# Patient Record
Sex: FEMALE | Race: WHITE | NOT HISPANIC OR LATINO | Employment: FULL TIME | URBAN - METROPOLITAN AREA
[De-identification: names, ages, dates, MRNs, and addresses within clinical notes are randomized per-mention and may not be internally consistent; named-entity substitution may affect disease eponyms.]

---

## 2018-01-03 ENCOUNTER — OFFICE VISIT - HEALTHEAST (OUTPATIENT)
Dept: FAMILY MEDICINE | Facility: CLINIC | Age: 28
End: 2018-01-03

## 2018-01-03 DIAGNOSIS — L08.9 LOCAL SKIN INFECTION: ICD-10-CM

## 2018-01-05 ENCOUNTER — AMBULATORY - HEALTHEAST (OUTPATIENT)
Dept: MULTI SPECIALTY CLINIC | Facility: CLINIC | Age: 28
End: 2018-01-05

## 2018-01-05 ENCOUNTER — OFFICE VISIT (OUTPATIENT)
Dept: FAMILY MEDICINE | Facility: CLINIC | Age: 28
End: 2018-01-05
Payer: COMMERCIAL

## 2018-01-05 VITALS
BODY MASS INDEX: 22.44 KG/M2 | OXYGEN SATURATION: 100 % | HEART RATE: 96 BPM | TEMPERATURE: 98.1 F | RESPIRATION RATE: 14 BRPM | DIASTOLIC BLOOD PRESSURE: 68 MMHG | SYSTOLIC BLOOD PRESSURE: 116 MMHG | HEIGHT: 67 IN | WEIGHT: 143 LBS

## 2018-01-05 DIAGNOSIS — Z12.4 PAP SMEAR FOR CERVICAL CANCER SCREENING: ICD-10-CM

## 2018-01-05 DIAGNOSIS — G47.00 PERSISTENT INSOMNIA: ICD-10-CM

## 2018-01-05 DIAGNOSIS — Z00.00 ROUTINE GENERAL MEDICAL EXAMINATION AT A HEALTH CARE FACILITY: Primary | ICD-10-CM

## 2018-01-05 LAB
CHOLEST SERPL-MCNC: 163 MG/DL
GLUCOSE SERPL-MCNC: 78 MG/DL (ref 70–99)
HDLC SERPL-MCNC: 69 MG/DL
LDLC SERPL CALC-MCNC: 83 MG/DL
NONHDLC SERPL-MCNC: 94 MG/DL
PAP SMEAR - HIM PATIENT REPORTED: NORMAL
TRIGL SERPL-MCNC: 57 MG/DL

## 2018-01-05 PROCEDURE — G0145 SCR C/V CYTO,THINLAYER,RESCR: HCPCS | Performed by: PHYSICIAN ASSISTANT

## 2018-01-05 PROCEDURE — 99385 PREV VISIT NEW AGE 18-39: CPT | Performed by: PHYSICIAN ASSISTANT

## 2018-01-05 PROCEDURE — 80061 LIPID PANEL: CPT | Performed by: PHYSICIAN ASSISTANT

## 2018-01-05 PROCEDURE — 36415 COLL VENOUS BLD VENIPUNCTURE: CPT | Performed by: PHYSICIAN ASSISTANT

## 2018-01-05 PROCEDURE — 82947 ASSAY GLUCOSE BLOOD QUANT: CPT | Performed by: PHYSICIAN ASSISTANT

## 2018-01-05 RX ORDER — TRAZODONE HYDROCHLORIDE 50 MG/1
50 TABLET, FILM COATED ORAL
Qty: 30 TABLET | Refills: 5 | Status: SHIPPED | OUTPATIENT
Start: 2018-01-05 | End: 2021-08-30

## 2018-01-05 NOTE — LETTER
January 6, 2018      Georgie BAKARI Sonia  [address] [address] [place] [address]        Dear ,    We are writing to inform you of your test results.    - Your Cholesterol is excellent.  - Your glucose (screening for diabetes) was normal. We do not need to recheck these for another three years.  - You will receive your Pap Smear results in a separate letter.      Resulted Orders   Glucose   Result Value Ref Range    Glucose 78 70 - 99 mg/dL      Comment:      Fasting specimen   Lipid panel reflex to direct LDL Fasting   Result Value Ref Range    Cholesterol 163 <200 mg/dL    Triglycerides 57 <150 mg/dL      Fasting specimen    HDL Cholesterol 69 >49 mg/dL    LDL Cholesterol Calculated 83 <100 mg/dL      Desirable:       <100 mg/dl    Non HDL Cholesterol 94 <130 mg/dL     If you have any questions or concerns, please call the clinic at the number listed above.       Sincerely,      Mayuri Mcdaniel PA-C

## 2018-01-05 NOTE — MR AVS SNAPSHOT
After Visit Summary   1/5/2018    Georgie Scott    MRN: [id number]           Patient Information     Date Of Birth          1990        Visit Information        Provider Department      1/5/2018 7:30 AM Mayuri Mcdaniel PA-C [facility]        Today's Diagnoses     Routine general medical examination at a health care facility    -  1    Pap smear for cervical cancer screening        Need for prophylactic vaccination and inoculation against influenza        Persistent insomnia          Care Instructions      Preventive Health Recommendations  Female Ages 26 - 39  Yearly exam:   See your health care provider every year in order to    Review health changes.     Discuss preventive care.      Review your medicines if you your doctor has prescribed any.    Until age 30: Get a Pap test every three years (more often if you have had an abnormal result).    After age 30: Talk to your doctor about whether you should have a Pap test every 3 years or have a Pap test with HPV screening every 5 years.   You do not need a Pap test if your uterus was removed (hysterectomy) and you have not had cancer.  You should be tested each year for STDs (sexually transmitted diseases), if you're at risk.   Talk to your provider about how often to have your cholesterol checked.  If you are at risk for diabetes, you should have a diabetes test (fasting glucose).  Shots: Get a flu shot each year. Get a tetanus shot every 10 years.   Nutrition:     Eat at least 5 servings of fruits and vegetables each day.    Eat whole-grain bread, whole-wheat pasta and brown rice instead of white grains and rice.    Talk to your provider about Calcium and Vitamin D.     Lifestyle    Exercise at least 150 minutes a week (30 minutes a day, 5 days of the week). This will help you control your weight and prevent disease.    Limit alcohol to one drink per day.    No smoking.     Wear sunscreen to prevent  "skin cancer.    See your dentist every six months for an exam and cleaning.            Follow-ups after your visit        Who to contact     If you have questions or need follow up information about today's clinic visit or your schedule please contact [facility] directly at [phone].  Normal or non-critical lab and imaging results will be communicated to you by MyChart, letter or phone within 4 business days after the clinic has received the results. If you do not hear from us within 7 days, please contact the clinic through MyChart or phone. If you have a critical or abnormal lab result, we will notify you by phone as soon as possible.  Submit refill requests through "[email or web address] or call your pharmacy and they will forward the refill request to us. Please allow 3 business days for your refill to be completed.          Additional Information About Your Visit        MyChart Information     "[email or web address] lets you send messages to your doctor, view your test results, renew your prescriptions, schedule appointments and more. To sign up, go to [email or web address] . Click on \"Log in\" on the left side of the screen, which will take you to the Welcome page. Then click on \"Sign up Now\" on the right side of the page.     You will be asked to enter the access code listed below, as well as some personal information. Please follow the directions to create your username and password.     Your access code is: [email or web address]  Expires: 2018  8:02 AM     Your access code will  in 90 days. If you need help or a new code, please call your [facility] or [phone].        Care EveryWhere ID     This is your Care EveryWhere ID. This could be used by other organizations to access your [place] medical records  [id number]        Your Vitals Were     Pulse Temperature Respirations Height Last Period Pulse Oximetry    96 98.1  F (36.7  C) (Tympanic) 14 5' 7.25\" (1.708 m) 2017 (Exact Date) " 100%    Breastfeeding? BMI (Body Mass Index)                No 22.23 kg/m2           Blood Pressure from Last 3 Encounters:   01/05/18 116/68    Weight from Last 3 Encounters:   01/05/18 143 lb (64.9 kg)              We Performed the Following     Glucose     Lipid panel reflex to direct LDL Fasting     Pap imaged thin layer screen reflex to HPV if ASCUS - recommend age 25 - 29        Primary Care Provider    None Specified       No primary provider on file.        Equal Access to Services     FAVIO MACEDO : Hadii aad ku hadsherryo Soromeoali, waaxda luqadaha, qaybta kaalmada adeegyada, waxay cadence hyltoneliapetey flowers . So [facility] [phone].    ATENCIÓN: Si habla español, tiene a mcgee disposición servicios gratuitos de asistencia lingüística. Llame al [phone].    We comply with applicable federal civil rights laws and [place] laws. We do not discriminate on the basis of race, color, national origin, age, disability, sex, sexual orientation, or gender identity.            Thank you!     Thank you for choosing [facility]  for your care. Our goal is always to provide you with excellent care. Hearing back from our patients is one way we can continue to improve our services. Please take a few minutes to complete the written survey that you may receive in the mail after your visit with us. Thank you!             Your Updated Medication List - Protect others around you: Learn how to safely use, store and throw away your medicines at www.disposemymeds.org.          This list is accurate as of: 1/5/18  8:02 AM.  Always use your most recent med list.                   Brand Name Dispense Instructions for use Diagnosis    KEFLEX PO

## 2018-01-05 NOTE — PROGRESS NOTES
SUBJECTIVE:   CC: Georgie Scott is an 27 year old woman who presents for preventive health visit.     Physical   Annual:     Getting at least 3 servings of Calcium per day::  Yes    Bi-annual eye exam::  Yes    Dental care twice a year::  NO    Sleep apnea or symptoms of sleep apnea::  None    Diet::  Regular (no restrictions)    Taking medications regularly::  Not Applicable    Medication side effects::  Not applicable    Additional concerns today::  YES          -possible cellulitis, left elbow, small bump, painful, taking abx for it currently  - Abnormal pap a long time ago but also had yeast infection, has had normal since then.    Today's PHQ-2 Score:   PHQ-2 ( 1999 Pf[employer]) 1/5/2018   Q1: Little interest or pleasure in doing things 0   Q2: Feeling down, depressed or hopeless 0   PHQ-2 Score 0   Q1: Little interest or pleasure in doing things Not at all   Q2: Feeling down, depressed or hopeless Not at all   PHQ-2 Score 0       Abuse: Current or Past(Physical, Sexual or Emotional)- No  Do you feel safe in your environment - Yes    Social History   Substance Use Topics     Smoking status: Never Smoker     Smokeless tobacco: Never Used     Alcohol use Yes      Comment: moderate     Alcohol Use 1/5/2018   If you drink alcohol, do you typically have greater than 3 drinks per day OR greater than 7 drinks per week?   Yes   AUDIT SCORE  6     AUDIT - Alcohol Use Disorders Identification Test - Reproduced from the World Health Organization Audit 2001 (Second Edition) 1/5/2018   1.  How often do you have a drink containing alcohol? 2 to 3 times a week   2.  How many drinks containing alcohol do you have on a typical day when you are drinking? 1 or 2   3.  How often do you have five or more drinks on one occasion? Monthly   4.  How often during the last year have you found that you were not able to stop drinking once you had started? Never   5.  How often during the last year have you failed to do what was normally  expected of you because of drinking? Never   6.  How often during the last year have you needed a first drink in the morning to get yourself going after a heavy drinking session? Never   7.  How often during the last year have you had a feeling of guilt or remorse after drinking? Never   8.  How often during the last year have you been unable to remember what happened the night before because of your drinking? Less than monthly   9.  Have you or someone else been injured because of your drinking? No   10. Has a relative, friend, doctor or other health care worker been concerned about your drinking or suggested you cut down? No   TOTAL SCORE 6     Reviewed orders with patient.  Reviewed health maintenance and updated orders accordingly - Yes  BP Readings from Last 3 Encounters:   01/05/18 116/68    Wt Readings from Last 3 Encounters:   01/05/18 143 lb (64.9 kg)               Allergies   Allergen Reactions     Bactrim [Sulfamethoxazole W/Trimethoprim] Hives     Amoxicillin Rash     Morphine Rash     No lab results found.         Pertinent mammograms are reviewed under the imaging tab.  History of abnormal Pap smear: YES - other categories - see link Cervical Cytology Screening Guidelines normal since then, remote abnormal.      Reviewed and updated as needed this visit by clinical staffTobacco  Allergies  Meds  Problems  Med Hx  Surg Hx  Fam Hx  Soc Hx          Reviewed and updated as needed this visit by Provider  Tobacco  Allergies  Meds  Problems  Med Hx  Surg Hx  Fam Hx  Soc Hx         Review of Systems  C: NEGATIVE for fever, chills, change in weight  I: NEGATIVE for worrisome rashes, moles or lesions. improving cellulitis on left elbow about 4 mm in diameter  E: NEGATIVE for vision changes or irritation  ENT: NEGATIVE for ear, mouth and throat problems  R: NEGATIVE for significant cough or SOB  B: NEGATIVE for masses, tenderness or discharge  CV: NEGATIVE for chest pain, palpitations or peripheral  "edema  GI: NEGATIVE for nausea, abdominal pain, heartburn, or change in bowel habits  : NEGATIVE for unusual urinary or vaginal symptoms. Periods are regular.  M: NEGATIVE for significant arthralgias or myalgia  N: NEGATIVE for weakness, dizziness or paresthesias  PSYCHIATRIC: POSITIVE forfatigue and insomnia      OBJECTIVE:   /68  Pulse 96  Temp 98.1  F (36.7  C) (Tympanic)  Resp 14  Ht 5' 7.25\" (1.708 m)  Wt 143 lb (64.9 kg)  LMP 12/14/2017 (Exact Date)  SpO2 100%  Breastfeeding? No  BMI 22.23 kg/m2  Physical Exam  GENERAL: healthy, alert and no distress  EYES: Eyes grossly normal to inspection, PERRL and conjunctivae and sclerae normal  HENT: ear canals and TM's normal, nose and mouth without ulcers or lesions  NECK: no adenopathy, no asymmetry, masses, or scars and thyroid normal to palpation  RESP: lungs clear to auscultation - no rales, rhonchi or wheezes  BREAST: normal without masses, tenderness or nipple discharge and no palpable axillary masses or adenopathy  CV: regular rate and rhythm, normal S1 S2, no S3 or S4, no murmur, click or rub, no peripheral edema and peripheral pulses strong  ABDOMEN: soft, nontender, no hepatosplenomegaly, no masses and bowel sounds normal   (female): normal female external genitalia, normal urethral meatus, vaginal mucosa pink, moist, well rugated, and normal cervix/adnexa/uterus without masses or discharge  MS: no gross musculoskeletal defects noted, no edema  SKIN: 4 mm warm and erythematous macule on left elbow  NEURO: Normal strength and tone, mentation intact and speech normal  PSYCH: mentation appears normal, affect normal/bright    ASSESSMENT/PLAN:       ICD-10-CM    1. Routine general medical examination at a health care facility Z00.00 Lipid panel reflex to direct LDL Fasting   2. Pap smear for cervical cancer screening Z12.4 Pap imaged thin layer screen reflex to HPV if ASCUS - recommend age 25 - 29     Glucose   3. Persistent insomnia G47.00 " "traZODone (DESYREL) 50 MG tablet       Discussed cellulitis improving, do not need to add clinda unless getting worse in the next two days, can call and request this.  Continue keflex as prescribed.    Will trial trazodone for sleep, use prn.    Labs today, recheck not need for 3 years if normal.    COUNSELING:  Reviewed preventive health counseling, as reflected in patient instructions       reports that she has never smoked. She has never used smokeless tobacco.    Estimated body mass index is 22.23 kg/(m^2) as calculated from the following:    Height as of this encounter: 5' 7.25\" (1.708 m).    Weight as of this encounter: 143 lb (64.9 kg).         Counseling Resources:  ATP IV Guidelines  Pooled Cohorts Equation Calculator  Breast Cancer Risk Calculator  FRAX Risk Assessment  ICSI Preventive Guidelines  Dietary Guidelines for Americans, 2010  USDA's MyPlate  ASA Prophylaxis  Lung CA Screening    Mayuri Mcdaniel PA-C  [facility] XERXESAnswers for HPI/ROS submitted by the patient on 1/5/2018   PHQ-2 Score: 0  "

## 2018-01-05 NOTE — NURSING NOTE
"Chief Complaint   Patient presents with     Physical       Initial /68  Pulse 96  Temp 98.1  F (36.7  C) (Tympanic)  Resp 14  Ht 5' 7.25\" (1.708 m)  Wt 143 lb (64.9 kg)  LMP 12/14/2017 (Exact Date)  SpO2 100%  Breastfeeding? No  BMI 22.23 kg/m2 Estimated body mass index is 22.23 kg/(m^2) as calculated from the following:    Height as of this encounter: 5' 7.25\" (1.708 m).    Weight as of this encounter: 143 lb (64.9 kg).  Medication Reconciliation: complete    "

## 2018-01-05 NOTE — LETTER
January 11, 2018      Georgie Scott  [address] [address] [place] [address]    Dear ,      I am happy to inform you that your recent cervical cancer screening test (PAP smear) was normal.      Preventative screenings such as this help to ensure your health for years to come. You should repeat a pap smear in 3 years, unless otherwise directed.      You will still need to return to the clinic every year for your annual exam and other preventive tests.     Please contact the clinic at [phone] if you have further questions.       Sincerely,      Mayuri Mcdaniel PA-C/tru

## 2018-01-06 NOTE — PROGRESS NOTES
Lab letter printed and signed.  Message comments below:    - Your Cholesterol is excellent.  - Your glucose (screening for diabetes) was normal. We do not need to recheck these for another three years.  - You will receive your Pap Smear results in a separate letter.

## 2018-01-09 LAB
COPATH REPORT: NORMAL
PAP: NORMAL

## 2018-04-03 ENCOUNTER — HOSPITAL ENCOUNTER (EMERGENCY)
Facility: CLINIC | Age: 28
Discharge: HOME | End: 2018-04-03
Attending: PHYSICIAN ASSISTANT | Admitting: PHYSICIAN ASSISTANT
Payer: COMMERCIAL

## 2018-04-03 VITALS
HEART RATE: 77 BPM | TEMPERATURE: 97.9 F | DIASTOLIC BLOOD PRESSURE: 80 MMHG | SYSTOLIC BLOOD PRESSURE: 130 MMHG | OXYGEN SATURATION: 98 %

## 2018-04-03 DIAGNOSIS — R30.0 DYSURIA: ICD-10-CM

## 2018-04-03 LAB
ALBUMIN UR-MCNC: NEGATIVE MG/DL
APPEARANCE UR: CLEAR
BACTERIA #/AREA URNS HPF: ABNORMAL /HPF
BILIRUB UR QL STRIP: NEGATIVE
COLOR UR AUTO: ABNORMAL
GLUCOSE UR STRIP-MCNC: NEGATIVE MG/DL
HCG UR QL: NEGATIVE
HGB UR QL STRIP: ABNORMAL
KETONES UR STRIP-MCNC: NEGATIVE MG/DL
LEUKOCYTE ESTERASE UR QL STRIP: ABNORMAL
MUCOUS THREADS #/AREA URNS LPF: PRESENT /LPF
NITRATE UR QL: NEGATIVE
PH UR STRIP: 7 PH (ref 5–7)
RBC #/AREA URNS AUTO: 1 /HPF (ref 0–2)
SOURCE: ABNORMAL
SP GR UR STRIP: 1 (ref 1–1.03)
SQUAMOUS #/AREA URNS AUTO: 3 /HPF (ref 0–1)
UROBILINOGEN UR STRIP-MCNC: 0 MG/DL (ref 0–2)
WBC #/AREA URNS AUTO: 1 /HPF (ref 0–5)

## 2018-04-03 PROCEDURE — 81025 URINE PREGNANCY TEST: CPT | Performed by: PHYSICIAN ASSISTANT

## 2018-04-03 PROCEDURE — 81001 URINALYSIS AUTO W/SCOPE: CPT | Performed by: PHYSICIAN ASSISTANT

## 2018-04-03 PROCEDURE — 87086 URINE CULTURE/COLONY COUNT: CPT | Performed by: PHYSICIAN ASSISTANT

## 2018-04-03 PROCEDURE — G0463 HOSPITAL OUTPT CLINIC VISIT: HCPCS | Performed by: PHYSICIAN ASSISTANT

## 2018-04-03 PROCEDURE — 99214 OFFICE O/P EST MOD 30 MIN: CPT | Mod: Z6 | Performed by: PHYSICIAN ASSISTANT

## 2018-04-03 RX ORDER — CIPROFLOXACIN 500 MG/1
500 TABLET, FILM COATED ORAL 2 TIMES DAILY
Qty: 14 TABLET | Refills: 0 | Status: SHIPPED | OUTPATIENT
Start: 2018-04-03 | End: 2018-04-10

## 2018-04-03 NOTE — ED PROVIDER NOTES
History     Chief Complaint   Patient presents with     Rule out Urinary Tract Infection     HPI  Georgie Scott is a 27 year old female who is in urgent care with concern for possible urinary tract infection.  Patient states she initially developed symptoms approximately 2 weeks ago with some dysuria, increased frequency urgency, nausea.  She did an online virtual visit and was scrubbed and antibiotic thought to be Macrobid daily for 5 days.  She states that while on medication her symptoms improved dramatically however she did continue to have some mild dysuria at the beginning of urination on her last dose.  She finished medications 2 days ago.  Beginning yesterday she states that symptoms have gradually worsened again with increasing dysuria, frequency, urgency.  She has not had any hematuria.  She denies any fever chills body aches, nausea, vomiting, abdominal, flank or back pain.  She has not had any vaginal itching burning or discharge.  She has had infrequent urinary tract infections previously.    Problem List:    Patient Active Problem List    Diagnosis Date Noted     Persistent insomnia 01/05/2018     Priority: Medium        Past Medical History:    History reviewed. No pertinent past medical history.    Past Surgical History:    History reviewed. No pertinent surgical history.    Family History:    Family History   Problem Relation Age of Onset     Hypertension Father      Does not go to the doctor regularly     Hypertension Paternal Grandmother      Colon Cancer Paternal Grandfather 45     No other family history of colon cancer     DIABETES Paternal Uncle      DIABETES Paternal Aunt        Social History:  Marital Status:  Single [1]  Social History   Substance Use Topics     Smoking status: Never Smoker     Smokeless tobacco: Never Used     Alcohol use Yes      Comment: moderate      Medications:      traZODone (DESYREL) 50 MG tablet     Review of Systems  CONSTITUTIONAL:NEGATIVE for fever,  chills, change in weight  INTEGUMENTARY/SKIN: NEGATIVE for worrisome rashes, moles or lesions  RESP:NEGATIVE for significant cough or SOB  GI NEGATIVE For nausea, vomiting, diarrhea, abdominal pain   : POSITIVE for dysuria, increased frequency, urgency, burning NEGATIVE for hematuria or vaginal complaints.    Physical Exam   BP: 130/80  Pulse: 77  Temp: 97.9  F (36.6  C)  SpO2: 98 %  Physical Exam  GENERAL APPEARANCE: healthy, alert and no distress  RESP: lungs clear to auscultation - no rales, rhonchi or wheezes  CV: regular rates and rhythm, normal S1 S2, no murmur noted  ABDOMEN:  soft, nontender, no HSM or masses and bowel sounds normal  BACK: No CVA tenderness  SKIN: no suspicious lesions or rashes  ED Course     ED Course     Procedures        Critical Care time:  none            Results for orders placed or performed during the hospital encounter of 04/03/18 (from the past 24 hour(s))   UA with Microscopic   Result Value Ref Range    Color Urine Straw     Appearance Urine Clear     Glucose Urine Negative NEG^Negative mg/dL    Bilirubin Urine Negative NEG^Negative    Ketones Urine Negative NEG^Negative mg/dL    Specific Gravity Urine 1.002 (L) 1.003 - 1.035    Blood Urine Moderate (A) NEG^Negative    pH Urine 7.0 5.0 - 7.0 pH    Protein Albumin Urine Negative NEG^Negative mg/dL    Urobilinogen mg/dL 0.0 0.0 - 2.0 mg/dL    Nitrite Urine Negative NEG^Negative    Leukocyte Esterase Urine Trace (A) NEG^Negative    Source Midstream Urine     WBC Urine 1 0 - 5 /HPF    RBC Urine 1 0 - 2 /HPF    Bacteria Urine Few (A) NEG^Negative /HPF    Squamous Epithelial /HPF Urine 3 (H) 0 - 1 /HPF    Mucous Urine Present (A) NEG^Negative /LPF   HCG qualitative urine   Result Value Ref Range    HCG Qual Urine Negative NEG^Negative     Medications - No data to display    Assessments & Plan (with Medical Decision Making)     I have reviewed the nursing notes.    I have reviewed the findings, diagnosis, plan and need for follow up  with the patient.       Discharge Medication List as of 4/3/2018  4:36 PM      START taking these medications    Details   ciprofloxacin (CIPRO) 500 MG tablet Take 1 tablet (500 mg) by mouth 2 times daily for 7 days, Disp-14 tablet, R-0, E-Prescribe           Final diagnoses:   Dysuria     19-year-old female presents to urgent care with concern for possible urinary tract infection after developing dysuria, increased frequency, urgency 2 weeks ago and having some initial improvement with oral antibiotics however no complete resolution of pain.  She had stable vital signs upon arrival.  Physical exam findings as described above were benign.  As part of evaluation she did have urinalysis which did show moderate blood, few bacteria and trace leukocyte esterase,  However was dilute.  although this was not definitive for infection.  Given patient's classic history and initial response to antibiotics I did discuss her/benefits of initiating antibiotics while urine culture from today's visit was pending and patient elected to be placed on antibiotics.  If no improvement patient will require further evaluation including testing for sexually transmitted infections, wet prep which she declined today.  She was discharged home stable with instructions to follow up with PCP if no improvement in 48-72 hours.  Worrisome reasons to return to ER/UC sooner discussed.     Disclaimer: This note consists of symbols derived from keyboarding, dictation, and/or voice recognition software. As a result, there may be errors in the script that have gone undetected.  Please consider this when interpreting information found in the chart.    4/3/2018   [facility] EMERGENCY DEPARTMENT     Judi Estes PA-C  04/03/18 [id number]

## 2018-04-03 NOTE — ED AVS SNAPSHOT
[facility] Emergency Department    [address] [address]    Phone:  [phone]    Fax:  [phone]                                       Georgie Scott   MRN: [id number]    Department:  [facility] Emergency Department   Date of Visit:  4/3/2018           After Visit Summary Signature Page     I have received my discharge instructions, and my questions have been answered. I have discussed any challenges I see with this plan with the nurse or doctor.    ..........................................................................................................................................  Patient/Patient Representative Signature      ..........................................................................................................................................  Patient Representative Print Name and Relationship to Patient    ..................................................               ................................................  Date                                            Time    ..........................................................................................................................................  Reviewed by Signature/Title    ...................................................              ..............................................  Date                                                            Time

## 2018-04-03 NOTE — ED AVS SNAPSHOT
[facility] Emergency Department    [address]    [facility] [place] [address]    Phone:  [phone]    Fax:  [phone]                                       Georgie Scott   MRN: [id number]    Department:  [facility] Emergency Department   Date of Visit:  4/3/2018           Patient Information     Date Of Birth          1990        Your diagnoses for this visit were:     Dysuria        You were seen by Judi Estes PA-C.      Follow-up Information     Follow up with No Ref-Primary, Physician In 3 days.    Why:  As needed, If symptoms worsen      24 Hour Appointment Hotline       To make an appointment at any [facility] clinic, call [phone] ([phone]). If you don't have a family doctor or clinic, we will help you find one. [facility] clinics are conveniently located to serve the needs of you and your family.             Review of your medicines      START taking        Dose / Directions Last dose taken    ciprofloxacin 500 MG tablet   Commonly known as:  CIPRO   Dose:  500 mg   Quantity:  14 tablet        Take 1 tablet (500 mg) by mouth 2 times daily for 7 days   Refills:  0          Our records show that you are taking the medicines listed below. If these are incorrect, please call your family doctor or clinic.        Dose / Directions Last dose taken    traZODone 50 MG tablet   Commonly known as:  DESYREL   Dose:  50 mg   Quantity:  30 tablet        Take 1 tablet (50 mg) by mouth nightly as needed   Refills:  5                Prescriptions were sent or printed at these locations (1 Prescription)                   [facility] [address] [place] [address] [address] [facility] [address]    Telephone:  [phone]   Fax:  [phone]   Hours:                  E-Prescribed (1 of 1)         ciprofloxacin (CIPRO) 500 MG tablet                Procedures and tests performed during your visit     HCG qualitative urine    UA with  Microscopic    Urine Culture      Orders Needing Specimen Collection     None      Pending Results     Date and Time Order Name Status Description    4/3/2018 1553 Urine Culture In process             Pending Culture Results     Date and Time Order Name Status Description    4/3/2018 1553 Urine Culture In process             Pending Results Instructions     If you had any lab results that were not finalized at the time of your Discharge, you can call the ED Lab Result RN at [phone]. You will be contacted by this team for any positive Lab results or changes in treatment. The nurses are available 7 days a week from 10A to 6:30P.  You can leave a message 24 hours per day and they will return your call.        Test Results From Your Hospital Stay        4/3/2018  3:58 PM         4/3/2018  4:19 PM      Component Results     Component Value Ref Range & Units Status    Color Urine Straw  Final    Appearance Urine Clear  Final    Glucose Urine Negative NEG^Negative mg/dL Final    Bilirubin Urine Negative NEG^Negative Final    Ketones Urine Negative NEG^Negative mg/dL Final    Specific Gravity Urine 1.002 (L) 1.003 - 1.035 Final    Blood Urine Moderate (A) NEG^Negative Final    pH Urine 7.0 5.0 - 7.0 pH Final    Protein Albumin Urine Negative NEG^Negative mg/dL Final    Urobilinogen mg/dL 0.0 0.0 - 2.0 mg/dL Final    Nitrite Urine Negative NEG^Negative Final    Leukocyte Esterase Urine Trace (A) NEG^Negative Final    Source Midstream Urine  Final    WBC Urine 1 0 - 5 /HPF Final    RBC Urine 1 0 - 2 /HPF Final    Bacteria Urine Few (A) NEG^Negative /HPF Final    Squamous Epithelial /HPF Urine 3 (H) 0 - 1 /HPF Final    Mucous Urine Present (A) NEG^Negative /LPF Final         4/3/2018  4:05 PM      Component Results     Component Value Ref Range & Units Status    HCG Qual Urine Negative NEG^Negative Final    This test is for screening purposes.  Results should be interpreted along with   the clinical picture.  Confirmation  testing is available if warranted by   ordering BAB701, HCG Quantitative Pregnancy.                  Thank you for choosing [facility]       Thank you for choosing [facility] for your care. Our goal is always to provide you with excellent care. Hearing back from our patients is one way we can continue to improve our services. Please take a few minutes to complete the written survey that you may receive in the mail after you visit with us. Thank you!        [employer] Information     [employer] gives you secure access to your electronic health record. If you see a primary care provider, you can also send messages to your care team and make appointments. If you have questions, please call your primary care clinic.  If you do not have a primary care provider, please call [phone] and they will assist you.        Care EveryWhere ID     This is your Care EveryWhere ID. This could be used by other organizations to access your [facility] medical records  [id number]        Equal Access to Services     FAVIO MACEDO : Harry Lilly, suzanne tan, leatha siegel. So [facility] [phone].    ATENCIÓN: Si habla español, tiene a mcgee disposición servicios gratuitos de asistencia lingüística. Llame al [phone].    We comply with applicable federal civil rights laws and [place] laws. We do not discriminate on the basis of race, color, national origin, age, disability, sex, sexual orientation, or gender identity.            After Visit Summary       This is your record. Keep this with you and show to your community pharmacist(s) and doctor(s) at your next visit.

## 2018-04-04 LAB
BACTERIA SPEC CULT: NO GROWTH
Lab: NORMAL
SPECIMEN SOURCE: NORMAL

## 2018-04-05 ENCOUNTER — TELEPHONE (OUTPATIENT)
Dept: EMERGENCY MEDICINE | Facility: CLINIC | Age: 28
End: 2018-04-05

## 2018-04-05 NOTE — TELEPHONE ENCOUNTER
"[facility] Emergency Department Lab result notification:    [facility] ED lab result protocol used  Urine culture    Reason for call  Notify of lab results, assess symptoms,  review ED providers recommendations/discharge instructions (if necessary) and advise per ED lab result f/u protocol    Lab Result  Final urine culture report shows \"NO GROWTH\" and is NEGATIVE.  [facility] ED discharge antibiotic: Ciprofloxacin (Cipro) 500 mg tablet, 1 tablet (500 mg) by mouth 2 times daily for 7 days.  Is ED discharge Rx antibiotic for UTI only (Yes/No): Yes.  (Patient had completed another antibiotic 2 days prior to ED visit).  Recommendations per [facility] ED Lab result protocol - Urine culture protocol.  Information table from ED Provider visit on 4/3/18  Symptoms reported at ED visit (Chief complaint, HPI) Chief Complaint   Patient presents with     Rule out Urinary Tract Infection      HPI  Georgie Scott is a 27 year old female who is in urgent care with concern for possible urinary tract infection.  Patient states she initially developed symptoms approximately 2 weeks ago with some dysuria, increased frequency urgency, nausea.  She did an online virtual visit and was scrubbed and antibiotic thought to be Macrobid daily for 5 days.  She states that while on medication her symptoms improved dramatically however she did continue to have some mild dysuria at the beginning of urination on her last dose.  She finished medications 2 days ago.  Beginning yesterday she states that symptoms have gradually worsened again with increasing dysuria, frequency, urgency.  She has not had any hematuria.  She denies any fever chills body aches, nausea, vomiting, abdominal, flank or back pain.  She has not had any vaginal itching burning or discharge.  She has had infrequent urinary tract infections previously.     ED providers Impression and Plan (applicable information) Final diagnoses:   Dysuria      19-year-old female presents to " urgent care with concern for possible urinary tract infection after developing dysuria, increased frequency, urgency 2 weeks ago and having some initial improvement with oral antibiotics however no complete resolution of pain.  She had stable vital signs upon arrival.  Physical exam findings as described above were benign.  As part of evaluation she did have urinalysis which did show moderate blood, few bacteria and trace leukocyte esterase,  However was dilute.  although this was not definitive for infection.  Given patient's classic history and initial response to antibiotics I did discuss her/benefits of initiating antibiotics while urine culture from today's visit was pending and patient elected to be placed on antibiotics.  If no improvement patient will require further evaluation including testing for sexually transmitted infections, wet prep which she declined today.  She was discharged home stable with instructions to follow up with PCP if no improvement in 48-72 hours.  Worrisome reasons to return to ER/UC sooner discussed.      Miscellaneous information Judi Estes PA-C, RN Assessment (Patient s current Symptoms), include time called.  [Insert Left message here if message left]  12:35 pm She is feeling better and does have PCP. Will continue antibiotic as prescribed.   Please Contact your PCP clinic or return to the Emergency department if your:    Symptoms return.    Symptoms do not improve after 3 days on antibiotic.    Symptoms do not resolve after completing antibiotic.    Symptoms worsen or other concerning symptom's.    PCP follow-up Questions asked: YES       Jennifer Puente RN  [facility] RN  Lung Nodule and ED Lab Result F/u RN  Epic pool (ED late result f/u RN): [phone] [id number]  # [phone]

## 2018-07-24 ENCOUNTER — RECORDS - HEALTHEAST (OUTPATIENT)
Dept: LAB | Facility: CLINIC | Age: 28
End: 2018-07-24

## 2018-07-25 LAB
GAMMA INTERFERON BACKGROUND BLD IA-ACNC: 0.28 IU/ML
M TB IFN-G BLD-IMP: NEGATIVE
MITOGEN IGNF BCKGRD COR BLD-ACNC: -0.1 IU/ML
MITOGEN IGNF BCKGRD COR BLD-ACNC: -0.11 IU/ML
QTF INTERPRETATION: NORMAL
QTF MITOGEN - NIL: >10 IU/ML

## 2018-08-01 PROBLEM — N94.6 DYSMENORRHEA: Status: ACTIVE | Noted: 2018-08-01

## 2018-08-03 ENCOUNTER — OFFICE VISIT (OUTPATIENT)
Dept: FAMILY MEDICINE | Facility: CLINIC | Age: 28
End: 2018-08-03
Payer: COMMERCIAL

## 2018-08-03 VITALS
BODY MASS INDEX: 21.92 KG/M2 | DIASTOLIC BLOOD PRESSURE: 78 MMHG | TEMPERATURE: 97 F | RESPIRATION RATE: 14 BRPM | HEART RATE: 71 BPM | OXYGEN SATURATION: 100 % | SYSTOLIC BLOOD PRESSURE: 120 MMHG | WEIGHT: 141 LBS

## 2018-08-03 DIAGNOSIS — Z12.83 SKIN EXAM, SCREENING FOR CANCER: ICD-10-CM

## 2018-08-03 DIAGNOSIS — N94.6 DYSMENORRHEA: Primary | ICD-10-CM

## 2018-08-03 PROCEDURE — 99213 OFFICE O/P EST LOW 20 MIN: CPT | Performed by: PHYSICIAN ASSISTANT

## 2018-08-03 NOTE — PROGRESS NOTES
SUBJECTIVE:   Georgie Scott is a 28 year old female who presents to clinic today for the following health issues:    Vaginal Bleeding (Dysmenorrhea)  Onset: ongoing    Description:   Duration of bleeding episodes: 10-15 days  Frequency between periods:  28-30 days  Describe bleeding/flow:   Clots: YES  Number of pads/hour: mostly just spotting at times, she usually will change it every 3 hours  Cramping: moderate, mild, before and during    Accompanying Signs & Symptoms:  Weakness: no   Lightheadedness: no   Hot flashes: YES  Nosebleeds/Easy bruising: no   Vaginal Discharge: no     History:  Patient's last menstrual period was 07/23/2018 (exact date).  Previous normal periods: YES- always has had longer periods but never into 15 days  Contraceptive use: NO  Possibility of Pregnancy: no   Any bleeding after intercourse: no   Age of first period (menarche): 11  Abnormal PAP Smears: no    Precipitating factors:   n/a    Alleviating factors:  n/a      Has had ultrasounds in the past,3 years ago was the last one, last US showed cysts   Therapies Tried and outcome: ibuprofen      -patient is also wanting a mole check or referral to derm for mole check  Reviewed and updated as needed this visit by clinical staff  Tobacco  Allergies  Meds  Problems  Med Hx  Surg Hx  Fam Hx  Soc Hx        Reviewed and updated as needed this visit by Provider  Tobacco  Allergies  Meds  Problems  Med Hx  Surg Hx  Fam Hx  Soc Hx        Additional complaints: None    HPI additional notes: Georgie presents today with   Chief Complaint   Patient presents with     Vaginal Bleeding     Mole     mole check or referral to derm for mole check   Life long history of periods lasting 10-15 days.  She has been on OCP and the ring before which shortened her periods to about 1 week but she did not like the way she felt on hormones.  Family history of fibroids.       ROS:  C: Negative for fever, chills, recent change in weight  Skin:  Negative for worrisome rashes or lesions  ENT: Negative for ear, mouth and throat problems  Resp: Negative for significant cough or SOB  CV: Negative for chest pain or peripheral edema  GI: Negative for nausea, abdominal pain, heartburn, or change in bowel habits  : POSITIVE for vaginal bleeding. Negative for dysuria  MS: Negative for significant arthralgias or myalgias  P: Negative for changes in mood or affect  ROS all other systems negative.    Chart Review:  History   Smoking Status     Never Smoker   Smokeless Tobacco     Never Used     Patient Active Problem List   Diagnosis     Persistent insomnia     Dysmenorrhea     History reviewed. No pertinent surgical history.  Problem list, Medication list, Allergies, Medical/Social/Surg hx reviewed in [employer], updated as appropriate.   OBJECTIVE:                                                    /78  Pulse 71  Temp 97  F (36.1  C) (Tympanic)  Resp 14  Wt 141 lb (64 kg)  LMP 07/23/2018 (Exact Date)  SpO2 100%  BMI 21.92 kg/m2  Body mass index is 21.92 kg/(m^2).  GENERAL: healthy, alert, in no acute distress  SKIN: no suspicious lesions, no rashes  PSYCH: Alert and oriented times 3;  Able to articulate logical thoughts. Affect is normal.    Diagnostic test results: none      ASSESSMENT/PLAN:                                                          ICD-10-CM    1. Dysmenorrhea N94.6 RADIOLOGY REFERRAL   2. Skin exam, screening for cancer Z12.83 DERMATOLOGY REFERRAL     Will follow up with dermatology for full skin exam.    Discussed different options for controlling her period.  Pt does not want to be on the pill or ring.  She has had friends who had bad experiences with the mirena and nexplanon.  She knows depo can be damaging to your bones.  Discussed mirena at length as feel that this would be the best option for her but she does not want to try it and sees it as only a temporary solution.    Will get pelvic US to see if there is any abnormality causing  her heavy periods.  If normal, will trial course of provera to see if it resets her cycle.    Please see patient instructions for treatment details.    Follow up as needed.    Mayuri Mcdaniel PA-C  [facility]

## 2018-08-03 NOTE — MR AVS SNAPSHOT
After Visit Summary   8/3/2018    Georgie Scott    MRN: [id number]           Patient Information     Date Of Birth          1990        Visit Information        Provider Department      8/3/2018 7:50 AM Mayuri Mcdaniel PA-C [facility]        Today's Diagnoses     Dysmenorrhea    -  1    Skin exam, screening for cancer           Follow-ups after your visit        Additional Services     DERMATOLOGY REFERRAL       Your provider has referred you to:   [facility]: [facility] [phone]   [email or web address]  [facility]: [facility] [phone]    Please be aware that coverage of these services is subject to the terms and limitations of your health insurance plan.  Call member services at your health plan with any benefit or coverage questions.      Please bring the following with you to your appointment:    (1) Any X-Rays, CTs or MRIs which have been performed.  Contact the facility where they were done to arrange for  prior to your scheduled appointment.    (2) List of current medications  (3) This referral request   (4) Any documents/labs given to you for this referral            RADIOLOGY REFERRAL       [facility]: [facility], [address]    [address] [place] [address]  Scheduling Phone: [phone]      Scheduling Fax: [phone]    Test Requested: US Pelvic complete with trans-vaginal, non OB  Timeline Request:  2 weeks  Signs/Sx: heavy menstrual periods  Known Diagnosis: dysmenorrhea  Previous Testing:    Please be aware that coverage of these services is subject to the terms and limitations of your health insurance plan.  Call member services at your health plan with any benefit or coverage questions.      Please bring the following to your appointment:  >>   Any x-rays, CTs or MRIs which have been performed.    >>   List of  current medications   >>   This referral request   >>   Any documents/labs given to you for this referral                  Who to contact     If you have questions or need follow up information about today's clinic visit or your schedule please contact [facility] directly at [phone].  Normal or non-critical lab and imaging results will be communicated to you by MyChart, letter or phone within 4 business days after the clinic has received the results. If you do not hear from us within 7 days, please contact the clinic through [employer] or phone. If you have a critical or abnormal lab result, we will notify you by phone as soon as possible.  Submit refill requests through [employer] or call your pharmacy and they will forward the refill request to us. Please allow 3 business days for your refill to be completed.          Additional Information About Your Visit        MyChart Information     [employer] gives you secure access to your electronic health record. If you see a primary care provider, you can also send messages to your care team and make appointments. If you have questions, please call your primary care clinic.  If you do not have a primary care provider, please call [phone] and they will assist you.        Care EveryWhere ID     This is your Care EveryWhere ID. This could be used by other organizations to access your [facility] medical records  [id number]        Your Vitals Were     Pulse Temperature Respirations Last Period Pulse Oximetry BMI (Body Mass Index)    71 97  F (36.1  C) (Tympanic) 14 07/23/2018 (Exact Date) 100% 21.92 kg/m2       Blood Pressure from Last 3 Encounters:   08/03/18 120/78   04/03/18 130/80   01/05/18 116/68    Weight from Last 3 Encounters:   08/03/18 141 lb (64 kg)   01/05/18 143 lb (64.9 kg)              We Performed the Following     DERMATOLOGY REFERRAL     RADIOLOGY REFERRAL        Primary Care Provider Fax #    Physician No Ref-Primary  [phone]       No address on file        Equal Access to Services     FAVIO REMINGTON : Hadii aad ku hadsherryusman Nicoleali, wadianeprakash danieleliseha, leno dannyeugeniaprakash arreguinjose rprakash, leatha hyltoneliapetey hinson. So [facility] [phone].    ATENCIÓN: Si habla español, tiene a mcgee disposición servicios gratuitos de asistencia lingüística. Llame al [phone].    We comply with applicable federal civil rights laws and [place] laws. We do not discriminate on the basis of race, color, national origin, age, disability, sex, sexual orientation, or gender identity.            Thank you!     Thank you for choosing [facility]  for your care. Our goal is always to provide you with excellent care. Hearing back from our patients is one way we can continue to improve our services. Please take a few minutes to complete the written survey that you may receive in the mail after your visit with us. Thank you!             Your Updated Medication List - Protect others around you: Learn how to safely use, store and throw away your medicines at www.disposemymeds.org.          This list is accurate as of 8/3/18  9:05 AM.  Always use your most recent med list.                   Brand Name Dispense Instructions for use Diagnosis    traZODone 50 MG tablet    DESYREL    30 tablet    Take 1 tablet (50 mg) by mouth nightly as needed    Persistent insomnia

## 2018-08-06 ENCOUNTER — RECORDS - HEALTHEAST (OUTPATIENT)
Dept: ADMINISTRATIVE | Facility: OTHER | Age: 28
End: 2018-08-06

## 2018-08-06 ENCOUNTER — TELEPHONE (OUTPATIENT)
Dept: FAMILY MEDICINE | Facility: CLINIC | Age: 28
End: 2018-08-06

## 2018-08-06 NOTE — TELEPHONE ENCOUNTER
Reason for Call:  Other call back    Detailed comments: Georgie requested her ultrasound order and information be faxed to [facility] in [place] to [facility].  Fax number: [phone]. Call Georgie for any clarification.     Phone Number Patient can be reached at: Cell number on file:    Telephone Information:   Mobile [phone]       Best Time: any    Can we leave a detailed message on this number? YES    Call taken on 8/6/2018 at 8:09 AM by Vandana Carr

## 2018-08-06 NOTE — TELEPHONE ENCOUNTER
Faxed ultrasound order to [facility] at [phone]. Left a voice message informing pt this was done. Faxed number listed in message is incomplete. Asked pt to call triage back if order needs to be faxed to a different fax.

## 2018-08-13 ENCOUNTER — RECORDS - HEALTHEAST (OUTPATIENT)
Dept: ADMINISTRATIVE | Facility: OTHER | Age: 28
End: 2018-08-13

## 2018-08-20 ENCOUNTER — MYC MEDICAL ADVICE (OUTPATIENT)
Dept: FAMILY MEDICINE | Facility: CLINIC | Age: 28
End: 2018-08-20

## 2018-08-20 DIAGNOSIS — N92.0 MENORRHAGIA WITH REGULAR CYCLE: Primary | ICD-10-CM

## 2018-08-21 ENCOUNTER — RECORDS - HEALTHEAST (OUTPATIENT)
Dept: ADMINISTRATIVE | Facility: OTHER | Age: 28
End: 2018-08-21

## 2018-08-21 NOTE — TELEPHONE ENCOUNTER
Ultrasound done at [facility]. Medical records dept does not open until 8. Will try again then.

## 2018-08-21 NOTE — TELEPHONE ENCOUNTER
"I discussed the ultrasound results with the patient.  She is on no birth control at present.  Said that \"did not work for her\".  When it actually did work in regulating her menstrual cycles she got very gaspar.  She currently has a menstrual cycle regularly but lasts for 2 weeks and then she is off for 2 weeks and then she gets it again for 2 weeks etc.  I recommended OB/GYN referral.  She just recently moved to [place] so I referred her to the [facility] clinic.  "

## 2018-09-26 ENCOUNTER — OFFICE VISIT - HEALTHEAST (OUTPATIENT)
Dept: FAMILY MEDICINE | Facility: CLINIC | Age: 28
End: 2018-09-26

## 2018-09-26 DIAGNOSIS — N92.0 MENORRHAGIA WITH REGULAR CYCLE: ICD-10-CM

## 2018-09-26 ASSESSMENT — MIFFLIN-ST. JEOR: SCORE: 1399.21

## 2018-11-20 ENCOUNTER — OFFICE VISIT - HEALTHEAST (OUTPATIENT)
Dept: FAMILY MEDICINE | Facility: CLINIC | Age: 28
End: 2018-11-20

## 2018-11-20 DIAGNOSIS — Z11.3 ROUTINE SCREENING FOR STI (SEXUALLY TRANSMITTED INFECTION): ICD-10-CM

## 2018-11-20 DIAGNOSIS — N92.0 MENORRHAGIA WITH REGULAR CYCLE: ICD-10-CM

## 2018-11-20 LAB
FSH SERPL-ACNC: 4.4 MIU/ML
HCG UR QL: NEGATIVE
HGB BLD-MCNC: 14 G/DL (ref 12–16)
PROLACTIN SERPL-MCNC: 17.8 NG/ML (ref 0–20)
SP GR UR STRIP: 1.01 (ref 1–1.03)
TSH SERPL DL<=0.005 MIU/L-ACNC: 1.53 UIU/ML (ref 0.3–5)

## 2018-11-20 ASSESSMENT — MIFFLIN-ST. JEOR: SCORE: 1406.01

## 2018-11-21 LAB
C TRACH DNA SPEC QL PROBE+SIG AMP: NEGATIVE
N GONORRHOEA DNA SPEC QL NAA+PROBE: NEGATIVE

## 2018-11-28 ENCOUNTER — COMMUNICATION - HEALTHEAST (OUTPATIENT)
Dept: FAMILY MEDICINE | Facility: CLINIC | Age: 28
End: 2018-11-28

## 2018-11-28 DIAGNOSIS — N92.0 MENORRHAGIA WITH REGULAR CYCLE: ICD-10-CM

## 2018-12-11 ENCOUNTER — OFFICE VISIT (OUTPATIENT)
Dept: DERMATOLOGY | Facility: CLINIC | Age: 28
End: 2018-12-11
Payer: COMMERCIAL

## 2018-12-11 DIAGNOSIS — D22.9 MULTIPLE NEVI: ICD-10-CM

## 2018-12-11 DIAGNOSIS — H01.9 DERMATITIS OF EYELIDS OF BOTH EYES: Primary | ICD-10-CM

## 2018-12-11 DIAGNOSIS — D18.01 CHERRY ANGIOMA: ICD-10-CM

## 2018-12-11 PROCEDURE — 99203 OFFICE O/P NEW LOW 30 MIN: CPT | Performed by: DERMATOLOGY

## 2018-12-11 RX ORDER — TACROLIMUS 1 MG/G
OINTMENT TOPICAL
Qty: 30 G | Refills: 11 | Status: SHIPPED | OUTPATIENT
Start: 2018-12-11 | End: 2021-08-30

## 2018-12-11 RX ORDER — HYDROCORTISONE 25 MG/G
OINTMENT TOPICAL
Qty: 20 G | Refills: 2 | Status: SHIPPED | OUTPATIENT
Start: 2018-12-11 | End: 2021-08-30

## 2018-12-11 NOTE — LETTER
12/11/2018      RE: Georgie Scott  [address]  [place] [address]       Dermatology Clinic Visit Note    DPL:  1. Eyelid dermatitis  2. Benign pigmented nevi   3. Cherry angiomas      Service Date: 12/11/2018      CHIEF COMPLAINT:  Skin check.      HISTORY OF PRESENT ILLNESS:  Ms. Scott is a 28-year-old female presenting to Dermatology Clinic for a skin check.  She is seen at the request of Mayuri Mcdaniel PA-C.  She has no past history of skin cancer or dysplastic nevi.  She notes that she does have chronic irritation and redness around her eyes.  Ms. Scott has seasonal allergies and her eyes water very easily.  She does not use eye drops or any other topical medications in this area.  When she had previously tried mometasone, which had been prescribed by an outside provider, this helped to clear the eruption but then it again recurred.      Patient Active Problem List   Diagnosis     Persistent insomnia     Dysmenorrhea       Allergies   Allergen Reactions     Bactrim [Sulfamethoxazole W/Trimethoprim] Hives     Amoxicillin Rash     Morphine Rash         Current Outpatient Medications   Medication     hydrocortisone 2.5 % ointment     tacrolimus (PROTOPIC) 0.1 % external ointment     traZODone (DESYREL) 50 MG tablet     No current facility-administered medications for this visit.           SOCIAL HISTORY:  The patient is single.  She lives in [place].  She is employed by [employer].      REVIEW OF SYSTEMS:  Feels well without other skin concerns.      FAMILY HISTORY:  No family members with skin cancer.  Sister-in-law with melanoma.      PHYSICAL EXAMINATION:   GENERAL:  Georgie is a healthy-appearing 28-year-old female in no distress.   HEENT:  Conjunctiva clear.   PULMONARY:  Breathing comfortably on room air.   SKIN:  Exam today included the scalp, face, neck, chest, abdomen, back, arms, legs, hands, feet, buttocks.  Skin exam was normal except for as follows:    -Examination of the back,  chest and arms with scattered, oval, slightly raised, 2-3 mm pink-brown papules.   -Examination of the right plantar foot with approximately 5 mm medium-brown macule with a furrow pigment network.   -Left dorsal foot with scattered 2-3 mm medium-brown macules.   -Left upper medial thigh with an approximately 4 mm medium-brown papule with vessels within.   -Scattered 1 mm cherry red papules on the thighs.   -Examination of the bilateral lower eyelids and the bilateral lateral canthi with mild erythema and scale.      ASSESSMENT AND PLAN:   1.  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  Discussed that mometasone would be too strong of a medication to safely use near the eyes.  I suggested use of Vaseline around the eyelids at bedtime to help protect the skin.  Suggested avoidance of makeup remover wipes.  Recommended hydrocortisone 2.5% ointment nightly for 1 week and then transition to Protopic 0.1% nightly as needed.  Noted that initial use of Protopic may cause mild stinging and burning.      Discussed that patch testing for allergic contact dermatitis could be considered but would try a barrier protection first.     2.  Benign pigmented nevi:  No lesions of concern today.  Discussed the ABCDEs of malignant melanoma.     3.  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  No treatment advised.      Ms. Scott to return to Dermatology Clinic every 2 years for a skin check or sooner if additional concerns.  Thank you for this consultation.     Bailey Miller MD  Dermatology Staff       cc:   Mayuri Martinez PA-C   [facility]   [address]   [place] [address]              D: 2018   T: 2018   MT: al      Name:     KAYLA SCOTT   MRN:      [id number]        Account:      [id number]   :      1990           Service Date: 2018      Document: [id number]

## 2018-12-11 NOTE — PATIENT INSTRUCTIONS
-for now, use Vaseline around the eyes at bedtime  -use the hydrocortisone 2.5% ointment nightly for 1 week, then transition to protopic as needed.   -Consider not using makeup remover wipes

## 2018-12-12 NOTE — PROGRESS NOTES
Dermatology Clinic Visit Note    DPL:  1. Eyelid dermatitis  2. Benign pigmented nevi   3. Cherry angiomas      Service Date: 12/11/2018      CHIEF COMPLAINT:  Skin check.      HISTORY OF PRESENT ILLNESS:  Ms. Scott is a 28-year-old female presenting to Dermatology Clinic for a skin check.  She is seen at the request of Mayuri Mcdaniel PA-C.  She has no past history of skin cancer or dysplastic nevi.  She notes that she does have chronic irritation and redness around her eyes.  Ms. Scott has seasonal allergies and her eyes water very easily.  She does not use eye drops or any other topical medications in this area.  When she had previously tried mometasone, which had been prescribed by an outside provider, this helped to clear the eruption but then it again recurred.      Patient Active Problem List   Diagnosis     Persistent insomnia     Dysmenorrhea       Allergies   Allergen Reactions     Bactrim [Sulfamethoxazole W/Trimethoprim] Hives     Amoxicillin Rash     Morphine Rash         Current Outpatient Medications   Medication     hydrocortisone 2.5 % ointment     tacrolimus (PROTOPIC) 0.1 % external ointment     traZODone (DESYREL) 50 MG tablet     No current facility-administered medications for this visit.           SOCIAL HISTORY:  The patient is single.  She lives in [place].  She is employed by [employer].      REVIEW OF SYSTEMS:  Feels well without other skin concerns.      FAMILY HISTORY:  No family members with skin cancer.  Sister-in-law with melanoma.      PHYSICAL EXAMINATION:   GENERAL:  Georgie is a healthy-appearing 28-year-old female in no distress.   HEENT:  Conjunctiva clear.   PULMONARY:  Breathing comfortably on room air.   SKIN:  Exam today included the scalp, face, neck, chest, abdomen, back, arms, legs, hands, feet, buttocks.  Skin exam was normal except for as follows:    -Examination of the back, chest and arms with scattered, oval, slightly raised, 2-3 mm pink-brown papules.    -Examination of the right plantar foot with approximately 5 mm medium-brown macule with a furrow pigment network.   -Left dorsal foot with scattered 2-3 mm medium-brown macules.   -Left upper medial thigh with an approximately 4 mm medium-brown papule with vessels within.   -Scattered 1 mm cherry red papules on the thighs.   -Examination of the bilateral lower eyelids and the bilateral lateral canthi with mild erythema and scale.      ASSESSMENT AND PLAN:   1.  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  Discussed that mometasone would be too strong of a medication to safely use near the eyes.  I suggested use of Vaseline around the eyelids at bedtime to help protect the skin.  Suggested avoidance of makeup remover wipes.  Recommended hydrocortisone 2.5% ointment nightly for 1 week and then transition to Protopic 0.1% nightly as needed.  Noted that initial use of Protopic may cause mild stinging and burning.      Discussed that patch testing for allergic contact dermatitis could be considered but would try a barrier protection first.     2.  Benign pigmented nevi:  No lesions of concern today.  Discussed the ABCDEs of malignant melanoma.     3.  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  No treatment advised.      Ms. Frausto to return to Dermatology Clinic every 2 years for a skin check or sooner if additional concerns.  Thank you for this consultation.     Bailey Miller MD  Dermatology Staff       cc:   Mayuri Martinez PA-C   [facility]   [address]   [place] [address]         BAILEY MILLER MD             D: 2018   T: 2018   MT: al      Name:     KAYLA FRAUSTO   MRN:      [id number]        Account:      [id number]   :      1990           Service Date: 2018      Document: [id number]

## 2019-01-22 ENCOUNTER — OFFICE VISIT - HEALTHEAST (OUTPATIENT)
Dept: OBGYN | Facility: CLINIC | Age: 29
End: 2019-01-22

## 2019-01-22 DIAGNOSIS — N92.0 MENORRHAGIA WITH REGULAR CYCLE: ICD-10-CM

## 2019-01-22 ASSESSMENT — MIFFLIN-ST. JEOR: SCORE: 1424.16

## 2019-01-29 ENCOUNTER — OFFICE VISIT - HEALTHEAST (OUTPATIENT)
Dept: FAMILY MEDICINE | Facility: CLINIC | Age: 29
End: 2019-01-29

## 2019-01-29 DIAGNOSIS — Z00.00 ROUTINE GENERAL MEDICAL EXAMINATION AT A HEALTH CARE FACILITY: ICD-10-CM

## 2019-01-29 DIAGNOSIS — G47.00 INSOMNIA, UNSPECIFIED TYPE: ICD-10-CM

## 2019-01-29 DIAGNOSIS — R41.840 POOR CONCENTRATION: ICD-10-CM

## 2019-01-29 DIAGNOSIS — S46.911A STRAIN OF RIGHT SHOULDER, INITIAL ENCOUNTER: ICD-10-CM

## 2019-01-29 ASSESSMENT — MIFFLIN-ST. JEOR: SCORE: 1406.58

## 2019-01-31 ENCOUNTER — COMMUNICATION - HEALTHEAST (OUTPATIENT)
Dept: ALLERGY | Facility: CLINIC | Age: 29
End: 2019-01-31

## 2019-02-11 ENCOUNTER — OFFICE VISIT - HEALTHEAST (OUTPATIENT)
Dept: PHYSICAL THERAPY | Facility: REHABILITATION | Age: 29
End: 2019-02-11

## 2019-02-11 DIAGNOSIS — M54.2 CHRONIC NECK PAIN: ICD-10-CM

## 2019-02-11 DIAGNOSIS — G89.29 CHRONIC NECK PAIN: ICD-10-CM

## 2019-02-11 DIAGNOSIS — M62.81 GENERALIZED MUSCLE WEAKNESS: ICD-10-CM

## 2019-02-18 ENCOUNTER — COMMUNICATION - HEALTHEAST (OUTPATIENT)
Dept: OBGYN | Facility: CLINIC | Age: 29
End: 2019-02-18

## 2019-02-18 ENCOUNTER — OFFICE VISIT - HEALTHEAST (OUTPATIENT)
Dept: PHYSICAL THERAPY | Facility: REHABILITATION | Age: 29
End: 2019-02-18

## 2019-02-18 DIAGNOSIS — G89.29 CHRONIC NECK PAIN: ICD-10-CM

## 2019-02-18 DIAGNOSIS — M54.2 CHRONIC NECK PAIN: ICD-10-CM

## 2019-02-18 DIAGNOSIS — M62.81 GENERALIZED MUSCLE WEAKNESS: ICD-10-CM

## 2019-02-25 ENCOUNTER — OFFICE VISIT - HEALTHEAST (OUTPATIENT)
Dept: PHYSICAL THERAPY | Facility: REHABILITATION | Age: 29
End: 2019-02-25

## 2019-02-25 DIAGNOSIS — M62.81 GENERALIZED MUSCLE WEAKNESS: ICD-10-CM

## 2019-02-25 DIAGNOSIS — M54.2 CHRONIC NECK PAIN: ICD-10-CM

## 2019-02-25 DIAGNOSIS — G89.29 CHRONIC NECK PAIN: ICD-10-CM

## 2019-03-02 ENCOUNTER — RECORDS - HEALTHEAST (OUTPATIENT)
Dept: ADMINISTRATIVE | Facility: OTHER | Age: 29
End: 2019-03-02

## 2019-03-04 ENCOUNTER — OFFICE VISIT - HEALTHEAST (OUTPATIENT)
Dept: PHYSICAL THERAPY | Facility: REHABILITATION | Age: 29
End: 2019-03-04

## 2019-03-04 DIAGNOSIS — M62.81 GENERALIZED MUSCLE WEAKNESS: ICD-10-CM

## 2019-03-04 DIAGNOSIS — M54.2 CHRONIC NECK PAIN: ICD-10-CM

## 2019-03-04 DIAGNOSIS — G89.29 CHRONIC NECK PAIN: ICD-10-CM

## 2019-03-06 ENCOUNTER — COMMUNICATION - HEALTHEAST (OUTPATIENT)
Dept: FAMILY MEDICINE | Facility: CLINIC | Age: 29
End: 2019-03-06

## 2019-03-08 ENCOUNTER — COMMUNICATION - HEALTHEAST (OUTPATIENT)
Dept: FAMILY MEDICINE | Facility: CLINIC | Age: 29
End: 2019-03-08

## 2019-03-08 ENCOUNTER — OFFICE VISIT - HEALTHEAST (OUTPATIENT)
Dept: FAMILY MEDICINE | Facility: CLINIC | Age: 29
End: 2019-03-08

## 2019-03-08 DIAGNOSIS — M54.2 NECK PAIN: ICD-10-CM

## 2019-03-08 DIAGNOSIS — F90.0 ATTENTION DEFICIT HYPERACTIVITY DISORDER (ADHD), PREDOMINANTLY INATTENTIVE TYPE: ICD-10-CM

## 2019-03-08 ASSESSMENT — MIFFLIN-ST. JEOR: SCORE: 1426.08

## 2019-03-11 ENCOUNTER — OFFICE VISIT - HEALTHEAST (OUTPATIENT)
Dept: PHYSICAL THERAPY | Facility: REHABILITATION | Age: 29
End: 2019-03-11

## 2019-03-11 ENCOUNTER — COMMUNICATION - HEALTHEAST (OUTPATIENT)
Dept: FAMILY MEDICINE | Facility: CLINIC | Age: 29
End: 2019-03-11

## 2019-03-11 DIAGNOSIS — G89.29 CHRONIC NECK PAIN: ICD-10-CM

## 2019-03-11 DIAGNOSIS — M54.2 CHRONIC NECK PAIN: ICD-10-CM

## 2019-03-11 DIAGNOSIS — M62.81 GENERALIZED MUSCLE WEAKNESS: ICD-10-CM

## 2019-03-13 ENCOUNTER — OFFICE VISIT - HEALTHEAST (OUTPATIENT)
Dept: FAMILY MEDICINE | Facility: CLINIC | Age: 29
End: 2019-03-13

## 2019-03-13 DIAGNOSIS — F90.0 ADHD (ATTENTION DEFICIT HYPERACTIVITY DISORDER), INATTENTIVE TYPE: ICD-10-CM

## 2019-03-13 ASSESSMENT — MIFFLIN-ST. JEOR: SCORE: 1426.08

## 2019-03-14 ENCOUNTER — COMMUNICATION - HEALTHEAST (OUTPATIENT)
Dept: FAMILY MEDICINE | Facility: CLINIC | Age: 29
End: 2019-03-14

## 2019-03-21 ENCOUNTER — OFFICE VISIT - HEALTHEAST (OUTPATIENT)
Dept: PHYSICAL THERAPY | Facility: REHABILITATION | Age: 29
End: 2019-03-21

## 2019-03-21 DIAGNOSIS — G89.29 CHRONIC NECK PAIN: ICD-10-CM

## 2019-03-21 DIAGNOSIS — M62.81 GENERALIZED MUSCLE WEAKNESS: ICD-10-CM

## 2019-03-21 DIAGNOSIS — M54.2 CHRONIC NECK PAIN: ICD-10-CM

## 2019-03-22 ENCOUNTER — COMMUNICATION - HEALTHEAST (OUTPATIENT)
Dept: OBGYN | Facility: CLINIC | Age: 29
End: 2019-03-22

## 2019-03-23 ENCOUNTER — AMBULATORY - HEALTHEAST (OUTPATIENT)
Dept: OBGYN | Facility: CLINIC | Age: 29
End: 2019-03-23

## 2019-03-23 DIAGNOSIS — N92.1 MENORRHAGIA WITH IRREGULAR CYCLE: ICD-10-CM

## 2019-03-25 ENCOUNTER — OFFICE VISIT - HEALTHEAST (OUTPATIENT)
Dept: PHYSICAL THERAPY | Facility: REHABILITATION | Age: 29
End: 2019-03-25

## 2019-03-25 DIAGNOSIS — M62.81 GENERALIZED MUSCLE WEAKNESS: ICD-10-CM

## 2019-03-25 DIAGNOSIS — M54.2 CHRONIC NECK PAIN: ICD-10-CM

## 2019-03-25 DIAGNOSIS — G89.29 CHRONIC NECK PAIN: ICD-10-CM

## 2019-03-27 ENCOUNTER — HOSPITAL ENCOUNTER (OUTPATIENT)
Dept: PHYSICAL MEDICINE AND REHAB | Facility: CLINIC | Age: 29
Discharge: HOME | End: 2019-03-27
Attending: FAMILY MEDICINE

## 2019-03-27 DIAGNOSIS — M54.2 CERVICALGIA: ICD-10-CM

## 2019-03-27 DIAGNOSIS — Z87.39 HISTORY OF HERNIATED INTERVERTEBRAL DISC: ICD-10-CM

## 2019-03-27 DIAGNOSIS — M79.18 MYOFASCIAL PAIN: ICD-10-CM

## 2019-04-02 ENCOUNTER — COMMUNICATION - HEALTHEAST (OUTPATIENT)
Dept: PHYSICAL MEDICINE AND REHAB | Facility: CLINIC | Age: 29
End: 2019-04-02

## 2019-04-03 ENCOUNTER — COMMUNICATION - HEALTHEAST (OUTPATIENT)
Dept: PHYSICAL MEDICINE AND REHAB | Facility: CLINIC | Age: 29
End: 2019-04-03

## 2019-04-03 DIAGNOSIS — M54.12 CERVICAL RADICULOPATHY: ICD-10-CM

## 2019-04-04 ENCOUNTER — COMMUNICATION - HEALTHEAST (OUTPATIENT)
Dept: PHYSICAL MEDICINE AND REHAB | Facility: CLINIC | Age: 29
End: 2019-04-04

## 2019-04-04 DIAGNOSIS — F41.9 ANXIETY: ICD-10-CM

## 2019-04-11 ENCOUNTER — RECORDS - HEALTHEAST (OUTPATIENT)
Dept: RADIOLOGY | Facility: CLINIC | Age: 29
End: 2019-04-11

## 2019-04-19 ENCOUNTER — HOSPITAL ENCOUNTER (OUTPATIENT)
Dept: PHYSICAL MEDICINE AND REHAB | Facility: CLINIC | Age: 29
Discharge: HOME | End: 2019-04-19
Attending: PHYSICIAN ASSISTANT

## 2019-04-19 ENCOUNTER — OFFICE VISIT - HEALTHEAST (OUTPATIENT)
Dept: FAMILY MEDICINE | Facility: CLINIC | Age: 29
End: 2019-04-19

## 2019-04-19 DIAGNOSIS — M54.12 CERVICAL RADICULOPATHY: ICD-10-CM

## 2019-04-19 DIAGNOSIS — F90.0 ATTENTION DEFICIT HYPERACTIVITY DISORDER (ADHD), PREDOMINANTLY INATTENTIVE TYPE: ICD-10-CM

## 2019-04-19 ASSESSMENT — MIFFLIN-ST. JEOR: SCORE: 1424.72

## 2019-04-22 ENCOUNTER — COMMUNICATION - HEALTHEAST (OUTPATIENT)
Dept: FAMILY MEDICINE | Facility: CLINIC | Age: 29
End: 2019-04-22

## 2019-04-22 ENCOUNTER — COMMUNICATION - HEALTHEAST (OUTPATIENT)
Dept: PHYSICAL MEDICINE AND REHAB | Facility: CLINIC | Age: 29
End: 2019-04-22

## 2019-04-22 DIAGNOSIS — F41.9 ANXIETY: ICD-10-CM

## 2019-04-23 ENCOUNTER — COMMUNICATION - HEALTHEAST (OUTPATIENT)
Dept: OBGYN | Facility: CLINIC | Age: 29
End: 2019-04-23

## 2019-04-25 ENCOUNTER — AMBULATORY - HEALTHEAST (OUTPATIENT)
Dept: OBGYN | Facility: CLINIC | Age: 29
End: 2019-04-25

## 2019-04-25 DIAGNOSIS — N92.0 MENORRHAGIA WITH REGULAR CYCLE: ICD-10-CM

## 2019-05-03 ENCOUNTER — HOSPITAL ENCOUNTER (OUTPATIENT)
Dept: PHYSICAL MEDICINE AND REHAB | Facility: CLINIC | Age: 29
Discharge: HOME | End: 2019-05-03
Attending: PHYSICIAN ASSISTANT

## 2019-05-03 DIAGNOSIS — M54.12 CERVICAL RADICULOPATHY: ICD-10-CM

## 2019-05-14 ENCOUNTER — OFFICE VISIT - HEALTHEAST (OUTPATIENT)
Dept: FAMILY MEDICINE | Facility: CLINIC | Age: 29
End: 2019-05-14

## 2019-05-14 DIAGNOSIS — F90.0 ATTENTION DEFICIT HYPERACTIVITY DISORDER (ADHD), PREDOMINANTLY INATTENTIVE TYPE: ICD-10-CM

## 2019-05-14 ASSESSMENT — MIFFLIN-ST. JEOR: SCORE: 1396.94

## 2019-05-16 ENCOUNTER — HOSPITAL ENCOUNTER (OUTPATIENT)
Dept: PHYSICAL MEDICINE AND REHAB | Facility: CLINIC | Age: 29
Discharge: HOME | End: 2019-05-16
Attending: PHYSICIAN ASSISTANT

## 2019-05-16 DIAGNOSIS — M79.18 MYOFASCIAL PAIN: ICD-10-CM

## 2019-05-16 DIAGNOSIS — M54.12 CERVICAL RADICULITIS: ICD-10-CM

## 2019-05-29 ENCOUNTER — COMMUNICATION - HEALTHEAST (OUTPATIENT)
Dept: FAMILY MEDICINE | Facility: CLINIC | Age: 29
End: 2019-05-29

## 2019-05-29 DIAGNOSIS — F90.0 ATTENTION DEFICIT HYPERACTIVITY DISORDER (ADHD), PREDOMINANTLY INATTENTIVE TYPE: ICD-10-CM

## 2019-06-03 ENCOUNTER — COMMUNICATION - HEALTHEAST (OUTPATIENT)
Dept: FAMILY MEDICINE | Facility: CLINIC | Age: 29
End: 2019-06-03

## 2019-07-02 ENCOUNTER — COMMUNICATION - HEALTHEAST (OUTPATIENT)
Dept: FAMILY MEDICINE | Facility: CLINIC | Age: 29
End: 2019-07-02

## 2019-07-02 DIAGNOSIS — F90.0 ATTENTION DEFICIT HYPERACTIVITY DISORDER (ADHD), PREDOMINANTLY INATTENTIVE TYPE: ICD-10-CM

## 2019-07-17 ENCOUNTER — COMMUNICATION - HEALTHEAST (OUTPATIENT)
Dept: PHYSICAL MEDICINE AND REHAB | Facility: CLINIC | Age: 29
End: 2019-07-17

## 2019-07-17 DIAGNOSIS — M54.2 NECK PAIN: ICD-10-CM

## 2019-07-17 DIAGNOSIS — M54.12 CERVICAL RADICULITIS: ICD-10-CM

## 2019-08-12 ENCOUNTER — RECORDS - HEALTHEAST (OUTPATIENT)
Dept: RADIOLOGY | Facility: CLINIC | Age: 29
End: 2019-08-12

## 2019-08-13 ENCOUNTER — AMBULATORY - HEALTHEAST (OUTPATIENT)
Dept: LAB | Facility: CLINIC | Age: 29
End: 2019-08-13

## 2019-08-13 ENCOUNTER — COMMUNICATION - HEALTHEAST (OUTPATIENT)
Dept: INTERNAL MEDICINE | Facility: CLINIC | Age: 29
End: 2019-08-13

## 2019-08-13 DIAGNOSIS — R30.0 BURNING WITH URINATION: ICD-10-CM

## 2019-08-13 LAB
ALBUMIN UR-MCNC: ABNORMAL MG/DL
APPEARANCE UR: ABNORMAL
BACTERIA #/AREA URNS HPF: ABNORMAL HPF
BILIRUB UR QL STRIP: NEGATIVE
COLOR UR AUTO: ABNORMAL
GLUCOSE UR STRIP-MCNC: NEGATIVE MG/DL
HGB UR QL STRIP: ABNORMAL
KETONES UR STRIP-MCNC: NEGATIVE MG/DL
LEUKOCYTE ESTERASE UR QL STRIP: ABNORMAL
NITRATE UR QL: POSITIVE
PH UR STRIP: 6.5 [PH] (ref 5–8)
RBC #/AREA URNS AUTO: >100 HPF
SP GR UR STRIP: 1.02 (ref 1–1.03)
SQUAMOUS #/AREA URNS AUTO: ABNORMAL LPF
UROBILINOGEN UR STRIP-ACNC: ABNORMAL
WBC #/AREA URNS AUTO: >100 HPF

## 2019-08-15 LAB — BACTERIA SPEC CULT: ABNORMAL

## 2019-08-20 ENCOUNTER — AMBULATORY - HEALTHEAST (OUTPATIENT)
Dept: INTERNAL MEDICINE | Facility: CLINIC | Age: 29
End: 2019-08-20

## 2019-08-20 ENCOUNTER — AMBULATORY - HEALTHEAST (OUTPATIENT)
Dept: LAB | Facility: CLINIC | Age: 29
End: 2019-08-20

## 2019-08-20 DIAGNOSIS — Z09 FOLLOW UP: ICD-10-CM

## 2019-08-20 LAB
ALBUMIN UR-MCNC: NEGATIVE MG/DL
APPEARANCE UR: CLEAR
BILIRUB UR QL STRIP: NEGATIVE
COLOR UR AUTO: YELLOW
GLUCOSE UR STRIP-MCNC: NEGATIVE MG/DL
HGB UR QL STRIP: NEGATIVE
KETONES UR STRIP-MCNC: NEGATIVE MG/DL
LEUKOCYTE ESTERASE UR QL STRIP: NEGATIVE
NITRATE UR QL: NEGATIVE
PH UR STRIP: 7 [PH] (ref 5–8)
SP GR UR STRIP: 1.01 (ref 1–1.03)
UROBILINOGEN UR STRIP-ACNC: NORMAL

## 2019-08-27 ENCOUNTER — RECORDS - HEALTHEAST (OUTPATIENT)
Dept: ADMINISTRATIVE | Facility: OTHER | Age: 29
End: 2019-08-27

## 2019-11-15 ENCOUNTER — HOSPITAL ENCOUNTER (OUTPATIENT)
Dept: PHYSICAL MEDICINE AND REHAB | Facility: CLINIC | Age: 29
Discharge: HOME | End: 2019-11-15
Attending: PHYSICIAN ASSISTANT

## 2019-11-15 DIAGNOSIS — M47.812 ARTHROPATHY OF CERVICAL FACET JOINT: ICD-10-CM

## 2019-11-15 DIAGNOSIS — M54.12 CERVICAL RADICULITIS: ICD-10-CM

## 2019-11-15 DIAGNOSIS — S13.4XXA WHIPLASH INJURY TO NECK, INITIAL ENCOUNTER: ICD-10-CM

## 2019-11-15 DIAGNOSIS — M79.18 MYOFASCIAL PAIN: ICD-10-CM
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
## 2019-11-15 ASSESSMENT — MIFFLIN-ST. JEOR: SCORE: 1396.94

## 2019-11-22 ENCOUNTER — OFFICE VISIT - HEALTHEAST (OUTPATIENT)
Dept: FAMILY MEDICINE | Facility: CLINIC | Age: 29
End: 2019-11-22

## 2019-11-22 DIAGNOSIS — F90.0 ATTENTION DEFICIT HYPERACTIVITY DISORDER (ADHD), PREDOMINANTLY INATTENTIVE TYPE: ICD-10-CM

## 2019-11-22 ASSESSMENT — MIFFLIN-ST. JEOR: SCORE: 1385.6

## 2019-11-27 ENCOUNTER — HOSPITAL ENCOUNTER (OUTPATIENT)
Dept: PHYSICAL MEDICINE AND REHAB | Facility: CLINIC | Age: 29
Discharge: HOME | End: 2019-11-27
Attending: PHYSICIAN ASSISTANT

## 2019-11-27 DIAGNOSIS — M47.812 ARTHROPATHY OF CERVICAL FACET JOINT: ICD-10-CM

## 2019-12-03 ENCOUNTER — COMMUNICATION - HEALTHEAST (OUTPATIENT)
Dept: PHYSICAL MEDICINE AND REHAB | Facility: CLINIC | Age: 29
End: 2019-12-03

## 2019-12-03 DIAGNOSIS — M47.812 CERVICAL SPONDYLOSIS WITHOUT MYELOPATHY: ICD-10-CM

## 2019-12-04 ENCOUNTER — AMBULATORY - HEALTHEAST (OUTPATIENT)
Dept: PHYSICAL MEDICINE AND REHAB | Facility: CLINIC | Age: 29
End: 2019-12-04

## 2019-12-04 ENCOUNTER — COMMUNICATION - HEALTHEAST (OUTPATIENT)
Dept: PHYSICAL MEDICINE AND REHAB | Facility: CLINIC | Age: 29
End: 2019-12-04

## 2019-12-04 DIAGNOSIS — F41.9 ANXIETY: ICD-10-CM

## 2019-12-11 ENCOUNTER — COMMUNICATION - HEALTHEAST (OUTPATIENT)
Dept: PHYSICAL MEDICINE AND REHAB | Facility: CLINIC | Age: 29
End: 2019-12-11

## 2019-12-12 ENCOUNTER — OFFICE VISIT - HEALTHEAST (OUTPATIENT)
Dept: OBGYN | Facility: CLINIC | Age: 29
End: 2019-12-12

## 2019-12-12 DIAGNOSIS — N92.0 MENORRHAGIA WITH REGULAR CYCLE: ICD-10-CM

## 2019-12-12 DIAGNOSIS — D25.0 SUBMUCOUS UTERINE FIBROID: ICD-10-CM

## 2019-12-12 ASSESSMENT — MIFFLIN-ST. JEOR: SCORE: 1387.87

## 2019-12-13 ENCOUNTER — HOSPITAL ENCOUNTER (OUTPATIENT)
Dept: ULTRASOUND IMAGING | Facility: CLINIC | Age: 29
Discharge: HOME | End: 2019-12-13
Attending: OBSTETRICS & GYNECOLOGY

## 2019-12-13 DIAGNOSIS — N92.0 MENORRHAGIA WITH REGULAR CYCLE: ICD-10-CM

## 2019-12-13 DIAGNOSIS — D25.0 SUBMUCOUS UTERINE FIBROID: ICD-10-CM

## 2019-12-14 ENCOUNTER — COMMUNICATION - HEALTHEAST (OUTPATIENT)
Dept: OBGYN | Facility: CLINIC | Age: 29
End: 2019-12-14

## 2019-12-19 ENCOUNTER — HOSPITAL ENCOUNTER (OUTPATIENT)
Dept: PHYSICAL MEDICINE AND REHAB | Facility: CLINIC | Age: 29
Discharge: HOME | End: 2019-12-19
Attending: PHYSICIAN ASSISTANT

## 2019-12-19 DIAGNOSIS — M47.812 CERVICAL SPONDYLOSIS WITHOUT MYELOPATHY: ICD-10-CM

## 2020-01-03 ENCOUNTER — HOSPITAL ENCOUNTER (OUTPATIENT)
Dept: PHYSICAL MEDICINE AND REHAB | Facility: CLINIC | Age: 30
Discharge: HOME | End: 2020-01-03
Attending: PHYSICIAN ASSISTANT

## 2020-01-03 DIAGNOSIS — M47.812 CERVICAL FACET SYNDROME: ICD-10-CM
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
## 2020-01-03 DIAGNOSIS — S13.4XXA WHIPLASH INJURY TO NECK, INITIAL ENCOUNTER: ICD-10-CM

## 2020-01-03 DIAGNOSIS — M79.18 MYOFASCIAL PAIN: ICD-10-CM

## 2020-02-03 ENCOUNTER — COMMUNICATION - HEALTHEAST (OUTPATIENT)
Dept: PHYSICAL MEDICINE AND REHAB | Facility: CLINIC | Age: 30
End: 2020-02-03

## 2020-02-03 DIAGNOSIS — M79.18 MYOFASCIAL PAIN: ICD-10-CM

## 2020-03-02 ENCOUNTER — COMMUNICATION - HEALTHEAST (OUTPATIENT)
Dept: FAMILY MEDICINE | Facility: CLINIC | Age: 30
End: 2020-03-02

## 2020-03-02 DIAGNOSIS — F90.0 ATTENTION DEFICIT HYPERACTIVITY DISORDER (ADHD), PREDOMINANTLY INATTENTIVE TYPE: ICD-10-CM

## 2020-03-11 ENCOUNTER — HEALTH MAINTENANCE LETTER (OUTPATIENT)
Age: 30
End: 2020-03-11

## 2020-03-16 ENCOUNTER — COMMUNICATION - HEALTHEAST (OUTPATIENT)
Dept: FAMILY MEDICINE | Facility: CLINIC | Age: 30
End: 2020-03-16

## 2020-03-16 ENCOUNTER — AMBULATORY - HEALTHEAST (OUTPATIENT)
Dept: NURSING | Facility: CLINIC | Age: 30
End: 2020-03-16

## 2020-03-16 DIAGNOSIS — Z23 NEED FOR PROPHYLACTIC VACCINATION WITH TETANUS-DIPHTHERIA (TD): ICD-10-CM

## 2020-04-08 ENCOUNTER — COMMUNICATION - HEALTHEAST (OUTPATIENT)
Dept: FAMILY MEDICINE | Facility: CLINIC | Age: 30
End: 2020-04-08

## 2020-05-05 ENCOUNTER — OFFICE VISIT - HEALTHEAST (OUTPATIENT)
Dept: FAMILY MEDICINE | Facility: CLINIC | Age: 30
End: 2020-05-05

## 2020-05-05 DIAGNOSIS — F90.0 ATTENTION DEFICIT HYPERACTIVITY DISORDER (ADHD), PREDOMINANTLY INATTENTIVE TYPE: ICD-10-CM

## 2020-08-20 ENCOUNTER — HOSPITAL ENCOUNTER (OUTPATIENT)
Dept: PHYSICAL MEDICINE AND REHAB | Facility: CLINIC | Age: 30
Discharge: HOME | End: 2020-08-20
Attending: PHYSICIAN ASSISTANT

## 2020-08-20 DIAGNOSIS — M50.20 CERVICAL DISC HERNIATION: ICD-10-CM

## 2020-08-20 DIAGNOSIS — M47.812 CERVICAL FACET SYNDROME: ICD-10-CM

## 2020-08-20 DIAGNOSIS — R20.2 PARESTHESIA: ICD-10-CM

## 2020-08-20 DIAGNOSIS — M79.18 MYOFASCIAL PAIN: ICD-10-CM

## 2020-08-20 ASSESSMENT — MIFFLIN-ST. JEOR: SCORE: 1392.41

## 2020-09-02 ENCOUNTER — COMMUNICATION - HEALTHEAST (OUTPATIENT)
Dept: FAMILY MEDICINE | Facility: CLINIC | Age: 30
End: 2020-09-02

## 2020-09-02 DIAGNOSIS — F90.0 ATTENTION DEFICIT HYPERACTIVITY DISORDER (ADHD), PREDOMINANTLY INATTENTIVE TYPE: ICD-10-CM

## 2020-09-04 ENCOUNTER — COMMUNICATION - HEALTHEAST (OUTPATIENT)
Dept: PHYSICAL MEDICINE AND REHAB | Facility: CLINIC | Age: 30
End: 2020-09-04

## 2020-09-04 ENCOUNTER — HOSPITAL ENCOUNTER (OUTPATIENT)
Dept: PHYSICAL MEDICINE AND REHAB | Facility: CLINIC | Age: 30
Discharge: HOME | End: 2020-09-04
Attending: PHYSICIAN ASSISTANT

## 2020-09-04 ENCOUNTER — OFFICE VISIT - HEALTHEAST (OUTPATIENT)
Dept: PHYSICAL THERAPY | Facility: CLINIC | Age: 30
End: 2020-09-04

## 2020-09-04 DIAGNOSIS — M47.812 CERVICAL FACET SYNDROME: ICD-10-CM

## 2020-09-04 DIAGNOSIS — R20.2 PARESTHESIA: ICD-10-CM

## 2020-09-14 ENCOUNTER — OFFICE VISIT - HEALTHEAST (OUTPATIENT)
Dept: PHYSICAL THERAPY | Facility: CLINIC | Age: 30
End: 2020-09-14

## 2020-09-14 ENCOUNTER — COMMUNICATION - HEALTHEAST (OUTPATIENT)
Dept: PHYSICAL THERAPY | Facility: CLINIC | Age: 30
End: 2020-09-14

## 2020-09-14 DIAGNOSIS — M47.812 CERVICAL FACET SYNDROME: ICD-10-CM

## 2020-09-14 DIAGNOSIS — M62.81 GENERALIZED MUSCLE WEAKNESS: ICD-10-CM

## 2020-09-14 DIAGNOSIS — M54.2 CHRONIC NECK PAIN: ICD-10-CM

## 2020-09-14 DIAGNOSIS — G89.29 CHRONIC NECK PAIN: ICD-10-CM

## 2020-09-18 ENCOUNTER — OFFICE VISIT - HEALTHEAST (OUTPATIENT)
Dept: PHYSICAL THERAPY | Facility: CLINIC | Age: 30
End: 2020-09-18

## 2020-09-18 DIAGNOSIS — M54.2 CHRONIC NECK PAIN: ICD-10-CM

## 2020-09-18 DIAGNOSIS — G89.29 CHRONIC NECK PAIN: ICD-10-CM

## 2020-09-18 DIAGNOSIS — M47.812 CERVICAL FACET SYNDROME: ICD-10-CM

## 2020-09-18 DIAGNOSIS — M62.81 GENERALIZED MUSCLE WEAKNESS: ICD-10-CM

## 2020-09-21 ENCOUNTER — OFFICE VISIT - HEALTHEAST (OUTPATIENT)
Dept: PHYSICAL THERAPY | Facility: CLINIC | Age: 30
End: 2020-09-21

## 2020-09-21 DIAGNOSIS — G89.29 CHRONIC NECK PAIN: ICD-10-CM

## 2020-09-21 DIAGNOSIS — M47.812 CERVICAL FACET SYNDROME: ICD-10-CM

## 2020-09-21 DIAGNOSIS — M62.81 GENERALIZED MUSCLE WEAKNESS: ICD-10-CM

## 2020-09-21 DIAGNOSIS — M54.2 CHRONIC NECK PAIN: ICD-10-CM

## 2020-09-23 ENCOUNTER — OFFICE VISIT - HEALTHEAST (OUTPATIENT)
Dept: PHYSICAL THERAPY | Facility: CLINIC | Age: 30
End: 2020-09-23

## 2020-09-23 DIAGNOSIS — M47.812 CERVICAL FACET SYNDROME: ICD-10-CM

## 2020-09-23 DIAGNOSIS — M62.81 GENERALIZED MUSCLE WEAKNESS: ICD-10-CM

## 2020-09-23 DIAGNOSIS — M54.2 CHRONIC NECK PAIN: ICD-10-CM

## 2020-09-23 DIAGNOSIS — G89.29 CHRONIC NECK PAIN: ICD-10-CM

## 2020-09-28 ENCOUNTER — OFFICE VISIT - HEALTHEAST (OUTPATIENT)
Dept: PHYSICAL THERAPY | Facility: CLINIC | Age: 30
End: 2020-09-28

## 2020-09-28 DIAGNOSIS — G89.29 CHRONIC NECK PAIN: ICD-10-CM

## 2020-09-28 DIAGNOSIS — M62.81 GENERALIZED MUSCLE WEAKNESS: ICD-10-CM

## 2020-09-28 DIAGNOSIS — M47.812 CERVICAL FACET SYNDROME: ICD-10-CM

## 2020-09-28 DIAGNOSIS — M54.2 CHRONIC NECK PAIN: ICD-10-CM

## 2020-10-21 ENCOUNTER — OFFICE VISIT - HEALTHEAST (OUTPATIENT)
Dept: PHYSICAL THERAPY | Facility: CLINIC | Age: 30
End: 2020-10-21

## 2020-10-21 DIAGNOSIS — M62.81 GENERALIZED MUSCLE WEAKNESS: ICD-10-CM

## 2020-10-21 DIAGNOSIS — M47.812 CERVICAL FACET SYNDROME: ICD-10-CM

## 2020-10-21 DIAGNOSIS — G89.29 CHRONIC NECK PAIN: ICD-10-CM

## 2020-10-21 DIAGNOSIS — M54.2 CHRONIC NECK PAIN: ICD-10-CM

## 2020-10-23 ENCOUNTER — OFFICE VISIT - HEALTHEAST (OUTPATIENT)
Dept: PHYSICAL THERAPY | Facility: CLINIC | Age: 30
End: 2020-10-23

## 2020-10-23 DIAGNOSIS — G89.29 CHRONIC NECK PAIN: ICD-10-CM

## 2020-10-23 DIAGNOSIS — M54.2 CHRONIC NECK PAIN: ICD-10-CM

## 2020-10-23 DIAGNOSIS — M62.81 GENERALIZED MUSCLE WEAKNESS: ICD-10-CM

## 2020-10-23 DIAGNOSIS — M47.812 CERVICAL FACET SYNDROME: ICD-10-CM

## 2020-10-26 ENCOUNTER — COMMUNICATION - HEALTHEAST (OUTPATIENT)
Dept: FAMILY MEDICINE | Facility: CLINIC | Age: 30
End: 2020-10-26

## 2020-10-26 DIAGNOSIS — F90.0 ATTENTION DEFICIT HYPERACTIVITY DISORDER (ADHD), PREDOMINANTLY INATTENTIVE TYPE: ICD-10-CM

## 2020-10-28 ENCOUNTER — HOSPITAL ENCOUNTER (OUTPATIENT)
Dept: PHYSICAL MEDICINE AND REHAB | Facility: CLINIC | Age: 30
Discharge: HOME | End: 2020-10-28
Attending: PHYSICIAN ASSISTANT

## 2020-10-28 DIAGNOSIS — R20.2 PARESTHESIA: ICD-10-CM

## 2020-10-28 DIAGNOSIS — M54.12 CERVICAL RADICULAR PAIN: ICD-10-CM

## 2020-10-28 DIAGNOSIS — F41.9 ANXIETY: ICD-10-CM

## 2020-10-28 ASSESSMENT — MIFFLIN-ST. JEOR: SCORE: 1392.41

## 2020-10-30 ENCOUNTER — OFFICE VISIT - HEALTHEAST (OUTPATIENT)
Dept: FAMILY MEDICINE | Facility: CLINIC | Age: 30
End: 2020-10-30

## 2020-10-30 DIAGNOSIS — F90.0 ATTENTION DEFICIT HYPERACTIVITY DISORDER (ADHD), PREDOMINANTLY INATTENTIVE TYPE: ICD-10-CM

## 2020-11-03 ENCOUNTER — COMMUNICATION - HEALTHEAST (OUTPATIENT)
Dept: FAMILY MEDICINE | Facility: CLINIC | Age: 30
End: 2020-11-03

## 2020-11-03 ENCOUNTER — COMMUNICATION - HEALTHEAST (OUTPATIENT)
Dept: PHYSICAL MEDICINE AND REHAB | Facility: CLINIC | Age: 30
End: 2020-11-03

## 2020-11-04 ENCOUNTER — OFFICE VISIT - HEALTHEAST (OUTPATIENT)
Dept: PHYSICAL THERAPY | Facility: CLINIC | Age: 30
End: 2020-11-04

## 2020-11-04 DIAGNOSIS — M47.812 CERVICAL FACET SYNDROME: ICD-10-CM

## 2020-11-04 DIAGNOSIS — M62.81 GENERALIZED MUSCLE WEAKNESS: ICD-10-CM

## 2020-11-04 DIAGNOSIS — G89.29 CHRONIC NECK PAIN: ICD-10-CM

## 2020-11-04 DIAGNOSIS — M54.2 CHRONIC NECK PAIN: ICD-10-CM

## 2020-11-06 ENCOUNTER — OFFICE VISIT - HEALTHEAST (OUTPATIENT)
Dept: FAMILY MEDICINE | Facility: CLINIC | Age: 30
End: 2020-11-06

## 2020-11-06 DIAGNOSIS — J02.9 SORE THROAT: ICD-10-CM

## 2020-11-06 DIAGNOSIS — Z20.822 SUSPECTED COVID-19 VIRUS INFECTION: ICD-10-CM

## 2020-11-07 DIAGNOSIS — Z20.822 SUSPECTED COVID-19 VIRUS INFECTION: Primary | ICD-10-CM

## 2020-11-07 DIAGNOSIS — J02.0 STREPTOCOCCAL SORE THROAT: ICD-10-CM

## 2020-11-07 DIAGNOSIS — J02.9 SORE THROAT: ICD-10-CM

## 2020-11-08 ENCOUNTER — RESULTS ONLY (OUTPATIENT)
Dept: LAB | Age: 30
End: 2020-11-08

## 2020-11-08 ENCOUNTER — OFFICE VISIT (OUTPATIENT)
Dept: URGENT CARE | Facility: URGENT CARE | Age: 30
End: 2020-11-08
Payer: COMMERCIAL

## 2020-11-08 DIAGNOSIS — Z20.822 SUSPECTED COVID-19 VIRUS INFECTION: ICD-10-CM

## 2020-11-08 DIAGNOSIS — J02.9 SORE THROAT: ICD-10-CM

## 2020-11-08 DIAGNOSIS — Z53.9 ERRONEOUS ENCOUNTER--DISREGARD: Primary | ICD-10-CM

## 2020-11-08 LAB
SARS-COV-2 RNA SPEC QL NAA+PROBE: NORMAL
SPECIMEN SOURCE: NORMAL

## 2020-11-09 LAB
LABORATORY COMMENT REPORT: ABNORMAL
SARS-COV-2 RNA SPEC QL NAA+PROBE: POSITIVE
SPECIMEN SOURCE: ABNORMAL

## 2020-11-11 ENCOUNTER — COMMUNICATION - HEALTHEAST (OUTPATIENT)
Dept: PHYSICAL MEDICINE AND REHAB | Facility: CLINIC | Age: 30
End: 2020-11-11

## 2020-11-20 ENCOUNTER — HOSPITAL ENCOUNTER (OUTPATIENT)
Dept: PHYSICAL MEDICINE AND REHAB | Facility: CLINIC | Age: 30
Discharge: HOME | End: 2020-11-20
Attending: PHYSICIAN ASSISTANT

## 2020-11-20 DIAGNOSIS — M54.12 CERVICAL RADICULAR PAIN: ICD-10-CM

## 2020-11-25 ENCOUNTER — OFFICE VISIT - HEALTHEAST (OUTPATIENT)
Dept: FAMILY MEDICINE | Facility: CLINIC | Age: 30
End: 2020-11-25

## 2020-11-25 DIAGNOSIS — N39.0 URINARY TRACT INFECTION WITHOUT HEMATURIA, SITE UNSPECIFIED: ICD-10-CM

## 2020-12-01 ENCOUNTER — COMMUNICATION - HEALTHEAST (OUTPATIENT)
Dept: FAMILY MEDICINE | Facility: CLINIC | Age: 30
End: 2020-12-01

## 2020-12-02 ENCOUNTER — OFFICE VISIT - HEALTHEAST (OUTPATIENT)
Dept: FAMILY MEDICINE | Facility: CLINIC | Age: 30
End: 2020-12-02

## 2020-12-02 DIAGNOSIS — R39.9 UTI SYMPTOMS: ICD-10-CM

## 2020-12-08 ENCOUNTER — COMMUNICATION - HEALTHEAST (OUTPATIENT)
Dept: FAMILY MEDICINE | Facility: CLINIC | Age: 30
End: 2020-12-08

## 2020-12-08 DIAGNOSIS — F90.0 ATTENTION DEFICIT HYPERACTIVITY DISORDER (ADHD), PREDOMINANTLY INATTENTIVE TYPE: ICD-10-CM

## 2021-01-13 ENCOUNTER — COMMUNICATION - HEALTHEAST (OUTPATIENT)
Dept: PHYSICAL MEDICINE AND REHAB | Facility: CLINIC | Age: 31
End: 2021-01-13

## 2021-01-13 DIAGNOSIS — M79.18 MYOFASCIAL PAIN: ICD-10-CM

## 2021-03-31 ENCOUNTER — OFFICE VISIT - HEALTHEAST (OUTPATIENT)
Dept: FAMILY MEDICINE | Facility: CLINIC | Age: 31
End: 2021-03-31

## 2021-03-31 DIAGNOSIS — Z13.220 SCREENING FOR LIPID DISORDERS: ICD-10-CM

## 2021-03-31 DIAGNOSIS — K21.00 GASTROESOPHAGEAL REFLUX DISEASE WITH ESOPHAGITIS, UNSPECIFIED WHETHER HEMORRHAGE: ICD-10-CM

## 2021-03-31 DIAGNOSIS — F90.0 ATTENTION DEFICIT HYPERACTIVITY DISORDER (ADHD), PREDOMINANTLY INATTENTIVE TYPE: ICD-10-CM

## 2021-03-31 DIAGNOSIS — R59.9 ENLARGED LYMPH NODES: ICD-10-CM

## 2021-03-31 DIAGNOSIS — Z12.4 SCREENING FOR CERVICAL CANCER: ICD-10-CM

## 2021-03-31 DIAGNOSIS — Z00.01 ENCOUNTER FOR GENERAL ADULT MEDICAL EXAMINATION WITH ABNORMAL FINDINGS: ICD-10-CM

## 2021-03-31 DIAGNOSIS — M25.531 PAIN IN BOTH WRISTS: ICD-10-CM

## 2021-03-31 DIAGNOSIS — M25.532 PAIN IN BOTH WRISTS: ICD-10-CM

## 2021-03-31 DIAGNOSIS — L30.9 DERMATITIS: ICD-10-CM

## 2021-03-31 DIAGNOSIS — Z13.1 SCREENING FOR DIABETES MELLITUS (DM): ICD-10-CM

## 2021-03-31 LAB
CHOLEST SERPL-MCNC: 161 MG/DL
ERYTHROCYTE [DISTWIDTH] IN BLOOD BY AUTOMATED COUNT: 11.6 % (ref 11–14.5)
FASTING STATUS PATIENT QL REPORTED: YES
FASTING STATUS PATIENT QL REPORTED: YES
GLUCOSE BLD-MCNC: 81 MG/DL (ref 70–99)
HCT VFR BLD AUTO: 40 % (ref 35–47)
HDLC SERPL-MCNC: 69 MG/DL
HGB BLD-MCNC: 13.6 G/DL (ref 12–16)
HIV 1+2 AB+HIV1 P24 AG SERPL QL IA: NEGATIVE
LDLC SERPL CALC-MCNC: 78 MG/DL
MCH RBC QN AUTO: 31.1 PG (ref 27–34)
MCHC RBC AUTO-ENTMCNC: 34 G/DL (ref 32–36)
MCV RBC AUTO: 91 FL (ref 80–100)
PLATELET # BLD AUTO: 286 THOU/UL (ref 140–440)
PMV BLD AUTO: 9.4 FL (ref 7–10)
RBC # BLD AUTO: 4.38 MILL/UL (ref 3.8–5.4)
TRIGL SERPL-MCNC: 70 MG/DL
WBC: 5.4 THOU/UL (ref 4–11)

## 2021-03-31 ASSESSMENT — MIFFLIN-ST. JEOR: SCORE: 1447.85

## 2021-04-01 LAB
HCV AB SERPL QL IA: NEGATIVE
HPV SOURCE: ABNORMAL
HUMAN PAPILLOMA VIRUS 16 DNA: NEGATIVE
HUMAN PAPILLOMA VIRUS 18 DNA: NEGATIVE
HUMAN PAPILLOMA VIRUS FINAL DIAGNOSIS: ABNORMAL
HUMAN PAPILLOMA VIRUS OTHER HR: POSITIVE
SPECIMEN DESCRIPTION: ABNORMAL

## 2021-04-08 ENCOUNTER — COMMUNICATION - HEALTHEAST (OUTPATIENT)
Dept: OBGYN | Facility: CLINIC | Age: 31
End: 2021-04-08

## 2021-04-08 LAB
BKR LAB AP ABNORMAL BLEEDING: NO
BKR LAB AP BIRTH CONTROL/HORMONES: NORMAL
BKR LAB AP CERVICAL APPEARANCE: NORMAL
BKR LAB AP GYN ADEQUACY: NORMAL
BKR LAB AP GYN INTERPRETATION: NORMAL
BKR LAB AP HPV REFLEX: NORMAL
BKR LAB AP LMP: NORMAL
BKR LAB AP PATIENT STATUS: NORMAL
BKR LAB AP PREVIOUS ABNORMAL: NORMAL
BKR LAB AP PREVIOUS NORMAL: 2018
HIGH RISK?: NO
PATH REPORT.COMMENTS IMP SPEC: NORMAL
RESULT FLAG (HE HISTORICAL CONVERSION): NORMAL

## 2021-04-19 ENCOUNTER — COMMUNICATION - HEALTHEAST (OUTPATIENT)
Dept: FAMILY MEDICINE | Facility: CLINIC | Age: 31
End: 2021-04-19

## 2021-04-19 DIAGNOSIS — R59.9 ENLARGED LYMPH NODES: ICD-10-CM

## 2021-04-19 DIAGNOSIS — L30.9 DERMATITIS: ICD-10-CM

## 2021-04-19 DIAGNOSIS — M25.532 PAIN IN BOTH WRISTS: ICD-10-CM

## 2021-04-19 DIAGNOSIS — M25.531 PAIN IN BOTH WRISTS: ICD-10-CM

## 2021-04-26 ENCOUNTER — HOSPITAL ENCOUNTER (OUTPATIENT)
Dept: ULTRASOUND IMAGING | Facility: CLINIC | Age: 31
Discharge: HOME | End: 2021-04-26
Attending: FAMILY MEDICINE
Payer: COMMERCIAL

## 2021-04-26 DIAGNOSIS — R59.9 ENLARGED LYMPH NODES: ICD-10-CM

## 2021-05-04 ENCOUNTER — OFFICE VISIT - HEALTHEAST (OUTPATIENT)
Dept: OCCUPATIONAL THERAPY | Facility: REHABILITATION | Age: 31
End: 2021-05-04

## 2021-05-04 DIAGNOSIS — R20.2 PARESTHESIA OF BOTH HANDS: ICD-10-CM

## 2021-05-04 DIAGNOSIS — M25.532 PAIN IN BOTH WRISTS: ICD-10-CM

## 2021-05-04 DIAGNOSIS — Z78.9 DECREASED ACTIVITIES OF DAILY LIVING (ADL): ICD-10-CM

## 2021-05-04 DIAGNOSIS — M25.531 PAIN IN BOTH WRISTS: ICD-10-CM

## 2021-05-11 ENCOUNTER — OFFICE VISIT - HEALTHEAST (OUTPATIENT)
Dept: OCCUPATIONAL THERAPY | Facility: REHABILITATION | Age: 31
End: 2021-05-11

## 2021-05-11 DIAGNOSIS — M25.532 PAIN IN BOTH WRISTS: ICD-10-CM

## 2021-05-11 DIAGNOSIS — Z78.9 DECREASED ACTIVITIES OF DAILY LIVING (ADL): ICD-10-CM

## 2021-05-11 DIAGNOSIS — M25.531 PAIN IN BOTH WRISTS: ICD-10-CM

## 2021-05-11 DIAGNOSIS — R20.2 PARESTHESIA OF BOTH HANDS: ICD-10-CM

## 2021-05-25 ENCOUNTER — RECORDS - HEALTHEAST (OUTPATIENT)
Dept: ADMINISTRATIVE | Facility: OTHER | Age: 31
End: 2021-05-25

## 2021-05-25 ENCOUNTER — OFFICE VISIT - HEALTHEAST (OUTPATIENT)
Dept: OCCUPATIONAL THERAPY | Facility: REHABILITATION | Age: 31
End: 2021-05-25

## 2021-05-25 DIAGNOSIS — M25.532 PAIN IN BOTH WRISTS: ICD-10-CM

## 2021-05-25 DIAGNOSIS — M25.531 PAIN IN BOTH WRISTS: ICD-10-CM

## 2021-05-25 DIAGNOSIS — R20.2 PARESTHESIA OF BOTH HANDS: ICD-10-CM

## 2021-05-25 DIAGNOSIS — Z78.9 DECREASED ACTIVITIES OF DAILY LIVING (ADL): ICD-10-CM

## 2021-05-27 NOTE — TELEPHONE ENCOUNTER
The patient requires oral sedation for her injection, necessitating a phone consent prior to the day of his injection.  The patient was contacted on 4/4/19 at 16:00.  The patient has had other conservative treatment but wishes to pursue spine injections.   The procedure of a  right C5/6 transforaminal epidural corticosteroid injection was discussed in detail.  The patient had the opportunity to directly ask the physician assistant questions regarding the procedure and the questions were answered prior to the consent form being presented.  The risks of the procedure, including but not limited to:  increased soreness, infection, bleeding, damage to surrounding structures, permanent weakness, paralysis,  allergic reaction,  worsening of her pain or no change in pain. The patient elected to proceed and gave verbal informed consent.  Lynda Fernandez CMA was present for the entire phone consent as a witness and signed the consent form with the provider.       The patient denies any symptoms of an active infection and denies taking antibiotics. The patient denies taking any prescription blood thinning medications. The patient denies any allergies to iodine or iodine contrast.

## 2021-05-27 NOTE — PROGRESS NOTES
ASSESSMENT: Georgie Scott is a 28 y.o. female with past medical history significant for ADHD who presents today for new patient evaluation of chronic record left neck pain with radiation toward the right shoulder with associated headaches.  Patient had an MRI cervical spine in 2015 which showed a small right C5-6 disc herniation.  At that time she had pain radiating further down the arm.  Patient's current pain may represent a residual proximal radiculitis with secondary myofascial pain.  She is neurologically intact.  Pain has been refractory to conservative treatment including chiropractic treatment, acupuncture, physical therapy, and NSAIDs.    NDI: 20  Who 5: 19    PLAN:  A shared decision making model was used.  The patient's values and choices were respected.  The following represents what was discussed and decided upon by the physician assistant and the patient.      1.  DIAGNOSTIC TESTS: I reviewed the report of the MRI cervical spine from 2015.  I ordered an MRI cervical spine for further evaluation.  Pain is been refractory to conservative treatment including physical therapy, chiropractic treatment, acupuncture, and NSAIDs.    2.  PHYSICAL THERAPY:  Patient is currently in PT at [facility] rehab.  She has had 6 sessions.  She should continue physical therapy and the home exercises.  -We could potentially have her try the [facility] physical therapy program next.    3.  MEDICATIONS:    -Cyclobenzaprine 5 mg 3 times daily as needed was prescribed.  -Patient can continue using ibuprofen as needed, but she should use this cautiously.  She reports that she had a suspected stomach ulcer years ago from excessive ibuprofen use.    4.  INTERVENTIONS: No interventions were ordered.  Patient may benefit from interventional pain management since her pain has not improved with conservative treatment.  We will await the results of the MRI.  If there is a disc bulge, would likely recommend an epidural steroid injection.  If  "not, I would recommend a trigger point injection.    5.  PATIENT EDUCATION: Patient is in agreement the above plan.  All questions were answered.    6.  FOLLOW-UP:   A nurse will call the patient with the results of her MRI.  At that time I may order an epidural steroid injection versus a trigger point injection.  If she has any other questions or concerns, she should not hesitate to call.        SUBJECTIVE:  Georgie Scott  Is a 28 y.o. female who presents today in consultation at the request of Dr. Marrero for new patient evaluation of chronic right greater than left neck pain with radiation to the right shoulder with associated headaches.  Patient states that she began to have neck pain at about age 15.  She denies any specific injury or event to cause the pain.  It has been persistent since then.  She gets flareups of pain.  She feels that the flareups are becoming more frequent.  Most severe episode of neck pain was in 2015.  At that time she was found to have a disc herniation on the right at C5-6 which was treated with a long course of physical therapy.  Patient reports that she has had multiple rounds of physical therapy, including her current round of physical therapy and she feels \"stuck.\"  She would like to see if anything else could be done to help with her pain.    Patient complains of right neck pain.  Patient points to the right of midline at the C5-6 level when she points to her pain.  The pain radiates out to the shoulder.  She denies any pain radiating further down the right arm.  She also feels pain radiate up to the head causing headaches.  She occasionally feels pain in the clavicular region on the right as well.  When pain is most severe she states that the left side of her neck feels \"sympathy pain.\"  She states that this pain is located in the same area as her right-sided pain, but not nearly as severe.  She states that her pain is 90% right-sided and 10% left-sided.  Pain is aggravated with " turning her neck.  Flareups can occur after walking her dog if he pulls on the leash, carrying items, sleeping wrong, and working out.  Most recent flareup occurred last week after carrying a grocery basket.  Pain is alleviated with applying a TENS unit and applying heat.  Patient has difficulty sleeping because of the pain.  The pain wakes her up at night.  She sometimes has to use her hands to to stabilize her head when she rolls over in bed due to the pain.  She denies any numbness, tingling, or weakness down the arms.  She denies that with fine motor activities.  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    As mentioned above, patient has had multiple rounds of physical therapy.  She is currently in physical therapy at [facility].  She has had 6 sessions.  She does home exercises.  She gets chiropractic manipulation regularly which provides temporary relief of her pain.  She gets acupuncture which provides temporary relief of her pain.  She has not had any spine injections or spine surgery.  She uses ibuprofen as needed for pain.      Current Outpatient Medications on File Prior to Encounter   Medication Sig Dispense Refill     dextroamphetamine-amphetamine (ADDERALL) 10 mg Tab tablet Take 1 tab daily as needed, then 2 tabs daily. 60 tablet 0     norethindrone (ORTHO MICRONOR) 0.35 mg tablet Take 1 tablet (0.35 mg total) by mouth daily. 84 tablet 4     omega-3/dha/epa/fish oil (FISH OIL-OMEGA-3 FATTY ACIDS) 300-1,000 mg capsule Take 2 g by mouth daily.       progesterone (PROMETRIUM) 100 MG capsule Take 1 capsule (100 mg total) by mouth daily. Take for 10 days a month 30 capsule 5       Allergies   Allergen Reactions     Amoxicillin      Bactrim [Sulfamethoxazole-Trimethoprim]      Morphine        Past Medical History:   Diagnosis Date     Medical history non-contributory 01/22/2019        Patient Active Problem List   Diagnosis     ADHD (attention deficit hyperactivity  disorder), inattentive type       Past Surgical History:   Procedure Laterality Date     No surgery history  01/22/2019       Family History   Problem Relation Age of Onset     Hypertension Father      Stroke Maternal Grandmother      Dementia Maternal Grandmother      Brain cancer Maternal Grandfather      Hypertension Paternal Grandmother      Colon cancer Paternal Grandfather      Diabetes type II Paternal Uncle      Diabetes type I Paternal Uncle      Diabetes Paternal Aunt      Diabetes Paternal Aunt      Social history: Patient is single.  She does not have children.  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  She denies tobacco use.  She drinks alcohol socially.  She denies illicit drug use.    ROS: Positive for headache, ringing in ears, nausea, joint pain, muscle pain, dizziness, excessive tiredness.  Specifically negative for dysphagia, imbalance, fine motor skill difficulties, bowel/bladder dysfunction, fevers,chills, appetite changes, unexplained weight loss.   Otherwise 13 systems reviewed are negative.  Please see the patient's intake questionnaire from today for details.      OBJECTIVE:  PHYSICAL EXAMINATION:  CONSTITUTIONAL:  Vital signs as above.  No acute distress.  The patient is well nourished and well groomed.  PSYCHIATRIC:  The patient is awake, alert, oriented to person, place, time and answering questions appropriately with clear speech.    HEENT:  Normocephalic, atraumatic.  Sclera clear.    SKIN:  Skin over the face, bilateral upper extremities, and neck is clean, dry, intact without rashes.  LYMPH NODES:  No palpable or tender anterior/posterior cervical, submandibular, or supraclavicular lymph nodes.    MUSCLE STRENGTH:  5/5 strength for the bilateral shoulder abductors, elbow flexors/extensors, wrist extensors, finger flexors/abductors.  NEURO:  CN III-XII are grossly intact.  1-2+ symmetric biceps, brachioradialis, triceps reflexes bilaterally.  Sensation to light  touch is intact over bilateral upper extremities throughout.  Negative Johnson's bilaterally.    VASCULAR:  2/4 radial pulses bilaterally.  Warm upper limbs bilaterally.  Capillary refill in the upper extremities is less than 1 second.  MUSCULOSKELETAL: Mild tenderness palpation right cervical paraspinous muscles at C5-6.  Mild tenderness palpation right greater than left occipital nerve.  Hypertonicity right greater than left upper trapezius muscle.  Cervical range of motion is mildly restricted with flexion and lateral rotation to the left.    RESULTS: I reviewed the report of an MRI cervical spine from the [facility] dated August 25, 2015.  This shows a small right herniated nucleus pulposus at C5-6.  This compresses the right side of the thecal sac and exiting nerve root.  Remainder of exam is unremarkable.

## 2021-05-27 NOTE — PROGRESS NOTES
[facility] Daily Progress     Patient Name: Georgie Scott  Date: 3/25/2019  Visit #:   PTA visit #:  na  Visit Max (if applicable): na  Referral Diagnosis: Strain of right shoulder, initial encounter  Referring provider: Nora Marrero MD  Visit Diagnosis:     ICD-10-CM    1. Chronic neck pain M54.2     G89.29    2. Generalized muscle weakness M62.81        Past Medical History:   Diagnosis Date     Medical history non-contributory 2019         Assessment:     HEP/POC compliance is  fair .  Patient demonstrates understanding/independence with home program.  Response to Intervention Limited tolerance for HEP progression with cervical strengthening. Pt continues to report pain/pressure on her R cervical spine and does not tolerate higher level cervical strengthening. She has been inconsistent with the strengthening she does tolerate (cervical isometrics) due to several different flare ups in her pain in the past few weeks. Overall, she does report mild relief with MT but no significant changes overall in her pain since initial evaluation.  Patient is appropriate to continue with skilled physical therapy intervention, as indicated by initial plan of care.    Goal Status: on-going  Pt. will demonstrate/verbalize independence in self-management of condition in : 4 weeks  Pt. will be independent with home exercise program in : 4 weeks  Pt. will have improved quality of sleep: waking less times/night;in other weeks;Comment  In Other Weeks: 8 weeks  Comment:: be able to lay on side without increased pain    No Data Recorded    Plan / Patient Education:     Continue with initial plan of care.  Progress with home program as tolerated. pt [facility] see [facility] this [facility] and is going on vacation next week. Follow up once she returns to progress cervical strengthening if tolerated, assess 1st rib  Subjective:     Pain Ratin-3/10  Pt fell down the stairs over the weekend and that  increased her pain. The pressure and sharp pain has decreased a little bit. She had a massage last week and that was helpful.       Objective:     Cervical AROM:  Flexion: 45 deg with pain/pull- slight decrease after pain after MT  Ext: 40 deg with pinching/pain  Rotation: R 73 deg with end range pain, L 73 deg with ERP  Lateral flexion: 30 deg B    Pt does not tolerate quadriped cervical chin tuck and retraction    Exercises:  Exercise #1: supine chin tuck 10x 5-10 sec hold  Comment #1: seated cervical retraction isometric 5 x 5 sec hold  Exercise #2: pec stretch in doorway 30 sec hold  Comment #2: UBE 5 min WL4.5 F/B  Exercise #3: cervical isometric lateral flexion, rotation, flexion and ext 5 x 3-5 sec hold, work up to 10 reps and 10 sec hold at home  Comment #3: Cervical rotation to R with towel 5-10 reps with 10 sec hold  Exercise #4: SCM stretch 2 x 20 sec hold B  Comment #4: median nerve slider 10x B, gentle ROM neck        Treatment Today     TREATMENT MINUTES COMMENTS   Evaluation     Self-care/ Home management     Manual therapy 12 -supine STM to chandana cervical paspinals, scalenes and SCM   -prone central PA to C2 3 x 30 sec oscillations, grade II  -prone unilateral PAs to cervical spine, grade II-III 10 sec oscillations   Neuromuscular Re-education     Therapeutic Activity     Therapeutic Exercises 15 -see exercise flow sheet for date completed  -verbal review of HEP   Gait training     Modality__________________                Total 27    Blank areas are intentional and mean the treatment did not include these items.       Hien Medellin, PT, DPT  3/25/2019     [facility] Discharge Summary    Patient was seen for 6 visits from 2/11/19 to 3/25/19 with 0 missed appointments.  The patient attended therapy initially, but did not finish the therapy sessions prescribed.  Goals were not fully achieved. Explanation for goals not achieved: Unable to fully assess progress towards goals as pt did not  return to PT for recommended POC. Pt continues to [facility] with the [facility].  Patient received a home program .  The patient discontinued therapy, did not return.      Therapy will be discontinued at this time.  The patient will need a new referral to resume.    Thank you for your referral.  Hien Medellin, PT, DPT  5/31/2019  9:23 AM

## 2021-05-27 NOTE — TELEPHONE ENCOUNTER
Patient calling for her Cervical MRI results. Results given and explained. Patient would like to have the recommended injection. Order placed in Epic. Injection requirements reviewed with patient. Stated understanding.     She is asking if she could get something to help her calm/relax prior to the injection. Explained provider would be notified of her request. They would be in contact with her as a telephone consent would need to be obtained. Stated understanding. Pharmacy verified.

## 2021-05-27 NOTE — TELEPHONE ENCOUNTER
----- Message from Kay Mcrae PA-C sent at 4/2/2019 12:28 PM CDT -----  Please call patient let her know I reviewed her MRI cervical spine.  This does show the disc bulge at C5-6 that was previously seen.  It is causing narrowing around the nerve on the right side, which is more pronounced than it was on her previous scan.  There is also mild narrowing around the nerve as it exits out of the spine higher up in the neck, at C3-4, but it does not appear as severe as the C5-6 level.  I would recommend a right C5-6 transforaminal epidural steroid injection as the next step.

## 2021-05-28 NOTE — TELEPHONE ENCOUNTER
Call placed to pt on behalf of provider to check on status after right C5-6 TFESI was aborted on Friday 4/19/2019, and to assist pt in rescheduling. Pt has the option to reschedule the injection, or she can be scheduled for a C7-T1 ILESI. The patient wanted to think about it over the weekend. Left message for pt to return call.

## 2021-05-28 NOTE — PATIENT INSTRUCTIONS - HE
A Nurse in Care Navigation will call you on Monday and assist with rescheduling. You have the option to repeat a Right C5-6 transforaminal epidural steroid injection or can schedule for a C7-T1 intralaminar epidural steroid injection.    DISCHARGE INSTRUCTIONS    During office hours (8:00 a.m.- 4:30 p.m.) questions or concerns may be answered  by calling  [phone] or spine navigation [phone]. If you experience any problems after hours  please call [phone] and you will be connected to [facility] Connection     All Patients:  ? You may experience an increase in your symptoms for the first 2 days.    ? You may resume your regular medication    ? You may shower. No swimming, tub bath or hot tub for 24 hours; remove bandage after 4 hours    ? Continue your activities that can cause you pain to test the blocks.                         ? You should not drive for the next 3 to 5 hours (have someone drive you)           POSSIBLE PROCEDURE SIDE EFFECTS  -Call Spine Center if concerned-    Increased Pain  Increased numbness/tingling     Nausea/Vomiting  Bruising/bleeding at site (hematoma)             Swelling at site (edema) Headache  Difficulty walking  Infection        Fever greater than 100.5

## 2021-05-28 NOTE — TELEPHONE ENCOUNTER
Pt returned call. Pt would like to proceed with a repeat injection, the right C5-6 TFESI. Order already in place. Injection requirements reviewed. Pt transferred to scheduling to make appt.

## 2021-05-28 NOTE — PROGRESS NOTES
Assessment:   Georgie Scott is a 29 y.o. y.o. female with past medical history significant for ADHD who presents today for follow-up regarding chronic right greater than left neck pain with radiation toward the right shoulder with associated headaches.  MRI cervical spine showed mild right foraminal stenosis at C3-4 and C5-6.  Patient status post a C7-T1 interlaminar epidural steroid injection on May 3, 2019 which provided 98% relief of her pain.  (Right C5-6 transforaminal epidural steroid injection was not able to be completed due to vascular uptake, despite multiple attempts.)  Patient has only mild residual right upper cervical spine pain which radiates up to the head causing headache.    NDI: 4       Plan:     A shared decision making plan was used.  The patient's values and choices were respected.  The following represents what was discussed and decided upon by the physician assistant and the patient.      1.  DIAGNOSTIC TESTS: I reviewed the MRI cervical spine.  No further diagnostic tests were ordered.    2.  PHYSICAL THERAPY: Patient attended 6 sessions of physical therapy.  No further physical therapy was ordered.  We discussed if she ever did want to trial physical therapy again, we could try the [facility] physical therapy program.    3.  MEDICATIONS: I refilled the patient Flexeril 5 mg 3 times daily as needed.  - Patient should avoid NSAIDs.  She suspects she had a stomach ulcer years ago.     4.  INTERVENTIONS: No further interventions were ordered.  If pain were to return, we could repeat the C7-T1 interlaminar epidural steroid injection    5.  PATIENT EDUCATION: Patient is in agreement the above plan.  All questions were answered.  -I discussed with the patient that it is important to avoid repetitive overhead reaching and work and prolonged cervical flexion extension in order to prevent flareups of pain.    6.  FOLLOW-UP: Patient to follow-up with me as needed.  If she has any questions or concerns,  she should not hesitate to call.    Subjective:     Georgie Scott is a 29 y.o. female who presents today for follow-up regarding chronic right greater than left neck pain with radiation to the right shoulder with associated headaches.  Patient status post a C7-T1 interlaminar epidural steroid injection on May 3, 2019.  Patient reports the injection has provided 98% relief of her pain.    Patient complains of only mild, residual, intermittent neck pain which is to the right of midline in the upper cervical region.  She feels the pain radiate up to the right occipital region the right side of the head causing headaches.  She denies any pain radiating into the shoulder or down the arm.  She rates her pain today as a 0-10.  At its worst it is a 2 out of 10.  At its best it is a 0-10.  Pain is aggravated with working too hard and overdoing it.  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  The physical work tends to aggravate the pain, although she notes that she would never been able to do that type of work before her injection due to the severity of the pain that she had done.  Her pain is alleviated with rest and sleep.  She denies any new symptoms and she was last seen.  She denies any numbness, tingling, or weakness.    Patient attended 6 sessions of physical therapy.  She does her home exercises.  She ran out of cyclobenzaprine.  That was helpful.  She requests a refill.  She is not taking any other pain relief medications.    Past medical history is reviewed and is unchanged.    Review of Systems:  Positive for headache.  Negative for numbness/tingling, weakness, loss of bowel/bladder control, inability to urinate, night pain, trips/stumble/falls, difficulty swallowing, difficulty with hand skills, fevers, unintentional weight loss.     Objective:   CONSTITUTIONAL:  Vital signs as above.  No acute distress.  The patient is well nourished and well groomed.    PSYCHIATRIC:  The patient is  awake, alert, oriented to person, place and time.  The patient is answering questions appropriately with clear speech.  Normal affect.  HEENT: Normocephalic, atraumatic.  Sclera clear.  Neck is supple.  SKIN:  Skin over the face, neck bilateral upper extremities is clean, dry, intact without rashes.  MUSCULOSKELETAL: The patient has 5/5 strength for the bilateral shoulder abductors, elbow flexors/extensors, wrist extensors, finger flexors/abductors.  No significant tenderness palpation cervical paraspinous muscles or upper trapezius muscles.  No tenderness palpation over the right occipital nerve.  NEUROLOGICAL:   Negative Johnson's bilaterally.  Sensation to light touch is intact over bilateral upper extremities throughout.    RESULTS: I reviewed the MRI cervical spine from [facility] dated March 29, 2019.  At C3-4 there is mild facet arthropathy on the right which causes mild right foraminal stenosis.  At C5-6 there is a slight posterior annular bulge and mild facet arthropathy which results in mild right foraminal stenosis.  No additional neural impingement.  Please see report for further details.

## 2021-05-28 NOTE — TELEPHONE ENCOUNTER
Fax received from [facility] requesting Prior Authorization    Medication Name: Adderall XR 10mg capsule    Insurance Plan: Preferred one   PBM:    Patient ID Number: [id number]    Please start PA process

## 2021-05-28 NOTE — PATIENT INSTRUCTIONS - HE
Please follow up two weeks post procedure with Kay to evaluate plan of care.       DISCHARGE INSTRUCTIONS    During office hours (8:00 a.m.- 4:30 p.m.) questions or concerns may be answered  by calling Spine Navigation Nurses at  [phone].     If you experience any problems after hours  please call [phone] and you will be connected to [facility] Connection.     All Patients:    ? You may experience an increase in your symptoms for the first 2 days (It may take anywhere between 2 days- 2 weeks for the steroid to have maximum effect).    ? You may use ice on the injection site, as frequently as 20 minutes each hour if needed.    ? You may take your pain medicine.    ? You may continue taking your regular medication after your injection. If you have had a Medial Branch Block you may resume pain medication once your pain diary is completed.    ? You may shower. No swimming, tub bath or hot tub for 48 hours.  You may remove your bandaid/bandage as soon as you are home.    ? You may resume light activities, as tolerated.    ? Resume your usual diet as tolerated.    ? It is strongly advised that you do not drive for 1-3 hours post injection.    ? If you have had oral sedation:  Do not drive for 8 hours post injection.      ? If you have had IV sedation:  Do not drive for 24 hours post injection.  Do not operate hazardous machinery or make important personal/business decisions for 24 hours.      POSSIBLE STEROID SIDE EFFECTS (If steroid/cortisone was used for your procedure)    -If you experience these symptoms, it should only last for a short period      Swelling of the legs                Skin redness (flushing)       Mouth (oral) irritation     Blood sugar (glucose) levels              Sweats                      Mood changes    Headache    Sleeplessness         POSSIBLE PROCEDURE SIDE EFFECTS  -Call the Spine Center if you are concerned    Increased Pain             Increased numbness/tingling         Nausea/Vomiting            Bruising/bleeding at site        Redness or swelling                                                Difficulty walking        Weakness             Fever greater than 100.5    *In the event of a severe headache after an epidural steroid injection that is relieved by lying down, please call the [facility] to speak with a clinical staff member*

## 2021-05-28 NOTE — TELEPHONE ENCOUNTER
The patient requires oral sedation for her injection, necessitating a phone consent prior to the day of his injection.  The patient was contacted on 4/22/19 at 13:15.  The patient has had other conservative treatment but wishes to pursue spine injections.   The procedure of a  right C5/6 transforaminal epidural corticosteroid injection was discussed in detail.  The patient had the opportunity to directly ask the physician assistant questions regarding the procedure and the questions were answered prior to the consent form being presented.  The risks of the procedure, including but not limited to:  increased soreness, infection, bleeding, damage to surrounding structures, permanent weakness, paralysis,  allergic reaction,  worsening of her pain or no change in pain. The patient elected to proceed and gave verbal informed consent.  Lynda Fernandez CMA was present for the entire phone consent as a witness and signed the consent form with the provider.       The patient denies any symptoms of an active infection and denies taking antibiotics. The patient denies taking any prescription blood thinning medications. The patient denies any allergies to iodine or iodine contrast.

## 2021-05-28 NOTE — PROGRESS NOTES
Assessment / Impression     1. Attention deficit hyperactivity disorder (ADHD), predominantly inattentive type  dextroamphetamine-amphetamine (ADDERALL XR) 15 MG 24 hr capsule    dextroamphetamine-amphetamine (ADDERALL) 10 mg Tab tablet       Plan:     Given she has had some improvement, we will increase Adderall X.R.to 50 mg daily #30 tabs with no refills, and she was also given Adderall immediate release 10 mg #30 tabs with no refills.  If she is doing well, she may send me a message through my chart, and can fill up to 6 months worth of prescriptions, though she should follow-up at least once every 6 months.  If further adjustments are needed, would recommend she come in for office visit.  Patient understands, agrees with plan.    Subjective:      HPI: Georgie Scott is a 29 y.o. female who presents for ADHD follow-up.  At her last visit with me, we had put her on Adderall extended release 10 mg in the morning, and she would use Adderall immediate release in the afternoon as needed.  She did note improvement, mostly when she was taking her afternoon immediate release Adderall dose it felt more effective, though she feels that if she could have slightly more improvement with her extended release, she may not even need her immediate release dose in the afternoon.  She denies any adverse effect such as palpitations or appetite suppression.      Medical History:     Patient Active Problem List   Diagnosis     ADHD (attention deficit hyperactivity disorder), inattentive type       Past Medical History:   Diagnosis Date     Medical history non-contributory 01/22/2019       Past Surgical History:   Procedure Laterality Date     No surgery history  01/22/2019       Current Medications:     Current Outpatient Medications   Medication Sig     dextroamphetamine-amphetamine (ADDERALL) 10 mg Tab tablet Take 1 tab daily in PM as needed.     omega-3/dha/epa/fish oil (FISH OIL-OMEGA-3 FATTY ACIDS) 300-1,000 mg capsule Take 2 g  "by mouth daily.     tranexamic acid (LYSTEDA) 650 mg Tab tablet Take 2 tablets (1,300 mg total) by mouth 3 (three) times a day.     dextroamphetamine-amphetamine (ADDERALL XR) 15 MG 24 hr capsule Take 1 capsule (15 mg total) by mouth daily.       Family History:     Family History   Problem Relation Age of Onset     Hypertension Father      Stroke Maternal Grandmother      Dementia Maternal Grandmother      Brain cancer Maternal Grandfather      Hypertension Paternal Grandmother      Colon cancer Paternal Grandfather      Diabetes type II Paternal Uncle      Diabetes type I Paternal Uncle      Diabetes Paternal Aunt      Diabetes Paternal Aunt        Review of Systems  All other systems reviewed and are negative.         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    Psychiatric: Normal affect. Does not appear anxious or depressed.    No results found for this or any previous visit (from the past 168 hour(s)).      Nora Marrero MD  5/14/2019  12:20 PM        "

## 2021-05-28 NOTE — PROGRESS NOTES
Assessment / Impression     1. Attention deficit hyperactivity disorder (ADHD), predominantly inattentive type  dextroamphetamine-amphetamine (ADDERALL XR) 10 MG 24 hr capsule    dextroamphetamine-amphetamine (ADDERALL) 10 mg Tab tablet           Plan:     We will switch her initial daily Adderall immediate release dose to Adderall extended release 10 mg daily, I did also give her an Adderall immediate release 10 mg prescription with #30 tabs.  She may take that in the afternoon as needed.  There is a possibility that she may need to increase her extended release dose to 20 mg.  If so, she will need follow-up appointment in 1 month, otherwise if doses are effective, she may message me via my chart.  She will need to follow-up at least once every 6 months.  Patient understands, agrees with plan.    Subjective:      HPI: Georgie Scott is a 29 y.o. female who presents for ADHD medication follow-up.  Please make my previous note for more details.  At the last visit, the patient was started on Adderall 10 mg daily, and she did occasionally start taking 10 mg twice daily.  She feels that on some days medication seems more helpful than others.  If she was tired she did not feel the effects as much.  She has noted that she tends to get more patient complements at work when she does feel the medication is effective.  She denies any adverse effects of medication such as appetite suppression or palpitations.  She was on vacation for 1 week and only use medication a few times then.  She is interested in trying to take the extended release, she feels medication does not last very long.      Medical History:     Patient Active Problem List   Diagnosis     ADHD (attention deficit hyperactivity disorder), inattentive type       Past Medical History:   Diagnosis Date     Medical history non-contributory 01/22/2019       Past Surgical History:   Procedure Laterality Date     No surgery history  01/22/2019       Current  "Medications:     Current Outpatient Medications   Medication Sig Note     dextroamphetamine-amphetamine (ADDERALL) 10 mg Tab tablet Take 1 tab daily in PM as needed.      omega-3/dha/epa/fish oil (FISH OIL-OMEGA-3 FATTY ACIDS) 300-1,000 mg capsule Take 2 g by mouth daily.      dextroamphetamine-amphetamine (ADDERALL XR) 10 MG 24 hr capsule Take 1 capsule (10 mg total) by mouth every morning.      diazePAM (VALIUM) 5 MG tablet Take one tab by mouth one - 1.5 hours prior to appt.  Bring second tab to MRI appointment, may take again 15 minutes before procedure.      norethindrone (ORTHO MICRONOR) 0.35 mg tablet Take 1 tablet (0.35 mg total) by mouth daily.      progesterone (PROMETRIUM) 100 MG capsule Take 1 capsule (100 mg total) by mouth daily. Take for 10 days a month 3/27/2019: DC 3/27/19       Family History:     Family History   Problem Relation Age of Onset     Hypertension Father      Stroke Maternal Grandmother      Dementia Maternal Grandmother      Brain cancer Maternal Grandfather      Hypertension Paternal Grandmother      Colon cancer Paternal Grandfather      Diabetes type II Paternal Uncle      Diabetes type I Paternal Uncle      Diabetes Paternal Aunt      Diabetes Paternal Aunt        Review of Systems  All other systems reviewed and are negative.         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    Extremities: No edema, no clubbing or cyanosis  Psychiatric: Normal affect. Does not appear anxious or depressed.    No results found for this or any previous visit (from the past 168 hour(s)).      Nora Marrero MD  4/19/2019  10:23 AM

## 2021-05-28 NOTE — TELEPHONE ENCOUNTER
Pt called back, inquiring about getting rx for valium before procedure as she did for the last one on Friday 4/19/2019. Will send message to provider to have order placed. Pharmacy verified.

## 2021-05-29 NOTE — TELEPHONE ENCOUNTER
Fax received from [facility] pharmacy requesting Prior Authorization    Medication Name:   dextroamphetamine-amphetamine (ADDERALL XR) 15 MG 24 hr capsule 30 capsule 0 5/14/2019     Sig - Route: Take 1 capsule (15 mg total) by mouth daily. - Oral    Sent to pharmacy as: dextroamphetamine-amphetamine (ADDERALL XR) 15 MG 24 hr capsule    Earliest Fill Date: 5/14/2019    E-Prescribing Status: Receipt confirmed by pharmacy (5/14/2019 12:18 PM CDT)          Insurance Plan: Preferred One   PBM:    Patient ID Number: [id number]    Please start PA process

## 2021-05-29 NOTE — TELEPHONE ENCOUNTER
Central PA team  [phone]  Pool: HE PA MED ([id number])          PA has been initiated.       PA form completed and faxed insurance via Cover My Meds     Key:  [id number]     Medication:  Adderall xr 15    Insurance:  Preferred One        Response will be received via fax and may take up to 5-10 business days depending on plan

## 2021-05-29 NOTE — TELEPHONE ENCOUNTER
"Request for medication refill:    Providers if patient needs an appointment and you are willing to give a one month supply please refill for one month and  send a letter/MyChart using \".SMILLIMITEDREFILL\" .smillimited and route chart to \"P SMI \" (Giving one month refill in non controlled medications is strongly recommended before denial)    If refill has been denied, meaning absolutely no refills without visit, please complete the smart phrase \".smirxrefuse\" and route it to the \"P SMI MED REFILLS\"  pool to inform the patient and the pharmacy.    ANGELIQUE MELENDEZ MA        " Controlled Substance Refill Request  Medication:   Requested Prescriptions     Pending Prescriptions Disp Refills     dextroamphetamine-amphetamine (ADDERALL XR) 15 MG 24 hr capsule 30 capsule 0     Sig: Take 1 capsule (15 mg total) by mouth daily.     Date Last Fill: 5/14/19  Pharmacy: [facility]   Submit electronically to pharmacy  Controlled Substance Agreement on File:   Encounter-Level CSA Scan Date:    There are no encounter-level csa scan date.       Last office visit: Last office visit pertaining to requested medication was 5/14/19.

## 2021-05-30 NOTE — TELEPHONE ENCOUNTER
"Patient calling to ask to have another injection for her neck pain. \"Kay said I could call if my symptoms returned and weren't different in any way.\" Reports her pain started to return a couple of weeks ago. \"It is just worsening with each day. It is not as bad as before the first injection, but I don't want it to get to that level either. I did have to pick my head up today like in the past.\" Rates her worst pain at 4/10 presently. Chart reviewed. Order placed in Epic. Injection requirements reviewed with patient. Stated understanding. Did discuss the number of injections that one can safely get in a 12 month time frame and usually would like to spread them out to 3 months apart if able. Transferred to scheduling to make appointment.      Did also discuss possibly doing [facility] PT in the future. States she would be open to that in the fall as she is busy with work and vacations during the summer.   "

## 2021-05-31 VITALS — WEIGHT: 146 LBS

## 2021-05-31 NOTE — TELEPHONE ENCOUNTER
Pt states that Cipro has helped her in the passed   [facility] [place] [facility] pharmacy please send there   Pt is very uncomfortably

## 2021-06-02 VITALS — BODY MASS INDEX: 23.23 KG/M2 | WEIGHT: 148 LBS | HEIGHT: 67 IN

## 2021-06-02 VITALS — HEIGHT: 66 IN | WEIGHT: 149 LBS | BODY MASS INDEX: 23.95 KG/M2

## 2021-06-02 VITALS — WEIGHT: 153.3 LBS | BODY MASS INDEX: 24.64 KG/M2 | HEIGHT: 66 IN

## 2021-06-02 VITALS — WEIGHT: 152 LBS | BODY MASS INDEX: 23.86 KG/M2 | HEIGHT: 67 IN

## 2021-06-02 VITALS — WEIGHT: 150.4 LBS | BODY MASS INDEX: 24.09 KG/M2

## 2021-06-02 VITALS — WEIGHT: 153.3 LBS | HEIGHT: 66 IN | BODY MASS INDEX: 24.64 KG/M2

## 2021-06-02 VITALS — HEIGHT: 67 IN | WEIGHT: 146.5 LBS | BODY MASS INDEX: 22.99 KG/M2

## 2021-06-03 ENCOUNTER — OFFICE VISIT - HEALTHEAST (OUTPATIENT)
Dept: OCCUPATIONAL THERAPY | Facility: REHABILITATION | Age: 31
End: 2021-06-03

## 2021-06-03 VITALS — HEIGHT: 66 IN | BODY MASS INDEX: 24.59 KG/M2 | WEIGHT: 153 LBS

## 2021-06-03 VITALS — WEIGHT: 146 LBS | BODY MASS INDEX: 23.21 KG/M2

## 2021-06-03 VITALS — HEIGHT: 67 IN | WEIGHT: 146 LBS | BODY MASS INDEX: 22.91 KG/M2

## 2021-06-03 DIAGNOSIS — M25.531 PAIN IN BOTH WRISTS: ICD-10-CM

## 2021-06-03 DIAGNOSIS — M25.532 PAIN IN BOTH WRISTS: ICD-10-CM

## 2021-06-03 DIAGNOSIS — Z78.9 DECREASED ACTIVITIES OF DAILY LIVING (ADL): ICD-10-CM

## 2021-06-03 DIAGNOSIS — R20.2 PARESTHESIA OF BOTH HANDS: ICD-10-CM

## 2021-06-03 NOTE — TELEPHONE ENCOUNTER
Patient calling to request a prescription of Valium to take prior to the cervical facet injections she is having on 12/20. She has had in the past for other injections. Pharmacy verified. Prescription prepped for provider review and authorization.

## 2021-06-03 NOTE — PROGRESS NOTES
Assessment / Impression     1. Attention deficit hyperactivity disorder (ADHD), predominantly inattentive type  dextroamphetamine-amphetamine (ADDERALL XR) 15 MG 24 hr capsule    dextroamphetamine-amphetamine (ADDERALL XR) 15 MG 24 hr capsule    dextroamphetamine-amphetamine (ADDERALL XR) 15 MG 24 hr capsule    dextroamphetamine-amphetamine (ADDERALL) 10 mg Tab tablet    dextroamphetamine-amphetamine (ADDERALL) 10 mg Tab tablet    dextroamphetamine-amphetamine (ADDERALL) 10 mg Tab tablet         Plan:     Doing well with current medications.  I did discuss trying to increase her extended release dose to higher dose that she would only need a one-time dose of her 10 mg tablets during the day, though patient feels that this regimen is working well for her, so we will keep her at this current dose.  I did give her dextroamphetamine 10 mg #40 tabs per month given she is taking usually 2 doses in the afternoon.  Plan to follow-up in 6 months, sooner if she would like medication adjustment.    Return in about 6 months (around 5/22/2020) for Annual physical, Med Check.    Subjective:      HPI: Georgie Scott is a 29 y.o. female who presents for ADHD medication follow-up.  She has been taking Adderall XR 15mg daily around 6:30 AM when she is at work, and often will take Adderall immediate release around 10 AM, and another dose around 1-2pm.  If she is on vacation, for example when she was in [place] for 2 weeks, she does not take any of her medication.  Denies any adverse effects of medication such as palpitations.      Medical History:     Patient Active Problem List   Diagnosis     ADHD (attention deficit hyperactivity disorder), inattentive type       Past Medical History:   Diagnosis Date     Medical history non-contributory 01/22/2019       Past Surgical History:   Procedure Laterality Date     No surgery history  01/22/2019       Current Medications:     Current Outpatient Medications   Medication Sig     [START ON  "1/21/2020] dextroamphetamine-amphetamine (ADDERALL XR) 15 MG 24 hr capsule Take 1 capsule (15 mg total) by mouth daily.     [START ON 12/22/2019] dextroamphetamine-amphetamine (ADDERALL XR) 15 MG 24 hr capsule Take 1 capsule (15 mg total) by mouth daily.     dextroamphetamine-amphetamine (ADDERALL XR) 15 MG 24 hr capsule Take 1 capsule (15 mg total) by mouth daily.     [START ON 1/21/2020] dextroamphetamine-amphetamine (ADDERALL) 10 mg Tab tablet Take 1 tab daily in PM as needed.     [START ON 12/22/2019] dextroamphetamine-amphetamine (ADDERALL) 10 mg Tab tablet Take 1 tablet by mouth daily.     dextroamphetamine-amphetamine (ADDERALL) 10 mg Tab tablet Take 1 tablet by mouth daily.     omega-3/dha/epa/fish oil (FISH OIL-OMEGA-3 FATTY ACIDS) 300-1,000 mg capsule Take 2 g by mouth daily.     tranexamic acid (LYSTEDA) 650 mg Tab tablet Take 2 tablets (1,300 mg total) by mouth 3 (three) times a day.     cyclobenzaprine (FLEXERIL) 5 MG tablet        Family History:     Family History   Problem Relation Age of Onset     Hypertension Father      Stroke Maternal Grandmother      Dementia Maternal Grandmother      Brain cancer Maternal Grandfather      Hypertension Paternal Grandmother      Colon cancer Paternal Grandfather      Diabetes type II Paternal Uncle      Diabetes type I Paternal Uncle      Diabetes Paternal Aunt      Diabetes Paternal Aunt        Review of Systems  All other systems reviewed and are negative.         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    Extremities: No edema, no clubbing or cyanosis  Psychiatric: Normal affect. Does not appear anxious or depressed.    No results found for this or any previous visit (from the past 168 hour(s)).      Nora Marrero MD  11/22/2019  9:04 AM
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
## 2021-06-03 NOTE — PATIENT INSTRUCTIONS - HE
Please complete your pain diary and return the diary to the Spine Center at your earliest convenience.  The Spine Center will contact you to schedule your next appointment after your pain diary is reviewed by your doctor.  Thank you.    DISCHARGE INSTRUCTIONS    During office hours (8:00 a.m.- 4:30 p.m.) questions or concerns may be answered  by calling  [phone] or spine navigation [phone]. If you experience any problems after hours  please call [phone] and you will be connected to [facility]     All Patients:  ? You may experience an increase in your symptoms for the first 2 days, once the numbing medication wears off.    ? You may resume your regular medication, no pain medication until you have completed your diary    ? You may shower. No swimming, tub bath or hot tub for 24 hours; remove bandage after 4 hours    ? Continue your activities that can cause you pain to test the blocks.                         ? You should not drive for the next 3 to 5 hours (have someone drive you)           POSSIBLE PROCEDURE SIDE EFFECTS  -Call Spine Center if concerned-    Increased Pain  Increased numbness/tingling     Nausea/Vomiting  Bruising/bleeding at site (hematoma)             Swelling at site (edema) Headache  Difficulty walking  Infection        Fever greater than 100.5

## 2021-06-03 NOTE — TELEPHONE ENCOUNTER
Attempted to contact patient to discuss pain diary that was received for a left C5,C6,C7,C8 MBB with Dr Ramos. Detailed message left. Patient with positive diagnostic test and recommended to proceed with a Left C5-6, C6-7, C7-T1 FJI. Injection requirements left and patient encouraged to contact Care Navigation with any questions or concerns or to schedule at her convenience.

## 2021-06-04 VITALS — HEIGHT: 67 IN | WEIGHT: 146 LBS | BODY MASS INDEX: 22.91 KG/M2

## 2021-06-04 VITALS
BODY MASS INDEX: 22.6 KG/M2 | WEIGHT: 144 LBS | OXYGEN SATURATION: 94 % | SYSTOLIC BLOOD PRESSURE: 121 MMHG | HEIGHT: 67 IN | DIASTOLIC BLOOD PRESSURE: 64 MMHG | HEART RATE: 112 BPM

## 2021-06-04 VITALS
RESPIRATION RATE: 16 BRPM | BODY MASS INDEX: 22.52 KG/M2 | SYSTOLIC BLOOD PRESSURE: 108 MMHG | DIASTOLIC BLOOD PRESSURE: 60 MMHG | HEART RATE: 82 BPM | HEIGHT: 67 IN | WEIGHT: 143.5 LBS

## 2021-06-04 VITALS — BODY MASS INDEX: 22.76 KG/M2 | HEIGHT: 67 IN | WEIGHT: 145 LBS

## 2021-06-04 VITALS — BODY MASS INDEX: 23.05 KG/M2 | WEIGHT: 145 LBS

## 2021-06-04 NOTE — PATIENT INSTRUCTIONS - HE
Follow-up visit with Kay Mcrae in 2 weeks to discuss injection outcome and determine care plan going forward.       DISCHARGE INSTRUCTIONS    During office hours (8:00 a.m.- 4:00 p.m.) questions or concerns may be answered  by calling Spine Center Navigation Nurses at  [phone].  Messages received after hours will be returned the following business day.      In the case of an emergency, please dial 911 or seek assistance at the nearest Emergency Room/Urgent Care facility.     All Patients:    ? You may experience an increase in your symptoms for the first 2 days (It may take anywhere between 2 days- 2 weeks for the steroid to have maximum effect).    ? You may use ice on the injection site, as frequently as 20 minutes each hour if needed.    ? You may take your pain medicine.    ? You may continue taking your regular medication after your injection. If you have had a Medial Branch Block you may resume pain medication once your pain diary is completed.    ? You may shower. No swimming, tub bath or hot tub for 48 hours.  You may remove your bandaid/bandage as soon as you are home.    ? You may resume light activities, as tolerated.    ? Resume your usual diet as tolerated.    ? It is strongly advised that you do not drive for 1-3 hours post injection.    ? If you have had oral sedation:  Do not drive for 8 hours post injection.      ? If you have had IV sedation:  Do not drive for 24 hours post injection.  Do not operate hazardous machinery or make important personal/business decisions for 24 hours.      POSSIBLE STEROID SIDE EFFECTS (If steroid/cortisone was used for your procedure)    -If you experience these symptoms, it should only last for a short period      Swelling of the legs                Skin redness (flushing)       Mouth (oral) irritation     Blood sugar (glucose) levels              Sweats                      Mood changes    Headache    Sleeplessness         POSSIBLE PROCEDURE SIDE EFFECTS  -Call  the Spine Center if you are concerned    Increased Pain             Increased numbness/tingling        Nausea/Vomiting            Bruising/bleeding at site        Redness or swelling                                                Difficulty walking        Weakness             Fever greater than 100.5    *In the event of a severe headache after an epidural steroid injection that is relieved by lying down, please call the [facility] to speak with a clinical staff member*
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
## 2021-06-04 NOTE — TELEPHONE ENCOUNTER
The patient wished to take a pre-procedure anxiolytic necessitating a phone consent.  The patient has been offered other conservative treatment (physical therapy, medications, etc.) but has opted to proceed with an injection.  The risks and benefits of the injection (Left C5-6, C6-7 and C7-T1) were discussed with the patient over the phone on 12-11-19 at 11:58 am.  The risks of the injection include infection, bleeding, damage to surrounding structures,  worsened pain, soreness, headache, allergic reaction, no change in pain.      The patient understands that they cannot have symptoms of an infection or be on antibiotics at the time of the injection as this would increase their risk of infection. If they start antibiotics prior to the procedure, they should call the clinic to see if the procedure will need to be rescheduled.  The patient understands that if they start any blood thinners prior to the injection, the provider must be notified immediately.  The patient denies any allergies to iodine contrast dye or iodine products.  The patient denies any symptoms of an active infection and denies taking antibiotics. The patient denies taking any prescription blood thinning medications. The patient denies any allergies to iodine or iodine contrast.   She denies any possibility of pregnancy.      The patient verbalized understanding of the information given. The patient was given the opportunity to ask questions of the physician.  The patient elected to proceed and gave consent over the phone with Luzmaria Reis LPN as a witness.  Informed consent form was signed by the physician and the nurse.

## 2021-06-04 NOTE — PROGRESS NOTES
S: The patient is here in follow up of her luteal phase bleeding.  See note from 1/2/19.  The Prometrium we tried then caused mood issues, in particular increased anxiety.  She then tried Lysteda which helped with her heavy periods but didn't stop the luteal bleeding.  She is trying acupuncture now.  It made her bleed more days, but she didn't have clots, and she's sleeping better.  She had an ultrasound 8/6/18 that showed a 0.7 x 0.6 x 0.5 cm fibroid with some submucosal impact.  She is starting to feel an occasional pain in her pelvis, and she is wondering if her fibroid may be growing.    Outpatient Medications Prior to Visit   Medication Sig Dispense Refill     cyclobenzaprine (FLEXERIL) 5 MG tablet        [START ON 1/21/2020] dextroamphetamine-amphetamine (ADDERALL XR) 15 MG 24 hr capsule Take 1 capsule (15 mg total) by mouth daily. 30 capsule 0     [START ON 12/22/2019] dextroamphetamine-amphetamine (ADDERALL XR) 15 MG 24 hr capsule Take 1 capsule (15 mg total) by mouth daily. 30 capsule 0     dextroamphetamine-amphetamine (ADDERALL XR) 15 MG 24 hr capsule Take 1 capsule (15 mg total) by mouth daily. 30 capsule 0     [START ON 1/21/2020] dextroamphetamine-amphetamine (ADDERALL) 10 mg Tab tablet Take 1 tab daily in PM as needed. 40 tablet 0     [START ON 12/22/2019] dextroamphetamine-amphetamine (ADDERALL) 10 mg Tab tablet Take 1 tablet by mouth daily. 40 tablet 0     dextroamphetamine-amphetamine (ADDERALL) 10 mg Tab tablet Take 1 tablet by mouth daily. 40 tablet 0     diazePAM (VALIUM) 5 MG tablet Take one tab by mouth one - 1.5 hours prior to appt.  Bring second tab to appointment, may take again 15 minutes before procedure. 2 tablet 0     omega-3/dha/epa/fish oil (FISH OIL-OMEGA-3 FATTY ACIDS) 300-1,000 mg capsule Take 2 g by mouth daily.       tranexamic acid (LYSTEDA) 650 mg Tab tablet Take 2 tablets (1,300 mg total) by mouth 3 (three) times a day. 30 tablet 12     No facility-administered medications  "prior to visit.        Patient is allergic to amoxicillin; bactrim [sulfamethoxazole-trimethoprim]; and morphine.    O:  /64 (Patient Site: Right Arm, Patient Position: Sitting, Cuff Size: Adult Regular)   Pulse (!) 112   Ht 5' 6.5\" (1.689 m)   Wt 144 lb (65.3 kg)   LMP 12/03/2019           Body mass index is 22.89 kg/m .    General: WN/WD WF, NAD    Assessment: 29 y.o. SWF  with abnormal bleeding.    Plan: We discussed that the fibroid could be growing and causing the pain she notes, but it likely isn't related to the luteal bleeding.  We discussed that the fibroid could be related to the heavy periods themselves.  We discussed that if the fibroid is larger she could meet with Interventional Radiology to see if she's a candidate for uterine artery embolization.  We discussed that if the ultrasound doesn't show much change, there may not be a lot of non-hormonal options to help with the luteal bleeding, which is what bothers her the most.  One option could be a Lia as the hormones are very low level and local rather than systemic.  Questions were answered.  Approximately 15 minutes were spent with the patient with the majority in counseling.  "

## 2021-06-05 VITALS
WEIGHT: 158 LBS | DIASTOLIC BLOOD PRESSURE: 88 MMHG | HEART RATE: 104 BPM | SYSTOLIC BLOOD PRESSURE: 129 MMHG | OXYGEN SATURATION: 95 % | TEMPERATURE: 99.1 F | BODY MASS INDEX: 25.12 KG/M2 | RESPIRATION RATE: 16 BRPM

## 2021-06-05 VITALS
HEART RATE: 91 BPM | HEIGHT: 67 IN | SYSTOLIC BLOOD PRESSURE: 140 MMHG | RESPIRATION RATE: 16 BRPM | WEIGHT: 156 LBS | DIASTOLIC BLOOD PRESSURE: 86 MMHG | BODY MASS INDEX: 24.48 KG/M2

## 2021-06-05 VITALS — HEIGHT: 67 IN | WEIGHT: 145 LBS | BODY MASS INDEX: 22.76 KG/M2

## 2021-06-06 NOTE — TELEPHONE ENCOUNTER
Controlled Substance Refill Request  Medication Name:   Requested Prescriptions     Pending Prescriptions Disp Refills     dextroamphetamine-amphetamine (ADDERALL XR) 15 MG 24 hr capsule [Pharmacy Med Name: AMPHETAMINE SALTS *ER* 15MG CAP] 30 capsule 0     Sig: TAKE ONE CAPSULE BY MOUTH ONCE DAILY     dextroamphetamine-amphetamine (ADDERALL) 10 mg Tab tablet [Pharmacy Med Name: AMPHETAMINE-DEXTROAMPHETAMI 10 TABS] 40 tablet 0     Sig: TAKE ONE TABLET BY MOUTH EVERY EVENING AS NEEDED     Date Last Fill: 1/21/20  [facility]ed [facility]acy: [facility]  Submit electronically to pharmacy  Controlled Substance Agreement on file:   Encounter-Level CSA Scan Date:    There are no encounter-level csa scan date.        Last office visit:  11/22/19

## 2021-06-06 NOTE — TELEPHONE ENCOUNTER
Patient Returning Call  Reason for call:  lmtcb   Information relayed to patient: Relayed msg and urbano agrees. No further questions a this time Patient will schedule follow up asap .   Patient has additional questions:  No  If YES, what are your questions/concerns:    Na  Okay to leave a detailed message?: No call back needed

## 2021-06-06 NOTE — TELEPHONE ENCOUNTER
1st attempt to contact patient    Left message to call back for: Georgie      Information to relay to patient:  LMTCB    Please advise patient as below and assist in scheduling     3 one month rx for both adderall XR and adderall signed. Needs appointment before additional refills in 3 months (end of May, beginning of June), please remind patient    Bonnie Blanton, CMA

## 2021-06-06 NOTE — TELEPHONE ENCOUNTER
3 one month rx for both adderall XR and adderall signed. Needs appointment before additional refills in 3 months (end of May, beginning of June), please remind patient.

## 2021-06-07 ENCOUNTER — COMMUNICATION - HEALTHEAST (OUTPATIENT)
Dept: FAMILY MEDICINE | Facility: CLINIC | Age: 31
End: 2021-06-07

## 2021-06-07 DIAGNOSIS — K21.00 GASTROESOPHAGEAL REFLUX DISEASE WITH ESOPHAGITIS, UNSPECIFIED WHETHER HEMORRHAGE: ICD-10-CM

## 2021-06-07 NOTE — PROGRESS NOTES
"Georgie Scott is a 30 y.o. female who is being evaluated via a billable video visit.      The patient has been notified of following:     \"This video visit will be conducted via a call between you and your physician/provider. We have found that certain health care needs can be provided without the need for an in-person physical exam.  This service lets us provide the care you need with a video conversation.  If a prescription is necessary we can send it directly to your pharmacy.  If lab work is needed we can place an order for that and you can then stop by our lab to have the test done at a later time.    Video visits are billed at different rates depending on your insurance coverage. Please reach out to your insurance provider with any questions.    If during the course of the call the physician/provider feels a video visit is not appropriate, you will not be charged for this service.\"    Patient has given verbal consent to a Video visit? Yes    Patient would like to receive their AVS by AVS Preference: Thalia.    Patient would like the video invitation sent by: Text to cell phone: [phone]    Will anyone else be joining your video visit? No      Video Start Time: 11:07am    Additional provider notes:   Assessment / Impression     1. Attention deficit hyperactivity disorder (ADHD), predominantly inattentive type  dextroamphetamine-amphetamine (ADDERALL XR) 15 MG 24 hr capsule    dextroamphetamine-amphetamine (ADDERALL XR) 15 MG 24 hr capsule    dextroamphetamine-amphetamine (ADDERALL XR) 15 MG 24 hr capsule    dextroamphetamine-amphetamine (ADDERALL) 10 mg Tab tablet    dextroamphetamine-amphetamine (ADDERALL) 10 mg Tab tablet    dextroamphetamine-amphetamine (ADDERALL) 10 mg Tab tablet         Plan:     Doing well with current medications, she was given 3 one-month prescriptions of both Adderall X.R.15 mg #30 tabs as well as Adderall immediate release 10 mg #40 tabs, with instructions to take 1 to 2 " tablets daily.  Hopefully with instruction changes she will be allowed to  the immediate release prescription monthly.  If she does have issues, she may call and we can adjust the quantity as needed.  Discussed with patient that we do need to follow-up at least once every 6 months, sooner if she has any questions or concerns.  Patient understands, agrees to plan.    Return in about 6 months (around 11/5/2020) for Med Check.    Subjective:      HPI: Georgie Scott is a 30 y.o. female, here today for video visit, who presents for med check for ADHD.  I last saw patient in November, 2019.  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  For this reason, she has not taken her Adderall medication as consistently since she has not been at work.  However, she will be returning to work this coming Monday, and also being deployed to [facility] to work as a radiology tech.  She does feel that she will need her Adderall medication consistently.  Prior to COVID-19, she had a taking Adderall extended release in the morning around 6:30 AM, and then using an additional 1 to 2 tablets of Adderall immediate release 10 mg in the afternoons.  She denies any adverse effects of medication such as appetite suppression or palpitations.  This regimen has been working well for patient.  The only thing that has been difficult is that she sometimes is unable to fill her Adderall immediate release monthly because of quantity #40 tabs.      Medical History:     Patient Active Problem List   Diagnosis     ADHD (attention deficit hyperactivity disorder), inattentive type       Past Medical History:   Diagnosis Date     ADHD      Medical history non-contributory 01/22/2019       Past Surgical History:   Procedure Laterality Date     No surgery history  01/22/2019       Current Medications:     Current Outpatient Medications   Medication Sig     cyclobenzaprine (FLEXERIL) 5  MG tablet TAKE ONE TABLET BY MOUTH THREE TIMES A DAY AS NEEDED FOR MUSCLE SPASMS     [START ON 7/4/2020] dextroamphetamine-amphetamine (ADDERALL XR) 15 MG 24 hr capsule Take 1 capsule (15 mg total) by mouth daily.     [START ON 6/4/2020] dextroamphetamine-amphetamine (ADDERALL XR) 15 MG 24 hr capsule Take 1 capsule (15 mg total) by mouth daily.     dextroamphetamine-amphetamine (ADDERALL XR) 15 MG 24 hr capsule Take 1 capsule (15 mg total) by mouth daily.     [START ON 7/4/2020] dextroamphetamine-amphetamine (ADDERALL) 10 mg Tab tablet Take 1-2 tablets by mouth daily.     [START ON 6/4/2020] dextroamphetamine-amphetamine (ADDERALL) 10 mg Tab tablet Take 1-2 tablets by mouth daily.     dextroamphetamine-amphetamine (ADDERALL) 10 mg Tab tablet Take 1-2 tablets by mouth daily.     ibuprofen 200 mg cap Take 600 mg by mouth every 6 (six) hours as needed.     omega-3/dha/epa/fish oil (FISH OIL-OMEGA-3 FATTY ACIDS) 300-1,000 mg capsule Take 2 g by mouth daily.       Family History:     Family History   Problem Relation Age of Onset     Hypertension Father      Stroke Maternal Grandmother      Dementia Maternal Grandmother      Brain cancer Maternal Grandfather      Hypertension Paternal Grandmother      Colon cancer Paternal Grandfather      Diabetes type II Paternal Uncle      Diabetes type I Paternal Uncle      Diabetes Paternal Aunt      Diabetes Paternal Aunt        Review of Systems  All other systems reviewed and are negative.         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  No visible retractions or increased work of breathing.  Able to speak fully in  complete sentences.  NEURO: Cranial nerves grossly intact, mentation intact and speech normal  PSYCH: mentation appears normal, affect normal/bright, judgement and insight intact, normal speech and appearance well-groomed                Video-Visit Details    Type of service:  Video Visit    Video End Time (time video stopped): 11:18am  Originating Location (pt. Location): Home    Distant Location (provider location):  [facility] offsite  Platform used for Video Visit: [facility]      Nora Marrero MD

## 2021-06-07 NOTE — TELEPHONE ENCOUNTER
1st attempt to contact patient    Left message to call back for: Georgie     Information to relay to patient:  PATT     Patient has an appointment on  with Dr. Marrero Friday May 1st.    Our providers have reviewed their schedules of upcoming patient visit to prioritize care needs related to COVID-19 as well as to ensure the health of other patients.    We are rescheduling/postpoing all physicals to July. Please assist patient in rescheduling into July or later.    If the patient has any other needs during this time, please advise that alternatives to many types of in-person visits are being implemented in order to remove any risk of COVID-19 transmission. (med refills, illness, anxiety, depression etc) and assist patient in scheduling a phone/video visit to address any issues she may have at this time.

## 2021-06-08 ENCOUNTER — OFFICE VISIT - HEALTHEAST (OUTPATIENT)
Dept: OCCUPATIONAL THERAPY | Facility: REHABILITATION | Age: 31
End: 2021-06-08

## 2021-06-08 DIAGNOSIS — M25.532 PAIN IN BOTH WRISTS: ICD-10-CM

## 2021-06-08 DIAGNOSIS — M25.531 PAIN IN BOTH WRISTS: ICD-10-CM

## 2021-06-08 DIAGNOSIS — R20.2 PARESTHESIA OF BOTH HANDS: ICD-10-CM

## 2021-06-08 DIAGNOSIS — Z78.9 DECREASED ACTIVITIES OF DAILY LIVING (ADL): ICD-10-CM
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
## 2021-06-11 NOTE — PROGRESS NOTES
"[facility]  Cervical Thoracic Initial Evaluation    Patient Name: Georgie Scott  Date of evaluation: 9/4/2020  Referral Diagnosis: Cervical facet syndrome  Referring provider: Kay Mcrae PA-C  Visit Diagnosis:     ICD-10-CM    1. Cervical facet syndrome  M47.812        Assessment:       Patient presents with chronic cervical pain which she has had since she was a teenager.  Over the past couple years she has had injections, physical therapy over a year ago and chiropractic adjustments, which are getting more painful to adjust and don't last. She did have an EMG today to rule out neck involvement with her UE symptoms.  She demonstrates decreased cervical strength and motion, tightness, decreased flexibility and pain with palpation.  Functional limitations are described as occurring with: turning head, sleep on side, reading, headaches.  Pt. is a good candidate for skilled PT services to improve pain levels and function and does not have any red flags to start MedX.    She was able to perform cervical extension MedX testing today which did show a strength and motion deficit.    Goals:  Pt. will demonstrate/verbalize independence in self-management of condition in : 12 weeks  Pt. will be independent with home exercise program in : 12 weeks    Pt will: Able to lay on either side to sleep within 12-16 weeks  Pt will: Able to turn head with less \"shifting\" or symptoms as stability and strength improve within 12-20 visits    Patient's expectations/goals are realistic.    Barriers to Learning or Achieving Goals:  Patient Active Problem List   Diagnosis     ADHD (attention deficit hyperactivity disorder), inattentive type     POC, goals, and pathology of condition were reviewed with patient.  Patient is in agreement with the POC.     Plan / Patient Instructions:      Plan of Care:   Authorization / Certification Start Date: 09/04/20  Authorization / Certification Number of Visits: " 16-20  Communication with: Referral Source  Patient Related Instruction: Nature of Condition;Treatment plan and rationale;Self Care instruction;Basis of treatment  Times per Week: 1-2  Number of Visits: 16-20  Therapeutic Exercise: ROM;Stretching;Strengthening  Neuromuscular Reeducation: kinesio tape;posture  Manual Therapy: soft tissue mobilization;myofascial release;joint mobilization  Modalities: traction;TENS      Plan for next visit:   medx exercise, add rotary torso  Add midback strengthening       Subjective:         History of Present Illness:    Georgie is a 30 y.o. female who presents to therapy today with complaints of chronic neck pain which she has had since she was a teenager.  Has been to physical therapy multiple times as well a chiropractic care and injections.  More recently she is having a harder time having successful adjustments and the pain is getting more intense.  She had an injection on the left side in 1/2020 and isn't sure it was as helpful as when she had it on the right prior to that injection.  She had an EMG today and it was negative for neck involvement. Overall, pain is on both sides and each day one side could be worse than the other.  Symptoms are intermittent and not improving.      Pain Rating:3  Pain rating at best: 0  Pain rating at worst: 9  Pain description: aching, dull, sharp and shooting    Functional limitations are described as occurring with:   turning head, sleep on side, reading, headaches    Patient reports benefit from:  laying supine       Objective:      Note: Items left blank indicates the item was not performed or not indicated at the time of the evaluation.    Patient Outcome Measures :    Neck Disability Score in %: 16     Scores range from 0-100%, where a score of 0% represents minimal pain and maximal function. The minmal clinically important difference is a score reduction of 10%.    Cervical Thoracic Examination  1. Cervical facet syndrome       Involved  side: Bilateral  Posture Observation:      General standing posture is fair.    Cervical ROM:    Within Normal Limits Unless Noted  Date: 09/04/20     *Indicate scale AROM AROM AROM   Cervical Flexion WNL     Cervical Extension WNL, pinch on right      Right Left Right Left Right Left   Cervical Sidebending WNL WNL       Cervical Rotation 58, P 72       Cervical Protraction      Cervical Retraction      Thoracic Flexion      Thoracic Extension      Thoracic Sidebending         Thoracic Rotation           Strength     Date: 09/04/20     Cervical Myotomes/5 Right Left Right Left Right Left   Cervical Flexion (C1-2) 5 5       Cervical Sidebending (C3) 5 5       Shoulder Elevation (C4) 5 5       Shoulder Abduction (C5) 5 5       Elbow Flexion (C6) 5 5       Elbow Extension (C7) 5 5       Wrist Flexion (C7)         Wrist Extension (C6)         Thumb abduction (C8) 5 5       Finger Abduction (T1)           Sensation         Reflex Testing  Cervical Dermatomes Right Left UE Reflexes Right Left   Back of the Head (C2)   Biceps (C5-6)     Supraclavicular Fossa (C3)   Brachioradialis (C5-6)     AC Joint (C4)   Triceps (C7-8)     Lateral Biceps (C5)   Marbin s test     Palmar Thumb (C6)   LE Reflexes     Palmar 3rd Finger (C7)   Patellar (L3-4)     Palmar 5th Finger (C8)   Achilles (S1-2)     Ulnar Forearm (T1)   Babinski Response         Flexibility: good    Palpation: tightness left UT, LS, pain over L>R SO region    Passive Mobility-Joint Integrity: some levels hyper and some hypo    Cervical Special Tests     Cervical Special Tests Right Left UE Nerve Mobility Right Left   Cervical compression - + pain Median nerve     Cervical distraction   Ulnar nerve     Spurling s test - - Radial nerve     Shoulder abduction sign   Thoracic outlet     Deep neck flexor endurance test   Jessica     Upper cervical rotation   Adson s     Sharper-Cameron   Cervical rotation lateral flexion     Alar ligament test   Other:     Other:   Other:        UE Screen: bilateral UE AROM WNL    Treatment Today       Enter Week / Visit #: w1v1  Weight (lbs): 141#  Reps (#): 30  Time: 204s  ROM (degrees): 0-102  Flex:Ext ratio: 1.24:1  .optmedx    Cervical MedX Initial testing (9/4/20) 4-week Re-test   AROM (full= 0-120  cervical) 0-102    Max Extension Torque  285    Flex: ext ratio (ideal 1.4:1) 1.24:1      Current HEP:  Cervical AROM with overpressure, UT stretch, doorway pec stretch    performs daily    Exercises:  Exercise #1: seated chin tuck and scapular retraction   hold 10 seconds X 10 reps  Comment #1: scalene stretch   hold 30 seconds X 1-3 reps  Exercise #2: nu-step   5' warm up      TREATMENT MINUTES COMMENTS   Evaluation 25 Cervical/thoracic   Self-care/ Home management     Manual therapy     Neuromuscular Re-education     Therapeutic Activity     Therapeutic Exercises 30 See above or flow sheet.   Review of current stretches  Added scalene   Declined pictures for HEP today  [id number]   Gait training     Modality__________________                Total 55    Blank areas are intentional and mean the treatment did not include these items.     PT Evaluation Code: (Please list factors)  Patient History/Comorbidities:   Patient Active Problem List   Diagnosis     ADHD (attention deficit hyperactivity disorder), inattentive type     Examination: pain to palpation, decrease strength  Clinical Presentation: stable  Clinical Decision Making: low    Patient History/  Comorbidities Examination  (body structures and functions, activity limitations, and/or participation restrictions) Clinical Presentation Clinical Decision Making (Complexity)   No documented Comorbidities or personal factors 1-2 Elements Stable and/or uncomplicated Low   1-2 documented comorbidities or personal factor 3 Elements Evolving clinical presentation with changing characteristics Moderate   3-4 documented comorbidities or personal factors 4 or more Unstable and unpredictable High               Sera Patel, PT  9/4/2020  10:38 AM

## 2021-06-11 NOTE — TELEPHONE ENCOUNTER
Call to pt with provider's results and recommendations. Pt stated understanding. Pt is scheduled to begin PT today 9/4.

## 2021-06-11 NOTE — PROGRESS NOTES
Patient presents at the request of Kay Mcrae for a bilateral upper extremity EMG.  She has bilateral hand numbness and tingling all fingers but mostly the thumbs right is worse than the left.  She is right-handed.  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    EMG/NCS  results: Please see scanned full report    Comment NCS: Normal study  1.  Normal nerve conduction studies bilateral upper extremities.    Comment EMG: Normal study  1.  Normal needle EMG bilateral upper extremities.    Interpretation: Normal study    1. There is no electrodiagnostic evidence of cervical radiculopathy, brachial plexopathy, or focal neuropathy in the bilateral upper extremities.  Specifically, there is no electrodiagnostic evidence of median neuropathy at the wrist in the bilateral upper extremities.    Note: Patient will continue to wear carpal tunnel splints at night as they are helpful for her and will await further recommendations from Kay Mcrae.    The testing was completed in its entirety by the physician.       It was our pleasure caring for your patient today, if there any questions or concerns please do not hesitate to contact us.

## 2021-06-11 NOTE — PATIENT INSTRUCTIONS - [facility]
Thank you for choosing the [facility] for your EMG testing.    The ordering provider will receive your final EMG results within the next few days.  Please follow up with your provider for the results and further treatment recommendations.

## 2021-06-11 NOTE — PROGRESS NOTES
[facility] Rehabilitation Daily Progress     Patient Name: Georgie Scott  Date: 9/28/2020  Date of Initial Evaluation: 9/4/2020  Visit #: 6/20  PTA visit #:  -  Referral Diagnosis: Cervical facet syndrome  Referring provider: Kay Mcrae PA-C  Visit Diagnosis:     ICD-10-CM    1. Cervical facet syndrome  M47.812    2. Chronic neck pain  M54.2     G89.29    3. Generalized muscle weakness  M62.81      From Initial Evaluation:  Patient presents with chronic cervical pain which she has had since she was a teenager.  Over the past couple years she has had injections, physical therapy over a year ago and chiropractic adjustments, which are getting more painful to adjust and don't last. She did have an EMG today to rule out neck involvement with her UE symptoms.  She demonstrates decreased cervical strength and motion, tightness, decreased flexibility and pain with palpation.  Functional limitations are described as occurring with: turning head, sleep on side, reading, headaches.  Pt. is a good candidate for skilled PT services to improve pain levels and function and does not have any red flags to start MedX.      Assessment:     HEP/POC compliance is  good .     Patient has completed the 3rd week of the Medx program and tolerating appropriately. The patient does have muscular restriction, joint mobility seems WNL if not slightly hypermobile. With testing of cervical proprioception (joint error position testing) the patient shows normal testing to the right, but abnormal to the left. The patient may benefit from training of cervical proprioception. She is showing overall slow improvements in headache complaints, though did have severe headache last night which has since improved. No progression of HEP today.    Goal Status:  Pt. will demonstrate/verbalize independence in self-management of condition in : 12 weeks  Pt. will be independent with home exercise program in : 12 weeks    Pt will: Able to lay on  "either side to sleep within 12-16 weeks  Pt will: Able to turn head with less \"shifting\" or symptoms as stability and strength improve within 12-20 visits      Plan / Patient Education:     Continue with initial plan of care.     Plan for next visit:   medx exercise, add rotary torso  Add midback strengthening  Cervical proprioception banded cervical rotation/antirotation.     Subjective:     Pain Rating: Not Rated     Last night was up all night from a migraine. Slept a lot the day before. Unsure of what would have caused it otherwise.    Objective:     Cervical Proprioception Joint Error Position Testing  Right 4.8 cm (normal <6.5 cm)  Left 9.3 cm (normal <6.5 cm)     Normal Cervical ROM  TTP Bilat suboccipital SCM, L cervical paraspinals, B UT.    Cervical Medx:  Enter Week / Visit #: w3 v2  Weight (lbs): 165#  Reps (#): 20  Time: 148  ROM (degrees): 0-102  Flex:Ext ratio: 1.24:1    Exercises:  Exercise #1: seated chin tuck and scapular retraction   hold 10 seconds X 10 reps  Comment #1: scalene stretch   hold 30 seconds X 1-3 reps  Exercise #2: nu-step   5' warm up  Comment #2: UBE X 4 min  Exercise #3: Prone I's X 10  Comment #3: DNF endurance x 15-20 seconds X 5  Exercise #4: C1-C2 self mobilization PRN  Comment #4: Rotary Torso 90 seconds 24#'s started to the L  Exercise #5: prone tracing of pattern with laser X 1.5 min      Treatment Today     TREATMENT MINUTES COMMENTS   Evaluation     Self-care/ Home management     Manual therapy 10 TPR to R suboccipital, SCM, cervical paraspinals B, and UT   Neuromuscular Re-education Not done today Joint Error position testing and cervical proprioception training in sitting.   Therapeutic Activity     Therapeutic Exercises 15 See flowsheets   Gait training     Modality__________________                Total 25    Blank areas are intentional and mean the treatment did not include these items.       Tye RAMOS, PT  9/28/2020  "

## 2021-06-11 NOTE — TELEPHONE ENCOUNTER
Controlled Substance Refill Request  Medication Name:   Requested Prescriptions     Pending Prescriptions Disp Refills     dextroamphetamine-amphetamine (ADDERALL) 10 mg Tab tablet [Pharmacy Med Name: AMPHETAMINE-DEXTROAMPHETAMI 10 TABS] 40 tablet 0     Sig: TAKE ONE TO TWO TABLETS BY MOUTH ONCE DAILY     Date Last Fill: 7/4/20  Requested Pharmacy: [facility]  Submit electronically to pharmacy  Controlled Substance Agreement on file:   Encounter-Level CSA Scan Date:    There are no encounter-level csa scan date.        Last office visit:  5/5/20

## 2021-06-11 NOTE — PROGRESS NOTES
"[facility] Rehabilitation Daily Progress     Patient Name: Georgie Scott  Date: 9/18/2020  Date of Initial Evaluation: 9/4/2020  Visit #: 2/20  PTA visit #:  -  Referral Diagnosis: Cervical facet syndrome  Referring provider: Kay Mcrae PA-C  Visit Diagnosis:     ICD-10-CM    1. Cervical facet syndrome  M47.812    2. Chronic neck pain  M54.2     G89.29    3. Generalized muscle weakness  M62.81      From Initial Evaluation:  Patient presents with chronic cervical pain which she has had since she was a teenager.  Over the past couple years she has had injections, physical therapy over a year ago and chiropractic adjustments, which are getting more painful to adjust and don't last. She did have an EMG today to rule out neck involvement with her UE symptoms.  She demonstrates decreased cervical strength and motion, tightness, decreased flexibility and pain with palpation.  Functional limitations are described as occurring with: turning head, sleep on side, reading, headaches.  Pt. is a good candidate for skilled PT services to improve pain levels and function and does not have any red flags to start MedX.      Assessment:     HEP/POC compliance is  good .     Patient is in the 2nd week of the Medx program and tolerating appropriately. The patient does have muscular restriction, joint mobility seems WNL if not slightly hypermobile.No progression of HEP today. May benefit from check of cervical proprioception.    Goal Status:  Pt. will demonstrate/verbalize independence in self-management of condition in : 12 weeks  Pt. will be independent with home exercise program in : 12 weeks    Pt will: Able to lay on either side to sleep within 12-16 weeks  Pt will: Able to turn head with less \"shifting\" or symptoms as stability and strength improve within 12-20 visits      Plan / Patient Education:     Continue with initial plan of care.     Plan for next visit:   medx exercise, add rotary torso  Add midback " strengthening  Cervical proprioception banded cervical rotation/antirotation.     Subjective:     Pain Rating: Not Rated     Has had some mild headaches after the last session. Expected she would get some of this.     Objective:     Normal Cervical ROM  TTP R suboccipital SCM, L cervical paraspinals, B UT.    Cervical Medx:  Enter Week / Visit #: w2 v1  Weight (lbs): 153#  Reps (#): 25  Time: 181  ROM (degrees): 0-102  Flex:Ext ratio: 1.24:1        Exercises:  Exercise #1: seated chin tuck and scapular retraction   hold 10 seconds X 10 reps  Comment #1: scalene stretch   hold 30 seconds X 1-3 reps  Exercise #2: nu-step   5' warm up  Comment #2: UBE X 4 min  Exercise #3: Prone I's X 10  Comment #3: DNF endurance x 15-20 seconds X 5  Exercise #4: C1-C2 self mobilization PRN  Comment #4: Rotary Torso 90 seconds 20#'s started to the L      Treatment Today     TREATMENT MINUTES COMMENTS   Evaluation     Self-care/ Home management     Manual therapy 10 TPR to R suboccipital, SCM, cervical paraspinals B, and UT   Neuromuscular Re-education     Therapeutic Activity     Therapeutic Exercises 17 See flowsheets   Gait training     Modality__________________                Total 27    Blank areas are intentional and mean the treatment did not include these items.       Tye RAMOS, PT  9/18/2020

## 2021-06-11 NOTE — PROGRESS NOTES
"[facility] Rehabilitation Daily Progress     Patient Name: Georgie Scott  Date: 9/14/2020  Date of Initial Evaluation: 9/4/2020  Visit #: 2/20  PTA visit #:  -  Referral Diagnosis: Cervical facet syndrome  Referring provider: Kay Mcrae PA-C  Visit Diagnosis:     ICD-10-CM    1. Cervical facet syndrome  M47.812    2. Chronic neck pain  M54.2     G89.29    3. Generalized muscle weakness  M62.81      From Initial Evaluation:  Patient presents with chronic cervical pain which she has had since she was a teenager.  Over the past couple years she has had injections, physical therapy over a year ago and chiropractic adjustments, which are getting more painful to adjust and don't last. She did have an EMG today to rule out neck involvement with her UE symptoms.  She demonstrates decreased cervical strength and motion, tightness, decreased flexibility and pain with palpation.  Functional limitations are described as occurring with: turning head, sleep on side, reading, headaches.  Pt. is a good candidate for skilled PT services to improve pain levels and function and does not have any red flags to start MedX.      Assessment:     HEP/POC compliance is  good .     Patient is in the 1st week of the Medx program and tolerating appropriately. The patient does have muscular restriction, but also weakness noted with DNF today. Initiated scapular strengthening and DNF endurance training for HEP. May benefit from check of cervical proprioception.    Goal Status:  Pt. will demonstrate/verbalize independence in self-management of condition in : 12 weeks  Pt. will be independent with home exercise program in : 12 weeks    Pt will: Able to lay on either side to sleep within 12-16 weeks  Pt will: Able to turn head with less \"shifting\" or symptoms as stability and strength improve within 12-20 visits      Plan / Patient Education:     Continue with initial plan of care.     Plan for next visit:   medx exercise, add " rotary torso  Add midback strengthening  Cervical proprioception banded cervical rotation/antirotation.     Subjective:     Pain Rating: Not Rated    Felt okay after the test. Suprisingly not as sore as expected.      Objective:     Normal Cervical ROM  TTP R suboccipital SCM, L cervical paraspinals, B UT.    DNF endurance 20 seconds good form, able to hold to 30 seconds.    Cervical Medx:  Enter Week / Visit #: w1v2  Weight (lbs): 150#  Reps (#): 20  Time: 140  ROM (degrees): 0-102  Flex:Ext ratio: 1.24:1        Exercises:  Exercise #1: seated chin tuck and scapular retraction   hold 10 seconds X 10 reps  Comment #1: scalene stretch   hold 30 seconds X 1-3 reps  Exercise #2: nu-step   5' warm up  Comment #2: UBE X 4 min  Exercise #3: Prone I's X 10  Comment #3: DNF endurance x 15-20 seconds X 5  Exercise #4: C1-C2 self mobilization PRN      Treatment Today     TREATMENT MINUTES COMMENTS   Evaluation     Self-care/ Home management     Manual therapy 10 TPR to R suboccipital, SCM, cervical paraspinals B, and UT   Neuromuscular Re-education     Therapeutic Activity     Therapeutic Exercises 17 See flowsheets   Gait training     Modality__________________                Total 27    Blank areas are intentional and mean the treatment did not include these items.       Tye RAMOS, PT  9/14/2020

## 2021-06-11 NOTE — TELEPHONE ENCOUNTER
----- Message from Kay Mcrae PA-C sent at 9/4/2020 10:32 AM CDT -----  Regarding: EMG results  Please call patient and let her know that her EMG did not show any carpal tunnel syndrome.  No nerve injury from the neck.  If wrist splints are helpful, she can continue wearing these at night.  Otherwise, I recommended the patient begin medics physical therapy and follow-up with me in 6 weeks.

## 2021-06-11 NOTE — PROGRESS NOTES
"[facility] Daily Progress     Patient Name: Georgie Scott  Date: 9/21/2020  Date of Initial Evaluation: 9/4/2020  Visit #: 3/20  PTA visit #:  -  Referral Diagnosis: Cervical facet syndrome  Referring provider: Kay Mcrae PA-C  Visit Diagnosis:     ICD-10-CM    1. Cervical facet syndrome  M47.812    2. Chronic neck pain  M54.2     G89.29    3. Generalized muscle weakness  M62.81      From Initial Evaluation:  Patient presents with chronic cervical pain which she has had since she was a teenager.  Over the past couple years she has had injections, physical therapy over a year ago and chiropractic adjustments, which are getting more painful to adjust and don't last. She did have an EMG today to rule out neck involvement with her UE symptoms.  She demonstrates decreased cervical strength and motion, tightness, decreased flexibility and pain with palpation.  Functional limitations are described as occurring with: turning head, sleep on side, reading, headaches.  Pt. is a good candidate for skilled PT services to improve pain levels and function and does not have any red flags to start MedX.      Assessment:     HEP/POC compliance is  good .     Patient has completed the 2nd week of the Medx program and tolerating appropriately. The patient does have muscular restriction, joint mobility seems WNL if not slightly hypermobile. She is showing overall slow improvements in headache complaints. No progression of HEP today. May benefit from check of cervical proprioception.    Goal Status:  Pt. will demonstrate/verbalize independence in self-management of condition in : 12 weeks  Pt. will be independent with home exercise program in : 12 weeks    Pt will: Able to lay on either side to sleep within 12-16 weeks  Pt will: Able to turn head with less \"shifting\" or symptoms as stability and strength improve within 12-20 visits      Plan / Patient Education:     Continue with initial plan of care. "     Plan for next visit:   medx exercise, add rotary torso  Add midback strengthening  Cervical proprioception banded cervical rotation/antirotation.     Subjective:     Pain Rating: Not Rated     Had a slight  Headache after last session, but not as bad. Saturday no headache slight nerve irritation over the last few days, but getting better .    Objective:     Normal Cervical ROM  TTP Bilat suboccipital SCM, L cervical paraspinals, B UT.    Cervical Medx:  Enter Week / Visit #: w2 v2  Weight (lbs): 159#  Reps (#): 20  Time: 148  ROM (degrees): 0-102  Flex:Ext ratio: 1.24:1    Exercises:  Exercise #1: seated chin tuck and scapular retraction   hold 10 seconds X 10 reps  Comment #1: scalene stretch   hold 30 seconds X 1-3 reps  Exercise #2: nu-step   5' warm up  Comment #2: UBE X 4 min  Exercise #3: Prone I's X 10  Comment #3: DNF endurance x 15-20 seconds X 5  Exercise #4: C1-C2 self mobilization PRN  Comment #4: Rotary Torso 90 seconds 22#'s started to the R      Treatment Today     TREATMENT MINUTES COMMENTS   Evaluation     Self-care/ Home management     Manual therapy 10 TPR to R suboccipital, SCM, cervical paraspinals B, and UT   Neuromuscular Re-education     Therapeutic Activity     Therapeutic Exercises 15 See flowsheets   Gait training     Modality__________________                Total 25    Blank areas are intentional and mean the treatment did not include these items.       Tye RAMOS, PT  9/21/2020

## 2021-06-11 NOTE — PROGRESS NOTES
[facility] Daily Progress     Patient Name: Georgie Scott  Date: 9/23/2020  Date of Initial Evaluation: 9/4/2020  Visit #: 5/20  PTA visit #:  -  Referral Diagnosis: Cervical facet syndrome  Referring provider: Kay Mcrae PA-C  Visit Diagnosis:     ICD-10-CM    1. Cervical facet syndrome  M47.812    2. Chronic neck pain  M54.2     G89.29    3. Generalized muscle weakness  M62.81      From Initial Evaluation:  Patient presents with chronic cervical pain which she has had since she was a teenager.  Over the past couple years she has had injections, physical therapy over a year ago and chiropractic adjustments, which are getting more painful to adjust and don't last. She did have an EMG today to rule out neck involvement with her UE symptoms.  She demonstrates decreased cervical strength and motion, tightness, decreased flexibility and pain with palpation.  Functional limitations are described as occurring with: turning head, sleep on side, reading, headaches.  Pt. is a good candidate for skilled PT services to improve pain levels and function and does not have any red flags to start MedX.      Assessment:     HEP/POC compliance is  good .     Patient is in the 3rd week of the Medx program and tolerating appropriately. The patient does have muscular restriction, joint mobility seems WNL if not slightly hypermobile. With testing of cervical proprioception (joint error position testing) the patient shows normal testing to the right, but abnormal to the left. The patient may benefit from training of cervical proprioception. She is showing overall slow improvements in headache complaints. No progression of HEP today.    Goal Status:  Pt. will demonstrate/verbalize independence in self-management of condition in : 12 weeks  Pt. will be independent with home exercise program in : 12 weeks    Pt will: Able to lay on either side to sleep within 12-16 weeks  Pt will: Able to turn head with less  "\"shifting\" or symptoms as stability and strength improve within 12-20 visits      Plan / Patient Education:     Continue with initial plan of care.     Plan for next visit:   medx exercise, add rotary torso  Add midback strengthening  Cervical proprioception banded cervical rotation/antirotation.     Subjective:     Pain Rating: Not Rated     After last session was fine, yesterday and today has had a little pain in the right shoulder. She did get adjusted yesterday. Had trouble yesterday sleeping on her side.    Objective:     Cervical Proprioception Joint Error Position Testing  Right 4.8 cm (normal <6.5 cm)  Left 9.3 cm (normal <6.5 cm)    Normal Cervical ROM  TTP Bilat suboccipital SCM, L cervical paraspinals, B UT.    Cervical Medx:  Enter Week / Visit #: w3 v1  Weight (lbs): 165#  Reps (#): 20  Time: 148  ROM (degrees): 0-102  Flex:Ext ratio: 1.24:1    Exercises:  Exercise #1: seated chin tuck and scapular retraction   hold 10 seconds X 10 reps  Comment #1: scalene stretch   hold 30 seconds X 1-3 reps  Exercise #2: nu-step   5' warm up  Comment #2: UBE X 4 min  Exercise #3: Prone I's X 10  Comment #3: DNF endurance x 15-20 seconds X 5  Exercise #4: C1-C2 self mobilization PRN  Comment #4: Rotary Torso 90 seconds 24#'s started to the L      Treatment Today     TREATMENT MINUTES COMMENTS   Evaluation     Self-care/ Home management     Manual therapy 10 TPR to R suboccipital, SCM, cervical paraspinals B, and UT   Neuromuscular Re-education 10 Joint Error position testing and cervical proprioception training in sitting.   Therapeutic Activity     Therapeutic Exercises 10 See flowsheets   Gait training     Modality__________________                Total 30    Blank areas are intentional and mean the treatment did not include these items.       Tye RAMOS, PT  9/23/2020  "

## 2021-06-12 NOTE — PROGRESS NOTES
Assessment:   Georgie Scott is a 30 y.o. y.o. female with past medical history significant for ADHD who presents today for follow-up regarding 3 concerns with her neck.  Patient has had chronic neck pain.  MRI cervical spine shows mild cervical spondylosis with mild right foraminal stenosis C3-4 and C5-6 related to disc bulges.    1.  Bilateral neck pain at the C1-C2 level.  This area of pain is improving.  Adjustments from her chiropractor are going better.  2.  A flareup of right neck pain with radiation toward the right shoulder/parascapular region.  I believe this represents proximal right C6 radiculitis.  She reports pain feels similar, although less severe, than the pain she had before her C7-T1 interlaminar epidural steroid injection in May 2019 which provided 98% relief of her pain.  3.  Right greater than left upper extremity paresthesias.  EMG bilateral upper extremities normal.  She may have subclinical carpal tunnel syndrome.  Wrist splints at night have provided significant relief of her symptoms.  -Patient was neurologically intact on exam.         Plan:     A shared decision making plan was used.  The patient's values and choices were respected.  The following represents what was discussed and decided upon by the physician assistant and the patient.      1.  DIAGNOSTIC TESTS: I reviewed the MRI cervical spine and EMG bilateral upper extremities.  No further diagnostic tests were ordered.    2.  PHYSICAL THERAPY: Patient is in [facility] physical therapy for her neck.  She has had 8 sessions so far.  Unfortunately home exercise program has been making pain worse as has some of the exercises on the MedX machines.  Hopefully once  pain is under better control she will be able to tolerate the treatment better.    3.  MEDICATIONS:  -Patient requests oral sedation for her procedure.  Valium 5 mg, #2 with no refills was prescribed.  -No other changes are made to the patient's medications.  -Patient can  continue cyclobenzaprine 5 mg at bedtime as needed.  -Patient can continues ibuprofen as needed.    4.  INTERVENTIONS:   -I offered the patient a repeat C7-T1 interlaminar epidural steroid injection.  This injection provided 98% relief of similar pain when it was performed May 3, 2019. The injection will use cortisone.  Cortisone weakens/suppresses the immune system so it does not function as well as it normally does.  We do not know if this could make it more likely that you could get the COVID-19 virus or if you do have the virus, if you are going to be sicker than you would have been if you hadn't had the cortisone injection.  Patient was willing to accept this risk and the order for the injection was placed.  -If left-sided neck pain were to return, recommend repeating the left C5-6, C6-7, C7-T1 facet joint injection.    5.  PATIENT EDUCATION: Patient is in agreement the above plan.  All questions were answered.    6.  FOLLOW-UP: As long as patient has expected relief after her C7-T1 interlaminar epidural steroid injection she can follow-up with me as needed.  If she fails to have significant relief she should follow up with me for a 2-week post procedure follow-up visit.  If she has questions or concerns in the meantime, she should not hesitate to call.    Subjective:     Georgie Scott is a 30 y.o. female who presents today for follow-up regarding bilateral neck pain with radiation toward the right parascapular region and bilateral upper extremity paresthesias.  I last saw the patient August 20, 2020.  Since I saw the patient she had an EMG bilateral upper extremities which was normal.  She has also had 8 sessions of medics physical therapy.  She denies any significant change in her symptoms since she was last seen.    Patient complains of right neck pain.  Pain is located just to the right of midline in the lower cervical region.  Pain extends through the right upper trapezius muscle.  She denies any pain  further down the arm.  Patient reports that this pain feels similar, but less severe, than the pain she had before her C7-T1 interlaminar epidural steroid injection in May 2019.  She states the pain feels like nerve pain.  She states that some of her home exercises seem to be aggravating this pain.  She also had a flareup of pain when she was doing a cervical extension exercises at [facility] physical therapy last week.    Patient also the pain in the upper cervical region has been improving.  She states that her chiropractic manipulations at C1-C2 are going better.    Patient states that her bilateral upper extremity paresthesias have improved significantly since I last saw her.  She started wearing wrist splints and these have made significant difference.  She states that she still has some days when she has more numbness and tingling in the hands, but is much better than it was.  She has noted that if she forgets to wear the wrist once at night she wakes up with her wrists flexed.    Patient is currently in [facility] physical therapy.  She has had 8 sessions so far.  She is doing her home exercises.  Unfortunately home exercises seem to be aggravating her pain.  She is using cyclobenzaprine 5 mg at bedtime as needed.  She uses ibuprofen as needed.  Medications are somewhat helpful.    Past medical history is reviewed and is unchanged.    Review of Systems:  Positive for headache.  Negative for numbness/tingling, weakness, loss of bowel/bladder control, inability to urinate, pain much worse at night, trip/stumble/falls, difficulty swallowing, difficulty with hand skills, fevers, unintentional weight loss.     Objective:   CONSTITUTIONAL:  Vital signs as above.  No acute distress.  The patient is well nourished and well groomed.    PSYCHIATRIC:  The patient is awake, alert, oriented to person, place and time.  The patient is answering questions appropriately with clear speech.  Normal affect.  HEENT: Normocephalic,  atraumatic.  Sclera clear.  Neck is supple.  SKIN:  Skin over the face, neck bilateral upper extremities is clean, dry, intact without rashes.  MUSCULOSKELETAL: The patient has 5/5 strength for the bilateral shoulder abductors, elbow flexors/extensors, wrist extensors, finger flexors/abductors.  Tender to palpation right lower cervical paraspinous muscles.  Mild tenderness palpation with hypertonicity right upper trapezius muscle.  Cervical range of motion is full although she has increased pain at end cervical extension.  NEUROLOGICAL:    Negative Johnson's bilaterally.  Sensation to light touch is intact over bilateral upper extremities throughout.     RESULTS: I reviewed the MRI cervical spine from [facility] dated August 27, 2019.  There is no evidence of cervical cord signal abnormality or high-grade spinal canal stenosis.  There is mild spondylosis with mild right C3-4 and C5-6 foraminal stenosis, similar to MRI cervical spine from March 2019.  Please see report for further details.    I also reviewed flexion-extension cervical spine x-rays from outside facility dated August 8, 2019.  I was not able to find the radiology report.  I do not appreciate any gross dynamic instability.    EMG bilateral upper extremity September 4, 2020:  EMG/NCS  results: Please see scanned full report     Comment NCS: Normal study  1.  Normal nerve conduction studies bilateral upper extremities.     Comment EMG: Normal study  1.  Normal needle EMG bilateral upper extremities.     Interpretation: Normal study     1. There is no electrodiagnostic evidence of cervical radiculopathy, brachial plexopathy, or focal neuropathy in the bilateral upper extremities.  Specifically, there is no electrodiagnostic evidence of median neuropathy at the wrist in the bilateral upper extremities.

## 2021-06-12 NOTE — PATIENT INSTRUCTIONS - HE
Dear Georgie Scott,    Your symptoms show that you may have coronavirus (COVID-19). This illness can cause fever, cough and trouble breathing. Many people get a mild case and get better on their own. Some people can get very sick.    Because you also reported sore throat I would like to also test you for Strep Throat to determine if we need to treat you for that as well.    What should I do?  We would like to test you for Covid-19 virus and Strep Throat. I have placed orders for these tests and please call [phone] to schedule testing. [employer] employees please call [phone]. [employer] employees call [phone].     When it's time for your COVID and Strep test:  Stay at least 6 feet away from others. (If someone will drive you to your test, stay in the backseat, as far away from the  as you can.)  Cover your mouth and nose with a mask, tissue or washcloth.  Go straight to the testing site. Don't make any stops on the way there or back.    Starting now:     Do not go to work. Follow your usual processes for taking time away from work.  o If you receive a negative COVID-19 test result and were NOT exposed to someone with a known positive COVID-19 test, you can return to work once you're free of fever for 24 hours without fever-reducing medication and your symptoms are improving or resolved.  o If you receive a positive COVID-19 test result, you must be cleared by Employee Occupational Health and Safety to return to work.   o If you were exposed to someone who has tested positive for COVID-19, you can return to work 14 days after your last contact with the positive individual, provided you do not have symptoms at all during that time. In some cases, your manager may ask you to come back sooner than 14 days.      During this time, don't leave the house except for testing or medical care.  o Stay in your own room, even for meals. Use your own bathroom if you can.  o Stay away from others in  "your home. No hugging, kissing or shaking hands. No visitors.  o Don't go to work, school or anywhere else.    Clean \"high touch\" surfaces often (doorknobs, counters, handles, etc.). Use a household cleaning spray or wipes. You'll find a full list of  on the EPA website: www.epa.gov/pesticide-registration/list-n-disinfectants-use-against-sars-cov-2.    Cover your mouth and nose with a mask, tissue or washcloth to avoid spreading germs.    Wash your hands and face often. Use soap and water.    People in these groups are at risk for severe illness due to COVID-19:  o People 65 years and older  o People who live in a nursing home or long-term care facility  o People with chronic disease (lung, heart, cancer, diabetes, kidney, liver, immunologic)  o People who have a weakened immune system, including those who:  - Are in cancer treatment  - Take medicine that weakens the immune system, such as corticosteroids  - Had a bone marrow or organ transplant  - Have an immune deficiency  - Have poorly controlled HIV or AIDS  - Are obese (body mass index of 40 or higher)  - Smoke regularly      Caregivers should wear gloves while washing dishes, handling laundry and cleaning bedrooms and bathrooms.    Use caution when washing and drying laundry: Don't shake dirty laundry, and use the warmest water setting that you can.    For more tips, go to www.cdc.gov/coronavirus/2019-ncov/downloads/10Things.pdf.    Sign up for [employer]. We know it's scary to hear that you might have COVID-19. We want to track your symptoms to make sure you're okay over the next 2 weeks. Please look for an email from [employer]--this is a free, online program that we'll use to keep in touch. To sign up, follow the link in the email you will receive. Learn more at [email or web address]    How can I take care of myself?    Get lots of rest. Drink extra fluids (unless a doctor has told you not to)    Take Tylenol (acetaminophen) for " fever or pain. If you have liver or kidney problems, ask your family doctor if it's okay to take Tylenol.  Adults can take either:    650 mg (two 325 mg pills) every 4 to 6 hours, or     1,000 mg (two 500 mg pills) every 8 hours as needed.    Note: Don't take more than 3,000 mg in one day. Acetaminophen is found in many medicines (both prescribed and over-the-counter medicines). Read all labels to be sure you don't take too much.  For children, check the Tylenol bottle for the right dose. The dose is based on the child's age or weight.    If you have other health problems (like cancer, heart failure, an organ transplant or severe kidney disease): Call your specialty clinic if you don't feel better in the next 2 days.    Know when to call 911. Emergency warning signs include:  Trouble breathing or shortness of breath  Pain or pressure in the chest that doesn't go away  Feeling confused like you haven't felt before, or not being able to wake up  Bluish-colored lips or face  Where can I get more information?    [facility] - About COVID-19: [email or web address]  CDC - What to Do If You're Sick: www.cdc.gov/coronavirus/2019-ncov/about/steps-when-sick.html

## 2021-06-12 NOTE — PROGRESS NOTES
[facility] Daily Progress     Patient Name: Georgie Scott  Date: 10/21/2020  Date of Initial Evaluation: 9/4/2020  Visit #: 7/20  PTA visit #:  -  Referral Diagnosis: Cervical facet syndrome  Referring provider: Kay Mcrae PA-C  Visit Diagnosis:     ICD-10-CM    1. Cervical facet syndrome  M47.812    2. Chronic neck pain  M54.2     G89.29    3. Generalized muscle weakness  M62.81      From Initial Evaluation:  Patient presents with chronic cervical pain which she has had since she was a teenager.  Over the past couple years she has had injections, physical therapy over a year ago and chiropractic adjustments, which are getting more painful to adjust and don't last. She did have an EMG today to rule out neck involvement with her UE symptoms.  She demonstrates decreased cervical strength and motion, tightness, decreased flexibility and pain with palpation.  Functional limitations are described as occurring with: turning head, sleep on side, reading, headaches.  Pt. is a good candidate for skilled PT services to improve pain levels and function and does not have any red flags to start MedX.      Assessment:     HEP/POC compliance is  good .     Patient returns to PT after being gone for 3 weeks on vacation. She did have pretty good maintenance of symptoms throughout her time away, but is reporting some increase in nerve/shoulder pain.    Patient is in the 4th week of the Medx program and tolerating appropriately. Sje is due to re-test on cervical Medx next session.    The patient does have muscular restriction, joint mobility seems WNL if not slightly hypermobile. With testing of cervical proprioception (joint error position testing) the patient shows normal testing to the right, but abnormal to the left. The patient may benefit from training of cervical proprioception.     She is showing overall slow improvements in headache complaints.    Goal Status:  Pt. will demonstrate/verbalize  "independence in self-management of condition in : 12 weeks  Pt. will be independent with home exercise program in : 12 weeks    Pt will: Able to lay on either side to sleep within 12-16 weeks  Pt will: Able to turn head with less \"shifting\" or symptoms as stability and strength improve within 12-20 visits      Plan / Patient Education:     Continue with initial plan of care.     Plan for next visit:   medx exercise, add rotary torso  Add midback strengthening  Cervical proprioception banded cervical rotation/antirotation.  4 way neck.     Subjective:     Pain Rating: Not Rated     Towards the end of vacation wasn't as good with exercise.s Since being back has been a little worse. Has been sleeping on stomach more often and this makes it worse.    Nerve was bothered a lot of vacation and had discomfort in the shoulder.     Objective:     Cervical Proprioception Joint Error Position Testing  Right 4.8 cm (normal <6.5 cm)  Left 9.3 cm (normal <6.5 cm)     Normal Cervical ROM  TTP Bilat suboccipital SCM, L cervical paraspinals, B UT.    Cervical Medx:  Enter Week / Visit #: w4 v1  Weight (lbs): 165#  Reps (#): 21  Time: 148  ROM (degrees): 0-102  Flex:Ext ratio: 1.24:1    Exercises:  Exercise #1: seated chin tuck and scapular retraction   hold 10 seconds X 10 reps  Comment #1: scalene stretch   hold 30 seconds X 1-3 reps  Exercise #2: nu-step   5' warm up  Comment #2: UBE X 4 min  Exercise #3: Prone I's X 10  Comment #3: DNF endurance x 15-20 seconds X 5  Exercise #4: C1-C2 self mobilization PRN  Comment #4: Rotary Torso 90 seconds 26#'s started to the R  Exercise #5: prone tracing of pattern with laser X 1.5 min      Treatment Today     TREATMENT MINUTES COMMENTS   Evaluation     Self-care/ Home management     Manual therapy 13 TPR to R suboccipital, SCM, cervical paraspinals B, and UT   Neuromuscular Re-education Not done today Joint Error position testing and cervical proprioception training in sitting.   Therapeutic " Activity     Therapeutic Exercises 15 See flowsheets   Gait training     Modality__________________                Total 28    Blank areas are intentional and mean the treatment did not include these items.       Tye RAMOS, PT  10/21/2020

## 2021-06-12 NOTE — TELEPHONE ENCOUNTER
PHONE CONSENT:    The patient wished to take pre-procedure oral sedation so a phone consent was obtained prior to their injection.  The patient has been offered other conservative treatment (physical therapy, medications, etc.) but has opted to proceed with an injection.  The risks and benefits of the injection (C7-T1 interlaminar epidural steroid injection) were discussed with the patient over the phone on 11-3-20 at 11:45 am.  The patient was told that the corticosteroid injection will somewhat suppress the immune system.  This could potentially increase the chance of catching the virus if exposed.  If the patient already has the virus but is asymptomatic, having a corticosteroid injection could potentially worsen the course of the virus.  These are theoretical risks.  The patient opted to proceed with the injection at this time.   Other risks of the injection include infection, bleeding, damage to surrounding structures, nerve injury, permanent weakness, permanent paralysis, worsened pain, soreness, headache, allergic reaction, no change in pain.      The patient understands that they cannot have symptoms of an infection or be on antibiotics at the time of the injection as this would increase their risk of infection. If they start antibiotics prior to the procedure, they should call the clinic to see if the procedure will need to be rescheduled.  The patient understands that if they start any blood thinners prior to the injection, the provider must be notified immediately.  The patient denies any allergies to iodine contrast dye or iodine products.  The patient denies any symptoms of an active infection (cough, fevers, myalgias) and denies taking antibiotics.  The patient knows not to come in to clinic if they have any symptoms of an active infection, particularly cough, fevers, myalgias.   The patient denies taking any prescription blood thinning medications. The patient denies any allergies to iodine or iodine  contrast.   She denies any possibility of being pregnant.     The patient verbalized understanding of the information given. The patient was given the opportunity to ask questions of the physician.  The patient elected to proceed and gave consent over the phone with Antonette Marrufo RN as a witness.  Informed consent form was signed by the physician and the witness.

## 2021-06-12 NOTE — TELEPHONE ENCOUNTER
Controlled Substance Refill Request  Medication Name:   Requested Prescriptions     Pending Prescriptions Disp Refills     dextroamphetamine-amphetamine (ADDERALL) 10 mg Tab tablet [Pharmacy Med Name: AMPHETAMINE-DEXTROAMPHETAMI 10 TABS] 40 tablet 0     Sig: TAKE ONE TO TWO TABLETS BY MOUTH ONCE DAILY     Date Last Fill: 11/7/20  Requested Pharmacy: [facility]  Submit electronically to pharmacy  Controlled Substance Agreement on file:   Encounter-Level CSA Scan Date:    There are no encounter-level csa scan date.        Last office visit:  5/5/20

## 2021-06-12 NOTE — PROGRESS NOTES
Time taken for assessment: 5 minutes    Assessment:   Diagnoses of Suspected COVID-19 virus infection and Sore throat were pertinent to this visit.     Plan:   No medications were ordered this encounter    Patient Instructions     Dear Georgie Scott,    Your symptoms show that you may have coronavirus (COVID-19). This illness can cause fever, cough and trouble breathing. Many people get a mild case and get better on their own. Some people can get very sick.    Because you also reported sore throat I would like to also test you for Strep Throat to determine if we need to treat you for that as well.    What should I do?  We would like to test you for Covid-19 virus and Strep Throat. I have placed orders for these tests and please call [phone] to schedule testing. [employer] employees please call [phone]. [employer] employees call [phone].     When it's time for your COVID and Strep test:  Stay at least 6 feet away from others. (If someone will drive you to your test, stay in the backseat, as far away from the  as you can.)  Cover your mouth and nose with a mask, tissue or washcloth.  Go straight to the testing site. Don't make any stops on the way there or back.    Starting now:     Do not go to work. Follow your usual processes for taking time away from work.  o If you receive a negative COVID-19 test result and were NOT exposed to someone with a known positive COVID-19 test, you can return to work once you're free of fever for 24 hours without fever-reducing medication and your symptoms are improving or resolved.  o If you receive a positive COVID-19 test result, you must be cleared by Employee Occupational Health and Safety to return to work.   o If you were exposed to someone who has tested positive for COVID-19, you can return to work 14 days after your last contact with the positive individual, provided you do not have symptoms at all during that time. In some cases, your manager  "may ask you to come back sooner than 14 days.      During this time, don't leave the house except for testing or medical care.  o Stay in your own room, even for meals. Use your own bathroom if you can.  o Stay away from others in your home. No hugging, kissing or shaking hands. No visitors.  o Don't go to work, school or anywhere else.    Clean \"high touch\" surfaces often (doorknobs, counters, handles, etc.). Use a household cleaning spray or wipes. You'll find a full list of  on the EPA website: www.epa.gov/pesticide-registration/list-n-disinfectants-use-against-sars-cov-2.    Cover your mouth and nose with a mask, tissue or washcloth to avoid spreading germs.    Wash your hands and face often. Use soap and water.    People in these groups are at risk for severe illness due to COVID-19:  o People 65 years and older  o People who live in a nursing home or long-term care facility  o People with chronic disease (lung, heart, cancer, diabetes, kidney, liver, immunologic)  o People who have a weakened immune system, including those who:  - Are in cancer treatment  - Take medicine that weakens the immune system, such as corticosteroids  - Had a bone marrow or organ transplant  - Have an immune deficiency  - Have poorly controlled HIV or AIDS  - Are obese (body mass index of 40 or higher)  - Smoke regularly      Caregivers should wear gloves while washing dishes, handling laundry and cleaning bedrooms and bathrooms.    Use caution when washing and drying laundry: Don't shake dirty laundry, and use the warmest water setting that you can.    For more tips, go to www.cdc.gov/coronavirus/2019-ncov/downloads/10Things.pdf.    Sign up for [employer]. We know it's scary to hear that you might have COVID-19. We want to track your symptoms to make sure you're okay over the next 2 weeks. Please look for an email from [employer]--this is a free, online program that we'll use to keep in touch. To sign up, follow the link " in the email you will receive. Learn more at [email or web address]    How can I take care of myself?    Get lots of rest. Drink extra fluids (unless a doctor has told you not to)    Take Tylenol (acetaminophen) for fever or pain. If you have liver or kidney problems, ask your family doctor if it's okay to take Tylenol.  Adults can take either:    650 mg (two 325 mg pills) every 4 to 6 hours, or     1,000 mg (two 500 mg pills) every 8 hours as needed.    Note: Don't take more than 3,000 mg in one day. Acetaminophen is found in many medicines (both prescribed and over-the-counter medicines). Read all labels to be sure you don't take too much.  For children, check the Tylenol bottle for the right dose. The dose is based on the child's age or weight.    If you have other health problems (like cancer, heart failure, an organ transplant or severe kidney disease): Call your specialty clinic if you don't feel better in the next 2 days.    Know when to call 911. Emergency warning signs include:  Trouble breathing or shortness of breath  Pain or pressure in the chest that doesn't go away  Feeling confused like you haven't felt before, or not being able to wake up  Bluish-colored lips or face  Where can I get more information?    [facility] - About COVID-19: [email or web address]  CDC - What to Do If You're Sick: www.cdc.gov/coronavirus/2019-ncov/about/steps-when-sick.html      No follow-ups on file.    Subjective:   Georgie Scott is a 30 y.o. female who submitted an eVisit request for evaluation of her Covid Concern.  See the questionnaire and message section of encounter report for information related to history of present illness and review of systems.    The following portions of the patient's history were reviewed and updated as appropriate:  She does not have any pertinent problems on file.  She is allergic to amoxicillin; bactrim [sulfamethoxazole-trimethoprim]; and morphine..      Objective:   No exam performed today, patient submitted as eVisit.

## 2021-06-12 NOTE — PROGRESS NOTES
"[facility] Discharge Summary  Patient Name: Georgie Scott  Date: 1/18/2021  Referral Diagnosis: Cervical facet syndrome  Referring provider: Kay Mcrae PA-C  Visit Diagnosis:   1. Cervical facet syndrome     2. Chronic neck pain     3. Generalized muscle weakness         Goals:  Pt. will demonstrate/verbalize independence in self-management of condition in : 12 weeks Not Met  Pt. will be independent with home exercise program in : 12 weeks Met  Pt will: Able to lay on either side to sleep within 12-16 weeks Not Met  Pt will: Able to turn head with less \"shifting\" or symptoms as stability and strength improve within 12-20 visits Some improvement    Patient was seen for 9 visits from 9/4/2020 to 11/4/2020 with - missed appointments.    The patient attended therapy initially, but did not finish the therapy sessions prescribed.  Goals were not fully achieved. Patient discontinued therapy after COVID exposure and did not return.    Therapy will be discontinued at this time.  The patient will need a new referral to resume.    Thank you for your referral.  Tye RAMOS, PT  1/18/2021  10:36 AM    [facility] Daily Progress     Patient Name: Georgie Scott  Date: 11/4/2020  Date of Initial Evaluation: 9/4/2020  Visit #: 9/20  PTA visit #:  -  Referral Diagnosis: Cervical facet syndrome  Referring provider: Kay Mcrae PA-C  Visit Diagnosis:     ICD-10-CM    1. Cervical facet syndrome  M47.812    2. Chronic neck pain  M54.2     G89.29    3. Generalized muscle weakness  M62.81      From Initial Evaluation:  Patient presents with chronic cervical pain which she has had since she was a teenager.  Over the past couple years she has had injections, physical therapy over a year ago and chiropractic adjustments, which are getting more painful to adjust and don't last. She did have an EMG today to rule out neck involvement with her UE symptoms.  She demonstrates " "decreased cervical strength and motion, tightness, decreased flexibility and pain with palpation.  Functional limitations are described as occurring with: turning head, sleep on side, reading, headaches.  Pt. is a good candidate for skilled PT services to improve pain levels and function and does not have any red flags to start MedX.      Assessment:     HEP/POC compliance is  good .     Patient is in the 5th week of the Medx program and tolerating appropriately. 4 week re-test on Medx showed good improvements in her strength throughout her ROM. The patient did have increased pain after pushing for test. Likely a muscle strain, but may have also irritated her nerve. She is planning to get another injection soon.    The patient does have muscular restriction, joint mobility seems WNL if not slightly hypermobile. With testing of cervical proprioception (joint error position testing) the patient shows normal testing to the right, but abnormal to the left. The patient may benefit from further training of cervical proprioception.     She is showing overall slow/minimal improvements in headache complaints, does continue to have intermittent shoulder pain she attributes to her nerve that has not improved with PT thus far.     Patient appropriate to continue with skilled PT per POC.     Goal Status:  Pt. will demonstrate/verbalize independence in self-management of condition in : 12 weeks  Pt. will be independent with home exercise program in : 12 weeks    Pt will: Able to lay on either side to sleep within 12-16 weeks  Pt will: Able to turn head with less \"shifting\" or symptoms as stability and strength improve within 12-20 visits      Plan / Patient Education:     Continue with initial plan of care.     Plan for next visit:   medx exercise, add rotary torso  Add midback strengthening  Cervical proprioception banded cervical rotation/antirotation.  4 way neck.     Subjective:     Pain Rating: Not Rated     Felt good after " last time. No increased soreness after last session. Wondering if she should have another injection to help the discomfort in the shoulder.    Objective:     Cervical Proprioception Joint Error Position Testing (previous)  Right 4.8 cm (normal <6.5 cm)  Left 9.3 cm (normal <6.5 cm)     Normal Cervical ROM  TTP Bilat suboccipital SCM, L cervical paraspinals, B UT.    Cervical Medx:  Enter Week / Visit #: w5 v1  Weight (lbs): 168#  Reps (#): na  Time: na  ROM (degrees): 0-102  Flex:Ext ratio: 1.24:1    Exercises:  Exercise #1: seated chin tuck and scapular retraction   hold 10 seconds X 10 reps  Comment #1: scalene stretch   hold 30 seconds X 1-3 reps  Exercise #2: nu-step   5' warm up  Comment #2: UBE X 4 min  Exercise #3: Prone I's X 10  Comment #3: DNF endurance x 15-20 seconds X 5  Exercise #4: C1-C2 self mobilization PRN  Comment #4: Rotary Torso 90 seconds 28#'s started to the L  Exercise #5: prone tracing of pattern with laser X 1.5 min      Treatment Today     TREATMENT MINUTES COMMENTS   Evaluation     Self-care/ Home management     Manual therapy 13 TPR to R suboccipital, SCM, cervical paraspinals B, and UT   Neuromuscular Re-education Not done today Joint Error position testing and cervical proprioception training in sitting.   Therapeutic Activity     Therapeutic Exercises 15 See flowsheets   Gait training     Modality__________________                Total 28    Blank areas are intentional and mean the treatment did not include these items.       Tye RAMOS, PT  11/4/2020

## 2021-06-12 NOTE — PROGRESS NOTES
[facility] Daily Progress     Patient Name: Georgie Scott  Date: 10/23/2020  Date of Initial Evaluation: 9/4/2020  Visit #: 8/20  PTA visit #:  -  Referral Diagnosis: Cervical facet syndrome  Referring provider: Kay Mcrae PA-C  Visit Diagnosis:     ICD-10-CM    1. Cervical facet syndrome  M47.812    2. Chronic neck pain  M54.2     G89.29    3. Generalized muscle weakness  M62.81      From Initial Evaluation:  Patient presents with chronic cervical pain which she has had since she was a teenager.  Over the past couple years she has had injections, physical therapy over a year ago and chiropractic adjustments, which are getting more painful to adjust and don't last. She did have an EMG today to rule out neck involvement with her UE symptoms.  She demonstrates decreased cervical strength and motion, tightness, decreased flexibility and pain with palpation.  Functional limitations are described as occurring with: turning head, sleep on side, reading, headaches.  Pt. is a good candidate for skilled PT services to improve pain levels and function and does not have any red flags to start MedX.      Assessment:     HEP/POC compliance is  good .     Patient is in the 4th week of the Medx program and tolerating appropriately. She was tested on Medx today and shows good improvements in her strength throughout her ROM. The patient did have increased pain after pushing for test. Likely a muscle strain, this did improve following manual therapy today.    The patient does have muscular restriction, joint mobility seems WNL if not slightly hypermobile. With testing of cervical proprioception (joint error position testing) the patient shows normal testing to the right, but abnormal to the left. The patient may benefit from training of cervical proprioception.     She is showing overall slow improvements in headache complaints, does continue to have intermittent shoulder pain she attributes to her  "nerve, but not further into the arm at all.     Patient appropriate to continue with skilled PT per POC. Does have a follow up with referring provider next week.     Goal Status:  Pt. will demonstrate/verbalize independence in self-management of condition in : 12 weeks  Pt. will be independent with home exercise program in : 12 weeks    Pt will: Able to lay on either side to sleep within 12-16 weeks  Pt will: Able to turn head with less \"shifting\" or symptoms as stability and strength improve within 12-20 visits      Plan / Patient Education:     Continue with initial plan of care.     Plan for next visit:   medx exercise, add rotary torso  Add midback strengthening  Cervical proprioception banded cervical rotation/antirotation.  4 way neck.     Subjective:     Pain Rating: Not Rated     Felt good after last time. No increased soreness after last session. Wondering if she should have another injection to help the discomfort in the shoulder.    Objective:     Cervical Proprioception Joint Error Position Testing  Right 4.8 cm (normal <6.5 cm)  Left 9.3 cm (normal <6.5 cm)     Normal Cervical ROM  TTP Bilat suboccipital SCM, L cervical paraspinals, B UT.    Cervical Medx:  Enter Week / Visit #: w4 v2 TEST  Weight (lbs): 335# Max  Reps (#): na  Time: na  ROM (degrees): 0-102  Flex:Ext ratio: 1.24:1    Exercises:  Exercise #1: seated chin tuck and scapular retraction   hold 10 seconds X 10 reps  Comment #1: scalene stretch   hold 30 seconds X 1-3 reps  Exercise #2: nu-step   5' warm up  Comment #2: UBE X 4 min  Exercise #3: Prone I's X 10  Comment #3: DNF endurance x 15-20 seconds X 5  Exercise #4: C1-C2 self mobilization PRN  Comment #4: Rotary Torso 90 seconds 28#'s started to the L  Exercise #5: prone tracing of pattern with laser X 1.5 min      Treatment Today     TREATMENT MINUTES COMMENTS   Evaluation     Self-care/ Home management     Manual therapy 13 TPR to R suboccipital, SCM, cervical paraspinals B, and UT "   Neuromuscular Re-education Not done today Joint Error position testing and cervical proprioception training in sitting.   Therapeutic Activity     Therapeutic Exercises 15 See flowsheets   Gait training     Modality__________________                Total 28    Blank areas are intentional and mean the treatment did not include these items.       Tye RAMOS, PT  10/23/2020

## 2021-06-12 NOTE — TELEPHONE ENCOUNTER
Declined, she was given 3 one month rx dated Sept, Oct, Nov of both Adderall XR and Adderall.  Please have pt contact pharmacy for refills.

## 2021-06-12 NOTE — PROGRESS NOTES
"Georgie Scott is a 30 y.o. female who is being evaluated via a billable video visit.      The patient has been notified of following:     \"This video visit will be conducted via a call between you and your physician/provider. We have found that certain health care needs can be provided without the need for an in-person physical exam.  This service lets us provide the care you need with a video conversation.  If a prescription is necessary we can send it directly to your pharmacy.  If lab work is needed we can place an order for that and you can then stop by our lab to have the test done at a later time.    Video visits are billed at different rates depending on your insurance coverage. Please reach out to your insurance provider with any questions.    If during the course of the call the physician/provider feels a video visit is not appropriate, you will not be charged for this service.\"    Patient has given verbal consent to a Video visit? Yes  How would you like to obtain your AVS? AVS Preference: [employer].  If dropped by the video visit, the video invitation should be sent to: Send to e-mail at: [email or web address]  Will anyone else be joining your video visit? No      Video Start Time: 9:05am    Additional provider notes:   Assessment / Impression     1. Attention deficit hyperactivity disorder (ADHD), predominantly inattentive type           Plan:     Doing well on current medications.  She is due to sign controlled substance agreement, so I did send updated controlled substance agreement to patient via [employer].  She will sign and send back to me as a PDF via my chart so that we can scan into patient's chart.  She is doing well on current medications, though given she just picked up medication, will not refill now.  Advised patient to send me my chart message a week prior to her medication running out so that we can refill medications at that time.  Patient understands, agrees with plan.    Return in " about 10 weeks (around 1/8/2021) for Annual physical.    Subjective:      HPI: Georgie Scott is a 30 y.o. female, here today for video visit, who presents for ADHD medication check.  Briefly, patient is currently on Adderall X.R.15 mg daily, and she will take Adderall immediate release 10-20 mg in the afternoon if needed.  She really only takes medication on days that she works, often will not take medication on weekend.  She is also on vacation for 2 weeks and did not need medication at that time.  She does note occasional dry mouth event medication but no heart palpitations or appetite suppression.  She just picked up a refill of her medications earlier this week.      Medical History:     Patient Active Problem List   Diagnosis     ADHD (attention deficit hyperactivity disorder), inattentive type       Past Medical History:   Diagnosis Date     ADHD      Medical history non-contributory 01/22/2019       Past Surgical History:   Procedure Laterality Date     No surgery history  01/22/2019       Current Medications:     Current Outpatient Medications   Medication Sig     cyclobenzaprine (FLEXERIL) 5 MG tablet TAKE ONE TABLET BY MOUTH THREE TIMES A DAY AS NEEDED FOR MUSCLE SPASMS     [START ON 11/7/2020] dextroamphetamine-amphetamine (ADDERALL XR) 15 MG 24 hr capsule Take 1 capsule (15 mg total) by mouth daily.     dextroamphetamine-amphetamine (ADDERALL XR) 15 MG 24 hr capsule Take 1 capsule (15 mg total) by mouth daily.     dextroamphetamine-amphetamine (ADDERALL XR) 15 MG 24 hr capsule Take 1 capsule (15 mg total) by mouth daily.     [START ON 11/7/2020] dextroamphetamine-amphetamine (ADDERALL) 10 mg Tab tablet TAKE ONE TO TWO TABLETS BY MOUTH ONCE DAILY     dextroamphetamine-amphetamine (ADDERALL) 10 mg Tab tablet Take 1-2 tablets by mouth daily.     dextroamphetamine-amphetamine (ADDERALL) 10 mg Tab tablet Take 1-2 tablets by mouth daily.     ibuprofen 200 mg cap Take 600 mg by mouth every 6 (six) hours as  needed.     omega-3/dha/epa/fish oil (FISH OIL-OMEGA-3 FATTY ACIDS) 300-1,000 mg capsule Take 2 g by mouth daily.       Family History:     Family History   Problem Relation Age of Onset     Hypertension Father      Stroke Maternal Grandmother      Dementia Maternal Grandmother      Brain cancer Maternal Grandfather      Hypertension Paternal Grandmother      Colon cancer Paternal Grandfather      Diabetes type II Paternal Uncle      Diabetes type I Paternal Uncle      Diabetes Paternal Aunt      Diabetes Paternal Aunt        Review of Systems  All other systems reviewed and are negative.         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 No discharge or erythema, or obvious scleral/conjunctival abnormalities.  RESP: No audible wheeze, cough, or visible cyanosis.  No visible retractions or increased work of breathing.    NEURO: Cranial nerves grossly intact. Mentation and speech appropriate for age.  PSYCH: Mentation appears normal, affect normal/bright, judgement and insight intact, normal speech and appearance well-groomed          Video-Visit Details    Type of service:  Video Visit    Video End Time (time video stopped): 9:13am  Originating Location (pt. Location): Home    Distant Location (provider location):  [facility]     Platform used for Video Visit: Dunia Marrero MD

## 2021-06-12 NOTE — PATIENT INSTRUCTIONS - HE
A C7/T1 interlaminar epidural steroid injection has been ordered today.      Please note that this injection uses cortisone.  The cortisone may somewhat weaken the immune system.  It is unknown how much the immune system is weakened.  It is unknown if it is weakened to the point that you may be more likely to get the COVID-19 virus, or if you do get the COVID-19 virus, if you would be sicker than you would have been if you had not had the cortisone injection.  If you do not wish to proceed with the injection, please let the nurse/physician know and do NOT schedule the injection.    Please note that since your immune system is weakened from the cortisone, having a flu vaccine/shot may be less effective if you have this vaccine within 2 weeks from your cortisone injection.  It is advised to wait 2 weeks after your cortisone injection to have the flu shot (or if you have the flu shot first, wait 2 weeks before you have the cortisone injection).    Please schedule this injection at least 1 week  from now to allow time for insurance prior authorization.  On the day of your injection, you cannot be sick or taking antibiotics.  If you become sick and are prescribed, please call the clinic so your injection can be rescheduled for once you have completed your antibiotics.  You will need to bring a  with you for your injection.   If you have any questions or concerns prior to your injection, please do not hesitate to call the nurse navigation line at [phone] or contact Kay Mcrae through [employer].

## 2021-06-13 NOTE — TELEPHONE ENCOUNTER
Please assess her symptoms. If her symptoms are mild or moderate, she needs to wait at least 10 days since the start of symptoms and at least 24 hours hours since her fever (without fever reducing meds) and COVID symptoms have substantially improved.  If her symptoms are severe, please notify the provider.

## 2021-06-13 NOTE — TELEPHONE ENCOUNTER
Patient calling to find out if/when she needs to reschedule her injection for. She is currently scheduled for 11/20/20.     Reports she started having COVID symptoms on 11/5 and was finally able to get tested on 11/8. Her test is POSITIVE for COVID. Wondering if she has to wait 14 days from first showing symptoms or if from the date she was tested.     Explained would check with provider doing the injection. She will be called back with response. Per patient, detailed message ok upon call back.     PSP: Kay GAGE

## 2021-06-13 NOTE — TELEPHONE ENCOUNTER
Controlled Substance Refill Request  Medication Name:   Requested Prescriptions     Pending Prescriptions Disp Refills     dextroamphetamine-amphetamine (ADDERALL) 10 mg Tab tablet 40 tablet 0     dextroamphetamine-amphetamine (ADDERALL XR) 15 MG 24 hr capsule 30 capsule 0     Sig: Take 1 capsule (15 mg total) by mouth daily.     Date Last Fill: 11/7/20  Requested Pharmacy: [facility]  Submit electronically to pharmacy  Controlled Substance Agreement on file:   Encounter-Level CSA Scan Date:    There are no encounter-level csa scan date.        Last office visit:  11/25/20

## 2021-06-13 NOTE — PATIENT INSTRUCTIONS - HE
Patient was educated on the natural course of condition. Discontinue Macrobid.  Take medication as directed. Side effects discussed. Conservative measures include drinking fluids (water), wiping from front to back, avoiding holding urine when there is urge to urinate, urinating before and after sexual intercourse, avoiding sexual activity until infection is eliminated, and over-the-counter AZO. See your primary care provider if symptoms do not improve in 3 days. Seek emergency care if you develop severe abdominal/flank pain, or fever.

## 2021-06-13 NOTE — PROGRESS NOTES
URGENT CARE VISIT:    SUBJECTIVE:    Georgie Scott is a 30 y.o. female who  presents today for a possible UTI. Symptoms of dysuria, urinary frequency and urinary hesitancy have been going on for 7 week(s). Symptoms are sudden in onset and Moderate.  Patient denies fever, abdominal pain, back pain and vomiting. This patient does have a history of urinary tract infections. Currently on Macrobid rx during an E visit. She has gotten no relief with this.     PMH:   Past Medical History:   Diagnosis Date     ADHD      Medical history non-contributory 01/22/2019     Allergies: Amoxicillin, Bactrim [sulfamethoxazole-trimethoprim], and Morphine   Medications:   Current Outpatient Medications   Medication Sig Dispense Refill     cyclobenzaprine (FLEXERIL) 5 MG tablet TAKE ONE TABLET BY MOUTH THREE TIMES A DAY AS NEEDED FOR MUSCLE SPASMS 30 tablet 2     dextroamphetamine-amphetamine (ADDERALL XR) 15 MG 24 hr capsule Take 1 capsule (15 mg total) by mouth daily. 30 capsule 0     dextroamphetamine-amphetamine (ADDERALL XR) 15 MG 24 hr capsule Take 1 capsule (15 mg total) by mouth daily. 30 capsule 0     dextroamphetamine-amphetamine (ADDERALL XR) 15 MG 24 hr capsule Take 1 capsule (15 mg total) by mouth daily. 30 capsule 0     dextroamphetamine-amphetamine (ADDERALL) 10 mg Tab tablet TAKE ONE TO TWO TABLETS BY MOUTH ONCE DAILY 40 tablet 0     dextroamphetamine-amphetamine (ADDERALL) 10 mg Tab tablet Take 1-2 tablets by mouth daily. 40 tablet 0     dextroamphetamine-amphetamine (ADDERALL) 10 mg Tab tablet Take 1-2 tablets by mouth daily. 40 tablet 0     ibuprofen 200 mg cap Take 600 mg by mouth every 6 (six) hours as needed.       omega-3/dha/epa/fish oil (FISH OIL-OMEGA-3 FATTY ACIDS) 300-1,000 mg capsule Take 2 g by mouth daily.       cephalexin (KEFLEX) 500 MG capsule Take 1 capsule (500 mg total) by mouth 2 (two) times a day for 7 days. 14 capsule 0     No current facility-administered medications for this visit.       Social History:   Social History     Tobacco Use     Smoking status: Never Smoker     Smokeless tobacco: Never Used   Substance Use Topics     Alcohol use: Not on file       ROS:  Review of systems negative except as stated above.    OBJECTIVE:  /88 (Patient Site: Left Arm, Patient Position: Sitting, Cuff Size: Adult Large)   Pulse (!) 104   Temp 99.1  F (37.3  C) (Oral)   Resp 16   Wt 158 lb (71.7 kg)   LMP 11/04/2020 (Approximate)   SpO2 95%   BMI 25.12 kg/m    GENERAL APPEARANCE: healthy, alert and no distress  RESP: lungs clear to auscultation - no rales, rhonchi or wheezes  CV: regular rates and rhythm, normal S1 S2, no murmur noted  ABDOMEN:  soft, nontender, no HSM or masses and bowel sounds normal  BACK: No CVA tenderness  SKIN: no suspicious lesions or rashes    Labs:    Results for orders placed or performed in visit on 12/02/20   Urinalysis-UC if Indicated   Result Value Ref Range    Color, UA Yellow Colorless, Yellow, Straw, Light Yellow    Clarity, UA Clear Clear    Glucose, UA Negative Negative    Bilirubin, UA Negative Negative    Ketones, UA Negative Negative    Specific Gravity, UA 1.015 1.005 - 1.030    Blood, UA Negative Negative    pH, UA 7.0 5.0 - 8.0    Protein, UA Negative Negative mg/dL    Urobilinogen, UA 0.2 E.U./dL 0.2 E.U./dL, 1.0 E.U./dL    Nitrite, UA Negative Negative    Leukocytes, UA Negative Negative       ASSESSMENT:   1. UTI symptoms  Urinalysis-UC if Indicated    cephalexin (KEFLEX) 500 MG capsule    DISCONTINUED: nitrofurantoin, macrocrystal-monohydrate, (MACROBID) 100 MG capsule       PLAN:  Patient Instructions   Patient was educated on the natural course of condition. Discontinue Macrobid.  Take medication as directed. Side effects discussed. Conservative measures include drinking fluids (water), wiping from front to back, avoiding holding urine when there is urge to urinate, urinating before and after sexual intercourse, avoiding sexual activity until  infection is eliminated, and over-the-counter AZO. See your primary care provider if symptoms do not improve in 3 days. Seek emergency care if you develop severe abdominal/flank pain, or fever.     Patient verbalized understanding and is agreeable to plan. The patient was discharged ambulatory and in stable condition.    Carolyn Franco PA-C on 12/2/2020 at 12:06 PM

## 2021-06-13 NOTE — TELEPHONE ENCOUNTER
"Patient returned call. Reports her symptoms are resolving. \"I have some stuffiness and still no sense of taste or smell. I have been cleared to return to work this Monday. My 10 days are up on Sunday sinbe onset of symptoms.\" Explained that she may proceed with scheduled injection then. Stated understanding and appreciation for call back.   "

## 2021-06-13 NOTE — PATIENT INSTRUCTIONS - HE
A Nurse with Care Navigation will call you two weeks to see how you are doing. Please follow up two weeks post procedure, with Kay, if symptoms have worsened or fails to improve. Otherwise follow as needed.       DISCHARGE INSTRUCTIONS    During office hours (8:00 a.m.- 4:00 p.m.) questions or concerns may be answered  by calling [facility] Nurses at  [phone].  Messages received after hours will be returned the following business day.      In the case of an emergency, please dial 911 or seek assistance at the nearest Emergency Room/Urgent Care facility.     All Patients:    ? You may experience an increase in your symptoms for the first 2 days (It may take anywhere between 2 days- 2 weeks for the steroid to have maximum effect).    ? You may use ice on the injection site, as frequently as 20 minutes each hour if needed.    ? You may take your pain medicine.    ? You may continue taking your regular medication after your injection. If you have had a Medial Branch Block you may resume pain medication once your pain diary is completed.    ? You may shower. No swimming, tub bath or hot tub for 48 hours.  You may remove your bandaid/bandage as soon as you are home.    ? You may resume light activities, as tolerated.    ? Resume your usual diet as tolerated.    ? It is strongly advised that you do not drive for 1-3 hours post injection.    ? If you have had oral sedation:  Do not drive for 8 hours post injection.      ? If you have had IV sedation:  Do not drive for 24 hours post injection.  Do not operate hazardous machinery or make important personal/business decisions for 24 hours.      POSSIBLE STEROID SIDE EFFECTS (If steroid/cortisone was used for your procedure)    -If you experience these symptoms, it should only last for a short period      Swelling of the legs                Skin redness (flushing)       Mouth (oral) irritation     Blood sugar (glucose) levels              Sweats                       Mood changes    Headache    Sleeplessness    Weakened immune system for up to 14 days, which could increase the risk of rony the COVID-19 virus and/or experiencing more severe symptoms of the disease, if exposed.    Decreased effectiveness of the flu vaccine if given within 2 weeks of the steroid.         POSSIBLE PROCEDURE SIDE EFFECTS  -Call the [facility] Center if you are concerned    Increased Pain             Increased numbness/tingling        Nausea/Vomiting            Bruising/bleeding at site        Redness or swelling                                                Difficulty walking        Weakness             Fever greater than 100.5    *In the event of a severe headache after an epidural steroid injection that is relieved by lying down, please call the [facility] to speak with a clinical staff member*

## 2021-06-14 NOTE — TELEPHONE ENCOUNTER
Last appointment:10/28/20 (f/u);11/20/20 (inj)  Next appointment: None    Notes/Comments:      Rx request(s) has been reviewed. Rx(s) cued up for auth, to be e-scribed to pharm. Thanks.

## 2021-06-15 ENCOUNTER — OFFICE VISIT - HEALTHEAST (OUTPATIENT)
Dept: OCCUPATIONAL THERAPY | Facility: REHABILITATION | Age: 31
End: 2021-06-15

## 2021-06-15 DIAGNOSIS — M25.532 PAIN IN BOTH WRISTS: ICD-10-CM

## 2021-06-15 DIAGNOSIS — Z78.9 DECREASED ACTIVITIES OF DAILY LIVING (ADL): ICD-10-CM

## 2021-06-15 DIAGNOSIS — M25.531 PAIN IN BOTH WRISTS: ICD-10-CM

## 2021-06-15 DIAGNOSIS — R20.2 PARESTHESIA OF BOTH HANDS: ICD-10-CM

## 2021-06-15 NOTE — PROGRESS NOTES
Chief Complaint   Patient presents with     Cellulitis     Had cellulitis on thanksgiving. Located on the left elbow. Treated with antibiotics. Went away and now come back. Painful. Was also treated for MRSA. MRSA no confirmed.          HPI    A few days of a red spot distal to left posterior elbow with mild soreness.  Hx of cellulitis at the area. No injury, fever, pus drainage.     ROS: Pertinent ROS noted in HPI.     Allergies   Allergen Reactions     Amoxicillin      Bactrim [Sulfamethoxazole-Trimethoprim]      Morphine        There is no problem list on file for this patient.      No family history on file.    Social History     Social History     Marital status: Single     Spouse name: N/A     Number of children: N/A     Years of education: N/A     Occupational History     Not on file.     Social History Main Topics     Smoking status: Never Smoker     Smokeless tobacco: Not on file     Alcohol use Not on file     Drug use: Not on file     Sexual activity: Not on file     Other Topics Concern     Not on file     Social History Narrative     No narrative on file     Objective:    Vitals:    01/03/18 1142   BP: 118/80   Pulse: 78       Gen:NAD  Skin: 0.5 cm indurated erythematous lesion with a central pustule distal to left posterior elbow without fluctuance, drainage, warmth.         Local skin infection  -     cephalexin (KEFLEX) 500 MG capsule; Take 1 capsule (500 mg total) by mouth 4 (four) times a day for 10 days.

## 2021-06-16 NOTE — TELEPHONE ENCOUNTER
Telephone Encounter by Julia Donald at 3/18/2019 12:06 PM     Author: Julia Donald Service: -- Author Type: --    Filed: 3/18/2019 12:06 PM Encounter Date: 3/14/2019 Status: Signed    : Julia Donald APPROVED:    Approval start date: 3/18/2019  Approval end date: 3/18/2021    Pharmacy has been notified of approval and will contact patient when medication is ready for pickup.

## 2021-06-16 NOTE — TELEPHONE ENCOUNTER
Telephone Encounter by Nora Brantley at 6/5/2019  2:37 PM     Author: Nora Brantley Service: -- Author Type: --    Filed: 6/5/2019  2:40 PM Encounter Date: 6/3/2019 Status: Signed    : Nora Brantley APPROVED:    Approval start date:06/05/2019  Approval end date:06/05/2021    Pharmacy has been notified of approval and will contact patient when medication is ready for pickup.

## 2021-06-16 NOTE — TELEPHONE ENCOUNTER
Telephone Encounter by Julia Donald at 3/15/2019 11:10 AM     Author: Julia Donald Service: -- Author Type: --    Filed: 3/15/2019 11:10 AM Encounter Date: 3/14/2019 Status: Signed    : Julia Donald       [facility]  [phone]  Pool: [facility] ([id number])          PA has been initiated.             Response will be received via fax and may take up to 5-10 business days depending on plan

## 2021-06-16 NOTE — TELEPHONE ENCOUNTER
Telephone Encounter by Nora Brantley at 4/24/2019  7:15 AM     Author: Nora Brantley Service: -- Author Type: --    Filed: 4/24/2019  7:15 AM Encounter Date: 4/22/2019 Status: Signed    : Nora Brantley APPROVED:    Approval start date:04/22/2019  Approval end date:03/16/2021    Pharmacy has been notified of approval and will contact patient when medication is ready for pickup.

## 2021-06-17 ENCOUNTER — OFFICE VISIT - HEALTHEAST (OUTPATIENT)
Dept: OCCUPATIONAL THERAPY | Facility: REHABILITATION | Age: 31
End: 2021-06-17

## 2021-06-17 DIAGNOSIS — M25.531 PAIN IN BOTH WRISTS: ICD-10-CM

## 2021-06-17 DIAGNOSIS — R20.2 PARESTHESIA OF BOTH HANDS: ICD-10-CM

## 2021-06-17 DIAGNOSIS — Z78.9 DECREASED ACTIVITIES OF DAILY LIVING (ADL): ICD-10-CM

## 2021-06-17 DIAGNOSIS — M25.532 PAIN IN BOTH WRISTS: ICD-10-CM

## 2021-06-17 NOTE — PATIENT INSTRUCTIONS - HE
Patient Instructions by Hien Medellin PT at 2/11/2019  3:30 PM     Author: Hien Medellin PT Service: -- Author Type: Physical Therapist    Filed: 2/11/2019  4:29 PM Encounter Date: 2/11/2019 Status: Signed    : Hien Medellin PT (Physical Therapist)        DOORWAY STRETCH - HIGH    While standing in a doorway, place your arms up on the door frame and lean in until a stretch is felt along the front of your chest and/or shoulders. Your upper arms should be placed upward along the door frame.     NOTE: Your legs should control how much you stretch by bending or straightening your knee through the doorway. Hold 30-60 sec, 2-3 times      CERVICAL CHIN TUCK  AND RETRACTION - SUPINE WITH TOWEL    While lying on your back with a small folded up towel under your head, tuck your chin towards your chest.    Maintain contact of head with the towel the entire time.    Hold 10 sec, 10 reps, 1-2 times      CERVICAL EXTENSION ISOMETRIC - DEEP CERVICAL    Place your hands behind your head, turn your head slightly to the side and then press the back of your head into your hands.  Hold 5-10 sec, 10 reps, 1-2 times

## 2021-06-17 NOTE — PATIENT INSTRUCTIONS - HE
Patient Instructions by Hien Medellin PT at 2/25/2019  3:30 PM     Author: Hien Medellin PT Service: -- Author Type: Physical Therapist    Filed: 2/25/2019  3:59 PM Encounter Date: 2/25/2019 Status: Signed    : Hien Medellin PT (Physical Therapist)        ISOMETRIC FLEXION    Place your fingers on your forehead and gently push your head into your fingers. Hold 3-10 sec, 5-10 reps, 1-3 times per day    ISOMETRIC EXTENSION    Place your fingers on the back of your head and gently draw your head back into your fingers. Hold 3-10 sec, 5-10 reps, 1-3 times per day    ISOMETRIC SIDE BEND    Place your fingers on the side of your head and gently tilt your head to the side and into your fingers. Hold 3-10 sec, 5-10 reps, 1-3 times per day    ISOMETRIC ROTATION    Place your fingers on your check bone and gently turn your head into your fingers. Hold 3-10 sec, 5-10 reps, 1-3 times per day

## 2021-06-17 NOTE — PATIENT INSTRUCTIONS - HE
Patient Instructions by Hien Medellin PT at 3/25/2019  4:30 PM     Author: Hien Medellin PT Service: -- Author Type: Physical Therapist    Filed: 3/25/2019  5:02 PM Encounter Date: 3/25/2019 Status: Addendum    : Hien Medellin PT (Physical Therapist)    Related Notes: Original Note by Hien Medellin PT (Physical Therapist) filed at 3/25/2019  4:43 PM        LEVATOR SCAPULAE STRETCH    Place the arm on the affected side behind your back and use your other hand to draw your head downward and towards the opposite side.     You should be looking towards your opposite pocket of the affected side.    Hold 20-30 sec, 2-3 times    LEVATOR SCAPULAE STRETCH - MODIFIED    Grasp your arm of the affected side and pull it gently towards the opposite side in front of your body.  Next, tilt your head downward and to the side looking away from the affected side until a stretch is felt. Hold 20-30 sec, 2-3 times

## 2021-06-17 NOTE — PROGRESS NOTES
"Occupational Therapy Daily Progress     Patient Name: Georgie Scott  Date: 5/11/2021  Visit #: 2  Referral Diagnosis: Pain in both wrists  Referring provider: Nora Marrero MD  Visit Diagnosis:     ICD-10-CM    1. Pain in both wrists  M25.531     M25.532    2. Decreased activities of daily living (ADL)  Z78.9    3. Paresthesia of both hands  R20.2        Assessment:     Patient is appropriate to continue with skilled occupational therapy intervention, as indicated by initial plan of care. No change in the bilateral tingling in hands.    Goal Status:  Patient Will Demonstrate / Verbalize independence in self-management of condition in: 2 weeks  Patient will be independent with home exercise program in: 2 weeks  Patient will work: without restrictions;with less pain;in 12 weeks  Patient will be able to: lift;carry;reach;for grocery shopping;with less pain;in 12 weeks  Patient will perform: housework;with less pain;in 12 weeks  Patient will be able to  & pinch: for meal prep;with less pain;in 12 weeks  Patient will improve hand/finger coordination for: typing;writing;with no pain;in 12 weeks      Plan / Patient Education:     Consider ionto but discuss placement. Consider reviewing ergonomics as patient had formal evaluation for this but might be having a specific issue needing to be changed    Subjective:     Pain rating at rest: 1  Pain rating with activity: 2      Objective:     Treatment Today: Patient tried kinesiotape and not very helpful. She will try wearing it at night as the wrist braces aren't comfortable. (tape may be helpful at night for the tingling) She has increased tingling in bilateral hands and will hold the nerve glides for now. The STM seemed to help but she had a difficult time performing so I showed her the \"index \" today. Performed STM to bilateral wrist flexors and extensors.  Adjusted bilateral wrist braces but patient reports they are uncomfortable and she is waking up at " night because of the braces. Adjusted and will see if any improvement.    Last Visit: Patient instructed on nerve glides today. Applied and instructed on kinesiotape from palm to elbow, bilaterally. Performed and instructed on muscle stripping to bilateral wrist flexors and extensors.   TREATMENT MINUTES COMMENTS   Evaluation     Self-care/ Home management     Manual therapy 13    Neuromuscular Re-education 10    Therapeutic Exercises     Iontophoresis     Orthotic Fitting     Total 23    Blank areas are intentional and mean the treatment did not include these items.       Verito Doran  5/11/2021  11:34 AM

## 2021-06-17 NOTE — PATIENT INSTRUCTIONS - HE
Patient Instructions by Hien Medellin PT at 3/4/2019  3:30 PM     Author: Hien Medellin PT Service: -- Author Type: Physical Therapist    Filed: 3/4/2019  3:55 PM Encounter Date: 3/4/2019 Status: Signed    : Hien Medellin PT (Physical Therapist)        Upper Cervical Rotation Self Mobilization     With your arms crossed hold the towel firmly to your chest and the other hand has the towel pressed against your check bone. Pull the towel across your cheekbone with the towel doing the work and your neck feeling the stretch- hold 5-10 sec, 5-10 looking to the right

## 2021-06-17 NOTE — PATIENT INSTRUCTIONS - HE
Patient Instructions by Hien Medellin PT at 3/11/2019  3:30 PM     Author: Hien Medellin PT Service: -- Author Type: Physical Therapist    Filed: 3/11/2019  4:00 PM Encounter Date: 3/11/2019 Status: Signed    : Hien Medellin PT (Physical Therapist)        Sternocleidomastoid (SCM) Stretch    In sitting, place one hand over the front of your collarbone, then extend your head backwards and to each side. Hold 20-30 sec, 2-3 times one each

## 2021-06-18 NOTE — PATIENT INSTRUCTIONS - HE
Patient Instructions by Jani Haas MD at 11/25/2020  2:55 PM     Author: Jani Haas MD Service: -- Author Type: Physician    Filed: 11/25/2020  2:55 PM Encounter Date: 11/25/2020 Status: Signed    : Jani Haas MD (Physician)           Urinary Tract Infections in Women    Urinary tract infections (UTIs) are most often caused by bacteria. These bacteria enter the urinary tract. The bacteria may come from inside the body. Or they may travel from the skin outside the rectum or vagina into the urethra. Female anatomy makes it easy for bacteria from the bowel to enter a womans urinary tract, which is the most common source of UTI. This means women develop UTIs more often than men. Pain in or around the urinary tract is a common UTI symptom. But the only way to know for sure if you have a UTI for the healthcare provider to test your urine. The two tests that may be done are the urinalysis and urine culture.  Types of UTIs    Cystitis. A bladder infection (cystitis) is the most common UTI in women. You may have urgent or frequent need to pee. You may also have pain, burning when you pee, and bloody urine.    Urethritis. This is an inflamed urethra, which is the tube that carries urine from the bladder to outside the body. You may have lower stomach or back pain. You may also have urgent or frequent need to pee.    Pyelonephritis. This is a kidney infection. If not treated, it can be serious and damage your kidneys. In severe cases, you may need to stay in the hospital. You may have a fever and lower back pain.    Medicines to treat a UTI  Most UTIs are treated with antibiotics. These kill the bacteria. The length of time you need to take them depends on the type of infection. It may be as short as 3 days. If you have repeated UTIs, you may need a low-dose antibiotic for several months. Take antibiotics exactly as directed. Dont stop taking them until all of the medicine is gone. If  you stop taking the antibiotic too soon, the infection may not go away. You may also develop a resistance to the antibiotic. This can make it much harder to treat.  Lifestyle changes to treat and prevent UTIs  The lifestyle changes below will help get rid of your UTI. They may also help prevent future UTIs.    Drink plenty of fluids. This includes water, juice, or other caffeine-free drinks. Fluids help flush bacteria out of your body.    Empty your bladder. Always empty your bladder when you feel the urge to pee. And always pee before going to sleep. Urine that stays in your bladder can lead to infection. Try to pee before and after sex as well.    Practice good personal hygiene. Wipe yourself from front to back after using the toilet. This helps keep bacteria from getting into the urethra.    Use condoms during sex. These help prevent UTIs caused by sexually transmitted bacteria. Also don't use spermicides during sex. These can increase the risk for UTIs. Choose other forms of birth control instead. For women who tend to get UTIs after sex, a low-dose of a preventive antibiotic may be used. Be sure to discuss this option with your healthcare provider.    Follow up with your healthcare provider as directed. He or she may test to make sure the infection has cleared. If needed, more treatment may be started.  Date Last Reviewed: 7/1/2019 2000-2019 [employer]. [address] [place] [id number]. All rights reserved. This information is not intended as a substitute for professional medical care. Always follow your healthcare professional's instructions.        If you don't see improvement after 3 days of treatment I would recommend you come in for an appointment to discuss these symptoms. You are able to schedule an appointment within [facility] at your convenience.    If you do need to come in for this same symptom within the next seven days, your eVisit will be free of charge.

## 2021-06-18 NOTE — PATIENT INSTRUCTIONS - HE
Patient Instructions by Tye Maher PT at 9/14/2020  3:30 PM     Author: Tye Maher PT Service: -- Author Type: Physical Therapist    Filed: 9/14/2020  4:02 PM Encounter Date: 9/14/2020 Status: Addendum    : Tye Maher PT (Physical Therapist)    Related Notes: Original Note by Tye Maher PT (Physical Therapist) filed at 9/14/2020  3:49 PM            CHIN TUCK HEAD LIFT  Perform chin tuck and then raise head up 1-2 inches off surface.  Slowly lower head raise and then relax chin tuck.    Hold 15-30 seconds  5 repetitions  1X/day      PRONE RETRACTION EXTENSION - PRONE I    Lying face down with your arms by your side, slowly move your arms upward towards the ceiling as you squeeze your shoulder blades downwards and towards your spine.    Hold 5 seconds  10-20 repetitions\  2 sets  1X/day

## 2021-06-18 NOTE — LETTER
Letter by Nora Marrero MD at      Author: Nora Marrero MD Service: -- Author Type: --    Filed:  Encounter Date: 3/13/2019 Status: (Other)         [facility]  03/13/19    Patient: Georgie Scott  YOB: 1990  Medical Record Number: [id number]  CSN: [id number]                                                                              Non-opioid Controlled Substance Agreement    I understand that my care provider has prescribed a controlled substance to help manage my condition(s). I am taking this medicine to help me function or work. I know this is strong medicine, and that it can cause serious side effects. Controlled substances can be sedating, addicting and may cause a dependency on the drug. They can affect my ability to drive or think, and cause depression. They need to be taken exactly as prescribed. Combining controlled substances with certain medicines or chemicals (such as cocaine, sedatives and tranquilizers, sleeping pills, meth) can be dangerous or even fatal. Also, if I stop controlled substances suddenly, I may have severe withdrawal symptoms.  If not helpful, I may be asked to stop them.    The risks, benefits, and side effects of these medicine(s) were explained to me. I agree that:    1. I will take part in other treatments as advised by my care team. This may be psychiatry or counseling, physical therapy, behavioral therapy, group treatment or a referral to a pain clinic. I will reduce or stop my medicine when my care team tells me to do so.  2. I will take my medicines as prescribed. I will not change the dose or schedule unless my care team tells me to. There will be no refills if I run out early.  I may be contactedwithout warning and asked to complete a urine drug test or pill count at any time.   3. I will keep all my appointments, and understand this is part of the monitoring of controlled substances. My care team may require an office visit for EVERY  controlled substance refill. If I miss appointments or dont follow instructions, my care team may stop my medicine.  4. I will not ask other providers to prescribe controlled substances, and I will not accept controlled substances from other people. If I need another prescribed controlled substance for a new reason, I will tell my care team within 1 business day.  5. I will use one pharmacy to fill all of my controlled substance prescriptions, and it is up to me to make sure that I do not run out of my medicines on weekends or holidays. If my care team is willing to refill my controlled substance prescription without a visit, I must request refills only during office hours, refills may take up to 3 days to process, and it may take up to 5 to 7 days for my medicine to be mailed and ready at my pharmacy. Prescriptions will not be mailed anywhere except my pharmacy.    6. I am responsible for my prescriptions. If the medicine/prescription is lost or stolen, it will not be replaced. I also agree not to share controlled substance medicines with anyone.          [facility]  03/13/19  Patient:  Georgie Scott  YOB: 1990  Medical Record Number: [id number]  CSN: [id number]    7. I agree to not use ANY illegal or recreational drugs. This includes marijuana, cocaine, bath salts or other drugs. I agree not to use alcohol unless my care team says I may. I agree to give urine samples whenever asked. If I dont give a urine sample, the care team may stop my medicine.    8. If I enroll in the [place] Medical Marijuana program, I will tell my care team. I will also sign an agreement to share my medical records with my care team.    9. I will bring in my list of medicines (or my medicine bottles) each time I come to the clinic.   10. I will tell my care team right away if I become pregnant or have a new medical problem treated outside of my regular clinic.  11. I understand that this medicine can  affect my thinking and judgment. It may be unsafe for me to drive, use machinery and do dangerous tasks. I will not do any of these things until I know how the medicine affects me. If my dose changes, I will wait to see how it affects me. I will contact my care team if I have concerns about medicine side effects.    I understand that if I do not follow any of the conditions above, my prescriptions or treatment may be stopped.      I agree that my provider, clinic care team, and pharmacy may work with any city, state or federal law enforcement agency that investigates the misuse, sale, or other diversion of my controlled medicine. I will allow my provider to discuss my care with or share a copy of this agreement with any other treating provider, pharmacy or emergency room where I receive care. I agree to give up (waive) any right of privacy or confidentiality with respect to these consents.   I have read this agreement and have asked questions about anything I did not understand.    ___________________________________________________________________________  Patient signature - Date/Time  -Georgie Scott                                      ___________________________________________________________________________  Witness signature                                                                    ___________________________________________________________________________  Provider signature- Nora Marrero MD

## 2021-06-20 NOTE — PROGRESS NOTES
"Assessment/Plan:        1. Menorrhagia with regular cycle  Pathophysiology and recommended tx were reviewed   Of note, recent pelvic US shown to have a small Fibroid.   Patient willing to try OCP at this time.     Plan:   - norethindrone-e.estradiol-iron (LO LOESTRIN FE) 1 mg-10 mcg (24)/10 mcg (2) Tab; Take 1 tablet by mouth daily.  Dispense: 3 Package; Refill: 3              Subjective:    Patient ID:   Georgie Scott is a 28 y.o. female comes in with having heavy periods, and mild spotting during the cycle over the past few months.  She was once offered to try the OCP, but past experience with it causing her to become gaspar has turned down the recommendation.  She is also against using the IUD or the depo shots. She does not plan on getting pregnant anytime soon.       Review of Systems  General : negative  Respiratory : no cough, shortness of breath, or wheezing  Cardiovascular : no chest pain or dyspnea on exertion  Gastrointestinal : no abdominal pain, change in bowel habits, or black or bloody stools  Genito-Urinary :  See HPI, no dysuria, trouble voiding, or hematuria  Dermatological : negative    Allergy: reviewed      The following patient's history were reviewed and updated as appropriate:   She  has no past medical history on file.  Her family history is not on file..      Outpatient Encounter Prescriptions as of 9/26/2018   Medication Sig Dispense Refill     norethindrone-e.estradiol-iron (LO LOESTRIN FE) 1 mg-10 mcg (24)/10 mcg (2) Tab Take 1 tablet by mouth daily. 3 Package 3     ondansetron (ZOFRAN) 4 MG tablet Take 1 tablet (4 mg total) by mouth every 6 (six) hours. 12 tablet 0     No facility-administered encounter medications on file as of 9/26/2018.          Objective:   /60 (Patient Site: Right Arm, Patient Position: Sitting, Cuff Size: Adult Regular)  Pulse 86  Temp 98.7  F (37.1  C) (Oral)   Ht 5' 6.5\" (1.689 m)  Wt 146 lb 8 oz (66.5 kg)  SpO2 99%  Breastfeeding? No  BMI 23.29 " kg/m2      Physical Exam  General Appearance:    Alert,  no acute distress, well hydrated    Eyes:    PERRL, conjunctiva/corneas clear,    Throat:   Lips, mucosa, and tongue normal;  gums normal   Neck:   Supple, symmetrical, trachea midline, no adenopathy;        thyroid:  No enlargement/tenderness/nodules; no carotid    bruit or JVD   Lungs:     Clear to auscultation bilaterally, respirations unlabored   Heart:    Regular rate and rhythm, S1 and S2 normal, no murmur, rub   or gallop   Abdomen:      Soft, NT , normal bowel sounds, no rebound or guarding, no masses, no organomegaly   Extremities:   Extremities normal, atraumatic, no cyanosis or edema   Skin:   Skin color, texture, turgor normal, no rashes or lesions

## 2021-06-21 NOTE — LETTER
Letter by Nora Marrero MD at      Author: Nora Marrero MD Service: -- Author Type: --    Filed:  Encounter Date: 10/30/2020 Status: (Other)         [facility]  10/30/20    Patient: Georgie Scott  YOB: 1990  Medical Record Number: [id number]  CSN: [id number]                                                                              Non-opioid Controlled Substance Agreement    I understand that my care provider has prescribed a controlled substance to help manage my condition(s). I am taking this medicine to help me function or work. I know this is strong medicine, and that it can cause serious side effects. Controlled substances can be sedating, addicting and may cause a dependency on the drug. They can affect my ability to drive or think, and cause depression. They need to be taken exactly as prescribed. Combining controlled substances with certain medicines or chemicals (such as cocaine, sedatives and tranquilizers, sleeping pills, meth) can be dangerous or even fatal. Also, if I stop controlled substances suddenly, I may have severe withdrawal symptoms.  If not helpful, I may be asked to stop them.    The risks, benefits, and side effects of these medicine(s) were explained to me. I agree that:    1. I will take part in other treatments as advised by my care team. This may be psychiatry or counseling, physical therapy, behavioral therapy, group treatment or a referral to a pain clinic. I will reduce or stop my medicine when my care team tells me to do so.  2. I will take my medicines as prescribed. I will not change the dose or schedule unless my care team tells me to. There will be no refills if I run out early.  I may be contactedwithout warning and asked to complete a urine drug test or pill count at any time.   3. I will keep all my appointments, and understand this is part of the monitoring of controlled substances. My care team may require an office visit for  EVERY controlled substance refill. If I miss appointments or dont follow instructions, my care team may stop my medicine.  4. I will not ask other providers to prescribe controlled substances, and I will not accept controlled substances from other people. If I need another prescribed controlled substance for a new reason, I will tell my care team within 1 business day.  5. I will use one pharmacy to fill all of my controlled substance prescriptions, and it is up to me to make sure that I do not run out of my medicines on weekends or holidays. If my care team is willing to refill my controlled substance prescription without a visit, I must request refills only during office hours, refills may take up to 3 days to process, and it may take up to 5 to 7 days for my medicine to be mailed and ready at my pharmacy. Prescriptions will not be mailed anywhere except my pharmacy.    6. I am responsible for my prescriptions. If the medicine/prescription is lost or stolen, it will not be replaced. I also agree not to share controlled substance medicines with anyone.          [facility]  10/30/20  Patient:  Georgie Scott  YOB: 1990  Medical Record Number: [id number]  CSN: [id number]    7. I agree to not use ANY illegal or recreational drugs. This includes marijuana, cocaine, bath salts or other drugs. I agree not to use alcohol unless my care team says I may. I agree to give urine samples whenever asked. If I dont give a urine sample, the care team may stop my medicine.    8. If I enroll in the [place] Medical Marijuana program, I will tell my care team. I will also sign an agreement to share my medical records with my care team.    9. I will bring in my list of medicines (or my medicine bottles) each time I come to the clinic.   10. I will tell my care team right away if I become pregnant or have a new medical problem treated outside of my regular clinic.  11. I understand that this medicine  can affect my thinking and judgment. It may be unsafe for me to drive, use machinery and do dangerous tasks. I will not do any of these things until I know how the medicine affects me. If my dose changes, I will wait to see how it affects me. I will contact my care team if I have concerns about medicine side effects.    I understand that if I do not follow any of the conditions above, my prescriptions or treatment may be stopped.      I agree that my provider, clinic care team, and pharmacy may work with any city, state or federal law enforcement agency that investigates the misuse, sale, or other diversion of my controlled medicine. I will allow my provider to discuss my care with or share a copy of this agreement with any other treating provider, pharmacy or emergency room where I receive care. I agree to give up (waive) any right of privacy or confidentiality with respect to these consents.   I have read this agreement and have asked questions about anything I did not understand.    ___________________________________________________________________________  Patient signature - Date/Time  -Georgie Scott                                      ___________________________________________________________________________  Witness signature                                                                    ___________________________________________________________________________  Provider signature- Nora Marrero MD

## 2021-06-21 NOTE — PROGRESS NOTES
Assessment / Impression     1. Routine screening for STI (sexually transmitted infection)  Chlamydia trachomatis & Neisseria gonorrhoeae, Amplified Detection   2. Menorrhagia with regular cycle  Pregnancy (Beta-hCG, Qual), Urine    Hemoglobin    Prolactin    Thyroid Cascade    Follicle Stimulating Hormone (FSH)         Plan:     Given ongoing prolonged bleeding, will check urine pregnancy test, as well as above hormone labs.  If labs are normal and symptoms persist, consider referral to OB/GYN in the future.    Per patient request, will check GC/chlamydia.            Subjective:      HPI: Georgie Scott is a 28 y.o. female, new to me, who presents for ongoing menorrhagia.  Previously, patient stated that approximately around age 23-25, after she had come off of OCPs, her periods would last 10-15 days, though they were regular.  She tried going on OCP a few months ago, though felt significant anxiety and mood changes, would be crying at work.  She is currently not taking any OCP.  She did also have pelvic ultrasound done a few months ago, which showed a 7 x 6 x 5 mm focus in the endometrium, possible fibroid, less likely polyp.  She denies any lightheadedness or shortness of breath.  No cramping associated with bleeding.  She is currently sexually active.  She does not want to go on additional hormones, given it affects her mood significantly, but is wondering if there are any other current hormone imbalances.    She is also requesting gonorrhea and Chlamydia screening today, declines other STI screening.  No known exposure.      Medical History:     There is no problem list on file for this patient.      History reviewed. No pertinent past medical history.    No past surgical history on file.    Current Medications:     No current outpatient medications on file.       Family History:   No family history on file.    Review of Systems  All other systems reviewed and are negative.         Social History:     Social  "History     Tobacco Use   Smoking Status Never Smoker   Smokeless Tobacco Never Used     Social History     Social History Narrative     Not on file         Objective:     /68 (Patient Site: Right Arm)   Pulse 60   Temp 98  F (36.7  C) (Oral)   Resp 12   Ht 5' 6.5\" (1.689 m)   Wt 148 lb (67.1 kg)   LMP 10/29/2018 (Approximate)   BMI 23.53 kg/m    Physical Examination: General appearance - alert, well appearing, and in no distress  Eyes: pupils equal and reactive, extraocular eye movements intact  Mouth: mucous membranes moist, pharynx normal without lesions  Neck: supple, no significant adenopathy or thyromegaly  Lungs: clear to auscultation, no wheezes, rales or rhonchi, symmetric air entry  Heart: normal rate, regular rhythm, normal S1, S2, no murmurs.  Abdomen: soft, nontender, nondistended, no masses or organomegaly  Neurological: alert, oriented, normal speech, no focal findings or movement disorder noted.    Extremities: No edema, no clubbing or cyanosis  Psychiatric: Normal affect. Does not appear anxious or depressed.    Recent Results (from the past 168 hour(s))   Pregnancy (Beta-hCG, Qual), Urine   Result Value Ref Range    Pregnancy Test, Urine Negative Negative    Specific Gravity, UA 1.010 1.001 - 1.030   Hemoglobin   Result Value Ref Range    Hemoglobin 14.0 12.0 - 16.0 g/dL         Nora Marrero MD  11/20/2018  4:55 PM        "

## 2021-06-23 NOTE — PROGRESS NOTES
[facility]   Cervical Thoracic Initial Evaluation    Patient Name: Georgie Scott  Date of evaluation: 2/11/2019  Referral Diagnosis: Strain of right shoulder, initial encounter  Referring provider: Nora Marrero MD  Visit Diagnosis:     ICD-10-CM    1. Chronic neck pain M54.2     G89.29    2. Generalized muscle weakness M62.81        Assessment:      Impairments in  pain, posture, ROM, joint mobility, strength  The POC is dynamic and will be modified on an ongoing basis.  Barriers to achieving goals as noted in the assessment section may affect outcome.  Prognosis to achieve goals is  good   Pt. is appropriate for skilled PT intervention as outlined in the Plan of Care (POC).  Pt. is a good candidate for skilled PT services to improve pain levels and function.  Plan of care and goals were established in collaboration with patient.     Georgie Scott is a 28 y.o. female who presents to therapy today with chief complaints of chandana cervical spine pain. Onset date of sx was since she was a teenager with recent increase in flare ups.  Pt reported h/o of herniated disc at C5-6 in 2015.  Pain symptoms are intermittent but increasing.  Functional impairments include turning head, look up/down, sleeping, lying on side.  Pt demo's signs and sx consistent with chronic cervical spine pain secondary to muscle weakness and myofascial hypertonicity.       Goals:  Pt. will demonstrate/verbalize independence in self-management of condition in : 4 weeks  Pt. will be independent with home exercise program in : 4 weeks  Pt. will have improved quality of sleep: waking less times/night;in other weeks;Comment  In Other Weeks: 8 weeks  Comment:: be able to lay on side without increased pain    No Data Recorded    Patient's expectations/goals are realistic.    Barriers to Learning or Achieving Goals:  Non- adherence to the home exercise program.       Plan / Patient Instructions:        Plan of Care:   Communication with:  Referral Source  Patient Related Instruction: Nature of Condition;Treatment plan and rationale;Precautions;Basis of treatment;Self Care instruction;Body mechanics;Next steps;Expected outcome;Posture  Times per Week: 1  Number of Weeks: 8  Number of Visits: 8  Discharge Planning: when goals are met or pt's progress with PT has plateaued  Therapeutic Exercise: ROM;Stretching;Strengthening  Neuromuscular Reeducation: kinesio tape;posture;balance/proprioception;TNE;core  Manual Therapy: soft tissue mobilization;myofascial release;joint mobilization;muscle energy;strain counterstrain  Modalities: traction;electrical stimulation;TENS;cold pack;hot pack (prn)  Equipment: theraband;home traction unit      Plan for next visit: continue with MFR and use of instrument assisted, scapular and cervical strengthening     Subjective:         Social information:   Occupation:rad tech   Work Status:Working full time    History of Present Illness:    Georgie is a 28 y.o. female who presents to therapy today with complaints of chandana cervical spine pain. Date of onset/duration of symptoms is since a teenager. Pt has a history of a disc herniation at C5-6 on the R side in . She did PT then and it improved but did not fully recover. They did a lot of myofascial release and it took a long time to improve. Prior to the disc herniation she had a lot of neck pain and headaches. The pain now comes and goes and she is noticing an increase in flare ups. She has a lot of tightness down the anterior neck on her R side. She is not really getting headaches- usually only when she is hung-over but always on one side. She uses a Theracane intermittently.     Current exercise: volleyball, bowling, [employer], dog walking  Pain Ratin  Pain rating at best: 0  Pain rating at worst: 5  Pain description: aching and intermittent zinging    Functional limitations are described as occurring with:   Sleeping  Change sleeping positions  Look up/down/turn  head             Objective:      Note: Items left blank indicates the item was not performed or not indicated at the time of the evaluation.    Patient Outcome Measures :    Neck Disability Score in %: 14     Scores range from 0-100%, where a score of 0% represents minimal pain and maximal function. The minmal clinically important difference is a score reduction of 10%.    Cervical Thoracic Examination  1. Chronic neck pain     2. Generalized muscle weakness       Involved side: Bilateral  Posture Observation:      General sitting posture is  fair.    Cervical ROM:    Date: 2/11/2019     *Indicate scale AROM AROM AROM   Cervical Flexion 40 deg     Cervical Extension 34 deg with pain on R      Right Left Right Left Right Left   Cervical Sidebending 30 deg with pain on R 30 deg       Cervical Rotation 70 deg with pain 60 deg       Cervical Protraction      Cervical Retraction      Thoracic Flexion      Thoracic Extension      Thoracic Sidebending         Thoracic Rotation           Strength     Date: 2/11/2019     Cervical Myotomes/5 Right Left Right Left Right Left   Cervical Flexion (C1-2)         Cervical Sidebending (C3)         Shoulder Elevation (C4) 5 5       Shoulder Abduction (C5) 5 5       Elbow Flexion (C6) 5 5       Elbow Extension (C7) 5 5       Wrist Flexion (C7) 5 5       Wrist Extension (C6) 5 5       Thumb abduction (C8) 5 5       Finger Abduction (T1) 5 5         Sensation   WNL      Reflex Testing  Cervical Dermatomes Right Left UE Reflexes Right Left   Back of the Head (C2)   Biceps (C5-6)     Supraclavicular Fossa (C3)   Brachioradialis (C5-6)     AC Joint (C4)   Triceps (C7-8)     Lateral Biceps (C5)   Marbin s test     Palmar Thumb (C6)   LE Reflexes     Palmar 3rd Finger (C7)   Patellar (L3-4)     Palmar 5th Finger (C8)   Achilles (S1-2)     Ulnar Forearm (T1)   Babinski Response         Palpation: hypertonicity along chandana upper trapezius, scalenes and sub-occipital muscles      Cervical  Special Tests     Cervical Special Tests Right Left UE Nerve Mobility Right Left   Cervical compression - - Median nerve     Cervical distraction   Ulnar nerve     Spurling s test - - Radial nerve     Shoulder abduction sign   Thoracic outlet     Deep neck flexor endurance test + at 11 sec  Jessica     Upper cervical rotation   Adson s     Sharper-Cameron   Cervical rotation lateral flexion     Alar ligament test   Other:     Other:   Other:       UE Screen: Shoulder AROM WNL chandana    Treatment Today     TREATMENT MINUTES COMMENTS   Evaluation 20 -cervical spine   Self-care/ Home management     Manual therapy 23 -supine STM to chandana cervical paraspinals, scalenes and upper trapezius  -supine cervical lateral glides, grade III, 4 x 10 sec oscillations chandana   Neuromuscular Re-education     Therapeutic Activity     Therapeutic Exercises 10 -see exercise flow sheet  -educated on POC, diagnosis and HEP   Gait training     Modality__________________                Total 53    Blank areas are intentional and mean the treatment did not include these items.     PT Evaluation Code: (Please list factors)  Patient History/Comorbidities: see above  Examination: cervical spine  Clinical Presentation: stable   Clinical Decision Making: low    Patient History/  Comorbidities Examination  (body structures and functions, activity limitations, and/or participation restrictions) Clinical Presentation Clinical Decision Making (Complexity)   No documented Comorbidities or personal factors 1-2 Elements Stable and/or uncomplicated Low   1-2 documented comorbidities or personal factor 3 Elements Evolving clinical presentation with changing characteristics Moderate   3-4 documented comorbidities or personal factors 4 or more Unstable and unpredictable High                Hien Medellin, PT, DPT  2/11/2019  3:35 PM

## 2021-06-23 NOTE — PROGRESS NOTES
CC: The patient is being seen as a consult from Dr Marrero secondary to abnormal periods.    HPI: The pt is a 28 y.o. SWF P0 who presents with prolonged bleeding with her periods.  She states she's always had heavy periods.  In the past the bleeding lasted about 7 days, was heavy throughout, and came every 2-3 months.  In the last few years she has noted changes so that now she gets about 7 days of spotting, 4 days of menstrual flow, and then several days of spotting again.  It's heaviest over the first day and a half.  During those days she changes a tampon every few hours.  Her periods are monthly now.  She has also noted that at times intercourse will start the spotting.  She does get some clots on the heavier flow days.  Her mother had a hysterectomy at age 32, her MGM at age 40, both for fibroids.  Her sister also has period issues.  She has used NuvaRing in the past; after some time it caused vaginal irritation so she stopped and tried OCPs.  She did well for about 6 months but then had significant mood changes.  She has tried several brands without finding one that doesn't cause mood changes.  She had an ultrasound 8/6/18 that showed a 0.7 x 0.6 x 0.5 cm fibroid that slightly impacted the endometrium.  Ovaries were normal bilaterally.  She had normal TSH, FSH, prolactin, and HgB on 11/20/18.  She is not thinking about pregnancy now, but maybe in 3 years or so.    Past Medical History:   Diagnosis Date     Medical history non-contributory 01/22/2019       Past Surgical History:   Procedure Laterality Date     No surgery history  01/22/2019       Patient's   Family History   Problem Relation Age of Onset     Hypertension Father      Stroke Maternal Grandmother      Dementia Maternal Grandmother      Brain cancer Maternal Grandfather      Hypertension Paternal Grandmother      Colon cancer Paternal Grandfather      Diabetes type II Paternal Uncle      Diabetes type I Paternal Uncle      Diabetes Paternal Aunt       "Diabetes Paternal Aunt        Patient   Social History     Socioeconomic History     Marital status: Single     Spouse name: None     Number of children: None     Years of education: None     Highest education level: None   Social Needs     Financial resource strain: None     Food insecurity - worry: None     Food insecurity - inability: None     Transportation needs - medical: None     Transportation needs - non-medical: None   Occupational History     None   Tobacco Use     Smoking status: Never Smoker     Smokeless tobacco: Never Used   Substance and Sexual Activity     Alcohol use: None     Drug use: None     Sexual activity: None   Other Topics Concern     None   Social History Narrative    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       tacrolimus (PROTOPIC) 0.1 % ointment        No facility-administered medications prior to visit.        Patient is allergic to amoxicillin; bactrim [sulfamethoxazole-trimethoprim]; and morphine.    ROS:  12 part ROS is negative aside from those symptoms in the HPI    PE:  /60   Pulse 72   Ht 5' 6.5\" (1.689 m)   Wt 152 lb (68.9 kg)   LMP 12/30/2018           Body mass index is 24.17 kg/m .    General: WN/WD WF, NAD  Psych: normal mood  Neuro: CN I-XII grossly intact  MS: normal gait    Assessment: 28 y.o. SWF P0 with prolonged periods.    Plan: Natural history of period issues discussed with the patient.  We discussed how hormones can change over time and change periods.  We discussed that usually when women have issues with OCPs it's because of the estrogen.  We discussed trying a progestin in the luteal phase versus progestin only OCPs versus Mirena to see if adding back progesterone would help with her bleeding.  She opted to start with luteal timing.  I sent a " prescription in for Prometrium 100 mg to take for 10 days starting with ovulation.  We discussed how to determine start time.  If this works she will let me know if she'd like to start something that has fewer timing issues.  We discussed that this isn't birth control.  We discussed fibroids and the familial issues with them.  Questions were answered.    Approximately 30 minutes were spent with the patient with the majority in counseling.

## 2021-06-24 NOTE — TELEPHONE ENCOUNTER
LVM for pt to call back to clinic with the information that  requested before her appt on Friday.

## 2021-06-24 NOTE — PROGRESS NOTES
"Assessment / Impression     1. Attention deficit hyperactivity disorder (ADHD), predominantly inattentive type     2. Neck pain  Ambulatory referral to Spine Care       Plan:     Discussed with patient that with the records I did not initially see a clear diagnosis of ADHD, and I did try to call Dr. Parsons, however was only able to leave a voicemail.  Only after the patient left, on the last page after the patient signature did I note a written diagnosis of ADHD predominantly inattentive type.  I can update patient via my chart regarding this diagnosis.  At her next visit with me, we can discuss whether we would like to initiate-ADHD medications.    Neck pain: This has been somewhat chronic, she has tried physical therapy with ongoing pain, would recommend referral to spine care for further evaluation.  Will defer to spine care whether they would like to repeat imaging.    I spent a total time of 17 minutes, with greater than 50% of time counseling patient regarding plan, and coordination of care, trying to communicate with PsychD.    Subjective:      HPI: Georgie Scott is a 28 y.o. female who presents for possible ADHD follow-up.  Briefly, patient had an evaluation done by Dr. Rob Pasrons PsyD at [facility].  I did get the progress note from her follow-up on March 2, 2019, please see scanned document in media tab, however, the only assessment I note was \"good participation from patient\".  Patient does verbally state that she was told that she had ADHD \"predominantly disorganized type\".      Patient also notes ongoing upper shoulder and neck pain.  She had previously had imaging done at [facility] stating that she had \"bulging disc\" within her cervical spine.  She has been participating in physical therapy, and has not noted any improvement, wondering if there is any additional treatment she may have.  Continues to note pain, though no numbness or tingling.      Medical History: " "    There is no problem list on file for this patient.      Past Medical History:   Diagnosis Date     Medical history non-contributory 01/22/2019       Past Surgical History:   Procedure Laterality Date     No surgery history  01/22/2019       Current Medications:     Current Outpatient Medications   Medication Sig     omega-3/dha/epa/fish oil (FISH OIL-OMEGA-3 FATTY ACIDS) 300-1,000 mg capsule Take 2 g by mouth daily.     progesterone (PROMETRIUM) 100 MG capsule Take 1 capsule (100 mg total) by mouth daily. Take for 10 days a month     tacrolimus (PROTOPIC) 0.1 % ointment        Family History:     Family History   Problem Relation Age of Onset     Hypertension Father      Stroke Maternal Grandmother      Dementia Maternal Grandmother      Brain cancer Maternal Grandfather      Hypertension Paternal Grandmother      Colon cancer Paternal Grandfather      Diabetes type II Paternal Uncle      Diabetes type I Paternal Uncle      Diabetes Paternal Aunt      Diabetes Paternal Aunt        Review of Systems  All other systems reviewed and are negative.         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 No   BMI 24.56 kg/m    Physical Examination: General appearance - alert, well appearing, and in no distress  Eyes: pupils equal and reactive, extraocular eye movements intact  Musculoskeletal: Decreased range of motion with flexion and extension of neck.  Extremities: No edema, no clubbing or cyanosis  Psychiatric: Normal affect. Does not appear anxious or depressed.    No results found for this or any previous visit (from the past 168 " hour(s)).      Nora Marrero MD  3/8/2019  4:40 PM

## 2021-06-24 NOTE — PROGRESS NOTES
"[facility] Daily Progress     Patient Name: Georgie Scott  Date: 2019  Visit #: 2  PTA visit #:  na  Insurance: [employer]  Visit Max (if applicable): na  Referral Diagnosis: Strain of right shoulder, initial encounter  Referring provider: Nora Marrero MD  Visit Diagnosis:     ICD-10-CM    1. Chronic neck pain M54.2     G89.29    2. Generalized muscle weakness M62.81        Past Medical History:   Diagnosis Date     Medical history non-contributory 2019         Assessment:     HEP/POC compliance is  good .  Response to Intervention Increased pain with new HEP which was likely related to adding lift to chin tuck exercise. Reviewed today and corrected performance to decrease pain.   Patient is appropriate to continue with skilled physical therapy intervention, as indicated by initial plan of care.    Goal Status:  Pt. will demonstrate/verbalize independence in self-management of condition in : 4 weeks  Pt. will be independent with home exercise program in : 4 weeks  Pt. will have improved quality of sleep: waking less times/night;in other weeks;Comment  In Other Weeks: 8 weeks  Comment:: be able to lay on side without increased pain    No Data Recorded    Plan / Patient Education:     Continue with initial plan of care.  Progress with home program as tolerated. cervical proprioception exercises with laser, SNAGS    Subjective:     Pain Ratin  It has been \"very irritated\" since starting the exercises. She had increased headaches. She was really stiff on Saturday that improved after taking a shower.       Objective:     Cervical AROM:  Flexion: WNL with tightness on R  Ext:WNL with pain on R- less pain at end range  Rotation: R mild L WNL  Lateral flexion: R mild with pain, L WNL    Cues on chin tuck exercise- performing with a lift at home    Exercises:  Exercise #1: supine chin tuck 10x 5-10 sec hold  Comment #1: seated cervical retraction isometric 5 x 5 sec hold  Exercise #2: pec " stretch in doorway 30 sec hold  Comment #2: UBE 5 min WL4.5        Treatment Today     TREATMENT MINUTES COMMENTS   Evaluation     Self-care/ Home management     Manual therapy 23 -supine STM to R SCM, chandana cervical paraspinals, scalenes and upper trapezius  -supine cervical lateral glides, grade III, 4 x 10 sec oscillations chandana  -supine central PAs to cervical spine, grade III, 5 x 10 sec oscillations   Neuromuscular Re-education     Therapeutic Activity     Therapeutic Exercises 8 -see exercise flow sheet for date completed   Gait training     Modality__________________                Total 31    Blank areas are intentional and mean the treatment did not include these items.       Hien Medellin, PT, DPT  2/18/2019

## 2021-06-24 NOTE — PROGRESS NOTES
[facility] Daily Progress     Patient Name: Georgie Scott  Date: 2019  Visit #: 3/8  PTA visit #:  na  Insurance: [employer]  Visit Max (if applicable): na  Referral Diagnosis: Strain of right shoulder, initial encounter  Referring provider: Nora Marrero MD  Visit Diagnosis:     ICD-10-CM    1. Chronic neck pain M54.2     G89.29    2. Generalized muscle weakness M62.81        Past Medical History:   Diagnosis Date     Medical history non-contributory 2019         Assessment:     HEP/POC compliance is  poor.  Response to Intervention Recent flare up in pain so pt was unable to complete HEP. Modified HEP to decrease intensity to prevent further increase in pain. Pt's cervical AROM slightly reduced from IE due to recent flare up in pain.  Patient is appropriate to continue with skilled physical therapy intervention, as indicated by initial plan of care.    Goal Status:  Pt. will demonstrate/verbalize independence in self-management of condition in : 4 weeks  Pt. will be independent with home exercise program in : 4 weeks  Pt. will have improved quality of sleep: waking less times/night;in other weeks;Comment  In Other Weeks: 8 weeks  Comment:: be able to lay on side without increased pain    No Data Recorded    Plan / Patient Education:     Continue with initial plan of care.  Progress with home program as tolerated. cervical proprioception exercises with laser, SNAGS    Subjective:     Pain Ratin  She did not do the exercise. She was okay on Monday and went to volleyball. She was turning over in bed on Monday night and had increased pain. Everytime she was turning in bed she had to assist with her hands. She has been taking ibuprofen, using E-stim, and heat to help reduce pain. It has been really painful with movement. It was too painful to do anything. Today, she did not take any ibuprofen or heat.       Objective:     Cervical AROM:  Flexion: 35 deg   Ext: 36 deg  Rotation: R 60  deg L 70 deg  Lateral flexion: R 21 deg with pain, L 30 deg      Exercises:  Exercise #1: supine chin tuck 10x 5-10 sec hold  Comment #1: seated cervical retraction isometric 5 x 5 sec hold  Exercise #2: pec stretch in doorway 30 sec hold  Comment #2: UBE 5 min WL4.5  Exercise #3: cervical isometric lateral flexion, rotation, flexion and ext 5 x 3-5 sec hold, work up to 10 reps and 10 sec hold at home        Treatment Today     TREATMENT MINUTES COMMENTS   Evaluation     Self-care/ Home management     Manual therapy 15 -supine STM to chandana SCM, chandana cervical paraspinals, scalenes and upper trapezius  -supine R cervical rotation with unilateral cervical mobilizations 3 x 10 sec oscillations, grade II-III along with STM in position   Neuromuscular Re-education     Therapeutic Activity     Therapeutic Exercises 13 -see exercise flow sheet for date completed  -educated on POC and progress  -educated to DC previous exercises and only focus on cervical isometrics   Gait training     Modality__________________                Total 28    Blank areas are intentional and mean the treatment did not include these items.       Hien Medellin, PT, DPT  2/25/2019

## 2021-06-24 NOTE — TELEPHONE ENCOUNTER
Provider Communication  Who is calling:  Dr Parsons    Facility in which provider is associated:  [facility]  Reason for call:  Calling Dr. Marrero to answer the questions she had regarding this patient.  Provider is free until 10:30 am an then in and out with patients  Urgency for return call:  end of day  Okay to leave detailed message?:  No

## 2021-06-24 NOTE — PROGRESS NOTES
Assessment / Impression     1. ADHD (attention deficit hyperactivity disorder), inattentive type             Plan:     Discussed with patient treatment options, and she is interested in starting medication.  Will begin Adderall 10 mg daily for 1 week, then if needed, can increase to 20 mg daily.  Discussed possible side effects of medication, such as palpitations and appetite suppression.  I also recommend avoiding medication prior to sleep.  We reviewed controlled substance agreement, which was signed, copy will be scanned her chart.  Patient will follow-up in 1 month.    Subjective:      HPI: Georgie Scott is a 28 y.o. female who presents for ADHD follow-up.  Please see my previous note for more details.  Briefly, patient had been evaluated by psychologist, please see scanned documents for formal evaluation.  I did also speak with that psychologist, who did state that patient meets minimal criteria for ADHD, predominantly inattentive type.  Patient is here today to discuss treatment options.  As noted previously, patient states difficulty focusing at work, or in conversations.  She is interested in trying medication for treatment.  She denies any personal family history of palpitations or arrhythmias.  She is not interested in taking medication daily, in hopes only use medication as needed.      Medical History:     Patient Active Problem List   Diagnosis     ADHD (attention deficit hyperactivity disorder), inattentive type       Past Medical History:   Diagnosis Date     Medical history non-contributory 01/22/2019       Past Surgical History:   Procedure Laterality Date     No surgery history  01/22/2019       Current Medications:     Current Outpatient Medications   Medication Sig     omega-3/dha/epa/fish oil (FISH OIL-OMEGA-3 FATTY ACIDS) 300-1,000 mg capsule Take 2 g by mouth daily.     progesterone (PROMETRIUM) 100 MG capsule Take 1 capsule (100 mg total) by mouth daily. Take for 10 days a month      "dextroamphetamine-amphetamine (ADDERALL) 10 mg Tab tablet Take 1 tab daily as needed, then 2 tabs daily.       Family History:     Family History   Problem Relation Age of Onset     Hypertension Father      Stroke Maternal Grandmother      Dementia Maternal Grandmother      Brain cancer Maternal Grandfather      Hypertension Paternal Grandmother      Colon cancer Paternal Grandfather      Diabetes type II Paternal Uncle      Diabetes type I Paternal Uncle      Diabetes Paternal Aunt      Diabetes Paternal Aunt        Review of Systems  All other systems reviewed and are negative.         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  Neurological: alert, oriented, normal speech, no focal findings or movement disorder noted.    Extremities: No edema, no clubbing or cyanosis  Psychiatric: Normal affect. Does not appear anxious or depressed. Maintains eye contact.      No results found for this or any previous visit (from the past 168 hour(s)).      Nora Marrero MD  3/13/2019  8:27 AM        "

## 2021-06-24 NOTE — TELEPHONE ENCOUNTER
Patient Returning Call  Reason for call:  Carlie  Information relayed to patient:  Dr. Marrero received notes on patient  Patient has additional questions:  No  If YES, what are your questions/concerns:  n/a  Okay to leave a detailed message?: No call back needed

## 2021-06-24 NOTE — TELEPHONE ENCOUNTER
Spoke with Dr. Parsons, who states patient just barely meets criteria for ADHD-inattentive type.  Pt has appt scheduled with me 3/13, will review and discuss treatment options at that time.

## 2021-06-24 NOTE — PROGRESS NOTES
[facility] Daily Progress     Patient Name: Georgie Scott  Date: 3/4/2019  Visit #: 4/8  PTA visit #:  na  Insurance: [employer]  Visit Max (if applicable): na  Referral Diagnosis: Strain of right shoulder, initial encounter  Referring provider: Nora Marrero MD  Visit Diagnosis:     ICD-10-CM    1. Chronic neck pain M54.2     G89.29    2. Generalized muscle weakness M62.81        Past Medical History:   Diagnosis Date     Medical history non-contributory 01/22/2019         Assessment:     HEP/POC compliance is  good .  Response to Intervention Improved HEP compliance without a flare up in symptoms. She continues to demonstrate decreased R cervical rotation that was addressed with her HEP. Slow progression due to irritability of condition.  Patient is benefitting from skilled physical therapy and is making steady progress toward functional goals.  Patient is appropriate to continue with skilled physical therapy intervention, as indicated by initial plan of care.    Goal Status:  Pt. will demonstrate/verbalize independence in self-management of condition in : 4 weeks  Pt. will be independent with home exercise program in : 4 weeks  Pt. will have improved quality of sleep: waking less times/night;in other weeks;Comment  In Other Weeks: 8 weeks  Comment:: be able to lay on side without increased pain    No Data Recorded    Plan / Patient Education:     Continue with initial plan of care.  Progress with home program as tolerated. cervical proprioception exercises with laser, seated cervical retraction    Subjective:     Pain Rating: 3  She has still been waking up several times per night with neck pain. Has had a HA all day today and part of the day yesterday. Exercises feel okay.      Objective:     Cervical AROM:  Flexion: chin to chest - 40 deg   Ext: 42 deg with pain at end  Rotation: R 60 deg- less pain after MT, L 70 deg  Lateral flexion: R 21 deg with pain, L 30 deg      Exercises:  Exercise  #1: supine chin tuck 10x 5-10 sec hold  Comment #1: seated cervical retraction isometric 5 x 5 sec hold  Exercise #2: pec stretch in doorway 30 sec hold  Comment #2: UBE 5 min WL4.5  Exercise #3: cervical isometric lateral flexion, rotation, flexion and ext 5 x 3-5 sec hold, work up to 10 reps and 10 sec hold at home        Treatment Today     TREATMENT MINUTES COMMENTS   Evaluation     Self-care/ Home management     Manual therapy 20 -supine STM to chandana cervical paspinals, scalenes  -supine sub-occipital release 3 x 1 min hold with instruction on how to complete at home with tennis balls  -supine cervical distraction 3 x 30 sec holds  -supine lateral glides, C1-4, grade II-III, completed chandana   Neuromuscular Re-education     Therapeutic Activity     Therapeutic Exercises 8 -see exercise flow sheet for date completed  -educated on POC and progress  -educated on correct seated posture- stretching with chin tuck so reduced to tolerance   Gait training     Modality__________________                Total 28    Blank areas are intentional and mean the treatment did not include these items.       Hien Medellin, PT, DPT  3/4/2019

## 2021-06-24 NOTE — PROGRESS NOTES
[facility] Daily Progress     Patient Name: Georgie Scott  Date: 3/11/2019  Visit #:   PTA visit #:  na  Visit Max (if applicable): na  Referral Diagnosis: Strain of right shoulder, initial encounter  Referring provider: Nora Marrero MD  Visit Diagnosis:     ICD-10-CM    1. Chronic neck pain M54.2     G89.29    2. Generalized muscle weakness M62.81        Past Medical History:   Diagnosis Date     Medical history non-contributory 2019         Assessment:     HEP/POC compliance is  good .  Patient demonstrates understanding/independence with home program.  Response to Intervention Improved tolerance to HEP- continue to increase isometric holds to 10 seconds.   Patient is benefitting from skilled physical therapy and is making steady progress toward functional goals.  Patient is appropriate to continue with skilled physical therapy intervention, as indicated by initial plan of care.    Goal Status: on-going  Pt. will demonstrate/verbalize independence in self-management of condition in : 4 weeks  Pt. will be independent with home exercise program in : 4 weeks  Pt. will have improved quality of sleep: waking less times/night;in other weeks;Comment  In Other Weeks: 8 weeks  Comment:: be able to lay on side without increased pain    No Data Recorded    Plan / Patient Education:     Continue with initial plan of care.  Progress with home program as tolerated. re-assess 1st rib, cervical proprioception exercises with laser, seated cervical retraction    Subjective:     Pain Ratin  Pt feels when she initially looks to the right during the stretch it is painful. Once she stretches to the L and then goes R she can feel the stretch. She had mild headaches for a few days and then it improves. She has been slowly increasing the isometric holds.      Objective:     Cervical AROM:  Flexion: chin to chest - 40 deg   Ext: 42 deg with pain at end  Rotation: R 60 deg- no pain in R anterior neck after MT  and pt reports improved mobility, L 70 deg  Lateral flexion: R 21 deg with pain, L 30 deg    L 1st rib elevated in supine    Tender along R SCM near insertion along mastoid    Exercises:  Exercise #1: supine chin tuck 10x 5-10 sec hold  Comment #1: seated cervical retraction isometric 5 x 5 sec hold  Exercise #2: pec stretch in doorway 30 sec hold  Comment #2: UBE 5 min WL4.5 F/B  Exercise #3: cervical isometric lateral flexion, rotation, flexion and ext 5 x 3-5 sec hold, work up to 10 reps and 10 sec hold at home  Comment #3: Cervical rotation to R with towel 5-10 reps with 10 sec hold        Treatment Today     TREATMENT MINUTES COMMENTS   Evaluation     Self-care/ Home management     Manual therapy 23 -supine STM to chandana cervical paspinals, scalenes and SCM (L>R)  -supine cervical distraction 3 x 30 sec holds  -supine lateral glides, C1-C6, 2 x 10 sec oscillations, grade III  -supine central PAs to C2 3 x 10 sec oscillations, grade III  -supine 1st rib MET on L, symmetrical after   Neuromuscular Re-education     Therapeutic Activity     Therapeutic Exercises 8 -see exercise flow sheet for date completed  -educated on POC and progress  -educated on possible [facility] program referral   Gait training     Modality__________________                Total 31    Blank areas are intentional and mean the treatment did not include these items.       Hien Medellin, PT, DPT  3/11/2019

## 2021-06-24 NOTE — TELEPHONE ENCOUNTER
Who is calling:  Carlie from [facility]  Reason for Call:  Caller stated she would like a call back to confirm that the clinic received their records on patient this morning. Caller faxed the records to [phone].  Date of last appointment with primary care: 1/29/19  Has the patient been recently seen:  Yes  Okay to leave a detailed message: No  [phone]

## 2021-06-24 NOTE — TELEPHONE ENCOUNTER
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  Could you touch base with pt and see where she was seen so I can review records prior to her visit? Thanks!    MC

## 2021-06-25 ENCOUNTER — OFFICE VISIT - HEALTHEAST (OUTPATIENT)
Dept: GASTROENTEROLOGY | Facility: CLINIC | Age: 31
End: 2021-06-25

## 2021-06-25 ENCOUNTER — TELEPHONE (OUTPATIENT)
Dept: GASTROENTEROLOGY | Facility: CLINIC | Age: 31
End: 2021-06-25

## 2021-06-25 DIAGNOSIS — R14.0 BLOATING: ICD-10-CM

## 2021-06-25 DIAGNOSIS — R11.0 NAUSEA: ICD-10-CM

## 2021-06-25 DIAGNOSIS — R12 HEARTBURN: ICD-10-CM

## 2021-06-25 DIAGNOSIS — Z80.0 FAMILY HISTORY OF COLON CANCER: ICD-10-CM

## 2021-06-25 DIAGNOSIS — K62.5 RECTAL BLEEDING: ICD-10-CM

## 2021-06-25 DIAGNOSIS — R10.13 DYSPEPSIA: ICD-10-CM

## 2021-06-25 DIAGNOSIS — K21.9 GASTROESOPHAGEAL REFLUX DISEASE, UNSPECIFIED WHETHER ESOPHAGITIS PRESENT: Primary | ICD-10-CM

## 2021-06-25 NOTE — TELEPHONE ENCOUNTER
Fax received from [facility] pharmacy requesting Prior Authorization    Medication Name: Adderall 10mg tablets    Insurance Plan: Preferred one   PBM:    Patient ID Number: [id number]    Please start PA process

## 2021-06-25 NOTE — PROGRESS NOTES
"[facility] Daily Progress     Patient Name: Georgie Scott  Date: 5/25/2021  Visit #: 3  Referral Diagnosis: Pain in both wrists  Referring provider: Nora Marrero MD  Visit Diagnosis:     ICD-10-CM    1. Pain in both wrists  M25.531     M25.532    2. Decreased activities of daily living (ADL)  Z78.9    3. Paresthesia of both hands  R20.2        Assessment:     Patient is appropriate to continue with skilled occupational therapy intervention, as indicated by initial plan of care. Patient reports that tingling and pain in hands is slightly improved.    Goal Status:  Patient Will Demonstrate / Verbalize independence in self-management of condition in: 2 weeks  Patient will be independent with home exercise program in: 2 weeks  Patient will work: without restrictions;with less pain;in 12 weeks  Patient will be able to: lift;carry;reach;for grocery shopping;with less pain;in 12 weeks  Patient will perform: housework;with less pain;in 12 weeks  Patient will be able to  & pinch: for meal prep;with less pain;in 12 weeks  Patient will improve hand/finger coordination for: typing;writing;with no pain;in 12 weeks      Plan / Patient Education:     Sleeping positions to decrease numbness and tingling    Subjective:     Pain rating at rest: 1  Pain rating with activity: 2      Objective:     Treatment Today: Patient tried kinesiotape and felt like it might now be beneficial-wearing from palm to elbow. She is wearing night wrist braces more often and this may also be helping. The STM is helping and she is using \"index \". Fitted patient with right size XS Sun compression glove for night wearing to see if this helps the numbness and tingling. Instructed on activity modification including positioning as well as anti-vibration gloves for mowing and other power tools.  Iontophoresis with Dexamethasone 4mg/ml to left volar wrist.    TREATMENT MINUTES COMMENTS   Evaluation     Self-care/ Home management 15 " Glove and activity modification   Manual therapy     Neuromuscular Re-education     Therapeutic Exercises     Iontophoresis 10    Orthotic Fitting     Total 25    Blank areas are intentional and mean the treatment did not include these items.       Verito Doran  5/25/2021  4:34 PM

## 2021-06-25 NOTE — PROGRESS NOTES
[facility] Daily Progress     Patient Name: Georgie Scott  Date: 3/21/2019  Visit #:   PTA visit #:  na  Visit Max (if applicable): na  Referral Diagnosis: Strain of right shoulder, initial encounter  Referring provider: Nora Marrero MD  Visit Diagnosis:     ICD-10-CM    1. Chronic neck pain M54.2     G89.29    2. Generalized muscle weakness M62.81        Past Medical History:   Diagnosis Date     Medical history non-contributory 2019         Assessment:     HEP/POC compliance is  good .  Patient demonstrates understanding/independence with home program.  Response to Intervention Pt reports another flare up in her pain without a significant change/increase in activity level or exercise. She is tender with palpation of C5-6 with central PAs and hypomobility present. There was no change in pain levels or cervical AROM with MT.  Patient is benefitting from skilled physical therapy and is making steady progress toward functional goals.  Patient is appropriate to continue with skilled physical therapy intervention, as indicated by initial plan of care.    Goal Status: on-going  Pt. will demonstrate/verbalize independence in self-management of condition in : 4 weeks  Pt. will be independent with home exercise program in : 4 weeks  Pt. will have improved quality of sleep: waking less times/night;in other weeks;Comment  In Other Weeks: 8 weeks  Comment:: be able to lay on side without increased pain    No Data Recorded    Plan / Patient Education:     Continue with initial plan of care.  Progress with home program as tolerated. progress cervical strengthening if tolerated, assess 1st rib, provide picture of median nerve glide, KTape    Subjective:     Pain Ratin  Pt has had increased pain since Tuesday. She has sharp pains with movement and shoulder elevation. She went grocery shopping on Monday night and reports holding her bag on the right side but it was not super heavy. She has been using  her TENS unit which has helped decrease her pain.     Objective:     Cervical AROM:  Flexion: mild restrictions with pain   Ext: mild with pain at end  Rotation: R mod with pain, L mild    No significant change in pain or mobility after MT    Exercises:  Exercise #1: supine chin tuck 10x 5-10 sec hold  Comment #1: seated cervical retraction isometric 5 x 5 sec hold  Exercise #2: pec stretch in doorway 30 sec hold  Comment #2: UBE 5 min WL4.5 F/B  Exercise #3: cervical isometric lateral flexion, rotation, flexion and ext 5 x 3-5 sec hold, work up to 10 reps and 10 sec hold at home  Comment #3: Cervical rotation to R with towel 5-10 reps with 10 sec hold  Exercise #4: SCM stretch 2 x 20 sec hold B  Comment #4: median nerve slider 10x B, gentle ROM neck        Treatment Today     TREATMENT MINUTES COMMENTS   Evaluation     Self-care/ Home management     Manual therapy 16 -supine STM to chandana cervical paspinals, scalenes and SCM   -supine cervical distraction 3 x 30 sec holds in L lateral flexion with limited tolerance  -supine lateral glides, C1-C6, 2 x 10 sec oscillations, grade III, tender on L  -supine central PAs to C4-5  4x 30 sec oscillations, grade II   Neuromuscular Re-education 8 -see nerve glide in flow sheet  -educated on TNE with regards to space, blood flow and movement required for a healthy nervous system   Therapeutic Activity     Therapeutic Exercises 4 -see exercise flow sheet for date completed  -verbal review of HEP   Gait training     Modality__________________                Total 28    Blank areas are intentional and mean the treatment did not include these items.       Hien Medellin, PT, DPT  3/21/2019

## 2021-06-25 NOTE — TELEPHONE ENCOUNTER
1st schedule attempt    Per Dr. Thurston:    Will need EGD with Bravo and colon at [facility] with me with [facility]. This should be planned for roughly 1.5-2 months from now.     Procedure: Upper Endoscopy [facility]    Lower Endoscopy     Esophageal Workup    Upper Endoscopy Type: EGD    Upper Endoscopy Sedation: Deep/MAC    Upper Endoscopy Reason for Procedure: gerd, bravo off therapy    Esophageal Workup: EGD with Bravo    Esophageal Workup Reason for Referral: incomplete response to PPI therapy    Lower Endoscopy Type: Colonoscopy    Purpose of Colonoscopy Procedure: Diagnostic    Colonoscopy reason for referral: rectal bleeding, grandfather with colon cancer [facility]   Colonoscopy Sedation: Deep/MAC    Preferred Location: [facility]    Scheduling Instructions: If you have not heard from the scheduling office within 2 business days, please call [phone].           Associated Diagnoses    Gastroesophageal reflux disease, unspecified whether esophagitis present [K21.9]  - Primary       Rectal bleeding [K62.5]       Family history of colon cancer [Z80.0]

## 2021-06-25 NOTE — PROGRESS NOTES
Occupational Therapy Daily Progress     Patient Name: Georgie Scott  Date: 6/3/2021  Visit #: 4  Referral Diagnosis: Pain in both wrists  Referring provider: Nora Marrero MD  Visit Diagnosis:     ICD-10-CM    1. Pain in both wrists  M25.531     M25.532    2. Decreased activities of daily living (ADL)  Z78.9    3. Paresthesia of both hands  R20.2        Assessment:     Patient is appropriate to continue with skilled occupational therapy intervention, as indicated by initial plan of care. Patient reports that tingling in hands is slightly improved however she has fatigue feeling in forearms and hands. Biking this past weekend aggravated her symptoms.    Goal Status:  Patient Will Demonstrate / Verbalize independence in self-management of condition in: 2 weeks  Patient will be independent with home exercise program in: 2 weeks  Patient will work: without restrictions;with less pain;in 12 weeks  Patient will be able to: lift;carry;reach;for grocery shopping;with less pain;in 12 weeks  Patient will perform: housework;with less pain;in 12 weeks  Patient will be able to  & pinch: for meal prep;with less pain;in 12 weeks  Patient will improve hand/finger coordination for: typing;writing;with no pain;in 12 weeks      Plan / Patient Education:     Iontophoresis to bilateral volar wrists    Subjective:     Pain rating at rest: 1  Pain rating with activity: 2      Objective:     Treatment Today: Patient instructed on sleeping with very slight incline to see if that positioning decreases the tingling in hands. She will continue kinesiotape STM, night bracing as needed. She did not feel any change with the compression glove. She will try biking gloves as anti-vibration gloves. Patient had significant symptom relief in left hand and forearm with Iontophoresis. Iontophoresis with Dexamethasone 4mg/ml to bilateral volar wrists. She will try kinesiotape from bilateral dorsal hand to elbow.    Last Visit: Patient tried  "kinesiotape and felt like it might now be beneficial-wearing from palm to elbow. She is wearing night wrist braces more often and this may also be helping. The STM is helping and she is using \"index \". Fitted patient with right size XS Sun compression glove for night wearing to see if this helps the numbness and tingling. Instructed on activity modification including positioning as well as anti-vibration gloves for mowing and other power tools.  Iontophoresis with Dexamethasone 4mg/ml to left volar wrist.    TREATMENT MINUTES COMMENTS   Evaluation     Self-care/ Home management 15    Manual therapy     Neuromuscular Re-education     Therapeutic Exercises     Iontophoresis 10    Orthotic Fitting     Total 25    Blank areas are intentional and mean the treatment did not include these items.       Verito Doran  6/3/2021  12:03 PM      "

## 2021-06-26 NOTE — PROGRESS NOTES
Occupational Therapy Daily Progress     Patient Name: Georgie Scott  Date: 6/15/2021  Visit #: 6  Referral Diagnosis: Pain in both wrists  Referring provider: Nora Marrero MD  Visit Diagnosis:     ICD-10-CM    1. Pain in both wrists  M25.531     M25.532    2. Decreased activities of daily living (ADL)  Z78.9    3. Paresthesia of both hands  R20.2        Assessment:     Patient is appropriate to continue with skilled occupational therapy intervention, as indicated by initial plan of care. Patient reports that tingling in hands continues to improve with iontophoresis.    Goal Status:  Patient Will Demonstrate / Verbalize independence in self-management of condition in: 2 weeks  Patient will be independent with home exercise program in: 2 weeks  Patient will work: without restrictions;with less pain;in 12 weeks  Patient will be able to: lift;carry;reach;for grocery shopping;with less pain;in 12 weeks  Patient will perform: housework;with less pain;in 12 weeks  Patient will be able to  & pinch: for meal prep;with less pain;in 12 weeks  Patient will improve hand/finger coordination for: typing;writing;with no pain;in 12 weeks      Plan / Patient Education:     Iontophoresis to bilateral volar wrists    Subjective:     Pain rating at rest: 1  Pain rating with activity: 2      Objective:     Treatment Today:  Iontophoresis with Dexamethasone 4mg/ml to bilateral volar wrists. She will continue to use kinesiotape bilaterally as needed .    Last Visit:  -Patient instructed on sleeping with very slight incline to see if that positioning decreases the tingling in hands. She will continue kinesiotape STM, night bracing as needed. She did not feel any change with the compression glove. She will try biking gloves as anti-vibration gloves. Patient had significant symptom relief in left hand and forearm with Iontophoresis. Iontophoresis with Dexamethasone 4mg/ml to bilateral volar wrists. She will try kinesiotape from  bilateral dorsal hand to elbow.  TREATMENT MINUTES COMMENTS   Evaluation     Self-care/ Home management     Manual therapy     Neuromuscular Re-education     Therapeutic Exercises     Iontophoresis 10    Orthotic Fitting     Total 10    Blank areas are intentional and mean the treatment did not include these items.       Verito Doran  6/15/2021  7:03 AM

## 2021-06-26 NOTE — PROGRESS NOTES
Occupational Therapy Daily Progress     Patient Name: Georgie Scott  Date: 6/8/2021  Visit #: 5  Referral Diagnosis: Pain in both wrists  Referring provider: Nora Marrero MD  Visit Diagnosis:     ICD-10-CM    1. Pain in both wrists  M25.531     M25.532    2. Decreased activities of daily living (ADL)  Z78.9    3. Paresthesia of both hands  R20.2        Assessment:     Patient is appropriate to continue with skilled occupational therapy intervention, as indicated by initial plan of care. Patient reports that tingling in hands continues to improve with iontophoresis.    Goal Status:  Patient Will Demonstrate / Verbalize independence in self-management of condition in: 2 weeks  Patient will be independent with home exercise program in: 2 weeks  Patient will work: without restrictions;with less pain;in 12 weeks  Patient will be able to: lift;carry;reach;for grocery shopping;with less pain;in 12 weeks  Patient will perform: housework;with less pain;in 12 weeks  Patient will be able to  & pinch: for meal prep;with less pain;in 12 weeks  Patient will improve hand/finger coordination for: typing;writing;with no pain;in 12 weeks      Plan / Patient Education:     Iontophoresis to bilateral volar wrists    Subjective:     Pain rating at rest: 1  Pain rating with activity: 2      Objective:     Treatment Today:  Iontophoresis with Dexamethasone 4mg/ml to bilateral volar wrists. She will continue to use kinesiotape as needed bilaterally from dorsal hand to elbow. She will also continue to focus on activity modification and ergonomics to decrease overuse.    Last Visit:  -Patient instructed on sleeping with very slight incline to see if that positioning decreases the tingling in hands. She will continue kinesiotape STM, night bracing as needed. She did not feel any change with the compression glove. She will try biking gloves as anti-vibration gloves. Patient had significant symptom relief in left hand and forearm  with Iontophoresis. Iontophoresis with Dexamethasone 4mg/ml to bilateral volar wrists. She will try kinesiotape from bilateral dorsal hand to elbow.  TREATMENT MINUTES COMMENTS   Evaluation     Self-care/ Home management 5    Manual therapy     Neuromuscular Re-education     Therapeutic Exercises     Iontophoresis 10    Orthotic Fitting     Total 15    Blank areas are intentional and mean the treatment did not include these items.       Verito Doran  6/8/2021  9:33 AM

## 2021-06-26 NOTE — PROGRESS NOTES
Occupational Therapy Daily Progress     Patient Name: Georgie Scott  Date: 6/17/2021  Visit #: 7  Referral Diagnosis: Pain in both wrists  Referring provider: Nora Marrero MD  Visit Diagnosis:     ICD-10-CM    1. Pain in both wrists  M25.531     M25.532    2. Decreased activities of daily living (ADL)  Z78.9    3. Paresthesia of both hands  R20.2        Assessment:     Patient is appropriate to continue with skilled occupational therapy intervention, as indicated by initial plan of care. Patient reports that tingling in hands continues to improve with iontophoresis. She continues to modify activities and make ergonomic changes at home and work. She did obtain the anti-vibration gloves already. Recommend she use a vertical mouse to keep wrist in neutral and minimize aggravation to the median nerve.     Goal Status:  Patient Will Demonstrate / Verbalize independence in self-management of condition in: 2 weeks  Patient will be independent with home exercise program in: 2 weeks  Patient will work: without restrictions;with less pain;in 12 weeks  Patient will be able to: lift;carry;reach;for grocery shopping;with less pain;in 12 weeks  Patient will perform: housework;with less pain;in 12 weeks  Patient will be able to  & pinch: for meal prep;with less pain;in 12 weeks  Patient will improve hand/finger coordination for: typing;writing;with no pain;in 12 weeks      Plan / Patient Education:     Iontophoresis to bilateral volar wrists    Subjective:     Pain rating at rest: 1  Pain rating with activity: 2      Objective:     Treatment Today:  Iontophoresis with Dexamethasone 4mg/ml to bilateral volar wrists. She will continue to use kinesiotape bilaterally as needed .    Last Visit:  -Patient instructed on sleeping with very slight incline to see if that positioning decreases the tingling in hands. She will continue kinesiotape STM, night bracing as needed. She did not feel any change with the compression glove.  She will try biking gloves as anti-vibration gloves. Patient had significant symptom relief in left hand and forearm with Iontophoresis. Iontophoresis with Dexamethasone 4mg/ml to bilateral volar wrists. She will try kinesiotape from bilateral dorsal hand to elbow.  TREATMENT MINUTES COMMENTS   Evaluation     Self-care/ Home management     Manual therapy     Neuromuscular Re-education     Therapeutic Exercises     Iontophoresis 10    Orthotic Fitting     Total 10    Blank areas are intentional and mean the treatment did not include these items.       Verito Doran  6/17/2021  8:32 AM

## 2021-06-27 NOTE — PROGRESS NOTES
Progress Notes by Nora Marrero MD at 1/29/2019  3:50 PM     Author: Nora Marrero MD Service: -- Author Type: Physician    Filed: 1/29/2019  4:39 PM Encounter Date: 1/29/2019 Status: Signed    : Nora Marrero MD (Physician)       FEMALE PREVENTATIVE EXAM    Assessment and Plan:       1. Routine general medical examination at a health care facility      2. Poor concentration  Recommend referral to psychology for formal evaluation, discussed with patient that if they do recommend medication treatment, she may return to see me for medication management.  - Ambulatory referral to Psychology    3. Insomnia, unspecified type  Unspecified insomnia.  Of note, she did also recently did start progesterone, may have some effect with insomnia, though patient states this is been ongoing before starting progesterone.  Discussed sleep hygiene, including going to bed at the same time every night and waking up at the same time, using bed for only sleep in intercourse, not exercising right before bedtime.  Discussed trial of extended release melatonin.  Follow-up if symptoms persisting, could consider medication such as hydroxyzine.    4. Strain of right shoulder, initial encounter  Would recommend further evaluation and treatment with PT OT.  - Ambulatory referral to PT/OT     Next follow up:  No Follow-up on file.    Immunization Review  Adult Imm Review: No immunizations due today    I discussed the following with the patient:   Adult Healthy Living: Healthy nutrition  Getting adequate sleep      Subjective:   Chief Complaint: Georgie Scott is an 28 y.o. female here for a preventative health visit.  Additional concerns:     HPI: Concentration concern: Patient feels that over the last year to year and a half she has had worsening concentration.  For example, her her boyfriend over the last year and a half has noted patient cannot sit still long, cannot even sit through a 45-minute Netflix show.  She does also  "feel that she misses things that are important during conversation.  Last week she was baking brownies and had gone to take a shower, completely forgot about the brownies in the oven.  She has not noted any cysts significant concentration deficits at work, though she says that is because \"work is easy\".    She does complain of insomnia, when she was seen for her physical last year at [facility], trazodone was prescribed, though she did not like the way she felt in the morning, continued to be in a haze, and also did not stay asleep.  She has no difficulty falling asleep, usually goes to bed around 10 PM, though has difficulty staying asleep.  Of note, she had been on call for her 4-1/2 years at her previous job, and felt that had disrupted her sleep.    Patient is also requesting referral to physical therapy.  She had an MRI of her cervical spine done in 2015, noted to have a herniated disc between C5 and C6.  She did do physical therapy at that time which helped, however over time she has felt ongoing similar symptoms recur.  She denies any numbness or tingling.  No weakness of her upper extremities.    Healthy Habits  Are you taking a daily aspirin? No  Do you typically exercising at least 40 min, 3-4 times per week?  NO  Do you usually eat at least 4 servings of fruit and vegetables a day, include whole grains and fiber and avoid regularly eating high fat foods? NO  Have you had an eye exam in the past two years? Yes  Do you see a dentist twice per year? Yes  Do you have any concerns regarding sleep? YES (Patient can fall asleep but can't stay asleep, wakes up 1-2 times a night. She has tried Melatonin, didn't work, and Trazodone, made her feel groggy all day.    Safety Screen  If you own firearms, are they secured in a locked gun cabinet or with trigger locks? The patient does not own any firearms  Do you feel you are safe where you are living?: Yes (1/29/2019  3:51 PM)  Do you feel you are safe in your " "relationship(s)?: Yes (1/29/2019  3:51 PM)      Review of Systems:  Please see above.  The rest of the review of systems are negative for all systems.     Pap History:   No - age 21-29 PAP every 3 years recommended  Cancer Screening       Status Date      PAP SMEAR Next Due 1/5/2021      Done 1/5/2018           Patient Care Team:  Nora Marrero MD as PCP - General (Family Medicine)        History     Reviewed By Date/Time Sections Reviewed    Nora Marrero MD 1/29/2019  4:10 PM Surgical, Tobacco, Alcohol, Drug Use, Sexual Activity, Family, Social Documentation    Nora Marrero MD 1/29/2019  4:08 PM Medical    Denise Juarez MA 1/29/2019  3:45 PM Tobacco            Objective:   Vital Signs:   Visit Vitals  /70 (Patient Site: Left Arm, Patient Position: Sitting, Cuff Size: Adult Regular)   Pulse 74   Temp 98  F (36.7  C) (Oral)   Ht 5' 6.25\" (1.683 m)   Wt 149 lb (67.6 kg)   LMP 12/30/2018 (Exact Date)   Breastfeeding? No   BMI 23.87 kg/m           PHYSICAL EXAM  General Appearance: Alert, cooperative, no distress, appears stated age  Head: Normocephalic, without obvious abnormality, atraumatic  Eyes: PERRL, conjunctiva/corneas clear, EOM's intact  Ears: Normal TM's and external ear canals, both ears  Nose: Nares normal, septum midline,mucosa normal, no drainage  Throat: Lips, mucosa, and tongue normal; teeth and gums normal  Neck: Supple, symmetrical, trachea midline, no adenopathy;  thyroid: not enlarged, symmetric, no tenderness/mass/nodules;   Back: Symmetric, no curvature, ROM normal, no CVA tenderness  Lungs: Clear to auscultation bilaterally, respirations unlabored  Breasts: No breast masses, tenderness, asymmetry, or nipple discharge.  Heart: Regular rate and rhythm, no murmur.   Abdomen: Soft, non-tender, bowel sounds active all four quadrants,  no masses, no organomegaly  Pelvic:Not examined  Extremities: Extremities normal, atraumatic, no cyanosis or edema  Musculoskeletal: Full range of " motion of shoulder joints bilaterally.  Empty can test is negative.  Negative Ceballos Jr test.  She has no tenderness palpation along clavicle or AC joint of her right shoulder.  There is some mild spasm noted of her right trapezius muscle.  Skin: Skin color, texture, turgor normal, no rashes or lesions  Lymph nodes: Cervical, supraclavicular, and axillary nodes normal  Neurologic: Alert.   Psych: Normal affect.  Does not appear anxious or depressed.               Medication List           Accurate as of 1/29/19  4:39 PM. If you have any questions, ask your nurse or doctor.               CONTINUE taking these medications    fish oil-omega-3 fatty acids 300-1,000 mg capsule  INSTRUCTIONS:  Take 2 g by mouth daily.        progesterone 100 MG capsule  Also known as:  PROMETRIUM  INSTRUCTIONS:  Take 1 capsule (100 mg total) by mouth daily. Take for 10 days a month        tacrolimus 0.1 % ointment  Also known as:  PROTOPIC               Additional Screenings Completed Today:

## 2021-06-28 ENCOUNTER — TELEPHONE (OUTPATIENT)
Dept: GASTROENTEROLOGY | Facility: CLINIC | Age: 31
End: 2021-06-28

## 2021-06-28 NOTE — TELEPHONE ENCOUNTER
2nd schedule attempt     Per Dr. Thurston:     Will need EGD with Bravo and colon at [facility] with me with MAC. This should be planned for roughly 1.5-2 months from now.     Procedure: Upper Endoscopy thurston w mac   Lower Endoscopy   Esophageal Workup   Upper Endoscopy Type: EGD   Upper Endoscopy Sedation: Deep/MAC   Upper Endoscopy Reason for Procedure: gerd, bravo off therapy   Esophageal Workup: EGD with Bravo   Esophageal Workup Reason for Referral: incomplete response to PPI therapy   Lower Endoscopy Type: Colonoscopy   Purpose of Colonoscopy Procedure: Diagnostic   Colonoscopy reason for referral: rectal bleeding, grandfather with colon cancer thurston w mac   Colonoscopy Sedation: Deep/MAC   Preferred Location: [facility]   Scheduling Instructions: If you have not heard from the scheduling office within 2 business days, please call [phone].         Associated Diagnoses     Gastroesophageal reflux disease, unspecified whether esophagitis present [K21.9]  - Primary     Rectal bleeding [K62.5]     Family history of colon cancer [Z80.0]

## 2021-06-28 NOTE — TELEPHONE ENCOUNTER
Screening Questions  1. Are you active on mychart? Yes     2. What insurance is in the chart? [employer]     2.  Ordering/Referring Provider: Bertrand     3. BMI 24.3    4. Are you on daily home oxygen? N    5. Do you have a history of difficult airway? N    6. Have you had a heart, lung, or liver transplant? N    7. Are you currently on dialysis? N    8. Have you had a stroke or Transient ischemic atttack (TIA) within 6 months? N    9. In the past 6 months, have you had any heart related issues including cardiomyopathy or heart attack?         If yes, did it require cardiac stenting or other implantable device?N    10. Do you have any implantable devices in your body (pacemaker, defib, LVAD)? N    11. Do you take nitroglycerin? If yes, how often? N    12. Are you currently taking any blood thinners?N    13. Are you a diabetic? N    14. (Females) Are you currently pregnant? N  If yes, how many weeks?    15. Have you had a procedure in the past that was difficult to tolerate with conscious sedation? Any allergies to Fentanyl or Versed N    16. Are you taking any scheduled prescription narcotics more than once daily? N    17. Do you have any chemical dependencies such as alcohol, street drugs, or methadone? N    18. Do you have any history of post-traumatic stress syndrome or mental health issues? N    19. Do you transfer independently? Y    20.  Do you have any issues with constipation? N    21. Preferred Pharmacy for Pre Prescription On chart/ [place] in [place] preferred     Scheduling Details    Procedure Scheduled:   Provider/Surgeon: Bertrand   Date of Procedure: 08/31/2021  Location: [facility]   Caller (Please ask for phone number if not scheduled by patient): Georgie    Sedation Type: MAC   Conscious Sedation- Needs  for 6 hours after the procedure  MAC/General-Needs  for 24 hours after procedure    Pre-op Required at [facility] [place], and OR for MAC sedation:   (if yes advise patient they will  need a pre-op prior to procedure)      Is patient on blood thinners? -n (If yes- inform patient to follow up with PCP or provider for follow up instructions)     Informed patient they will need an adult  Y  Cannot take any type of public or medical transportation alone    Informed Patient of COVID Test Requirement Y    Confirmed Nurse will call to complete assessment Y    Additional comments: N

## 2021-06-30 NOTE — PROGRESS NOTES
Progress Notes by Nora Marrero MD at 3/31/2021 11:40 AM     Author: Nora Marrero MD Service: -- Author Type: Physician    Filed: 3/31/2021 12:45 PM Encounter Date: 3/31/2021 Status: Signed    : Nora Marrero MD (Physician)       FEMALE PREVENTATIVE EXAM    Assessment and Plan:     Patient has been advised of split billing requirements and indicates understanding: Yes    1. Encounter for general adult medical examination with abnormal findings  Discussed one-time HIV and hepatitis C screening, ordered today.  Will inform her of results.  - HIV Antigen/Antibody Screening Cascade  - Hepatitis C Antibody (Anti-HCV) (pts born [id number])    2. Attention deficit hyperactivity disorder (ADHD), predominantly inattentive type  Feels it is adequately managed with current routine.  She was given three 1 month prescriptions of both Adderall X.R.and Adderall 10 mg.  Controlled substance agreement was reviewed with patient, copy signed by both patient and myself and will be scanned to her chart.  She will have med check in approximately 6 months.  - dextroamphetamine-amphetamine (ADDERALL XR) 15 MG 24 hr capsule; Take 1 capsule (15 mg total) by mouth daily.  Dispense: 30 capsule; Refill: 0  - dextroamphetamine-amphetamine (ADDERALL) 10 mg Tab tablet; Take 1 tablet by mouth daily.  Dispense: 40 tablet; Refill: 0  - dextroamphetamine-amphetamine (ADDERALL XR) 15 MG 24 hr capsule; Take 1 capsule (15 mg total) by mouth daily.  Dispense: 30 capsule; Refill: 0  - dextroamphetamine-amphetamine (ADDERALL XR) 15 MG 24 hr capsule; Take 1 capsule (15 mg total) by mouth daily.  Dispense: 30 capsule; Refill: 0  - dextroamphetamine-amphetamine (ADDERALL) 10 mg Tab tablet; Take 1-2 tablets by mouth daily.  Dispense: 40 tablet; Refill: 0  - dextroamphetamine-amphetamine (ADDERALL) 10 mg Tab tablet; Take 1-2 tablets by mouth daily.  Dispense: 40 tablet; Refill: 0    3. Gastroesophageal reflux disease with esophagitis, unspecified  whether hemorrhage  Symptoms have significantly improved since making lifestyle changes and starting omeprazole 20 mg daily.  I would recommend that she stay on omeprazole 20 mg daily for the next month and again, limit her alcohol and caffeine intake as well as spicy food intake.  Follow-up in 1 month if symptoms persist, consider referral to GI at that time.  Will check CBC and make sure she is not anemic today.  - HM2(CBC w/o Differential)    4. Enlarged lymph nodes  There is a mildly enlarged lymph node palpated on the left aspect of her neck.  I would recommend patient have formal ultrasound.  She would like to watch symptoms over the next 2 weeks, which is reasonable.  If it persist, I would recommend that she go ahead and schedule ultrasound.  Patient is in agreement.  She denies any other symptoms such as fevers, chills, sweats, unexplained weight changes.  - US Neck Limited; Future    5. Pain in both wrists  She can continue bracing.  I discussed other options including hand therapy or referral to orthopedics.  She will hold off for now and let me know how symptoms go over the next month or so.    6. Dermatitis  I did give patient a one-time prescription of hydrocortisone ointment, though informed her that she should not use it for more than 2 weeks at a time.  If she feels that rash is persisting, recommend referral to dermatology at that time.  - hydrocortisone 2.5 % ointment; Apply to affected skin area twice daily for up to 2 weeks, and as needed.  Dispense: 60 g; Refill: 1    7. Screening for lipid disorders  8. Screening for diabetes mellitus (DM)  Fasting today, will check labs.  - Lipid Cascade  - Glucose    9. Screening for cervical cancer  Discussed with patient, will inform of results when we have them  - Gynecologic Cytology (PAP Smear)     Next follow up:  Return in about 1 month (around 4/30/2021), or if symptoms worsen or fail to improve, for for GERD symptoms.    Immunization Review  Adult  Imm Review: No immunizations due today      I discussed the following with the patient:   Adult Healthy Living: Importance of regular exercise  Healthy nutrition      Subjective:   Chief Complaint: Georgie Scott is an 30 y.o. female here for a preventative health visit.  Patient has been advised of split billing requirements and indicates understanding: Yes  HPI:  Additional concerns:    GERD symptoms: In her early 20s, she had been taking significant amount of aspirin and ibuprofen, and as result developed GERD.  She was prescribed omeprazole and ranitidine, symptoms got better, though since then she will occasionally get flares of GERD symptoms.  She feel that this started approximately 3 weeks ago, has been taking omeprazole 20 mg daily and feels symptoms are about 80% better.  When she was having symptoms, she did not have any black stool, though did note some occasional drops of blood on the toilet paper.  Since starting omeprazole and trying to make lifestyle changes she has not noted any bleeding.  No lightheadedness.  She has increased alcohol intake during the pandemic, approximately 6 alcoholic beverages per week, usually has 1 to 2 cups of coffee, and likes spicy food.    She saw a dermatologist approximately 3 years ago and was given a prescription for hydrocortisone ointment 2.5%.  It initially had been helpful, though over the last month or so she feels that the medication has not made dermatitis better, though medication is also .  Wondering if she can get refill of medication.  This is for the rash just under her eyes.    About 1 month after COVID vaccine, felt discomfort in her left neck. Did have some pain under left axilla after vaccine.  Got vaccine in left arm.  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 Noticed lump/swollen lymph node about 2 weeks ago, but neck symptoms started about 6 weeks ago.  No fever,  "chills, sweats.      ADHD: Usually takes 1-2 tabs of Adderall XR 15 mg.  With the Adderall 10mg, will use it as a booster instead of Adderall XR 15mg.  Occasionally will take on days she doesn't work but not all the time.  No adverse effects of medication.    Has tendinitis of arms, will sleep with wrist brace.  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  Sounds like she may have had EMG study done, said \"not carpal tunnel\".     Healthy Habits  Are you taking a daily aspirin? No  Do you typically exercising at least 40 min, 3-4 times per week?  Yes  Do you usually eat at least 4 servings of fruit and vegetables a day, include whole grains and fiber and avoid regularly eating high fat foods? Yes  Have you had an eye exam in the past two years? Yes  Do you see a dentist twice per year? Yes  Do you have any concerns regarding sleep? No    Safety Screen  If you own firearms, are they secured in a locked gun cabinet or with trigger locks? She is getting lock for handgun.  Do you feel you are safe where you are living?: Yes (3/31/2021 11:40 AM)  Do you feel you are safe in your relationship(s)?: Yes (3/31/2021 11:40 AM)      Review of Systems:  Please see above.  The rest of the review of systems are negative for all systems.     Pap History:   No - age 30-65 PAP every 5 years recommended  Cancer Screening       Status Date      PAP SMEAR Overdue 1/5/2021      Done 1/5/2018      Patient has more history with this topic...          Patient Care Team:  Nora Marrero MD as PCP - General (Family Medicine)  Libby Cervantes MD as Assigned OBGYN Provider  Nora Marrero MD as Assigned PCP        History     Reviewed By Date/Time Sections Reviewed    Nora Marrero MD 3/31/2021 12:08 PM Medical, Surgical, Family    Chelsea Lopez CMA 3/31/2021 11:43 AM Tobacco            Objective:   Vital Signs:   Visit Vitals  /86 (Patient Site: Left Arm, Patient Position: Sitting, Cuff Size: Adult Regular)   Pulse 91   Resp 16 " "  Ht 5' 6.85\" (1.698 m)   Wt 156 lb (70.8 kg)   LMP 03/16/2021 (Approximate)   Breastfeeding No   BMI 24.54 kg/m           PHYSICAL EXAM  General Appearance: Alert, cooperative, no distress, appears stated age  Head: Normocephalic, without obvious abnormality, atraumatic  Eyes: PERRL, conjunctiva/corneas clear, EOM's intact  Ears: Normal TM's and external ear canals, both ears  Nose: Nares normal, septum midline,mucosa normal, no drainage  Throat: Lips, mucosa, and tongue normal; teeth and gums normal  Neck: Supple, symmetrical, trachea midline; there is an approximately 2cm lump noted on left anterior-lateral aspect of neck  thyroid: not enlarged, symmetric, no tenderness/mass/nodules;   Back: Symmetric, no curvature, ROM normal, no CVA tenderness  Lungs: Clear to auscultation bilaterally, respirations unlabored  Breasts: No breast masses, tenderness, asymmetry, or nipple discharge.  Heart: Regular rate and rhythm, no murmur.   Abdomen: Soft, non-tender, bowel sounds active all four quadrants,  no masses, no organomegaly  Pelvic:Not examined  Extremities: Extremities normal, atraumatic, no cyanosis or edema  Skin: Skin color, texture, turgor normal, no rashes or lesions EXCEPT small patch of dermatitis noted on left cheek just below eye.    Lymph nodes: Cervical, supraclavicular, and axillary nodes normal  Neurologic: Alert.   Psych: Normal affect.  Does not appear anxious or depressed.               Medication List          Accurate as of March 31, 2021 12:37 PM. If you have any questions, ask your nurse or doctor.            START taking these medications    hydrocortisone 2.5 % ointment  INSTRUCTIONS: Apply to affected skin area twice daily for up to 2 weeks, and as needed.  Started by: Nora Marrero MD           CHANGE how you take these medications    * dextroamphetamine-amphetamine 15 MG 24 hr capsule  Also known as: Adderall XR  INSTRUCTIONS: Take 1 capsule (15 mg total) by mouth daily.  What changed: " Another medication with the same name was changed. Make sure you understand how and when to take each.  Changed by: Nora Marrero MD        * dextroamphetamine-amphetamine 10 mg Tab tablet  Also known as: AdderalL  INSTRUCTIONS: Take 1-2 tablets by mouth daily.  What changed: Another medication with the same name was changed. Make sure you understand how and when to take each.  Changed by: Nora Marrero MD        * dextroamphetamine-amphetamine 15 MG 24 hr capsule  Also known as: Adderall XR  INSTRUCTIONS: Take 1 capsule (15 mg total) by mouth daily.  Start taking on: April 30, 2021  What changed: These instructions start on April 30, 2021. If you are unsure what to do until then, ask your doctor or other care provider.  Changed by: Nora Marrero MD        * dextroamphetamine-amphetamine 10 mg Tab tablet  Also known as: AdderalL  INSTRUCTIONS: Take 1-2 tablets by mouth daily.  Start taking on: April 30, 2021  What changed: These instructions start on April 30, 2021. If you are unsure what to do until then, ask your doctor or other care provider.  Changed by: Nora Marrero MD        * dextroamphetamine-amphetamine 15 MG 24 hr capsule  Also known as: ADDERALL XR  INSTRUCTIONS: Take 1 capsule (15 mg total) by mouth daily.  Start taking on: May 30, 2021  What changed: These instructions start on May 30, 2021. If you are unsure what to do until then, ask your doctor or other care provider.  Changed by: Nora Marrero MD        * dextroamphetamine-amphetamine 10 mg Tab tablet  Also known as: ADDERALL  INSTRUCTIONS: Take 1 tablet by mouth daily.  Start taking on: May 30, 2021  What changed: These instructions start on May 30, 2021. If you are unsure what to do until then, ask your doctor or other care provider.  Changed by: Nora Marrero MD            * This list has 6 medication(s) that are the same as other medications prescribed for you. Read the directions carefully, and ask your doctor or other care  provider to review them with you.            CONTINUE taking these medications    cyclobenzaprine 5 MG tablet  Also known as: FLEXERIL  INSTRUCTIONS: TAKE ONE TABLET BY MOUTH THREE TIMES A DAY AS NEEDED FOR MUSCLE SPASMS.        fish oil-omega-3 fatty acids 300-1,000 mg capsule  INSTRUCTIONS: Take 2 g by mouth daily.        ibuprofen 200 mg Cap  INSTRUCTIONS: Take 600 mg by mouth every 6 (six) hours as needed.        OMEPRAZOLE ORAL  INSTRUCTIONS: Take 20 mg by mouth daily.              Where to Get Your Medications      These medications were sent to [facility] [address] [address]    Phone: [phone]     dextroamphetamine-amphetamine 10 mg Tab tablet    dextroamphetamine-amphetamine 10 mg Tab tablet    dextroamphetamine-amphetamine 10 mg Tab tablet    dextroamphetamine-amphetamine 15 MG 24 hr capsule    dextroamphetamine-amphetamine 15 MG 24 hr capsule    dextroamphetamine-amphetamine 15 MG 24 hr capsule    hydrocortisone 2.5 % ointment         Additional Screenings Completed Today:

## 2021-06-30 NOTE — PROGRESS NOTES
Progress Notes by Verito Doran OT at 5/4/2021  4:30 PM     Author: Verito Doran OT Service: -- Author Type: Occupational Therapist    Filed: 5/4/2021  6:02 PM Encounter Date: 5/4/2021 Status: Attested    : Verito Doran OT (Occupational Therapist) Cosigner: Nora Marrero MD at 5/5/2021  8:59 AM    Attestation signed by Nora Marrero MD at 5/5/2021  8:59 AM    agree                    Certification Request    May 4, 2021      Patient: Georgie Scott  MR Number: [id number]  YOB: 1990  Date of Visit: 5/4/2021      Dear Dr. Nora Marrero:    Thank you for this referral.   We are seeing Georgie Scott in Occupational Therapy for bilateral wrist pain.    Medicare and/or Medicaid requires physician review and approval of the treatment plan. Please review the plan of care and verify that you agree with the therapy plan of care by co-signing this note.      Plan of Care  Authorization / Certification Start Date: 05/04/21  Authorization / Certification End Date: 08/02/21  Authorization / Certification Number of Visits: 12  Communication with: Referral Source  Patient Related Instruction: Nature of Condition;Treatment plan and rationale;Basis of treatment;Expected outcome  Times per Week: 1  Number of Weeks: 12  Number of Visits: 12  Select Plan of Care: Select  Therapeutic Exercise: Strengthening;ROM  Neuromuscular Reeducation: kinesio tape  Manual Therapy: soft tissue mobilization  Modalities: iontophoresis  Functional Training (ADL's): compensatory training;ergonomics;ADL's  Orthotic Fitting: OTC      Goals:  Patient Will Demonstrate / Verbalize independence in self-management of condition in: 2 weeks  Patient will be independent with home exercise program in: 2 weeks  Patient will work: without restrictions;with less pain;in 12 weeks  Patient will be able to: lift;carry;reach;for grocery shopping;with less pain;in 12 weeks  Patient will perform: housework;with less pain;in  12 weeks  Patient will be able to  & pinch: for meal prep;with less pain;in 12 weeks  Patient will improve hand/finger coordination for: typing;writing;with no pain;in 12 weeks        If you have any questions or concerns, please don't hesitate to call.    Sincerely,      Verito Doran, OT        Physician recommendation:                                ___ Follow therapist's recommendation                                                                                                    ___ Modify therapy                                                                                                      Physician Signature:_____________________                                                                                                                                        Date:___________________________      *Physician co-signature indicates they certify the need for these services furnished within this plan and while under their care.        Upper Extremity Initial Evaluation    Patient Name: Georgie Scott  Date of evaluation: 5/4/2021  Referral Diagnosis: Pain in both wrists  Referring provider: Nora Marrero MD  Visit Diagnosis:     ICD-10-CM    1. Pain in both wrists  M25.531     M25.532    2. Decreased activities of daily living (ADL)  Z78.9    3. Paresthesia of both hands  R20.2        Assessment:      Pt. is appropriate for skilled OT intervention as outlined in the Plan of Care (POC). Patient has tight, tender wrist flexors and extensors bilaterally. She was instructed on initial exercises today. See back in 1 week.    Goals:  Patient Will Demonstrate / Verbalize independence in self-management of condition in: 2 weeks  Patient will be independent with home exercise program in: 2 weeks  Patient will work: without restrictions;with less pain;in 12 weeks  Patient will be able to: lift;carry;reach;for grocery shopping;with less pain;in 12 weeks  Patient will perform: housework;with less  pain;in 12 weeks  Patient will be able to  & pinch: for meal prep;with less pain;in 12 weeks  Patient will improve hand/finger coordination for: typing;writing;with no pain;in 12 weeks      Patient's expectations/goals are realistic.    Barriers to Learning or Achieving Goals:  No Barriers.       Plan / Patient Instructions:        Plan of Care:   Authorization / Certification Start Date: 05/04/21  Authorization / Certification End Date: 08/02/21  Authorization / Certification Number of Visits: 12  Communication with: Referral Source  Patient Related Instruction: Nature of Condition;Treatment plan and rationale;Basis of treatment;Expected outcome  Times per Week: 1  Number of Weeks: 12  Number of Visits: 12  Select Plan of Care: Select  Therapeutic Exercise: Strengthening;ROM  Neuromuscular Reeducation: kinesio tape  Manual Therapy: soft tissue mobilization  Modalities: iontophoresis  Functional Training (ADL's): compensatory training;ergonomics;ADL's  Orthotic Fitting: OTC      POC and pathology of condition were reviewed with patient.  Pt. is in agreement with the Plan of Care. Treatment techniques, plan of care, and goals were discussed with the patient.  The patient agrees to the plan as outlined.  The plan of care is dynamic and will be modified on an ongoing basis.    A Home Exercise Program (HEP) was initiated today. Pt. was instructed in exercises by OT and patient was given a handout with detailed instructions.        Subjective:        Social information:   Occupation: mammography   Work Status:Working full time     History of Present Illness:    Georgie is a 31 y.o. female who presents to therapy today with complaints of bilateral wrist pain and numbness, tingling and burning. Date of onset/duration of symptoms is January 2020 but symptoms used to fluctuate. Then she reports that she was moved (at her job) to new location and began performing more mammograms which increased the symptoms. Symptoms are  now constant. Onset was gradual. Symptoms are getting worse. She reports no history of similar symptoms prior to January 2020. Patient had an on-site ergonomic evaluation of her work station last week and has made recommended adjustments.  She describes their previous level of function as not limited. Functional limitations are described as occurring with gripping, pinching, lifting, carrying for ADL's and IADL's.   Patient had an EMG about a year ago and reports that it was negative for carpal tunnel.    Pain rating at rest: 0  Pain rating with activity: 5         Objective:      Note: Items left blank indicates the item was not performed or not indicated at the time of the evaluation.    Patient Outcome Measures :    No data recorded    Upper Extremity Examination:  1. Pain in both wrists     2. Decreased activities of daily living (ADL)     3. Paresthesia of both hands       Precautions/Restrictions: None  Involved side: Bilateral  Atrophy:  Absent  Color: Normal  Temperature: Normal  Edema: Absent  Palpation: No tenderness.   Scar: NA  Guarded extremity: Normal.  Sensory: patient reports numbness and tingling and burning sensation in hands    Hand AROM  Right   Left     NT WNL Impaired NT WNL Impaired   Full Fist  x   x    Flat Fist  x   x    Claw Fist  x   x      ROM/STRENGTH RIGHT LEFT   Note: * indicates pain AROM AROM   Shoulder Flexion - 180? WNL WNL   Shoulder Ext - 60?      Shoulder Abd - 180?     Elbow Flexion - 150? WNL WNL   Elbow Extension - 0?    WNL WNL   Forearm Supination - 80? WNL WNL   Forearm Pronation - 80? WNL WNL   Wrist Flexion - 65-80?  WNL WNL   Wrist Extension - 65-80? WNL WNL   Ulnar Deviation - 20-35?     Radial Deviation - 10-20?      Strength 86# 75#   3 Point Pinch 23# 21#   Lateral Pinch 25# 23#     May benefit from PT evaluation: No    U/E orthosis currently: OTC wrist braces and wears at night as needed    Plan for next visit: adjust her existing OTC wrist braces. Consider  iontophoresis    Treatment Today: Patient instructed on nerve glides today. Applied and instructed on kinesiotape from palm to elbow, bilaterally. Performed and instructed on muscle stripping to bilateral wrist flexors and extensors.   TREATMENT MINUTES COMMENTS   Evaluation 25    Self-care/ Home management     Manual therapy 10    Neuromuscular Re-education 10    Orthotic fitting     Therapeutic Exercises     Iontophoresis           Total 45    Blank areas are intentional and mean the treatment did not include these items.     GOALS AND PLAN OF CARE WERE ESTABLISHED IN COOPERATION WITH THE PATIENT    OT Evaluation Code: (Please list factors)   Comorbidities:   Patient Active Problem List   Diagnosis   ? ADHD (attention deficit hyperactivity disorder), inattentive type   ? Cervical high risk HPV (human papillomavirus) test positive        Profile/History Review: Brief    Need for eval modification: No    # Treatment options: Limited    Clinical Decision Making:  Low      Occupational Profile/ Medical and Therapy History and Comorbidities Occupational Performance Clinical Decision Making   (Complexity)   brief history with review of medical/therapy records related to the presenting problem.  No comorbidities 1-3 Performance deficits that result in activity limitations and/or participation restrictions.    No Assessment Modification  Low complexity, which includes  problem-focused assessments, and consideration of a limited number of treatment options.      expanded review of medical/therapy records and additional review of physical, cognitive and psychosocial history.    May have comorbidities 3-5 Performance deficits that result in activity limitations and/or participation restrictions.    Minimal to moderate modification of assessment Moderate complexity, which includes analysis of data from detailed assessments, and consideration of several treatment options.         Review of medical/therapy records and extensive  additional review of physical, cognitive and psychosocial history.  Comorbidities affect occupational performance 5 or more Performance deficits that result in activity limitations and/or participation restrictions.    Significant modification of assessment High complexity, analysis of  Occupational profile and data,  Comprehensive assessments, multiple treatment options.            Verito Doran  5/4/2021  4:33 PM

## 2021-07-03 DIAGNOSIS — Z11.59 ENCOUNTER FOR SCREENING FOR OTHER VIRAL DISEASES: ICD-10-CM

## 2021-07-03 NOTE — ADDENDUM NOTE
Addendum Note by Mulu Corbin MD at 8/13/2019  8:25 AM     Author: Mulu Corbin MD Service: -- Author Type: Physician    Filed: 8/13/2019  8:25 AM Encounter Date: 8/13/2019 Status: Signed    : Mulu Corbin MD (Physician)    Addended by: MULU CORBIN on: 8/13/2019 08:25 AM        Modules accepted: Orders

## 2021-07-03 NOTE — ADDENDUM NOTE
Addendum Note by Kay Hollis PA-C at 4/22/2019  3:18 PM     Author: Kay Hollis PA-C Service: -- Author Type: Physician Assistant    Filed: 4/22/2019  3:18 PM Encounter Date: 4/22/2019 Status: Signed    : Kay Hollis PA-C (Physician Assistant)    Addended by: KAY HOLLIS on: 4/22/2019 03:18 PM        Modules accepted: Orders

## 2021-07-07 NOTE — PROGRESS NOTES
Denies loss of fluid, vaginal bleeding and abdominal pain  Confirms frequent fetal movement  Doing fetal kick counts daily  Tolerating prenatal vitamin well  History of cholestasis denies signs or symptoms  History thyroid dysfunction has repeat labs to be drawn in 2 weeks  Reviewed recommendation for Tdap vaccine, patient declines after counseling   patient plans include breast feeding, opened to epidural as needed for pain control, rhythm method and uncertain regarding  Circumcision for infant  BP: 110/72    Weight: +54lb    Plan:  1  Continue prenatal vitamins daily  2  Continue fetal kick counts daily  3  History of thyroid dysfunction- repeat labs due in 2 weeks  4  Declines Tdap  5  Common discomforts of pregnancy and precautions including  labor reviewed  Signs and symptoms to report reviewed    Written information provided and COVID-19  RTO 2 weeks f/u TFT & circ Georgie Scott is a 31 y.o. female who is being evaluated via a billable video visit.      How would you like to obtain your AVS? MyChart.  If dropped from the video visit, the video invitation should be resent by: Send to e-mail at: [email or web address]  Will anyone else be joining your video visit? No      Video Start Time: 8:11 AM  Video-Visit Details    Type of service:  Video Visit    Video End Time (time video stopped): 8:33 AM  Originating Location (pt. Location): Home    Distant Location (provider location):  [facility]     Platform used for Video Visit: [facility]     Gastroenterology Visit for: Georgie Scott 1990   MRN: [id number]     Reason for Visit:    Chief Complaint   Patient presents with     Consult     pt takes omeprazole daily and is not very effective. does help with digestive problems but still having acid reflux. hx of using gas x ranidadine, and tums that were ineffective. suspected of stomach ulcer in early 20s. treated but no dx       Referred by: Janes  / AWA [place] [address] [address] / V*  Patient Care Team:  Nora Marrero MD as PCP - General (Family Medicine)  Libby Cervantes MD as Assigned OBGYN Provider  Nora Marrero MD as Assigned PCP    History of Present Illness:   Georgie Scott is a 31 y.o. female with acid reflux, ADHD, dermatitis (on minocycline), herniated disk in neck who is presenting as a new patient in consultation at the request of Dr. Marrero with a chief complaint of reflux, dyspepsia.  ---------------------------------------------------------------  Georgie Scott states she has been having a lot of stomach problems. She takes omeprazole daily. She has been told in her 20s that she might have had a gastric ulcer. In January 2021 felt sick every day and was started on omeprazole for nausea and still has nausea approximately 50% of the time. Currently she gets nausea, has heartburn. She was  having urgency to go to the bathroom to have a bowel movement however that improved with omeprazole. She was having weekly fecal urgency however that is now improved. Heartburn will be triggered by coffee and spicy foods. The omeprazole has helped diminish the symptoms but not totally eliminated. All symptoms have improved by 50%. When symptoms started she would have gagging with brushing teeth. This can lead to vomiting. Patient was having bloating before omeprazole however that has improved. The patient has had blood in the stool when having urgent bowel movements and has had blood on the toilet paper dating back 2-3 months ago. The patient had a fissure when she was a teenager.     Omeprazole 20mg daily in the morning before breakfast (does not always eat breakfast).   ---------------------------------------------------------------     Georgie Scott denies dysphagia, odynophagia, early satiety, abdominal pain,  constipation,  or melena. No unintentional weight loss.     Family history of colon cancer: paternal grandfather-  of colon cancer in his 40s or 50s.   Wt Readings from Last 5 Encounters:   21 156 lb (70.8 kg)   20 158 lb (71.7 kg)   10/28/20 145 lb (65.8 kg)   20 145 lb (65.8 kg)   20 145 lb (65.8 kg)     [unfilled]    Brief Esophageal Dysphagia Questionnaire (BEDQ):  We would like to ask about your dysphagia symptoms over the past 30 days, 6 months, and 12 months.  Please think about your symptom experiences and choose the best answer.      Over the past 14 days, on average, how often have you had the following?  If your response falls between two categories, please make your best guess.  If you are unable to eat the type of food in the question, please check  Cannot eat this.    Cannot  Eat This 0  Rarely/ Never 1  Once or twice a month 2  1-2 times per week 3  3-5 times per week 4  Daily or almost daily 5  Several times per day   Trouble eating solid food (meat,  bread, vegetables)             Trouble eating soft foods (yogurt, jello, pudding)             Trouble swallowing liquids               Pain while swallowing  ---         Coughing or choking while swallowing foods or liquids ---           SCORE:    Over the past 14 days, on average, how would you rate your discomfort or pain during swallowing? If you are unable to eat the type of food in the question, please check  Cannot eat this.    Cannot Eat This 0  None 1  Very Mild 2  Mild 3  Moderate 4  Moderately Severe 5  Severe   Eating solid food   (meat, bread, vegetables)          Eating soft foods   (yogurt, jello, pudding)          Drinking liquids            SCORE:    Approximately how many times in the past 12 months have you:   Had food stuck in your throat or esophagus for a period lasting longer than 30 minutes?  Had to visit the emergency room because of food stuck in your throat or esophagus?  Eckardt Score:   Score Dysphagia Regurgitation Retrosternal Pain Weight Loss (kg)   0 None None None None   1 Occasional Occasional Occasional <5   2 Daily Daily Daily 5-10   3 Each meal Each meal Each meal >10     SCORE:    STUDIES & PROCEDURES:    EGD:   Date:  Impression:  Pathology Report:    Colonoscopy:  Date:  Impression:    Pathology Report:     EndoFLIP directed at the UES or LES (8cm (EF-325) balloon length or 16cm (EF-322) balloon length):   Date:  8cm balloon  Balloon inflation Balloon pressure CSA (mm^2) DI (mm^2/mmHg) Dmin (mm) Compliance   20 (ladmark ID)        30        40        50           16cm balloon  Balloon inflation Balloon pressure CSA (mm^2) DI (mm^2/mmHg) Dmin (mm) Compliance   30 (ladmark ID)        40        50        60        70           High Resolution Manometry:  Date:  Impression:    PH/Impedance:  Date:  Impression:     Bravo:  48 or 96hr  Date:  Impression:    CT:  Date:  Impression:    Esophagram:  Date:  Impression:     Prior medical records were reviewed including, but not limited  to, notes from referring providers, lab work, radiographic tests, and other diagnostic tests. Pertinent results were summarized above.     History     Past Medical History:   Diagnosis Date     ADHD      Cervical high risk HPV (human papillomavirus) test positive 3/31/2021    10/1/14 NIL 1/5/18 NIL 3/31/21 NIL pap, +HR HPV (not 16/18). Plan: cotest in 1 year     Medical history non-contributory 01/22/2019       Past Surgical History:   Procedure Laterality Date     No surgery history  01/22/2019       Social History     Socioeconomic History     Marital status: Single     Spouse name: Not on file     Number of children: Not on file     Years of education: Not on file     Highest education level: Not on file   Occupational History     Not on file   Social Needs     Financial resource strain: Not on file     Food insecurity     Worry: Not on file     Inability: Not on file     Transportation needs     Medical: Not on file     Non-medical: Not on file   Tobacco Use     Smoking status: Never Smoker     Smokeless tobacco: Never Used   Substance and Sexual Activity     Alcohol use: Not on file     Drug use: Not on file     Sexual activity: Not on file   Lifestyle     Physical activity     Days per week: Not on file     Minutes per session: Not on file     Stress: Not on file   Relationships     Social connections     Talks on phone: Not on file     Gets together: Not on file     Attends Bahai service: Not on file     Active member of club or organization: Not on file     Attends meetings of clubs or organizations: Not on file     Relationship status: Not on file     Intimate partner violence     Fear of current or ex partner: Not on file     Emotionally abused: Not on file     Physically abused: Not on file     Forced sexual activity: Not on file   Other Topics Concern     Not on file   Social History Narrative    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, dexa scan, radiology       Family History   Problem Relation Age of  Onset     Hypertension Father      Stroke Maternal Grandmother      Dementia Maternal Grandmother      Brain cancer Maternal Grandfather      Hypertension Paternal Grandmother      Colon cancer Paternal Grandfather      Diabetes type II Paternal Uncle      Diabetes type I Paternal Uncle      Diabetes Paternal Aunt      Diabetes Paternal Aunt        Medications and Allergies:     Outpatient Encounter Medications as of 6/25/2021   Medication Sig Dispense Refill     cyclobenzaprine (FLEXERIL) 5 MG tablet TAKE ONE TABLET BY MOUTH THREE TIMES A DAY AS NEEDED FOR MUSCLE SPASMS. 30 tablet 2     dextroamphetamine-amphetamine (ADDERALL XR) 15 MG 24 hr capsule Take 1 capsule (15 mg total) by mouth daily. 30 capsule 0     omega-3/dha/epa/fish oil (FISH OIL-OMEGA-3 FATTY ACIDS) 300-1,000 mg capsule Take 2 g by mouth daily.       [DISCONTINUED] OMEPRAZOLE ORAL Take 20 mg by mouth daily.       dextroamphetamine-amphetamine (ADDERALL XR) 15 MG 24 hr capsule Take 1 capsule (15 mg total) by mouth daily. 30 capsule 0     dextroamphetamine-amphetamine (ADDERALL XR) 15 MG 24 hr capsule Take 1 capsule (15 mg total) by mouth daily. 30 capsule 0     dextroamphetamine-amphetamine (ADDERALL) 10 mg Tab tablet Take 1 tablet by mouth daily. 40 tablet 0     dextroamphetamine-amphetamine (ADDERALL) 10 mg Tab tablet Take 1-2 tablets by mouth daily. 40 tablet 0     dextroamphetamine-amphetamine (ADDERALL) 10 mg Tab tablet Take 1-2 tablets by mouth daily. 40 tablet 0     ibuprofen 200 mg cap Take 600 mg by mouth every 6 (six) hours as needed.       minocycline (MINOCIN,DYNACIN) 100 MG capsule        omeprazole (PRILOSEC) 40 MG capsule Take 1 capsule (40 mg total) by mouth daily before breakfast. 90 capsule 3     [DISCONTINUED] hydrocortisone 2.5 % ointment Apply to affected skin area twice daily for up to 2 weeks, and as needed. 60 g 1     No facility-administered encounter medications on file as of 6/25/2021.         Allergies   Allergen Reactions      Amoxicillin      Bactrim [Sulfamethoxazole-Trimethoprim]      Morphine         Review of systems:  A full 10 point review of systems was obtained and was negative except for the pertinent positives and negatives stated within the HPI.    Objective Findings:   Physical Exam:    Constitutional: There were no vitals taken for this visit.  General: Alert, cooperative, no distress, well-appearing  Head: Atraumatic, normocephalic, no obvious abnormalities   Eyes: EOMI, Sclera anicteric, no obvious conjunctival hemorrhage   Nose: Nares normal, no obvious malformation, no obvious rhinorrhea   Respiratory: normal appearing respirations  Musculoskeletal: Range of motion intact, no obvious strength deficit  Skin: No jaundice, no obvious rash  Neurologic: AAOx3, no obvious neurologic abnormality  Psychiatric: Normal Affect, appropriate mood  Extremities: No obvious edema, no obvious malformation     Labs, Radiology, Pathology     Lab Results   Component Value Date    WBC 5.4 03/31/2021    WBC 10.0 01/07/2018    HGB 13.6 03/31/2021    HGB 14.0 11/20/2018    HGB 13.8 01/07/2018     03/31/2021     01/07/2018    CHOL 161 03/31/2021    TRIG 70 03/31/2021    HDL 69 03/31/2021    ALT 13 01/07/2018    AST 17 01/07/2018     01/07/2018    K 3.4 (L) 01/07/2018     01/07/2018    CREATININE 0.76 01/07/2018    BUN 6 (L) 01/07/2018    CO2 20 (L) 01/07/2018    TSH 1.53 11/20/2018        Liver Function Studies - No results for input(s): ALBUMIN, ALKPHOS, AST, ALT in the last 72 hours.    Invalid input(s): PROTTOTAL, BILITOTAL, BILIDIRECT       Patient Active Problem List    Diagnosis Date Noted     Heartburn 06/26/2021     Dyspepsia 06/26/2021     Nausea 06/26/2021     Bloating 06/26/2021     Rectal bleeding 06/26/2021     Cervical high risk HPV (human papillomavirus) test positive 03/31/2021     ADHD (attention deficit hyperactivity disorder), inattentive type 03/13/2019      Assessment and Plan   Assessment:     Georgie Scott is a 31 y.o. female with acid reflux, ADHD, dermatitis (on minocycline), herniated disk in neck who is presenting as a new patient in consultation at the request of Dr. Marrero with a chief complaint of reflux, dyspepsia.    The patient was seen in video telemedicine consultation regarding her symptoms of reflux, nausea, and dyspepsia.     It is possible that her foregut symptoms are related to acid reflux disease. Given that she is 31 without alarm symptoms I do not have a strong indication to pursue upper endoscopy immediately. However, outlined a general plan that would include endoscopy if her symptoms do not completely respond to PPI therapy. I have first asked that she take her omeprazole 40mg daily 30-60 minutes before breakfast. After roughly two weeks if her symptoms are not completely controlled she should notify me through myLINGOhart and increase her omeprazole to 40mg two times a day. If there is not complete control with two times a day dosing we will plan to hold the PPI for 7 days prior to an EGD with 96hr Bravo OFF therapy. The endoscopy with Bravo is ordered through the [facility] where I am capable of performing the endoscopy with bravo at the [facility].     The risks and benefits of PPI use were discussed including but not limited to: kidney injury, dementia, fracture, C. Difficile, and community acquired pneumonia.  Recent literature suggests a possible relationship between PPI use with a greater likelihood of reporting a positive COVID-19 test (Davidaario CV, Cherelle WD, Emigdio BMR. Am J Gastroenterol 2020;115:9286-4943).This was discussed with the patient in detail. The quality of research studies was described (retrospective vs prospective). The value of joint decision making was emphasized to ensure that the medication is utilized in the appropriate clinical setting.    We discussed the risks and benefits of pursuing upper endoscopy. The benefits outweigh the potential  risks however the risks, including but not limited to: bleeding, infection, perforation, the need for surgical intervention, hospitalization, and in rare instances death were discussed and all questions were answered.    Pertaining to possible Bravo, the patient denies a nickel allergy.    In addition to her foregut symptoms she has described some rectal bleeding. While this could be related to hemorrhoids, the fact that she has seen blood on her stool in the past as opposed to solely being on the toilet paper is more concerning. I have asked that she undergo colonoscopy to evaluate the source of her rectal bleeding.      We discussed the risks and benefits of pursuing colonoscopy The benefits outweigh the potential risks however the risks, including but not limited to: bleeding, infection, perforation, the need for surgical intervention, hospitalization, and in rare instances death were discussed and all questions were answered.      1. Heartburn    2. Dyspepsia    3. Nausea    4. Bloating    5. Rectal bleeding          Plan:  1. We will plan to schedule an endoscopy with Bravo and colonoscopy.   2. Colonoscopy is because of the rectal bleeding and a family history of presumed colon cancer   3. Please begin taking omeprazole 40mg daily 30-60 minutes before breakfast. After 2 weeks assess if your symptoms have completely improved. If there is an incomplete response increase the omeprazole to 40mg twice a day. By one week before your procedures, if you continue to have an incomplete response, please discontinue for a minimum of 7 days in preparation for your endoscopy with Bravo. If you have a complete response you may continue to take the omeprazole twice daily and we will not have to perform endoscopy and bravo.   4. Please provide updates via [employer] at each decision point to keep Dr. Thurston in the loop.   5. We discussed risks and benefits of PPI therapy. Please feel free to review this article recently published  that goes over recent available data:  https://doi.org/10.1177/[id number]     Follow up plan:   Return to clinic 3 months and as needed.    The risks and benefits of my recommendations, as well as other treatment options were discussed with the patient and any available family today. All questions were answered.     o Follow up: As planned above. Today, I personally spent 22 minutes in direct face to face time with the patient, of which greater than 50% of the time was spent in patient education and counseling as described above. Approximately 15 minutes were spent on indirect care associated with the patient's consultation including but not limited to review of: patient medical records to date, clinic visits, hospital records, lab results, imaging studies, procedural documentation, and coordinating care with other providers. The findings from this review are summarized in the above note.    The patient verbalized understanding of the plan and was appreciative for the time spent and information provided during the office visit.     Author:   Billy Thurston DO   of Medicine  Division of Gastroenterology, Hepatology, and Nutrition  [facility]     Documentation assisted by voice recognition and documentation system.

## 2021-07-07 NOTE — PATIENT INSTRUCTIONS - HE
It was a pleasure taking care of you today.  I've included a brief summary of our discussion and care plan from today's visit below.  Please review this information with your primary care provider.  _______________________________________________________________________    My recommendations are summarized as follows:    1. We will plan to schedule an endoscopy with Bravo and colonoscopy.   2. Colonoscopy is because of the rectal bleeding and a family history of presumed colon cancer   3. Please begin taking omeprazole 40mg daily 30-60 minutes before breakfast. After 2 weeks assess if your symptoms have completely improved. If there is an incomplete response increase the omeprazole to 40mg twice a day. By one week before your procedures, if you continue to have an incomplete response, please discontinue for a minimum of 7 days in preparation for your endoscopy with Bravo. If you have a complete response you may continue to take the omeprazole twice daily and we will not have to perform endoscopy and bravo.   4. Please provide updates via wiseri at each decision point to keep Dr. Thurston in the loop.   5. We discussed risks and benefits of PPI therapy. Please feel free to review this article recently published that goes over recent available data:  https://doi.org/[id number]    To schedule endoscopic procedures you may call: [phone]   To schedule radiology tests you may call: [phone]     Please call my nurse Raquel with any questions or concerns- [phone]   --    Return to GI Clinic in 3 months to review your progress.    _______________________________________________________________________    How will I get the results of any tests ordered?    You will receive all of your results.  If you have signed up for [employer], any tests ordered at your visit will be available to you after your physician reviews them.  Typically this takes 1-2 weeks.  If there are urgent results that require a  change in your care plan, your physician or nurse will call you to discuss the next steps.      What is Bybanhart?  27 bards is a secure way for you to access all of your healthcare records from the [facility].  It is a web based computer program, so you can sign on to it from any location.  It also allows you to send secure messages to your care team.  I recommend signing up for 27 bards access if you have not already done so and are comfortable with using a computer.      How to I schedule a follow-up visit?  If you did not schedule a follow-up visit today, please call [phone] to schedule a follow-up office visit.        Sincerely,    Billy Thurston DO   of Medicine  Division of Gastroenterology, Hepatology, and Nutrition  [facility]

## 2021-07-13 ENCOUNTER — HOSPITAL ENCOUNTER (OUTPATIENT)
Dept: OCCUPATIONAL THERAPY | Facility: REHABILITATION | Age: 31
End: 2021-07-13
Payer: OTHER MISCELLANEOUS

## 2021-07-13 PROCEDURE — 97033 APP MDLTY 1+IONTPHRSIS EA 15: CPT | Mod: GO | Performed by: OCCUPATIONAL THERAPIST

## 2021-07-13 NOTE — PROGRESS NOTES
"   07/13/21 1100   Signing Clinician's Name / Credentials   Signing clinician's name / credentials linh daniel OTR/L   Session Number   Session Number 11   Progress/Recertification   Progress Note Due 08/04/21   Adult OT Eval Goals   OT Eval Goals (Adult)   (see Note for previous goals and plan)   Subjective Report   Subjective Report Patient reports that she was doing well until she was breaking some sticks 2 days ago and again felt a \"snapping\" in her volar forearms. Her hands went completely numb after that and she has had mild pain ever since.   Modalities   Modalities Iontophoresis  (Dexamethasone 4mg/ml)   Iontophoresis   Iontophoresis, Minutes (81961) 10 Minutes   Skilled Intervention ionto   Treatment Detail Iontophoresis with Dexamethasone 4mg/ml to bilateral volar wrists   Therapeutic Interventions   Therapeutic Interventions Therapeutic Procedure/Exercise   Therapeutic Procedure/Exercise   Therapeutic Procedure: strength, endurance, ROM, flexibillity minutes (04598) 5   Treatment Detail patient will hold strengthening for now. reviewed HEP   Education   Learner Patient   Readiness Eager   Response Verbalizes understanding   Plan   Plan for next session ionto to volar wrists. review HEP   Total Session Time   Timed Code Treatment Minutes 15   Total Treatment Time (sum of timed and untimed services) 15   Plan of Care  Authorization / Certification Start Date: 05/04/21  Authorization / Certification End Date: 08/02/21  Authorization / Certification Number of Visits: 12  Communication with: Referral Source  Patient Related Instruction: Nature of Condition;Treatment plan and rationale;Basis of treatment;Expected outcome  Times per Week: 1  Number of Weeks: 12  Number of Visits: 12  Select Plan of Care: Select  Therapeutic Exercise: Strengthening;ROM  Neuromuscular Reeducation: kinesio tape  Manual Therapy: soft tissue mobilization  Modalities: iontophoresis  Functional Training (ADL's): compensatory " training;ergonomics;ADL's  Orthotic Fitting: OTC        Goals:  Patient Will Demonstrate / Verbalize independence in self-management of condition in: 2 weeks-MET  Patient will be independent with home exercise program in: 2 weeks-MET  Patient will work with less pain;in 12 weeks-PROGRESSING TOWARD  Patient will be able to: lift;carry;reach;for grocery shopping;with less pain;in 12 weeks- PROGRESSING  Patient will perform: housework;with less pain;in 12 weeks-PROGRESSING  Patient will be able to  & pinch: for meal prep;with less pain;in 12 weeks-PROGRESSING  Patient will improve hand/finger coordination for: typing;writing;with no pain;in 12 weeks-PROGRESSING

## 2021-07-15 ENCOUNTER — HOSPITAL ENCOUNTER (OUTPATIENT)
Dept: OCCUPATIONAL THERAPY | Facility: REHABILITATION | Age: 31
End: 2021-07-15
Payer: OTHER MISCELLANEOUS

## 2021-07-15 DIAGNOSIS — Z78.9 DECREASED ACTIVITIES OF DAILY LIVING (ADL): ICD-10-CM

## 2021-07-15 DIAGNOSIS — M25.532 PAIN IN BOTH WRISTS: Primary | ICD-10-CM

## 2021-07-15 DIAGNOSIS — R20.2 PARESTHESIA OF BOTH HANDS: ICD-10-CM

## 2021-07-15 DIAGNOSIS — M25.531 PAIN IN BOTH WRISTS: Primary | ICD-10-CM

## 2021-07-15 PROCEDURE — 97033 APP MDLTY 1+IONTPHRSIS EA 15: CPT | Mod: GO | Performed by: OCCUPATIONAL THERAPIST

## 2021-07-19 ENCOUNTER — MYC MEDICAL ADVICE (OUTPATIENT)
Dept: FAMILY MEDICINE | Facility: CLINIC | Age: 31
End: 2021-07-19

## 2021-07-19 DIAGNOSIS — M25.532 PAIN IN BOTH WRISTS: Primary | ICD-10-CM

## 2021-07-19 DIAGNOSIS — M25.531 PAIN IN BOTH WRISTS: Primary | ICD-10-CM

## 2021-07-20 NOTE — CONFIDENTIAL NOTE
1. Pain in both wrists  - Orthopedic  Referral; Future     Ortho referral placed.     Taylor Womack, DO

## 2021-07-26 NOTE — ADDENDUM NOTE
Encounter addended by: Verito Doran, OT on: 7/26/2021 7:25 AM   Actions taken: Charge Capture section accepted

## 2021-07-27 ENCOUNTER — HOSPITAL ENCOUNTER (OUTPATIENT)
Dept: OCCUPATIONAL THERAPY | Facility: REHABILITATION | Age: 31
End: 2021-07-27
Payer: OTHER MISCELLANEOUS

## 2021-07-27 DIAGNOSIS — M25.531 PAIN IN BOTH WRISTS: Primary | ICD-10-CM

## 2021-07-27 DIAGNOSIS — R20.2 PARESTHESIA OF BOTH HANDS: ICD-10-CM

## 2021-07-27 DIAGNOSIS — M25.532 PAIN IN BOTH WRISTS: Primary | ICD-10-CM

## 2021-07-27 DIAGNOSIS — Z78.9 DECREASED ACTIVITIES OF DAILY LIVING (ADL): ICD-10-CM

## 2021-07-27 PROCEDURE — 97033 APP MDLTY 1+IONTPHRSIS EA 15: CPT | Mod: GO | Performed by: OCCUPATIONAL THERAPIST

## 2021-07-29 ENCOUNTER — HOSPITAL ENCOUNTER (OUTPATIENT)
Dept: OCCUPATIONAL THERAPY | Facility: REHABILITATION | Age: 31
End: 2021-07-29
Payer: OTHER MISCELLANEOUS

## 2021-07-29 DIAGNOSIS — M25.531 PAIN IN BOTH WRISTS: Primary | ICD-10-CM

## 2021-07-29 DIAGNOSIS — Z78.9 DECREASED ACTIVITIES OF DAILY LIVING (ADL): ICD-10-CM

## 2021-07-29 DIAGNOSIS — M25.532 PAIN IN BOTH WRISTS: Primary | ICD-10-CM

## 2021-07-29 DIAGNOSIS — R20.2 PARESTHESIA OF BOTH HANDS: ICD-10-CM

## 2021-07-29 PROCEDURE — 97760 ORTHOTIC MGMT&TRAING 1ST ENC: CPT | Mod: GO | Performed by: OCCUPATIONAL THERAPIST

## 2021-07-29 PROCEDURE — 97033 APP MDLTY 1+IONTPHRSIS EA 15: CPT | Mod: GO | Performed by: OCCUPATIONAL THERAPIST

## 2021-08-04 ENCOUNTER — TRANSFERRED RECORDS (OUTPATIENT)
Dept: HEALTH INFORMATION MANAGEMENT | Facility: CLINIC | Age: 31
End: 2021-08-04

## 2021-08-09 ENCOUNTER — TRANSFERRED RECORDS (OUTPATIENT)
Dept: HEALTH INFORMATION MANAGEMENT | Facility: CLINIC | Age: 31
End: 2021-08-09

## 2021-08-11 ENCOUNTER — TELEPHONE (OUTPATIENT)
Dept: GASTROENTEROLOGY | Facility: CLINIC | Age: 31
End: 2021-08-11

## 2021-08-11 NOTE — TELEPHONE ENCOUNTER
Caller: Georgie    Procedure: EGD    Date of Procedure Cancelled: 8/30    Ordering Provider:Bertrand    Reason for cancel: Lily Thurston states she does not need EGD but to keep colonoscopy    Rescheduled: Not needed     If rescheduled:    Date:    Location:    Note any change or update to original order/sedation:

## 2021-08-12 ENCOUNTER — OFFICE VISIT (OUTPATIENT)
Dept: OTOLARYNGOLOGY | Facility: CLINIC | Age: 31
End: 2021-08-12
Attending: FAMILY MEDICINE
Payer: COMMERCIAL

## 2021-08-12 DIAGNOSIS — J37.0 CHRONIC LARYNGITIS: Primary | ICD-10-CM

## 2021-08-12 DIAGNOSIS — K21.9 LARYNGOPHARYNGEAL REFLUX (LPR): ICD-10-CM

## 2021-08-12 PROCEDURE — 99203 OFFICE O/P NEW LOW 30 MIN: CPT | Performed by: OTOLARYNGOLOGY

## 2021-08-12 NOTE — LETTER
8/12/2021         RE: Georgie Scott  [address]  [place] [address]        Dear Colleague,    Thank you for referring your patient, Georgie Scott, to the [facility]. Please see a copy of my visit note below.    HPI: This patient is a 30yo F who presents to clinic for evaluation of a neck mass at the request of Dr. Womack. She had an enlarged lymph node on the left side of her neck (an in her axillar) following her covid vaccine in early this year. She has had a few unofficial ultrasounds and then the official one in April; she states that the lymph node identified on the ultrasound has been bigger. At the same time, she was having intermittent feelings of globus sensation in her throat that she attributed to the lymph node. However, she has been dealing with rather significant acid reflux and is now improving with medication, but symptoms there are not gone either. The throat tightness feeling has driven up some stress/anxiety. Denies fevers, unintentional weight loss, odynophagia, dysphagia, hemoptysis, voice changes, and shortness of breath.     Past medical history, surgical history, social history, family history, medications, and allergies have been reviewed with the patient and are documented above.    Review of Systems: a 10-system review was performed. Pertinent positives are noted in the HPI and on a separate scanned document in the chart.    PHYSICAL EXAMINATION:  GEN: no acute distress, normocephalic  EYES: extraocular movements are intact, pupils are equal and round. Sclera clear.   EARS: auricles are normally formed. The external auditory canals are clear with minimal to no cerumen. Tympanic membranes are intact bilaterally with no signs of infection, effusion, retractions, or perforations.  NOSE: anterior nares are patent. There are no masses or lesions. The septum is non-obstructing.  OC/OP: clear, dentition is in good repair. The tongue and  palate are fully mobile and symmetric. No masses or lesions.   HP/L (scope): nasopharynx, base of tongue, vallecula, epiglottis, and pyriform sinuses are clear. The bilateral vocal folds are mobile and without lesion. Has hyperemia of the laryngeal surface of the epiglottis and cobblestoning of the posterior pharyngeal wall up into the nasopharynx.  NECK: soft and supple. No enlarged or abnormal lymphadenopathy or masses. Airway is midline.  NEURO: CN VII and XII symmetric. alert and oriented. No spontaneous nystagmus. Gait is normal.  PULM: breathing comfortably on room air, normal chest expansion with respiration  CARDS: no cyanosis or clubbing, normal carotid pulses    ULTRASOUND 4/21:  Imaging performed over the left neck lump. The lump is a 24 x 11 x 7 mm benign-appearing lymph node.    MEDICAL DECISION-MAKING: This patient is a 30yo F with a left neck lymph node that has fluctuated size following her covid vaccine earlier this year. Her neck exam does not reveal any residual lymph nodes measuring 2.5cm, and she reports it is gone. The symptoms that bother her are much more consistent with her underlying acid reflux issue, which is not yet under control. She is doing better, but is encouraged to meet up with her GI physician again. Did discuss a repeat U/S with possible biopsy, but decided together that this isn't necessary at this time. If she feels like the lymph node enlarges again, she will call us to have us order an U/S with bx.         Again, thank you for allowing me to participate in the care of your patient.        Sincerely,        Luzmaria Melchor MD

## 2021-08-12 NOTE — TELEPHONE ENCOUNTER
Caller: Georgie    Procedure: EGD    Date of Procedure Cancelled: 8/31    Ordering Provider:Bertrand    Reason for cancel: Provider state it was not needed but now would like EGD again    Rescheduled: Y     If rescheduled:    Date: 8/31   [facility]   Note any change or update to original order/sedation:

## 2021-08-12 NOTE — PROGRESS NOTES
HPI: This patient is a 30yo F who presents to clinic for evaluation of a neck mass at the request of Dr. Womack. She had an enlarged lymph node on the left side of her neck (an in her axillar) following her covid vaccine in early this year. She has had a few unofficial ultrasounds and then the official one in April; she states that the lymph node identified on the ultrasound has been bigger. At the same time, she was having intermittent feelings of globus sensation in her throat that she attributed to the lymph node. However, she has been dealing with rather significant acid reflux and is now improving with medication, but symptoms there are not gone either. The throat tightness feeling has driven up some stress/anxiety. Denies fevers, unintentional weight loss, odynophagia, dysphagia, hemoptysis, voice changes, and shortness of breath.     Past medical history, surgical history, social history, family history, medications, and allergies have been reviewed with the patient and are documented above.    Review of Systems: a 10-system review was performed. Pertinent positives are noted in the HPI and on a separate scanned document in the chart.    PHYSICAL EXAMINATION:  GEN: no acute distress, normocephalic  EYES: extraocular movements are intact, pupils are equal and round. Sclera clear.   EARS: auricles are normally formed. The external auditory canals are clear with minimal to no cerumen. Tympanic membranes are intact bilaterally with no signs of infection, effusion, retractions, or perforations.  NOSE: anterior nares are patent. There are no masses or lesions. The septum is non-obstructing.  OC/OP: clear, dentition is in good repair. The tongue and palate are fully mobile and symmetric. No masses or lesions.   HP/L (scope): nasopharynx, base of tongue, vallecula, epiglottis, and pyriform sinuses are clear. The bilateral vocal folds are mobile and without lesion. Has hyperemia of the laryngeal surface of the  epiglottis and cobblestoning of the posterior pharyngeal wall up into the nasopharynx.  NECK: soft and supple. No enlarged or abnormal lymphadenopathy or masses. Airway is midline.  NEURO: CN VII and XII symmetric. alert and oriented. No spontaneous nystagmus. Gait is normal.  PULM: breathing comfortably on room air, normal chest expansion with respiration  CARDS: no cyanosis or clubbing, normal carotid pulses    ULTRASOUND 4/21:  Imaging performed over the left neck lump. The lump is a 24 x 11 x 7 mm benign-appearing lymph node.    MEDICAL DECISION-MAKING: This patient is a 30yo F with a left neck lymph node that has fluctuated size following her covid vaccine earlier this year. Her neck exam does not reveal any residual lymph nodes measuring 2.5cm, and she reports it is gone. The symptoms that bother her are much more consistent with her underlying acid reflux issue, which is not yet under control. She is doing better, but is encouraged to meet up with her GI physician again. Did discuss a repeat U/S with possible biopsy, but decided together that this isn't necessary at this time. If she feels like the lymph node enlarges again, she will call us to have us order an U/S with bx.

## 2021-08-18 ENCOUNTER — TELEPHONE (OUTPATIENT)
Dept: GASTROENTEROLOGY | Facility: CLINIC | Age: 31
End: 2021-08-18

## 2021-08-18 NOTE — TELEPHONE ENCOUNTER
Caller: Left message for Georgie Scott    Procedure: EGD and Colon    Date of Procedure Cancelled: 8/31/2021    Ordering Provider:Bertrand    Reason for cancel: Bertrand is double booked and pt needs to move to the [facility]    Rescheduled: not at the time of the call, but I did mention the spot on Bertrand's block at 2:55pm (okay to schedule there)        If rescheduled:    Date:    Location:    Note any change or update to original order/sedation:

## 2021-08-20 ENCOUNTER — TELEPHONE (OUTPATIENT)
Dept: GASTROENTEROLOGY | Facility: CLINIC | Age: 31
End: 2021-08-20

## 2021-08-20 NOTE — TELEPHONE ENCOUNTER
Caller: Spoke with Georgie    Procedure: Patient was scheduled for EGD.  Order is for EGD BRAVO    Date of Procedure Cancelled: 8/31/2021    Ordering Provider:Dr. Thurston    Reason for cancel: Dr. Thurston was double booked at the [facility] and the [facility].    Rescheduled: not yet, in-basket message sent to Dr. Thurston for options.

## 2021-08-25 ENCOUNTER — TELEPHONE (OUTPATIENT)
Dept: FAMILY MEDICINE | Facility: CLINIC | Age: 31
End: 2021-08-25

## 2021-08-25 NOTE — TELEPHONE ENCOUNTER
Reason for Call:  Same Day Appointment, Requested Provider:  Any available but would prefer Dr. Womack    PCP: Taylor Womack    Reason for visit: Pre op surgery on 8/31/21 Patient would like to be seen on Friday and she also has a covid test scheduled on that day at a different location and would like it to be done at [facility]    Duration of symptoms:     Have you been treated for this in the past? No    Additional comments: Pre op surgery on 8/31/21 - [facility] surgery center - Dr. Thurston- colonoscopy and upper GI    Can we leave a detailed message on this number? YES    Phone number patient can be reached at: Cell number on file:    Telephone Information:   Mobile [phone]       Best Time: any    Call taken on 8/25/2021 at 9:54 AM by Nadja Oswald

## 2021-08-26 ENCOUNTER — TELEPHONE (OUTPATIENT)
Dept: GASTROENTEROLOGY | Facility: CLINIC | Age: 31
End: 2021-08-26

## 2021-08-26 NOTE — TELEPHONE ENCOUNTER
Attempted to contact patient regarding upcoming colonoscopy and EGD with BRAVO procedure on 8.31.2021 for pre assessment questions. No answer.     Left message to return call to [phone] #2    Covid test scheduled: 8.27.2021    Pre-op:  8.30.2021 with Daisha Kidd MD    Arrival time: 1225    Facility location: [facility]    Sedation type: MAC    Bowel prep recommendation: miralax and magnesium citrate prep    Stop PPI x 7 days prior to procedure.    Dionne Vaughn RN

## 2021-08-27 ENCOUNTER — TELEPHONE (OUTPATIENT)
Dept: GASTROENTEROLOGY | Facility: CLINIC | Age: 31
End: 2021-08-27

## 2021-08-27 ENCOUNTER — LAB (OUTPATIENT)
Dept: LAB | Facility: CLINIC | Age: 31
End: 2021-08-27
Payer: COMMERCIAL

## 2021-08-27 DIAGNOSIS — Z11.59 ENCOUNTER FOR SCREENING FOR OTHER VIRAL DISEASES: ICD-10-CM

## 2021-08-27 PROCEDURE — U0003 INFECTIOUS AGENT DETECTION BY NUCLEIC ACID (DNA OR RNA); SEVERE ACUTE RESPIRATORY SYNDROME CORONAVIRUS 2 (SARS-COV-2) (CORONAVIRUS DISEASE [COVID-19]), AMPLIFIED PROBE TECHNIQUE, MAKING USE OF HIGH THROUGHPUT TECHNOLOGIES AS DESCRIBED BY CMS-2020-01-R: HCPCS

## 2021-08-27 PROCEDURE — U0005 INFEC AGEN DETEC AMPLI PROBE: HCPCS

## 2021-08-27 NOTE — TELEPHONE ENCOUNTER
Patient returned call.     Pre assessment questions completed for upcoming colonoscopy/EGD BRAVO procedure scheduled on 8/31/21    COVID test scheduled 8/27/21 - in process    Pre-op scheduled? Patient stated that they were not ware that this was needed. Made an appointment for 8/30/21 but states they are not able to go to that appointment. Gave PAC number [phone] to see if there is availability. Patient is aware a pre op is required prior to procedure.     Procedural arrival time and facility location reviewed.    Designated  policy reviewed.    Reviewed Miralax prep instructions with patient. No fiber/iron supplements or foods that contain nuts/seeds 7 days prior to procedure.     Reviewed BRAVO instructions of no PPIs 7 days prior to procedure     Anticoagulation/blood thinners? no    Electronic implanted devices? no    Patient verbalized understanding and had no questions or concerns at this time.    Melani Larry RN

## 2021-08-28 LAB — SARS-COV-2 RNA RESP QL NAA+PROBE: NEGATIVE

## 2021-08-30 ENCOUNTER — OFFICE VISIT (OUTPATIENT)
Dept: FAMILY MEDICINE | Facility: CLINIC | Age: 31
End: 2021-08-30
Payer: COMMERCIAL

## 2021-08-30 ENCOUNTER — ANESTHESIA EVENT (OUTPATIENT)
Dept: GASTROENTEROLOGY | Facility: CLINIC | Age: 31
End: 2021-08-30
Payer: COMMERCIAL

## 2021-08-30 VITALS
SYSTOLIC BLOOD PRESSURE: 123 MMHG | HEIGHT: 67 IN | HEART RATE: 85 BPM | BODY MASS INDEX: 24.64 KG/M2 | DIASTOLIC BLOOD PRESSURE: 80 MMHG | WEIGHT: 157 LBS | OXYGEN SATURATION: 100 %

## 2021-08-30 DIAGNOSIS — Z01.818 PREOP GENERAL PHYSICAL EXAM: Primary | ICD-10-CM

## 2021-08-30 PROBLEM — R10.13 DYSPEPSIA: Status: ACTIVE | Noted: 2021-06-26

## 2021-08-30 PROBLEM — R11.0 NAUSEA: Status: ACTIVE | Noted: 2021-06-26

## 2021-08-30 PROBLEM — R14.0 BLOATING: Status: ACTIVE | Noted: 2021-06-26

## 2021-08-30 PROBLEM — K62.5 RECTAL BLEEDING: Status: ACTIVE | Noted: 2021-06-26

## 2021-08-30 PROBLEM — F90.0 ADHD (ATTENTION DEFICIT HYPERACTIVITY DISORDER), INATTENTIVE TYPE: Status: ACTIVE | Noted: 2019-03-13

## 2021-08-30 PROBLEM — R12 HEARTBURN: Status: ACTIVE | Noted: 2021-06-26

## 2021-08-30 LAB
BASOPHILS # BLD AUTO: 0 10E3/UL (ref 0–0.2)
BASOPHILS NFR BLD AUTO: 1 %
EOSINOPHIL # BLD AUTO: 0.1 10E3/UL (ref 0–0.7)
EOSINOPHIL NFR BLD AUTO: 1 %
ERYTHROCYTE [DISTWIDTH] IN BLOOD BY AUTOMATED COUNT: 11.8 % (ref 10–15)
HCT VFR BLD AUTO: 39 % (ref 35–47)
HGB BLD-MCNC: 13.3 G/DL (ref 11.7–15.7)
IMM GRANULOCYTES # BLD: 0 10E3/UL
IMM GRANULOCYTES NFR BLD: 0 %
LYMPHOCYTES # BLD AUTO: 1.8 10E3/UL (ref 0.8–5.3)
LYMPHOCYTES NFR BLD AUTO: 33 %
MCH RBC QN AUTO: 30.8 PG (ref 26.5–33)
MCHC RBC AUTO-ENTMCNC: 34.1 G/DL (ref 31.5–36.5)
MCV RBC AUTO: 90 FL (ref 78–100)
MONOCYTES # BLD AUTO: 0.4 10E3/UL (ref 0–1.3)
MONOCYTES NFR BLD AUTO: 8 %
NEUTROPHILS # BLD AUTO: 3.1 10E3/UL (ref 1.6–8.3)
NEUTROPHILS NFR BLD AUTO: 58 %
PLATELET # BLD AUTO: 282 10E3/UL (ref 150–450)
RBC # BLD AUTO: 4.32 10E6/UL (ref 3.8–5.2)
WBC # BLD AUTO: 5.5 10E3/UL (ref 4–11)

## 2021-08-30 PROCEDURE — 99214 OFFICE O/P EST MOD 30 MIN: CPT | Performed by: FAMILY MEDICINE

## 2021-08-30 PROCEDURE — 36415 COLL VENOUS BLD VENIPUNCTURE: CPT | Performed by: FAMILY MEDICINE

## 2021-08-30 PROCEDURE — 85025 COMPLETE CBC W/AUTO DIFF WBC: CPT | Performed by: FAMILY MEDICINE

## 2021-08-30 RX ORDER — LIDOCAINE 40 MG/G
CREAM TOPICAL
Status: CANCELLED | OUTPATIENT
Start: 2021-08-30

## 2021-08-30 RX ORDER — OMEPRAZOLE 40 MG/1
40 CAPSULE, DELAYED RELEASE ORAL DAILY PRN
COMMUNITY
Start: 2021-06-26 | End: 2022-02-22

## 2021-08-30 RX ORDER — ONDANSETRON 2 MG/ML
4 INJECTION INTRAMUSCULAR; INTRAVENOUS
Status: CANCELLED | OUTPATIENT
Start: 2021-08-30

## 2021-08-30 RX ORDER — DEXTROAMPHETAMINE SACCHARATE, AMPHETAMINE ASPARTATE, DEXTROAMPHETAMINE SULFATE AND AMPHETAMINE SULFATE 2.5; 2.5; 2.5; 2.5 MG/1; MG/1; MG/1; MG/1
1-2 TABLET ORAL
COMMUNITY
Start: 2021-03-31 | End: 2021-11-04

## 2021-08-30 RX ORDER — DEXTROAMPHETAMINE SACCHARATE, AMPHETAMINE ASPARTATE MONOHYDRATE, DEXTROAMPHETAMINE SULFATE AND AMPHETAMINE SULFATE 3.75; 3.75; 3.75; 3.75 MG/1; MG/1; MG/1; MG/1
15 CAPSULE, EXTENDED RELEASE ORAL
COMMUNITY
Start: 2021-03-31 | End: 2021-11-04

## 2021-08-30 RX ORDER — CHLORAL HYDRATE 500 MG
2 CAPSULE ORAL
Status: ON HOLD | COMMUNITY
End: 2022-02-11

## 2021-08-30 ASSESSMENT — MIFFLIN-ST. JEOR: SCORE: 1446.84

## 2021-08-30 NOTE — PROGRESS NOTES
[facility]  [address] [facility] [address]  Phone: [phone]  Fax: [phone]  Primary Provider: Taylor Womack        PREOPERATIVE EVALUATION:  Today's date: 8/30/2021    Georgie Scott is a 31 year old female who presents for a preoperative evaluation.    Surgical Information:  Surgery/Procedure: Colonoscopy  Surgery Location: [facility]  Surgeon: Dr. Thurston   Surgery Date: 08/31/2021  Time of Surgery: 12:55   Where patient plans to recover: At home with family  Fax number for surgical facility: Note does not need to be faxed, will be available electronically in Epic.    Type of Anesthesia Anticipated: Local with MAC    Assessment & Plan     ICD-10-CM    1. Preop general physical exam  Z01.818 HCG qualitative urine POCT, Enter/Edit Result     CBC with platelets and differential     CBC with platelets and differential       The proposed surgical procedure is considered LOW risk.           Risks and Recommendations:  The patient has the following additional risks and recommendations for perioperative complications:   - No identified additional risk factors other than previously addressed    Medication Instructions:  Hold Adderall on day of surgery    RECOMMENDATION:  APPROVAL GIVEN to proceed with proposed procedure, without further diagnostic evaluation.        Provider  Link to [facility] Help Grid :[phone]}    Subjective   Chief Complaint   Patient presents with     Pre-Op Exam     DOS 08/31/2021, Colonoscopy @ [facility] with Dr. Thurston       HPI related to upcoming procedure: Scheduled for an endoscopy and colonoscopy tomorrow.  She needs a preop clearance.  Denies any risk factors of being on blood thinners or any physical conditions.  She does take Adderall for ADHD and occasionally takes Flexeril.  There is family history of early colon cancer in paternal grandfather.    She is currently not on birth control and she is sexually active.  Last encounter about a week ago.   LMP around 8/10/2021.    Preop Questions 8/30/2021   1. Have you ever had a heart attack or stroke? No   2. Have you ever had surgery on your heart or blood vessels, such as a stent placement, a coronary artery bypass, or surgery on an artery in your head, neck, heart, or legs? No   3. Do you have chest pain with activity? No   4. Do you have a history of  heart failure? No   5. Do you currently have a cold, bronchitis or symptoms of other infection? No   6. Do you have a cough, shortness of breath, or wheezing? No   7. Do you or anyone in your family have previous history of blood clots? No   8. Do you or does anyone in your family have a serious bleeding problem such as prolonged bleeding following surgeries or cuts? No   9. Have you ever had problems with anemia or been told to take iron pills? No   10. Have you had any abnormal blood loss such as black, tarry or bloody stools, or abnormal vaginal bleeding? No   11. Have you ever had a blood transfusion? No   12. Are you willing to have a blood transfusion if it is medically needed before, during, or after your surgery? Yes   13. Have you or any of your relatives ever had problems with anesthesia? No   14. Do you have sleep apnea, excessive snoring or daytime drowsiness? No   15. Do you have any artifical heart valves or other implanted medical devices like a pacemaker, defibrillator, or continuous glucose monitor? No   16. Do you have artificial joints? No   17. Are you allergic to latex? No   18. Is there any chance that you may be pregnant? No       Health Care Directive:  Patient does not have a Health Care Directive or Living Will: Discussed advance care planning with patient; however, patient declined at this time.    Preoperative Review of :   reviewed - controlled substances reflected in medication list.      Status of Chronic Conditions:  ADHD:     Review of Systems  CONSTITUTIONAL: NEGATIVE for fever, chills, change in weight  INTEGUMENTARY/SKIN:  NEGATIVE for worrisome rashes, moles or lesions  EYES: NEGATIVE for vision changes or irritation  RESP: NEGATIVE for significant cough or SOB  CV: NEGATIVE for chest pain, palpitations or peripheral edema  GI: POSITIVE for heartburn or reflux and NEGATIVE for melena  : NEGATIVE for frequency, dysuria, or hematuria  MUSCULOSKELETAL: NEGATIVE for significant arthralgias or myalgia  NEURO: NEGATIVE for weakness, dizziness or paresthesias  ENDOCRINE: NEGATIVE for temperature intolerance, skin/hair changes  HEME: NEGATIVE for bleeding problems  PSYCHIATRIC: NEGATIVE for changes in mood or affect    Patient Active Problem List    Diagnosis Date Noted     Controlled substance agreement signed 07/09/2021     Priority: Medium     Bloating 06/26/2021     Priority: Medium     Dyspepsia 06/26/2021     Priority: Medium     Heartburn 06/26/2021     Priority: Medium     Nausea 06/26/2021     Priority: Medium     Rectal bleeding 06/26/2021     Priority: Medium     Cervical high risk HPV (human papillomavirus) test positive 03/31/2021     Priority: Medium     Formatting of this note might be different from the original.  10/1/14 NIL  1/5/18 NIL  3/31/21 NIL pap, +HR HPV (not 16/18). Plan: cotest in 1 year  4/8/21 pt notified       ADHD (attention deficit hyperactivity disorder), inattentive type 03/13/2019     Priority: Medium     Dysmenorrhea 08/01/2018     Priority: Medium     Persistent insomnia 01/05/2018     Priority: Medium      Past Medical History:   Diagnosis Date     ADHD      Cervical high risk HPV (human papillomavirus) test positive 3/31/2021    10/1/14 NIL 1/5/18 NIL 3/31/21 NIL pap, +HR HPV (not 16/18). Plan: cotest in 1 year     Medical history non-contributory 01/22/2019     Past Surgical History:   Procedure Laterality Date     OTHER SURGICAL HISTORY  01/22/2019    No surgery history     Current Outpatient Medications   Medication Sig Dispense Refill     amphetamine-dextroamphetamine (ADDERALL XR) 15 MG 24 hr  "capsule Take 15 mg by mouth       amphetamine-dextroamphetamine (ADDERALL) 10 MG tablet Take 1-2 tablets by mouth       cyclobenzaprine (FLEXERIL) 5 MG tablet TAKE ONE TABLET BY MOUTH THREE TIMES A DAY AS NEEDED FOR MUSCLE SPASMS. 30 tablet 0     Omega-3 1000 MG capsule Take 2 g by mouth       omeprazole (PRILOSEC) 40 MG DR capsule Take 40 mg by mouth         Allergies   Allergen Reactions     Bactrim [Sulfamethoxazole W/Trimethoprim] Hives     Amoxicillin Rash     Morphine Rash        Social History     Tobacco Use     Smoking status: Never Smoker     Smokeless tobacco: Never Used   Substance Use Topics     Alcohol use: Yes     Comment: moderate     Family History   Problem Relation Age of Onset     Hypertension Father      Hypertension Paternal Grandmother      Colon Cancer Paternal Grandfather      Diabetes Paternal Uncle      Diabetes Paternal Aunt      Cerebrovascular Disease Maternal Grandmother      Dementia Maternal Grandmother      Brain Cancer Maternal Grandfather      Diabetes Type 2  Paternal Uncle      Diabetes Type 1 Paternal Uncle      Diabetes Paternal Aunt      Diabetes Paternal Aunt      History   Drug Use No         Objective     /80 (BP Location: Left arm, Patient Position: Sitting, Cuff Size: Adult Regular)   Pulse 85   Ht 1.689 m (5' 6.5\")   Wt 71.2 kg (157 lb)   SpO2 100%   BMI 24.96 kg/m      Physical Exam  GENERAL APPEARANCE: healthy, alert and no distress  HENT: ear canals and TM's normal and nose and mouth without ulcers or lesions  RESP: lungs clear to auscultation - no rales, rhonchi or wheezes  CV: regular rate and rhythm, normal S1 S2, no S3 or S4 and no murmur, click or rub   ABDOMEN: soft, nontender, no HSM or masses and bowel sounds normal  NEURO: Normal strength and tone, sensory exam grossly normal, mentation intact and speech normal    Recent Labs   Lab Test 03/31/21  1234   HGB 13.6           Diagnostics:  No results found for this or any previous visit (from " the past 24 hour(s)).   No EKG required for low risk surgery (cataract, skin procedure, breast biopsy, etc).    Revised Cardiac Risk Index (RCRI):  The patient has the following serious cardiovascular risks for perioperative complications:   - No serious cardiac risks = 0 points     RCRI Interpretation: 0 points: Class I (very low risk - 0.4% complication rate)           Signed Electronically by: Daisha Kidd MD  Copy of this evaluation report is provided to requesting physician.

## 2021-08-30 NOTE — TELEPHONE ENCOUNTER
Upon review patient did keep scheduled 8/30/21 pre op exam appointment.     No further action needed at this time.     Melani Larry RN

## 2021-08-30 NOTE — PATIENT INSTRUCTIONS

## 2021-08-31 ENCOUNTER — ANESTHESIA (OUTPATIENT)
Dept: GASTROENTEROLOGY | Facility: CLINIC | Age: 31
End: 2021-08-31
Payer: COMMERCIAL

## 2021-08-31 ENCOUNTER — HOSPITAL ENCOUNTER (OUTPATIENT)
Facility: CLINIC | Age: 31
Discharge: HOME | End: 2021-08-31
Attending: INTERNAL MEDICINE | Admitting: INTERNAL MEDICINE
Payer: COMMERCIAL

## 2021-08-31 VITALS
HEIGHT: 66 IN | BODY MASS INDEX: 24.55 KG/M2 | RESPIRATION RATE: 16 BRPM | OXYGEN SATURATION: 100 % | SYSTOLIC BLOOD PRESSURE: 115 MMHG | DIASTOLIC BLOOD PRESSURE: 82 MMHG | TEMPERATURE: 98.3 F | WEIGHT: 152.78 LBS | HEART RATE: 78 BPM

## 2021-08-31 LAB
COLONOSCOPY: NORMAL
UPPER GI ENDOSCOPY: NORMAL

## 2021-08-31 PROCEDURE — 250N000011 HC RX IP 250 OP 636: Performed by: NURSE ANESTHETIST, CERTIFIED REGISTERED

## 2021-08-31 PROCEDURE — 370N000017 HC ANESTHESIA TECHNICAL FEE, PER MIN: Performed by: INTERNAL MEDICINE

## 2021-08-31 PROCEDURE — 250N000009 HC RX 250: Performed by: NURSE ANESTHETIST, CERTIFIED REGISTERED

## 2021-08-31 PROCEDURE — 88305 TISSUE EXAM BY PATHOLOGIST: CPT | Mod: 26 | Performed by: PATHOLOGY

## 2021-08-31 PROCEDURE — 45378 DIAGNOSTIC COLONOSCOPY: CPT | Performed by: INTERNAL MEDICINE

## 2021-08-31 PROCEDURE — 88305 TISSUE EXAM BY PATHOLOGIST: CPT | Mod: TC | Performed by: INTERNAL MEDICINE

## 2021-08-31 PROCEDURE — 91035 G-ESOPH REFLX TST W/ELECTROD: CPT | Performed by: INTERNAL MEDICINE

## 2021-08-31 PROCEDURE — 43239 EGD BIOPSY SINGLE/MULTIPLE: CPT

## 2021-08-31 PROCEDURE — 258N000003 HC RX IP 258 OP 636: Performed by: NURSE ANESTHETIST, CERTIFIED REGISTERED

## 2021-08-31 RX ORDER — ONDANSETRON 2 MG/ML
4 INJECTION INTRAMUSCULAR; INTRAVENOUS EVERY 6 HOURS PRN
Status: CANCELLED | OUTPATIENT
Start: 2021-08-31

## 2021-08-31 RX ORDER — NALOXONE HYDROCHLORIDE 0.4 MG/ML
0.2 INJECTION, SOLUTION INTRAMUSCULAR; INTRAVENOUS; SUBCUTANEOUS
Status: CANCELLED | OUTPATIENT
Start: 2021-08-31

## 2021-08-31 RX ORDER — FLUMAZENIL 0.1 MG/ML
0.2 INJECTION, SOLUTION INTRAVENOUS
Status: CANCELLED | OUTPATIENT
Start: 2021-08-31 | End: 2021-09-01

## 2021-08-31 RX ORDER — ONDANSETRON 4 MG/1
4 TABLET, ORALLY DISINTEGRATING ORAL EVERY 6 HOURS PRN
Status: CANCELLED | OUTPATIENT
Start: 2021-08-31

## 2021-08-31 RX ORDER — PROPOFOL 10 MG/ML
INJECTION, EMULSION INTRAVENOUS PRN
Status: DISCONTINUED | OUTPATIENT
Start: 2021-08-31 | End: 2021-08-31

## 2021-08-31 RX ORDER — FENTANYL CITRATE 50 UG/ML
25 INJECTION, SOLUTION INTRAMUSCULAR; INTRAVENOUS EVERY 5 MIN PRN
Status: CANCELLED | OUTPATIENT
Start: 2021-08-31

## 2021-08-31 RX ORDER — LIDOCAINE HYDROCHLORIDE 20 MG/ML
SOLUTION OROPHARYNGEAL PRN
Status: DISCONTINUED | OUTPATIENT
Start: 2021-08-31 | End: 2021-08-31

## 2021-08-31 RX ORDER — PROCHLORPERAZINE MALEATE 10 MG
10 TABLET ORAL EVERY 6 HOURS PRN
Status: CANCELLED | OUTPATIENT
Start: 2021-08-31

## 2021-08-31 RX ORDER — ONDANSETRON 2 MG/ML
4 INJECTION INTRAMUSCULAR; INTRAVENOUS EVERY 30 MIN PRN
Status: CANCELLED | OUTPATIENT
Start: 2021-08-31

## 2021-08-31 RX ORDER — HYDROMORPHONE HYDROCHLORIDE 1 MG/ML
0.2 INJECTION, SOLUTION INTRAMUSCULAR; INTRAVENOUS; SUBCUTANEOUS EVERY 5 MIN PRN
Status: CANCELLED | OUTPATIENT
Start: 2021-08-31

## 2021-08-31 RX ORDER — SODIUM CHLORIDE, SODIUM LACTATE, POTASSIUM CHLORIDE, CALCIUM CHLORIDE 600; 310; 30; 20 MG/100ML; MG/100ML; MG/100ML; MG/100ML
INJECTION, SOLUTION INTRAVENOUS CONTINUOUS
Status: CANCELLED | OUTPATIENT
Start: 2021-08-31

## 2021-08-31 RX ORDER — NALOXONE HYDROCHLORIDE 0.4 MG/ML
0.4 INJECTION, SOLUTION INTRAMUSCULAR; INTRAVENOUS; SUBCUTANEOUS
Status: CANCELLED | OUTPATIENT
Start: 2021-08-31

## 2021-08-31 RX ORDER — ONDANSETRON 4 MG/1
4 TABLET, ORALLY DISINTEGRATING ORAL EVERY 30 MIN PRN
Status: CANCELLED | OUTPATIENT
Start: 2021-08-31

## 2021-08-31 RX ORDER — SODIUM CHLORIDE, SODIUM LACTATE, POTASSIUM CHLORIDE, CALCIUM CHLORIDE 600; 310; 30; 20 MG/100ML; MG/100ML; MG/100ML; MG/100ML
INJECTION, SOLUTION INTRAVENOUS CONTINUOUS PRN
Status: DISCONTINUED | OUTPATIENT
Start: 2021-08-31 | End: 2021-08-31

## 2021-08-31 RX ORDER — OXYCODONE HYDROCHLORIDE 5 MG/1
5 TABLET ORAL EVERY 4 HOURS PRN
Status: CANCELLED | OUTPATIENT
Start: 2021-08-31

## 2021-08-31 RX ADMIN — PROPOFOL 50 MG: 10 INJECTION, EMULSION INTRAVENOUS at 15:03

## 2021-08-31 RX ADMIN — SODIUM CHLORIDE, POTASSIUM CHLORIDE, SODIUM LACTATE AND CALCIUM CHLORIDE: 600; 310; 30; 20 INJECTION, SOLUTION INTRAVENOUS at 14:59

## 2021-08-31 RX ADMIN — MIDAZOLAM 2 MG: 1 INJECTION INTRAMUSCULAR; INTRAVENOUS at 14:59

## 2021-08-31 RX ADMIN — PROPOFOL 150 MCG/KG/MIN: 10 INJECTION, EMULSION INTRAVENOUS at 15:03

## 2021-08-31 RX ADMIN — LIDOCAINE HYDROCHLORIDE 5 ML: 20 SOLUTION ORAL; TOPICAL at 15:00

## 2021-08-31 ASSESSMENT — MIFFLIN-ST. JEOR: SCORE: 1419.75

## 2021-08-31 NOTE — ANESTHESIA CARE TRANSFER NOTE
Patient: Georgie Scott    Procedure(s):  COLONOSCOPY  ESOPHAGOGASTRODUODENOSCOPY, WITH BRAVO PH MONITORING DEVICE INSERTION    Diagnosis: Gastroesophageal reflux disease, unspecified whether esophagitis present [K21.9]  Rectal bleeding [K62.5]  Family history of colon cancer [Z80.0]  Diagnosis Additional Information: No value filed.    Anesthesia Type:   General     Note:    Oropharynx: spontaneously breathing  Level of Consciousness: awake  Oxygen Supplementation: room air    Independent Airway: airway patency satisfactory and stable  Dentition: dentition unchanged  Vital Signs Stable: post-procedure vital signs reviewed and stable  Report to RN Given: handoff report given  Patient transferred to: Phase II    Handoff Report: Identifed the Patient, Identified the Reponsible Provider, Reviewed the pertinent medical history, Discussed the surgical course, Reviewed Intra-OP anesthesia mangement and issues during anesthesia, Set expectations for post-procedure period and Allowed opportunity for questions and acknowledgement of understanding      Vitals:  Vitals Value Taken Time   /8297.9    Temp     Pulse     Resp     SpO2         Electronically Signed By: ARI Garnica CRNA  August 31, 2021  3:55 PM

## 2021-08-31 NOTE — ANESTHESIA PREPROCEDURE EVALUATION
Anesthesia Pre-Procedure Evaluation    Patient: Georgie Scott   MRN: [id number] : 1990        Preoperative Diagnosis: Gastroesophageal reflux disease, unspecified whether esophagitis present [K21.9]  Rectal bleeding [K62.5]  Family history of colon cancer [Z80.0]   Procedure : Procedure(s):  COLONOSCOPY  ESOPHAGOGASTRODUODENOSCOPY, WITH BRAVO PH MONITORING DEVICE INSERTION     Past Medical History:   Diagnosis Date     ADHD      Cervical high risk HPV (human papillomavirus) test positive 3/31/2021    10/1/14 NIL 18 NIL 3/31/21 NIL pap, +HR HPV (not 16/18). Plan: cotest in 1 year     Medical history non-contributory 2019      Past Surgical History:   Procedure Laterality Date     OTHER SURGICAL HISTORY  2019    No surgery history      Allergies   Allergen Reactions     Bactrim [Sulfamethoxazole W/Trimethoprim] Hives     Amoxicillin Rash     Morphine Rash      Social History     Tobacco Use     Smoking status: Never Smoker     Smokeless tobacco: Never Used   Substance Use Topics     Alcohol use: Yes     Comment: moderate      Wt Readings from Last 1 Encounters:   21 71.2 kg (157 lb)        Anesthesia Evaluation   Pt has not had prior anesthetic         ROS/MED HX  ENT/Pulmonary:       Neurologic:  - neg neurologic ROS     Cardiovascular:  - neg cardiovascular ROS     METS/Exercise Tolerance:     Hematologic:  - neg hematologic  ROS     Musculoskeletal:  - neg musculoskeletal ROS     GI/Hepatic:     (+) GERD,     Renal/Genitourinary:  - neg Renal ROS     Endo:  - neg endo ROS     Psychiatric/Substance Use: Comment: ADHD, insomnia - neg psychiatric ROS     Infectious Disease:  - neg infectious disease ROS     Malignancy:  - neg malignancy ROS     Other:  - neg other ROS          Physical Exam    Airway  airway exam normal           Respiratory Devices and Support         Dental  no notable dental history         Cardiovascular   cardiovascular exam normal          Pulmonary   pulmonary  exam normal                OUTSIDE LABS:  CBC:   Lab Results   Component Value Date    WBC 5.5 08/30/2021    WBC 5.4 03/31/2021    HGB 13.3 08/30/2021    HGB 13.6 03/31/2021    HCT 39.0 08/30/2021    HCT 40.0 03/31/2021     08/30/2021     03/31/2021     BMP:   Lab Results   Component Value Date     01/07/2018    POTASSIUM 3.4 (L) 01/07/2018    CHLORIDE 105 01/07/2018    CO2 20 (L) 01/07/2018    BUN 6 (L) 01/07/2018    CR 0.76 01/07/2018    GLC 81 03/31/2021     01/07/2018     COAGS: No results found for: PTT, INR, FIBR  POC:   Lab Results   Component Value Date    HCG Negative 11/20/2018     HEPATIC:   Lab Results   Component Value Date    ALBUMIN 4.0 01/07/2018    PROTTOTAL 7.6 01/07/2018    ALT 13 01/07/2018    AST 17 01/07/2018    ALKPHOS 51 01/07/2018    BILITOTAL 2.9 (H) 01/07/2018     OTHER:   Lab Results   Component Value Date    BELA 9.6 01/07/2018    LIPASE 11 01/07/2018    TSH 1.26 07/09/2021       Anesthesia Plan    ASA Status:  1   NPO Status:  NPO Appropriate    Anesthesia Type: General.     - Airway: Native airway   Induction: Propofol.   Maintenance: TIVA.        Consents    Anesthesia Plan(s) and associated risks, benefits, and realistic alternatives discussed. Questions answered and patient/representative(s) expressed understanding.     - Discussed with:  Patient      - Extended Intubation/Ventilatory Support Discussed: No.      - Patient is DNR/DNI Status: No    Use of blood products discussed: No .     Postoperative Care            Comments:                Terrence Becerra MD

## 2021-08-31 NOTE — H&P
Georgie Scott  [id number]  female  31 year old      Reason for procedure/surgery: egd, colonoscopy, rectal bleeding, reflux    Patient Active Problem List   Diagnosis     Persistent insomnia     Dysmenorrhea     ADHD (attention deficit hyperactivity disorder), inattentive type     Bloating     Cervical high risk HPV (human papillomavirus) test positive     Controlled substance agreement signed     Dyspepsia     Heartburn     Nausea     Rectal bleeding       Past Surgical History:    Past Surgical History:   Procedure Laterality Date     OTHER SURGICAL HISTORY  01/22/2019    No surgery history       Past Medical History:   Past Medical History:   Diagnosis Date     ADHD      Cervical high risk HPV (human papillomavirus) test positive 3/31/2021    10/1/14 NIL 1/5/18 NIL 3/31/21 NIL pap, +HR HPV (not 16/18). Plan: cotest in 1 year     Medical history non-contributory 01/22/2019       Social History:   Social History     Tobacco Use     Smoking status: Never Smoker     Smokeless tobacco: Never Used   Substance Use Topics     Alcohol use: Yes     Comment: moderate       Family History:   Family History   Problem Relation Age of Onset     Hypertension Father      Hypertension Paternal Grandmother      Colon Cancer Paternal Grandfather      Diabetes Paternal Uncle      Diabetes Paternal Aunt      Cerebrovascular Disease Maternal Grandmother      Dementia Maternal Grandmother      Brain Cancer Maternal Grandfather      Diabetes Type 2  Paternal Uncle      Diabetes Type 1 Paternal Uncle      Diabetes Paternal Aunt      Diabetes Paternal Aunt        Allergies:   Allergies   Allergen Reactions     Bactrim [Sulfamethoxazole W/Trimethoprim] Hives     Amoxicillin Rash     Morphine Rash       Active Medications:   No current outpatient medications on file.       Systemic Review:   CONSTITUTIONAL: NEGATIVE for fever, chills, change in weight  ENT/MOUTH: NEGATIVE for ear, mouth and throat problems  RESP: NEGATIVE for significant  "cough or SOB  CV: NEGATIVE for chest pain, palpitations or peripheral edema    Physical Examination:   Vital Signs: /86   Pulse 112   Temp 98.4  F (36.9  C) (Oral)   Resp 14   Ht 1.676 m (5' 6\")   Wt 69.3 kg (152 lb 12.5 oz)   LMP 08/10/2021 (Within Days)   SpO2 100%   BMI 24.66 kg/m    GENERAL: healthy, alert and no distress  NECK: no adenopathy, no asymmetry, masses, or scars  RESP: lungs clear to auscultation - no rales, rhonchi or wheezes  CV: regular rate and rhythm, normal S1 S2, no S3 or S4, no murmur, click or rub, no peripheral edema and peripheral pulses strong  ABDOMEN: soft, nontender, no hepatosplenomegaly, no masses and bowel sounds normal  MS: no gross musculoskeletal defects noted, no edema    Plan: Appropriate to proceed as scheduled.      Billy Thurston DO  8/31/2021    PCP:  Taylor Womack    "

## 2021-08-31 NOTE — ANESTHESIA POSTPROCEDURE EVALUATION
Patient: Georgie Scott    Procedure(s):  COLONOSCOPY  ESOPHAGOGASTRODUODENOSCOPY, WITH BRAVO PH MONITORING DEVICE INSERTION    Diagnosis:Gastroesophageal reflux disease, unspecified whether esophagitis present [K21.9]  Rectal bleeding [K62.5]  Family history of colon cancer [Z80.0]  Diagnosis Additional Information: No value filed.    Anesthesia Type:  General    Note:  Disposition: Outpatient   Postop Pain Control: Uneventful            Sign Out: Well controlled pain   PONV: No   Neuro/Psych: Uneventful            Sign Out: Acceptable/Baseline neuro status   Airway/Respiratory: Uneventful            Sign Out: Acceptable/Baseline resp. status   CV/Hemodynamics: Uneventful            Sign Out: Acceptable CV status; No obvious hypovolemia; No obvious fluid overload   Other NRE: NONE   DID A NON-ROUTINE EVENT OCCUR? No           Last vitals:  Vitals Value Taken Time   /82 08/31/21 1630   Temp 36.8  C (98.3  F) 08/31/21 1630   Pulse 78 08/31/21 1600   Resp 16 08/31/21 1630   SpO2 100 % 08/31/21 1630       Electronically Signed By: Jaron Guerrero MD  August 31, 2021  5:59 PM

## 2021-09-01 LAB
PATH REPORT.COMMENTS IMP SPEC: NORMAL
PATH REPORT.FINAL DX SPEC: NORMAL
PATH REPORT.GROSS SPEC: NORMAL
PATH REPORT.MICROSCOPIC SPEC OTHER STN: NORMAL
PATH REPORT.RELEVANT HX SPEC: NORMAL
PHOTO IMAGE: NORMAL

## 2021-09-17 ENCOUNTER — TRANSFERRED RECORDS (OUTPATIENT)
Dept: HEALTH INFORMATION MANAGEMENT | Facility: CLINIC | Age: 31
End: 2021-09-17

## 2021-09-20 NOTE — TELEPHONE ENCOUNTER
Central PA team  [phone]  Pool: HE PA MED ([id number])          PA has been initiated.       PA completed on [employer] web portal    Your Tracking Number is [id number]        Response will be received via fax and may take up to 5-10 business days depending on plan         Hospitalist Progress Note      PCP: No primary care provider on file. Date of Admission: 2021    Chief Complaint on Admission: SOB    Pt Seen/Examined and Chart Reviewed. Admitting dx covid pneumonia    SUBJECTIVE:     Pt very SOB with talking, on bipap 15/6/100%, desats easily, not sleeping much, eating a few bites of food, no diarrhea      OBJECTIVE:   Vitals    TEMPERATURE:  Current - Temp: 98.5 °F (36.9 °C); Max - Temp  Av.8 °F (36.6 °C)  Min: 97.5 °F (36.4 °C)  Max: 98.5 °F (36.9 °C)  RESPIRATIONS RANGE: Resp  Av.4  Min: 25  Max: 32  PULSE RANGE: Pulse  Av.8  Min: 81  Max: 91  BLOOD PRESSURE RANGE:  Systolic (86WTY), HIK:850 , Min:119 , FJY:453   ; Diastolic (23UHG), VYT:65, Min:77, Max:85    PULSE OXIMETRY RANGE: SpO2  Av.7 %  Min: 90 %  Max: 94 %  24HR INTAKE/OUTPUT:      Intake/Output Summary (Last 24 hours) at 2021 1351  Last data filed at 2021 0720  Gross per 24 hour   Intake --   Output 1100 ml   Net -1100 ml       Exam:    General appearance: Well,  appears stated age and cooperative. HEENT Normal cephalic, atraumatic without obvious deformity. Pupils equal, round, and reactive to light. Extra ocular muscles intact. Conjunctivae/corneas clear. Neck: Supple, No jugular venous distention/bruits. Trachea midline without thyromegaly or adenopathy with full range of motion. Lungs: severe resp distress, tachypneic, bilateral coarse rales, no wheezing  Heart: tachycardic rate, regular rhythm with Normal S1/S2 without  murmurs, rubs or gallops, point of maximum impulse non-displaced  Abdomen: Soft, non-tender or non-distended without rigidity or guarding and positive bowel sounds all four quadrants. Extremities: No clubbing, cyanosis, or edema bilaterally. Full range of motion without deformity  Skin: Skin color, texture, turgor normal. No rashes or lesions.   Neurologic: Alert and oriented X 3,  neurovascularly intact with sensory/motor intact upper extremities/lower extremities, bilaterally. Cranial nerves:II-XII intact, grossly non-focal.  Mental status: Alert, oriented, thought content appropriate. Data    Recent Labs     09/18/21  0720 09/19/21  1300 09/20/21  0600   WBC 6.9 10.3 9.1   HGB 15.6 16.4 16.6   HCT 48.4 50.5 51.8    270 279      Recent Labs     09/18/21  1639 09/19/21  1300 09/20/21  0600    139 139   K 4.8 5.1 5.2*    102 102   CO2 25 22 23   PHOS  --  3.4  --    BUN 23 27* 31*   CREATININE 0.7* 0.7* 0.7*     Recent Labs     09/18/21  1639 09/19/21  1300 09/20/21  0600   AST 54* 53* 57*   ALT 48* 53* 68*   BILITOT 0.6 0.7 0.9   ALKPHOS 122 128 135*     No results for input(s): INR in the last 72 hours. No results for input(s): CKTOTAL, CKMB, CKMBINDEX, TROPONINI in the last 72 hours.     Imaging/Test Results            Consults:     IP CONSULT TO HOSPITALIST  IP CONSULT TO PHARMACY  IP CONSULT TO INFECTIOUS DISEASES  IP CONSULT TO PHARMACY  IP CONSULT TO PULMONOLOGY    Allergies  Percocet [oxycodone-acetaminophen]    Medications      Scheduled Meds:   methylPREDNISolone  80 mg IntraVENous Daily    HYDROcodone 5 mg - acetaminophen  1 tablet Oral Q12H    vitamin C  1,000 mg Oral BID    zinc sulfate  50 mg Oral BID    Vitamin D  1,000 Units Oral Daily    baricitinib  4 mg Oral Daily    sodium chloride flush  5-40 mL IntraVENous 2 times per day    enoxaparin  30 mg SubCUTAneous BID    bisacodyl  5 mg Oral Daily    albuterol sulfate HFA  2 puff Inhalation 4x daily    carvedilol  6.25 mg Oral BID    divalproex  250 mg Oral Daily    furosemide  20 mg Oral Daily    pantoprazole  40 mg Oral QAM AC    montelukast  10 mg Oral QPM    fluticasone  2 puff Inhalation BID    pravastatin  40 mg Oral Dinner    tiotropium-olodaterol  2 puff Inhalation BID    diclofenac-miSOPROStol  1 tablet Oral BID       Infusions:   sodium chloride         PRN Meds:  LORazepam, benzonatate, sodium chloride flush, sodium chloride,

## 2021-09-21 ENCOUNTER — TELEPHONE (OUTPATIENT)
Dept: PHYSICAL MEDICINE AND REHAB | Facility: CLINIC | Age: 31
End: 2021-09-21

## 2021-09-21 NOTE — TELEPHONE ENCOUNTER
PSP:  Kay GAGE  Last clinic visit:  10/28/2020  Reason for call: Injection order request  Clinical information:  Pt calling inquiring about repeat C7-T1 ILESI. Pt last had this on 11/20/2020 and reports significant relief of her symptoms for almost 1 year. Her same pain has returned. She inquires if Kay would like her to follow-up with her or if she could be scheduled for another injection.   Advice given to patient: Informed pt that provider would be updated with her request. Pt states Kay can send her a [employer] message with a response, or a nurse can call her.   Provider to address: Follow-up vs. Repeat injection

## 2021-09-21 NOTE — TELEPHONE ENCOUNTER
She should follow up with me since it has been 11 months since her last evaluation.  Please assist with scheduling.

## 2021-09-22 NOTE — TELEPHONE ENCOUNTER
Phone call to patient to discuss this with patient. Left detailed message on dedicated voicemail. Encouraged pt to call nurse navigation line if questions regarding the message left.

## 2021-09-30 ENCOUNTER — OFFICE VISIT (OUTPATIENT)
Dept: PHYSICAL MEDICINE AND REHAB | Facility: CLINIC | Age: 31
End: 2021-09-30
Payer: COMMERCIAL

## 2021-09-30 VITALS
HEART RATE: 80 BPM | BODY MASS INDEX: 24.48 KG/M2 | HEIGHT: 67 IN | SYSTOLIC BLOOD PRESSURE: 117 MMHG | WEIGHT: 156 LBS | DIASTOLIC BLOOD PRESSURE: 78 MMHG

## 2021-09-30 DIAGNOSIS — M54.12 CERVICAL RADICULAR PAIN: Primary | ICD-10-CM

## 2021-09-30 DIAGNOSIS — M79.18 MYOFASCIAL PAIN: ICD-10-CM

## 2021-09-30 PROCEDURE — 99214 OFFICE O/P EST MOD 30 MIN: CPT | Performed by: PHYSICIAN ASSISTANT

## 2021-09-30 ASSESSMENT — MIFFLIN-ST. JEOR: SCORE: 1442.3

## 2021-09-30 ASSESSMENT — PAIN SCALES - GENERAL: PAINLEVEL: MILD PAIN (2)

## 2021-09-30 NOTE — PROGRESS NOTES
Assessment:   Georgie Scott is a 31 y.o. y.o. female with past medical history significant for ADHD who presents today for follow-up regarding chronic right neck pain with radiation into the right upper trapezius muscle.  My review event MRI cervical spine shows mild cervical spondylosis with mild right foraminal stenosis C3-4 and C5-6 related to disc bulges. EMG bilateral upper extremities normal.  Pain is thought to be related to a proximal right C6 radiculitis.  She has secondary myofascial pain.  Patient is status post a C7-T1 interlaminar epidural steroid injection November 20, 2020 which provided 90% relief of her pain for about 8 months.  Over the past 1 to 2 months, same pain returned.  She is neurologically intact.           Plan:     A shared decision making plan was used.  The patient's values and choices were respected.  The following represents what was discussed and decided upon by the physician assistant and the patient.      1.  DIAGNOSTIC TESTS: I reviewed the MRI cervical spine and EMG bilateral upper extremities.  No further diagnostic tests were ordered.    2.  PHYSICAL THERAPY:   -Patient completed 9 sessions physical therapy November 4, 2020.  She does her home exercises.  No further physical therapy was ordered.  -Patient also completed 11 sessions of occupational therapy July 13, 2021 for wrist pain.    3.  MEDICATIONS:  -Patient requests oral sedation for her procedure.  [place] the next 2 weeks and will not be able to get to her pharmacy here.  I made a reminder to prescribe Valium 5 mg, #2 with no refills on October 14, 2021.  I notified her that Dr. Ramos will call her to review the risks of the procedure.  -No other changes are made to the patient's medications.  -Patient can continue cyclobenzaprine 5 mg at bedtime as needed.  -Patient can continues ibuprofen as needed.    4.  INTERVENTIONS:   -I offered the patient a repeat C7-T1 interlaminar epidural  steroid injection.  Patient indicated she would like to proceed and an order was placed.  -If left-sided neck pain were to return, recommend repeating the left C5-6, C6-7, C7-T1 facet joint injection.    5.  PATIENT EDUCATION: Patient is in agreement the above plan.  All questions were answered.    6.  FOLLOW-UP: As long as patient has expected relief after her C7-T1 interlaminar epidural steroid injection she can follow-up with me as needed.  If she fails to have significant relief she should follow up with me for a 2-week post procedure follow-up visit.  If she has questions or concerns in the meantime, she should not hesitate to call.    Subjective:     Georgie Scott is a 31 y.o. female who presents today for follow-up regarding chronic right neck pain with radiation into the right upper trapezius muscle.  Patient had a C7-T1 interlaminar epidural steroid injection November 20, 2020.  Patient reports the injection provided 90% relief of her pain for about 8 months.  Over the past 1 to 2 months, same pain returned.  She denies any injury or event to cause the recurrent pain.    Patient complains of right-sided neck pain.  Pain radiates into the right upper trapezius muscle to the shoulder.  Pain does not radiate further down the arm.  Pain is radiating up into the right occipital region causing a headache.  She denies numbness, tingling, weakness.  She rates her pain today as a 2 out of 10.  At its worst it is a 7 out of 10.  At its best it is a 0-10.  Pain is aggravated with turning her head and moving her neck.  Pain is alleviated with lying down.    Patient completed 9 sessions of medics physical therapy November 4, 2020.  She does her home exercises.  She takes cyclobenzaprine at bedtime as needed.  She uses ibuprofen as needed.  Medications are somewhat helpful.    Past medical history is reviewed.    Review of Systems:  Positive for headache.  Negative for numbness/tingling, weakness, loss of  bowel/bladder control, inability to urinate, pain much worse at night, trip/stumble/falls, difficulty swallowing, difficulty with hand skills, fevers, unintentional weight loss.     Objective:   CONSTITUTIONAL:  Vital signs as above.  No acute distress.  The patient is well nourished and well groomed.    PSYCHIATRIC:  The patient is awake, alert, oriented to person, place and time.  The patient is answering questions appropriately with clear speech.  Normal affect.  HEENT: Normocephalic, atraumatic.  Sclera clear.  Neck is supple.  SKIN:  Skin over the face, neck bilateral upper extremities is clean, dry, intact without rashes.  MUSCULOSKELETAL: The patient has 5/5 strength for the bilateral shoulder abductors, elbow flexors/extensors, wrist extensors, finger flexors/abductors.  Tender to palpation right lower cervical paraspinous muscles.  Mild tenderness palpation with hypertonicity right upper trapezius muscle.    NEUROLOGICAL: 1-2+ bilateral biceps, triceps, brachioradialis reflexes.  Negative Johnson's bilaterally.  Sensation to light touch is intact over bilateral upper extremities throughout.     RESULTS: I reviewed the MRI cervical spine from [facility] dated August 27, 2019.  There is no evidence of cervical cord signal abnormality or high-grade spinal canal stenosis.  There is mild spondylosis with mild right C3-4 and C5-6 foraminal stenosis, similar to MRI cervical spine from March 2019.  Please see report for further details.    I also reviewed flexion-extension cervical spine x-rays from outside facility dated August 8, 2019.  I was not able to find the radiology report.  I do not appreciate any gross dynamic instability.    EMG bilateral upper extremity September 4, 2020:  EMG/NCS  results: Please see scanned full report     Comment NCS: Normal study  1.  Normal nerve conduction studies bilateral upper extremities.     Comment EMG: Normal study  1.  Normal needle EMG bilateral upper  extremities.     Interpretation: Normal study     1. There is no electrodiagnostic evidence of cervical radiculopathy, brachial plexopathy, or focal neuropathy in the bilateral upper extremities.  Specifically, there is no electrodiagnostic evidence of median neuropathy at the wrist in the bilateral upper extremities.

## 2021-09-30 NOTE — LETTER
9/30/2021         RE: Georgie Scott  [address]  [place] [address]        Dear Colleague,    Thank you for referring your patient, Georgie Scott, to the [facility] [place]. Please see a copy of my visit note below.    Assessment:   Georgie Scott is a 31 y.o. y.o. female with past medical history significant for ADHD who presents today for follow-up regarding chronic right neck pain with radiation into the right upper trapezius muscle.  My review event MRI cervical spine shows mild cervical spondylosis with mild right foraminal stenosis C3-4 and C5-6 related to disc bulges. EMG bilateral upper extremities normal.  Pain is thought to be related to a proximal right C6 radiculitis.  She has secondary myofascial pain.  Patient is status post a C7-T1 interlaminar epidural steroid injection November 20, 2020 which provided 90% relief of her pain for about 8 months.  Over the past 1 to 2 months, same pain returned.  She is neurologically intact.           Plan:     A shared decision making plan was used.  The patient's values and choices were respected.  The following represents what was discussed and decided upon by the physician assistant and the patient.      1.  DIAGNOSTIC TESTS: I reviewed the MRI cervical spine and EMG bilateral upper extremities.  No further diagnostic tests were ordered.    2.  PHYSICAL THERAPY:   -Patient completed 9 sessions physical therapy November 4, 2020.  She does her home exercises.  No further physical therapy was ordered.  -Patient also completed 11 sessions of occupational therapy July 13, 2021 for wrist pain.    3.  MEDICATIONS:  -Patient requests oral sedation for her procedure.  She is going to be out of [place] for the next 2 weeks and will not be able to get to her pharmacy here.  I made a reminder to prescribe Valium 5 mg, #2 with no refills on October 14, 2021.  I notified her that Dr. Ramos will call her to review the risks of the  procedure.  -No other changes are made to the patient's medications.  -Patient can continue cyclobenzaprine 5 mg at bedtime as needed.  -Patient can continues ibuprofen as needed.    4.  INTERVENTIONS:   -I offered the patient a repeat C7-T1 interlaminar epidural steroid injection.  Patient indicated she would like to proceed and an order was placed.  -If left-sided neck pain were to return, recommend repeating the left C5-6, C6-7, C7-T1 facet joint injection.    5.  PATIENT EDUCATION: Patient is in agreement the above plan.  All questions were answered.    6.  FOLLOW-UP: As long as patient has expected relief after her C7-T1 interlaminar epidural steroid injection she can follow-up with me as needed.  If she fails to have significant relief she should follow up with me for a 2-week post procedure follow-up visit.  If she has questions or concerns in the meantime, she should not hesitate to call.    Subjective:     Georgie Scott is a 31 y.o. female who presents today for follow-up regarding chronic right neck pain with radiation into the right upper trapezius muscle.  Patient had a C7-T1 interlaminar epidural steroid injection November 20, 2020.  Patient reports the injection provided 90% relief of her pain for about 8 months.  Over the past 1 to 2 months, same pain returned.  She denies any injury or event to cause the recurrent pain.    Patient complains of right-sided neck pain.  Pain radiates into the right upper trapezius muscle to the shoulder.  Pain does not radiate further down the arm.  Pain is radiating up into the right occipital region causing a headache.  She denies numbness, tingling, weakness.  She rates her pain today as a 2 out of 10.  At its worst it is a 7 out of 10.  At its best it is a 0-10.  Pain is aggravated with turning her head and moving her neck.  Pain is alleviated with lying down.    Patient completed 9 sessions of medics physical therapy November 4, 2020.  She does her home  exercises.  She takes cyclobenzaprine at bedtime as needed.  She uses ibuprofen as needed.  Medications are somewhat helpful.    Past medical history is reviewed.    Review of Systems:  Positive for headache.  Negative for numbness/tingling, weakness, loss of bowel/bladder control, inability to urinate, pain much worse at night, trip/stumble/falls, difficulty swallowing, difficulty with hand skills, fevers, unintentional weight loss.     Objective:   CONSTITUTIONAL:  Vital signs as above.  No acute distress.  The patient is well nourished and well groomed.    PSYCHIATRIC:  The patient is awake, alert, oriented to person, place and time.  The patient is answering questions appropriately with clear speech.  Normal affect.  HEENT: Normocephalic, atraumatic.  Sclera clear.  Neck is supple.  SKIN:  Skin over the face, neck bilateral upper extremities is clean, dry, intact without rashes.  MUSCULOSKELETAL: The patient has 5/5 strength for the bilateral shoulder abductors, elbow flexors/extensors, wrist extensors, finger flexors/abductors.  Tender to palpation right lower cervical paraspinous muscles.  Mild tenderness palpation with hypertonicity right upper trapezius muscle.    NEUROLOGICAL: 1-2+ bilateral biceps, triceps, brachioradialis reflexes.  Negative Johnson's bilaterally.  Sensation to light touch is intact over bilateral upper extremities throughout.     RESULTS: I reviewed the MRI cervical spine from [facility] dated August 27, 2019.  There is no evidence of cervical cord signal abnormality or high-grade spinal canal stenosis.  There is mild spondylosis with mild right C3-4 and C5-6 foraminal stenosis, similar to MRI cervical spine from March 2019.  Please see report for further details.    I also reviewed flexion-extension cervical spine x-rays from outside facility dated August 8, 2019.  I was not able to find the radiology report.  I do not appreciate any gross dynamic instability.    EMG bilateral  upper extremity September 4, 2020:  EMG/NCS  results: Please see scanned full report     Comment NCS: Normal study  1.  Normal nerve conduction studies bilateral upper extremities.     Comment EMG: Normal study  1.  Normal needle EMG bilateral upper extremities.     Interpretation: Normal study     1. There is no electrodiagnostic evidence of cervical radiculopathy, brachial plexopathy, or focal neuropathy in the bilateral upper extremities.  Specifically, there is no electrodiagnostic evidence of median neuropathy at the wrist in the bilateral upper extremities.          Again, thank you for allowing me to participate in the care of your patient.        Sincerely,        Kay Mcrae PA-C

## 2021-09-30 NOTE — PATIENT INSTRUCTIONS
A C7/T1 epidural steroid injection has been ordered today.      Please note that this injection uses cortisone.  The cortisone may somewhat weaken the immune system.  It is unknown how much the immune system is weakened.  It is unknown if it is weakened to the point that you may be more likely to get the COVID-19 virus, or if you do get the COVID-19 virus, if you would be sicker than you would have been if you had not had the cortisone injection.  If you do not wish to proceed with the injection, please let the nurse/physician know and do NOT schedule the injection.    Please note that since your immune system is weakened from the cortisone, having a flu vaccine/shot may be less effective if you have this vaccine within 2 weeks from your cortisone injection.  It is advised to wait 2 weeks after your cortisone injection to have the flu shot (or if you have the flu shot first, wait 2 weeks before you have the cortisone injection).    Please schedule this injection at least 1 week  from now to allow time for insurance prior authorization.  On the day of your injection, you cannot be sick or taking antibiotics.  If you become sick and are prescribed, please call the clinic so your injection can be rescheduled for once you have completed your antibiotics.  You will need to bring a  with you for your injection.   If you have any questions or concerns prior to your injection, please do not hesitate to call the nurse navigation line at [phone] or contact Kay Mcrae through [facility].

## 2021-10-09 ENCOUNTER — HEALTH MAINTENANCE LETTER (OUTPATIENT)
Age: 31
End: 2021-10-09

## 2021-10-13 NOTE — PROGRESS NOTES
Outpatient Occupational Therapy Discharge Note     Patient: Georgie Scott  : 1990    Beginning/End Dates of Reporting Period:  21 to 21    Referring Provider: Dr. Nora Marrero    Therapy Diagnosis: bilateral wrist pain    Client Self Report: Patient reports improvement in thumb(lateral wrist) pain. Patient also reports that she has not has any tingling in hands since the initial iontophoresis treatments.    Goals: Goals partially met    Plan:  Discharge from therapy.

## 2021-10-13 NOTE — ADDENDUM NOTE
Encounter addended by: Verito Doran, OT on: 10/13/2021 1:24 PM   Actions taken: Episode resolved, Clinical Note Signed

## 2021-10-14 DIAGNOSIS — F41.9 ANXIETY DUE TO INVASIVE PROCEDURE: Primary | ICD-10-CM

## 2021-10-14 RX ORDER — DIAZEPAM 5 MG
TABLET ORAL
Qty: 2 TABLET | Refills: 0 | Status: SHIPPED | OUTPATIENT
Start: 2021-10-14 | End: 2021-11-11

## 2021-10-26 ENCOUNTER — TELEPHONE (OUTPATIENT)
Dept: PHYSICAL MEDICINE AND REHAB | Facility: CLINIC | Age: 31
End: 2021-10-26

## 2021-10-26 NOTE — TELEPHONE ENCOUNTER
PHONE CONSENT:    The patient wished to take pre-procedure oral sedation so a phone consent was obtained prior to their injection.  The patient has been offered other conservative treatment (physical therapy, medications, etc.) but has opted to proceed with an injection.  The risks and benefits of the injection (C7-T1 interlaminar epidural steroid injection) were discussed with the patient over the phone on 10-26-21 at 14:10 pm.  Risks of the injection include infection, bleeding, damage to surrounding structures, nerve injury, permanent weakness, permanent paralysis, worsened pain, soreness, headache, allergic reaction, no change in pain.      Her second dose of the COVID vaccine was 2-1-21.    The patient understands that they cannot have symptoms of an infection or be on antibiotics at the time of the injection as this would increase their risk of infection. If they start antibiotics prior to the procedure, they should call the clinic to see if the procedure will need to be rescheduled.  The patient understands that if they start any blood thinners prior to the injection, the provider must be notified immediately.  The patient denies any allergies to iodine contrast dye or iodine products.  The patient denies any symptoms of an active infection (cough, fevers, myalgias) and denies taking antibiotics.  The patient knows not to come in to clinic if they have any symptoms of an active infection, particularly cough, fevers, myalgias.   The patient denies taking any prescription blood thinning medications. The patient denies any allergies to iodine or iodine contrast.       The patient verbalized understanding of the information given. The patient was given the opportunity to ask questions of the physician.  The patient elected to proceed and gave consent over the phone with Nate Breyer, RN as a witness.  Informed consent form was signed by the physician and the witness.

## 2021-10-29 ENCOUNTER — ANCILLARY PROCEDURE (OUTPATIENT)
Dept: PHYSICAL MEDICINE AND REHAB | Facility: CLINIC | Age: 31
End: 2021-10-29
Attending: PHYSICIAN ASSISTANT
Payer: COMMERCIAL

## 2021-10-29 VITALS
TEMPERATURE: 98.8 F | DIASTOLIC BLOOD PRESSURE: 64 MMHG | SYSTOLIC BLOOD PRESSURE: 108 MMHG | HEART RATE: 93 BPM | OXYGEN SATURATION: 99 %

## 2021-10-29 DIAGNOSIS — M54.12 CERVICAL RADICULAR PAIN: ICD-10-CM

## 2021-10-29 PROCEDURE — 99207 PAIN INTERLAMINAR EPIDURAL STEROID INJECTION CERVICAL: CPT | Performed by: PHYSICAL MEDICINE & REHABILITATION

## 2021-10-29 PROCEDURE — 62321 NJX INTERLAMINAR CRV/THRC: CPT | Performed by: PHYSICAL MEDICINE & REHABILITATION

## 2021-10-29 RX ORDER — METHYLPREDNISOLONE ACETATE 80 MG/ML
INJECTION, SUSPENSION INTRA-ARTICULAR; INTRALESIONAL; INTRAMUSCULAR; SOFT TISSUE
Status: COMPLETED | OUTPATIENT
Start: 2021-10-29 | End: 2021-10-29

## 2021-10-29 RX ORDER — LIDOCAINE HYDROCHLORIDE 10 MG/ML
INJECTION, SOLUTION EPIDURAL; INFILTRATION; INTRACAUDAL; PERINEURAL
Status: COMPLETED | OUTPATIENT
Start: 2021-10-29 | End: 2021-10-29

## 2021-10-29 RX ADMIN — METHYLPREDNISOLONE ACETATE 80 MG: 80 INJECTION, SUSPENSION INTRA-ARTICULAR; INTRALESIONAL; INTRAMUSCULAR; SOFT TISSUE at 08:07

## 2021-10-29 RX ADMIN — LIDOCAINE HYDROCHLORIDE 2 ML: 10 INJECTION, SOLUTION EPIDURAL; INFILTRATION; INTRACAUDAL; PERINEURAL at 08:07

## 2021-10-29 ASSESSMENT — PAIN SCALES - GENERAL
PAINLEVEL: MILD PAIN (2)
PAINLEVEL: NO PAIN (0)

## 2021-10-29 NOTE — PATIENT INSTRUCTIONS
Please follow up two weeks post procedure with Kay if your pain worsens or fails to improve. Otherwise follow up as needed.       DISCHARGE INSTRUCTIONS    During office hours (8:00 a.m.- 4:00 p.m.) questions or concerns may be answered  by calling [facility] Navigation Nurses at  [phone].  Messages received after hours will be returned the following business day.      In the case of an emergency, please dial 911 or seek assistance at the nearest Emergency Room/Urgent Care facility.     All Patients:    ? You may experience an increase in your symptoms for the first 2 days (It may take anywhere between 2 days- 2 weeks for the steroid to have maximum effect).    ? You may use ice on the injection site, as frequently as 20 minutes each hour if needed.    ? You may take your pain medicine.    ? You may continue taking your regular medication after your injection. If you have had a Medial Branch Block you may resume pain medication once your pain diary is completed.    ? You may shower. No swimming, tub bath or hot tub for 48 hours.  You may remove your bandaid/bandage as soon as you are home.    ? You may resume light activities, as tolerated.    ? Resume your usual diet as tolerated.    ? It is strongly advised that you do not drive for 1-3 hours post injection.    ? If you have had oral sedation:  Do not drive for 8 hours post injection.      ? If you have had IV sedation:  Do not drive for 24 hours post injection.  Do not operate hazardous machinery or make important personal/business decisions for 24 hours.      POSSIBLE STEROID SIDE EFFECTS (If steroid/cortisone was used for your procedure)    -If you experience these symptoms, it should only last for a short period      Swelling of the legs                Skin redness (flushing)       Mouth (oral) irritation     Blood sugar (glucose) levels              Sweats                      Mood changes    Headache    Sleeplessness    Weakened immune system for up  to 14 days, which could increase the risk of rony the COVID-19 virus and/or experiencing more severe symptoms of the disease, if exposed.    Decreased effectiveness of the flu vaccine if given within 2 weeks of the steroid.         POSSIBLE PROCEDURE SIDE EFFECTS  -Call the Spine Center if you are concerned    Increased Pain             Increased numbness/tingling        Nausea/Vomiting            Bruising/bleeding at site        Redness or swelling                                                Difficulty walking        Weakness             Fever greater than 100.5    *In the event of a severe headache after an epidural steroid injection that is relieved by lying down, please call the [facility] to speak with a clinical staff member*

## 2021-11-11 ENCOUNTER — VIRTUAL VISIT (OUTPATIENT)
Dept: OBGYN | Facility: CLINIC | Age: 31
End: 2021-11-11
Payer: COMMERCIAL

## 2021-11-11 DIAGNOSIS — Z30.09 STERILIZATION CONSULT: ICD-10-CM

## 2021-11-11 DIAGNOSIS — N92.0 MENORRHAGIA WITH REGULAR CYCLE: Primary | ICD-10-CM

## 2021-11-11 PROCEDURE — 99214 OFFICE O/P EST MOD 30 MIN: CPT | Mod: 95 | Performed by: OBSTETRICS & GYNECOLOGY

## 2021-11-11 NOTE — PROGRESS NOTES
Georgie is a 31 year old who is being evaluated via a billable video visit.  The visit started as a video visit but she didn't have reception, so it was changed to a phone visit.    How would you like to obtain your AVS? Ariellehart  If the video visit is dropped, the invitation should be resent by: Text to cell phone: [phone]  Will anyone else be joining your video visit? No      Video Start Time: 11:25 AM    CC: Georgie Scott is being seen via a video converted to phone visit secondary to heavy periods and unwanted fertility.    HPI: The pt is a 31 year old SWF  who presents with knowing she doesn't want any children.  She recently got engaged, and they had a long conversation around whether they wanted children or not.  They agreed that they don't want children.  She also has been dealing with heavy periods along with luteal phase bleeding for a long time.  She has tried NuvaRing, OCPs, luteal progesterone, Lysteda, and acupuncture, all with either side effects, lack of efficacy, or both.  She has had ultrasounds; the first showed a small fibroid (7 x 6 x 5 mm), the second didn't show it anymore.  Her mother and grandmother both had hysterectomies at age 32 and 40 respectively secondary to fibroids.  Her sister also has period issues.  She is ready to be done with her periods as they have a negative impact on her day to day life.    Past Medical History:   Diagnosis Date     ADHD      Cervical high risk HPV (human papillomavirus) test positive 3/31/2021    10/1/14 NIL 1/5/18 NIL 3/31/21 NIL pap, +HR HPV (not 16/18). Plan: cotest in 1 year     Medical history non-contributory 01/22/2019       Past Surgical History:   Procedure Laterality Date     COLONOSCOPY N/A 8/31/2021    Procedure: COLONOSCOPY;  Surgeon: Billy Thurston DO;  Location:  GI     OTHER SURGICAL HISTORY  01/22/2019    No surgery history       Patient's   Family History   Problem Relation Age of Onset     Hypertension Father       Hypertension Paternal Grandmother      Colon Cancer Paternal Grandfather      Diabetes Paternal Uncle      Diabetes Paternal Aunt      Cerebrovascular Disease Maternal Grandmother      Dementia Maternal Grandmother      Brain Cancer Maternal Grandfather      Diabetes Type 2  Paternal Uncle      Diabetes Type 1 Paternal Uncle      Diabetes Paternal Aunt      Diabetes Paternal Aunt        Patient   Social History     Socioeconomic History     Marital status: Single     Spouse name: Not on file     Number of children: Not on file     Years of education: Not on file     Highest education level: Not on file   Occupational History     Not on file   Tobacco Use     Smoking status: Never Smoker     Smokeless tobacco: Never Used   Substance and Sexual Activity     Alcohol use: Yes     Comment: moderate     Drug use: No     Sexual activity: Yes     Partners: Male   Other Topics Concern     Parent/sibling w/ CABG, MI or angioplasty before 65F 55M? 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 MAY FILL 9/8 OR LATER. 30 capsule 0     amphetamine-dextroamphetamine (ADDERALL) 10 MG tablet Take 1-2 tablets (10-20 mg) by mouth daily 40 tablet 0     cyclobenzaprine (FLEXERIL) 5 MG tablet TAKE ONE TABLET BY MOUTH THREE TIMES A DAY AS NEEDED FOR MUSCLE SPASMS. 30 tablet 0     Omega-3 1000 MG capsule Take 2 g by mouth       omeprazole (PRILOSEC) 40 MG DR capsule Take 40 mg by mouth       diazepam (VALIUM)  5 MG tablet Take 1 tab 2 hrs before procedure, take 1 tab 1 hr before procedure only if needed (Patient not taking: Reported on 11/11/2021) 2 tablet 0     No facility-administered medications prior to visit.       Patient is allergic to bactrim [sulfamethoxazole w/trimethoprim], amoxicillin, and morphine.    ROS:  12 part ROS is negative aside from those symptoms in the HPI    PE:  There were no vitals taken for this visit.          There is no height or weight on file to calculate BMI.    General: WN/WD WF, NAD    Assessment: 31 year old SWF  with menorrhagia and unwanted fertility.    Plan: We discussed options including endometrial ablation with sterilization versus hysterectomy.  She feels like she is really ready to be done with periods and would like a hysterectomy.  She has a coworker who had an ablation and would rather have had a hysterectomy in retrospect.  We discussed that the fertility options are the same whether she has an ablation or a hysterectomy, as the ablation is a contraindication to pregnancy even though it doesn't serve as contraception.  She was counseled on most hysterectomies being done laparoscopically with a 4-6 week recovery.  She was counseled that I no longer do hysterectomies; referral was placed for [facility].  Questions were answered to the best of my ability.    37 minutes spent on the date of the encounter doing chart review, patient visit and documentation     Video-Visit Details    Type of service:  Video Visit    Video End Time:11:45 AM    Originating Location (pt. Location): Other patient's car    Distant Location (provider location):  [facility]     Platform used for Video Visit: Unable to complete video visit so phone visit done instead

## 2021-12-16 RX ORDER — CLINDAMYCIN PHOSPHATE 900 MG/50ML
900 INJECTION, SOLUTION INTRAVENOUS SEE ADMIN INSTRUCTIONS
Status: CANCELLED | OUTPATIENT
Start: 2021-12-16

## 2021-12-16 RX ORDER — CLINDAMYCIN PHOSPHATE 900 MG/50ML
900 INJECTION, SOLUTION INTRAVENOUS
Status: CANCELLED | OUTPATIENT
Start: 2021-12-16

## 2021-12-27 DIAGNOSIS — M79.18 MYOFASCIAL PAIN: ICD-10-CM

## 2021-12-27 RX ORDER — CYCLOBENZAPRINE HCL 5 MG
TABLET ORAL
Qty: 30 TABLET | Refills: 0 | Status: SHIPPED | OUTPATIENT
Start: 2021-12-27 | End: 2022-03-23

## 2021-12-27 NOTE — TELEPHONE ENCOUNTER
Last appointment: 09/30/2021 ov/10/29/2021 inj  Next appointment: 02/03/2022    Notes/Comments:      Rx request(s) has been reviewed.

## 2021-12-29 ENCOUNTER — VIRTUAL VISIT (OUTPATIENT)
Dept: FAMILY MEDICINE | Facility: CLINIC | Age: 31
End: 2021-12-29
Payer: COMMERCIAL

## 2021-12-29 DIAGNOSIS — F90.0 ADHD (ATTENTION DEFICIT HYPERACTIVITY DISORDER), INATTENTIVE TYPE: Primary | ICD-10-CM

## 2021-12-29 PROCEDURE — 99213 OFFICE O/P EST LOW 20 MIN: CPT | Mod: 95 | Performed by: FAMILY MEDICINE

## 2021-12-29 RX ORDER — DEXTROAMPHETAMINE SACCHARATE, AMPHETAMINE ASPARTATE MONOHYDRATE, DEXTROAMPHETAMINE SULFATE AND AMPHETAMINE SULFATE 3.75; 3.75; 3.75; 3.75 MG/1; MG/1; MG/1; MG/1
15 CAPSULE, EXTENDED RELEASE ORAL DAILY
Qty: 30 CAPSULE | Refills: 0 | Status: SHIPPED | OUTPATIENT
Start: 2022-03-01 | End: 2022-03-31

## 2021-12-29 RX ORDER — DEXTROAMPHETAMINE SACCHARATE, AMPHETAMINE ASPARTATE MONOHYDRATE, DEXTROAMPHETAMINE SULFATE AND AMPHETAMINE SULFATE 3.75; 3.75; 3.75; 3.75 MG/1; MG/1; MG/1; MG/1
15 CAPSULE, EXTENDED RELEASE ORAL DAILY
Qty: 30 CAPSULE | Refills: 0 | Status: SHIPPED | OUTPATIENT
Start: 2021-12-29 | End: 2022-01-28

## 2021-12-29 RX ORDER — DEXTROAMPHETAMINE SACCHARATE, AMPHETAMINE ASPARTATE, DEXTROAMPHETAMINE SULFATE AND AMPHETAMINE SULFATE 2.5; 2.5; 2.5; 2.5 MG/1; MG/1; MG/1; MG/1
TABLET ORAL
Qty: 40 TABLET | Refills: 0 | Status: SHIPPED | OUTPATIENT
Start: 2021-12-29 | End: 2022-12-02

## 2021-12-29 RX ORDER — DEXTROAMPHETAMINE SACCHARATE, AMPHETAMINE ASPARTATE, DEXTROAMPHETAMINE SULFATE AND AMPHETAMINE SULFATE 2.5; 2.5; 2.5; 2.5 MG/1; MG/1; MG/1; MG/1
TABLET ORAL
Qty: 40 TABLET | Refills: 0 | Status: SHIPPED | OUTPATIENT
Start: 2022-01-28 | End: 2022-12-02

## 2021-12-29 RX ORDER — DEXTROAMPHETAMINE SACCHARATE, AMPHETAMINE ASPARTATE, DEXTROAMPHETAMINE SULFATE AND AMPHETAMINE SULFATE 2.5; 2.5; 2.5; 2.5 MG/1; MG/1; MG/1; MG/1
TABLET ORAL
Qty: 40 TABLET | Refills: 0 | Status: SHIPPED | OUTPATIENT
Start: 2022-02-27 | End: 2022-12-02

## 2021-12-29 RX ORDER — DEXTROAMPHETAMINE SACCHARATE, AMPHETAMINE ASPARTATE MONOHYDRATE, DEXTROAMPHETAMINE SULFATE AND AMPHETAMINE SULFATE 3.75; 3.75; 3.75; 3.75 MG/1; MG/1; MG/1; MG/1
15 CAPSULE, EXTENDED RELEASE ORAL DAILY
Qty: 30 CAPSULE | Refills: 0 | Status: SHIPPED | OUTPATIENT
Start: 2022-01-29 | End: 2022-02-28

## 2021-12-29 NOTE — PROGRESS NOTES
Georgie is a 31 year old who is being evaluated via a billable video visit.      How would you like to obtain your AVS? MyChart  If the video visit is dropped, the invitation should be resent by: Text to cell phone: [phone]  Will anyone else be joining your video visit? No    Video Start Time: 5:49 PM    Assessment & Plan     1. ADHD (attention deficit hyperactivity disorder), inattentive type  - amphetamine-dextroamphetamine (ADDERALL XR) 15 MG 24 hr capsule; Take 1 capsule (15 mg) by mouth daily  Dispense: 30 capsule; Refill: 0  - amphetamine-dextroamphetamine (ADDERALL XR) 15 MG 24 hr capsule; Take 1 capsule (15 mg) by mouth daily  Dispense: 30 capsule; Refill: 0  - amphetamine-dextroamphetamine (ADDERALL XR) 15 MG 24 hr capsule; Take 1 capsule (15 mg) by mouth daily  Dispense: 30 capsule; Refill: 0  - amphetamine-dextroamphetamine (ADDERALL) 10 MG tablet; Take 1-2 tablets (10-20 mg) by mouth daily  Dispense: 40 tablet; Refill: 0  - amphetamine-dextroamphetamine (ADDERALL) 10 MG tablet; Take 1-2 tablets (10-20 mg) by mouth daily  Dispense: 40 tablet; Refill: 0  - amphetamine-dextroamphetamine (ADDERALL) 10 MG tablet; Take 1-2 tablets (10-20 mg) by mouth daily  Dispense: 40 tablet; Refill: 0     Continue current dose of Adderall XR 15mg in the morning and additional 10-20mg in the afternoon.  3 months Rx sent to the pharmacy.  She will return in February for preop. We will renew CSA at that time.     Return in about 6 weeks (around 2/9/2022) for preop physical as scheduled .    [facility] W[facility]    CSA signed March 2021.     Subjective   Georgie is a 31 year old who presents for the following health issues    Chief Complaints and History of Present Illnesses   Patient presents with     A.D.H.D     6 mo follow up       HPI     Dose is working well.   Uses 10mg tablet, wears off too soon. Uses 1-2 times per day.   Blood pressure recently checked at [facility]  and normal.  Will be back for preop in February for hysterectomy for menorrhagia.       Review of Systems   No sleep problems, no weight loss, appetite is fine, no palpitations.       Objective           Vitals:  No vitals were obtained today due to virtual visit.    Physical Exam   Georgie appears well, in no distress. Normal thought content, appropriately groomed.           Video-Visit Details    Type of service:  Video Visit    Video End Time:5:55 PM    Originating Location (pt. Location): Home    Distant Location (provider location):  [facility]     Platform used for Video Visit: TayDatanyze

## 2022-01-05 DIAGNOSIS — Z11.52 ENCOUNTER FOR PREOPERATIVE SCREENING LABORATORY TESTING FOR COVID-19 VIRUS: Primary | ICD-10-CM

## 2022-01-05 DIAGNOSIS — Z01.812 ENCOUNTER FOR PREOPERATIVE SCREENING LABORATORY TESTING FOR COVID-19 VIRUS: Primary | ICD-10-CM

## 2022-01-10 ENCOUNTER — TELEPHONE (OUTPATIENT)
Dept: FAMILY MEDICINE | Facility: CLINIC | Age: 32
End: 2022-01-10
Payer: COMMERCIAL

## 2022-01-10 NOTE — TELEPHONE ENCOUNTER
Pt Called and needed help schedule her pre op covid test but order will  before she is due for it. Please change order to  2 months after.

## 2022-02-01 NOTE — PROGRESS NOTES
Assessment:   Georgie Scott is a 31 y.o. y.o. female with past medical history significant for ADHD who presents today for follow-up regarding chronic right neck pain with radiation into the right upper trapezius muscle.  My review of an MRI cervical spine from August 2019 shows mild cervical spondylosis with mild right foraminal stenosis C3-4 and C5-6 related to disc bulges. EMG bilateral upper extremities September 2020 was normal.  Pain is thought to be related to a proximal right C6 radiculitis.  She has secondary myofascial pain.  Patient is status post a C7-T1 interlaminar epidural steroid injection October 29, 2021 which provided 90% relief of her pain but only lasted 3 weeks.  After that pain returned and got even more severe about 2 months ago.  At that time she also developed a sensation of instability in her neck when changing positions.  There was no trauma.  She is neurologically intact on exam.           Plan:     A shared decision making plan was used.  The patient's values and choices were respected.  The following represents what was discussed and decided upon by the physician assistant and the patient.      1.  DIAGNOSTIC TESTS: I reviewed the MRI cervical spine from 2019 and EMG bilateral upper extremities from 2020.  I ordered an updated MRI cervical spine for further evaluation.  I also ordered cervical spine flexion-extension x-rays.    2.  PHYSICAL THERAPY:   -Patient completed 9 sessions physical therapy November 4, 2020.  She does her home exercises.  No further physical therapy was ordered.  -Patient also completed 11 sessions of occupational therapy July 13, 2021 for wrist pain.    3.  MEDICATIONS:  -No other changes are made to the patient's medications.  -Patient can continue cyclobenzaprine 5 mg at bedtime as needed.  -Patient can continues ibuprofen as needed.    4.  INTERVENTIONS:   -No interventions were ordered today.  We will await the results of her imaging studies.  -We could  potentially try a paramedian right C7-T1 interlaminar epidural steroid injection if MRI is stable.  A right C5-6 transforaminal epidural steroid injection was attempted twice but had to be aborted due to vascular uptake.  If there is a new area of neural impingement on the right we could try a transforaminal epidural steroid injection there.  -An epidural steroid injection failed to provide relief we could try medial branch blocks on the right to determine if there is facet mediated pain.  -We did also discuss osteopathic manipulation as an option.  She had been going to the chiropractor regularly with some improvement.  However, her chiropractor recently quit.    5.  PATIENT EDUCATION: Patient is in agreement the above plan.  All questions were answered.    6.  FOLLOW-UP: I will send the patient a My Dentist message with the results of her imaging studies.  At that time I may offer another injection versus OMM versus have the patient return to the clinic to review the results and discuss treatment options.    Subjective:     Georgie Scott is a 31 y.o. female who presents today for follow-up regarding chronic right neck pain with radiation into the right upper trapezius muscle.  Patient had a C7-T1 interlaminar epidural steroid injection October 29, 2021.  Patient reports this injection provided 90% relief of her pain but only lasted 3 weeks.  She is disappointed because the same injection provided 90% relief of her pain for about 8 months when it was performed November 20, 2020.  Patient reports that about 2 months ago she developed more severe pain and a sensation of some instability in her neck when transitioning from lying down to sitting up.  She states that she could feel a shift in the vertebrae in her neck and she had the sensation that if it shifted too much she would have severe nerve pain.  She felt like she had to hold her head in her hands to minimize this.  She tried going to the chiropractor and for  some time the chiropractor was not able to adjust her neck.  More recently they have been able to perform adjustments and that unstable sensation has improved somewhat.  Over the past couple of weeks it has only occurred a couple of times.  She denies any trauma.    Patient complains of right-sided neck pain.  Pain radiates toward the right shoulder blade.  She denies any pain radiating down the arm.  Denies any numbness or tingling down the arms.  She feels a weak sensation in her neck.  Denies weakness down the arm.  She rates her pain today as a 2 out of 10.  At its worst it is a 6 out of 10.  At its best it is a 0 out of 10.  Pain is aggravated with normal life activities.  Pain is alleviated with lying down and not moving.    Patient completed 9 sessions of medics physical therapy November 4, 2020.  She does her home exercises.  She goes to the chiropractor up to once per week.  She takes cyclobenzaprine at bedtime as needed.  She uses ibuprofen as needed.  Medications are somewhat helpful.    Past medical history is reviewed and is pertinent for being scheduled for a hysterectomy for menorrhagia February 11, 2022.    Review of Systems:  Positive for weakness, headache.  Negative for numbness/tingling, weakness, loss of bowel/bladder control, inability to urinate, pain much worse at night, trip/stumble/falls, difficulty swallowing, difficulty with hand skills, fevers, unintentional weight loss.     Objective:   CONSTITUTIONAL:  Vital signs as above.  No acute distress.  The patient is well nourished and well groomed.    PSYCHIATRIC:  The patient is awake, alert, oriented to person, place and time.  The patient is answering questions appropriately with clear speech.  Normal affect.  HEENT: Normocephalic, atraumatic.  Sclera clear.  Neck is supple.  SKIN:  Skin over the face, neck bilateral upper extremities is clean, dry, intact without rashes.  MUSCULOSKELETAL: The patient has 5/5 strength for the bilateral  shoulder abductors, elbow flexors/extensors, wrist extensors, finger flexors/abductors.  Mild hypertonicity right cervical paraspinous muscles but no significant tenderness to palpation.  Cervical range of motion is intact with flexion.  She appears to be slightly hypermobile with extension.    NEUROLOGICAL: 1-2+ bilateral biceps, triceps, brachioradialis reflexes.  Negative Johnson's bilaterally.  Sensation to light touch is intact over bilateral upper extremities throughout.     RESULTS: I reviewed the MRI cervical spine from [facility] dated August 27, 2019.  There is no evidence of cervical cord signal abnormality or high-grade spinal canal stenosis.  There is mild spondylosis with mild right C3-4 and C5-6 foraminal stenosis, similar to MRI cervical spine from March 2019.  Please see report for further details.    I also reviewed flexion-extension cervical spine x-rays from outside facility dated August 8, 2019.  I was not able to find the radiology report.  I do not appreciate any gross dynamic instability.    EMG bilateral upper extremity September 4, 2020:  EMG/NCS  results: Please see scanned full report     Comment NCS: Normal study  1.  Normal nerve conduction studies bilateral upper extremities.     Comment EMG: Normal study  1.  Normal needle EMG bilateral upper extremities.     Interpretation: Normal study     1. There is no electrodiagnostic evidence of cervical radiculopathy, brachial plexopathy, or focal neuropathy in the bilateral upper extremities.  Specifically, there is no electrodiagnostic evidence of median neuropathy at the wrist in the bilateral upper extremities.

## 2022-02-03 ENCOUNTER — OFFICE VISIT (OUTPATIENT)
Dept: PHYSICAL MEDICINE AND REHAB | Facility: CLINIC | Age: 32
End: 2022-02-03
Payer: COMMERCIAL

## 2022-02-03 VITALS
TEMPERATURE: 98.3 F | SYSTOLIC BLOOD PRESSURE: 127 MMHG | BODY MASS INDEX: 25.55 KG/M2 | WEIGHT: 162.8 LBS | DIASTOLIC BLOOD PRESSURE: 79 MMHG | HEIGHT: 67 IN | HEART RATE: 83 BPM

## 2022-02-03 DIAGNOSIS — M79.18 MYOFASCIAL PAIN: ICD-10-CM

## 2022-02-03 DIAGNOSIS — M54.12 CERVICAL RADICULAR PAIN: Primary | ICD-10-CM

## 2022-02-03 PROCEDURE — 99214 OFFICE O/P EST MOD 30 MIN: CPT | Performed by: PHYSICIAN ASSISTANT

## 2022-02-03 ASSESSMENT — PAIN SCALES - GENERAL: PAINLEVEL: MILD PAIN (2)

## 2022-02-03 ASSESSMENT — MIFFLIN-ST. JEOR: SCORE: 1473.15

## 2022-02-03 NOTE — LETTER
2/3/2022         RE: Georgie Scott  [address]  [place] [address]        Dear Colleague,    Thank you for referring your patient, Georgie Scott, to the [facility] [place]. Please see a copy of my visit note below.    Assessment:   Georgie Scott is a 31 y.o. y.o. female with past medical history significant for ADHD who presents today for follow-up regarding chronic right neck pain with radiation into the right upper trapezius muscle.  My review of an MRI cervical spine from August 2019 shows mild cervical spondylosis with mild right foraminal stenosis C3-4 and C5-6 related to disc bulges. EMG bilateral upper extremities September 2020 was normal.  Pain is thought to be related to a proximal right C6 radiculitis.  She has secondary myofascial pain.  Patient is status post a C7-T1 interlaminar epidural steroid injection October 29, 2021 which provided 90% relief of her pain but only lasted 3 weeks.  After that pain returned and got even more severe about 2 months ago.  At that time she also developed a sensation of instability in her neck when changing positions.  There was no trauma.  She is neurologically intact on exam.           Plan:     A shared decision making plan was used.  The patient's values and choices were respected.  The following represents what was discussed and decided upon by the physician assistant and the patient.      1.  DIAGNOSTIC TESTS: I reviewed the MRI cervical spine from 2019 and EMG bilateral upper extremities from 2020.  I ordered an updated MRI cervical spine for further evaluation.  I also ordered cervical spine flexion-extension x-rays.    2.  PHYSICAL THERAPY:   -Patient completed 9 sessions physical therapy November 4, 2020.  She does her home exercises.  No further physical therapy was ordered.  -Patient also completed 11 sessions of occupational therapy July 13, 2021 for wrist pain.    3.  MEDICATIONS:  -No other changes are made to  the patient's medications.  -Patient can continue cyclobenzaprine 5 mg at bedtime as needed.  -Patient can continues ibuprofen as needed.    4.  INTERVENTIONS:   -No interventions were ordered today.  We will await the results of her imaging studies.  -We could potentially try a paramedian right C7-T1 interlaminar epidural steroid injection if MRI is stable.  A right C5-6 transforaminal epidural steroid injection was attempted twice but had to be aborted due to vascular uptake.  If there is a new area of neural impingement on the right we could try a transforaminal epidural steroid injection there.  -An epidural steroid injection failed to provide relief we could try medial branch blocks on the right to determine if there is facet mediated pain.  -We did also discuss osteopathic manipulation as an option.  She had been going to the chiropractor regularly with some improvement.  However, her chiropractor recently quit.    5.  PATIENT EDUCATION: Patient is in agreement the above plan.  All questions were answered.    6.  FOLLOW-UP: I will send the patient a [employer] message with the results of her imaging studies.  At that time I may offer another injection versus OMM versus have the patient return to the clinic to review the results and discuss treatment options.    Subjective:     Georgie Scott is a 31 y.o. female who presents today for follow-up regarding chronic right neck pain with radiation into the right upper trapezius muscle.  Patient had a C7-T1 interlaminar epidural steroid injection October 29, 2021.  Patient reports this injection provided 90% relief of her pain but only lasted 3 weeks.  She is disappointed because the same injection provided 90% relief of her pain for about 8 months when it was performed November 20, 2020.  Patient reports that about 2 months ago she developed more severe pain and a sensation of some instability in her neck when transitioning from lying down to sitting up.  She states  that she could feel a shift in the vertebrae in her neck and she had the sensation that if it shifted too much she would have severe nerve pain.  She felt like she had to hold her head in her hands to minimize this.  She tried going to the chiropractor and for some time the chiropractor was not able to adjust her neck.  More recently they have been able to perform adjustments and that unstable sensation has improved somewhat.  Over the past couple of weeks it has only occurred a couple of times.  She denies any trauma.    Patient complains of right-sided neck pain.  Pain radiates toward the right shoulder blade.  She denies any pain radiating down the arm.  Denies any numbness or tingling down the arms.  She feels a weak sensation in her neck.  Denies weakness down the arm.  She rates her pain today as a 2 out of 10.  At its worst it is a 6 out of 10.  At its best it is a 0 out of 10.  Pain is aggravated with normal life activities.  Pain is alleviated with lying down and not moving.    Patient completed 9 sessions of medics physical therapy November 4, 2020.  She does her home exercises.  She goes to the chiropractor up to once per week.  She takes cyclobenzaprine at bedtime as needed.  She uses ibuprofen as needed.  Medications are somewhat helpful.    Past medical history is reviewed and is pertinent for being scheduled for a hysterectomy for menorrhagia February 11, 2022.    Review of Systems:  Positive for weakness, headache.  Negative for numbness/tingling, weakness, loss of bowel/bladder control, inability to urinate, pain much worse at night, trip/stumble/falls, difficulty swallowing, difficulty with hand skills, fevers, unintentional weight loss.     Objective:   CONSTITUTIONAL:  Vital signs as above.  No acute distress.  The patient is well nourished and well groomed.    PSYCHIATRIC:  The patient is awake, alert, oriented to person, place and time.  The patient is answering questions appropriately with  clear speech.  Normal affect.  HEENT: Normocephalic, atraumatic.  Sclera clear.  Neck is supple.  SKIN:  Skin over the face, neck bilateral upper extremities is clean, dry, intact without rashes.  MUSCULOSKELETAL: The patient has 5/5 strength for the bilateral shoulder abductors, elbow flexors/extensors, wrist extensors, finger flexors/abductors.  Mild hypertonicity right cervical paraspinous muscles but no significant tenderness to palpation.  Cervical range of motion is intact with flexion.  She appears to be slightly hypermobile with extension.    NEUROLOGICAL: 1-2+ bilateral biceps, triceps, brachioradialis reflexes.  Negative Johnson's bilaterally.  Sensation to light touch is intact over bilateral upper extremities throughout.     RESULTS: I reviewed the MRI cervical spine from [facility] dated August 27, 2019.  There is no evidence of cervical cord signal abnormality or high-grade spinal canal stenosis.  There is mild spondylosis with mild right C3-4 and C5-6 foraminal stenosis, similar to MRI cervical spine from March 2019.  Please see report for further details.    I also reviewed flexion-extension cervical spine x-rays from outside facility dated August 8, 2019.  I was not able to find the radiology report.  I do not appreciate any gross dynamic instability.    EMG bilateral upper extremity September 4, 2020:  EMG/NCS  results: Please see scanned full report     Comment NCS: Normal study  1.  Normal nerve conduction studies bilateral upper extremities.     Comment EMG: Normal study  1.  Normal needle EMG bilateral upper extremities.     Interpretation: Normal study     1. There is no electrodiagnostic evidence of cervical radiculopathy, brachial plexopathy, or focal neuropathy in the bilateral upper extremities.  Specifically, there is no electrodiagnostic evidence of median neuropathy at the wrist in the bilateral upper extremities.          Again, thank you for allowing me to participate in the  care of your patient.        Sincerely,        Kay Mcrae PA-C

## 2022-02-03 NOTE — PATIENT INSTRUCTIONS
[facility] Scheduling    Please call [phone] to schedule your image(s) (select option#1). There are 2 different locations, see below. You can do walk-in visits for xray only images if you want.     [place] [id number]    =    [place] [id number]

## 2022-02-07 ENCOUNTER — HOSPITAL ENCOUNTER (OUTPATIENT)
Dept: RADIOLOGY | Facility: CLINIC | Age: 32
Discharge: HOME | End: 2022-02-07
Attending: PHYSICIAN ASSISTANT | Admitting: PHYSICIAN ASSISTANT
Payer: COMMERCIAL

## 2022-02-07 DIAGNOSIS — M54.12 CERVICAL RADICULAR PAIN: ICD-10-CM

## 2022-02-07 PROCEDURE — 72040 X-RAY EXAM NECK SPINE 2-3 VW: CPT

## 2022-02-08 ENCOUNTER — LAB (OUTPATIENT)
Dept: LAB | Facility: CLINIC | Age: 32
End: 2022-02-08
Attending: STUDENT IN AN ORGANIZED HEALTH CARE EDUCATION/TRAINING PROGRAM
Payer: COMMERCIAL

## 2022-02-08 DIAGNOSIS — Z11.52 ENCOUNTER FOR PREOPERATIVE SCREENING LABORATORY TESTING FOR COVID-19 VIRUS: ICD-10-CM

## 2022-02-08 DIAGNOSIS — Z01.812 ENCOUNTER FOR PREOPERATIVE SCREENING LABORATORY TESTING FOR COVID-19 VIRUS: ICD-10-CM

## 2022-02-08 PROCEDURE — U0005 INFEC AGEN DETEC AMPLI PROBE: HCPCS

## 2022-02-08 PROCEDURE — U0003 INFECTIOUS AGENT DETECTION BY NUCLEIC ACID (DNA OR RNA); SEVERE ACUTE RESPIRATORY SYNDROME CORONAVIRUS 2 (SARS-COV-2) (CORONAVIRUS DISEASE [COVID-19]), AMPLIFIED PROBE TECHNIQUE, MAKING USE OF HIGH THROUGHPUT TECHNOLOGIES AS DESCRIBED BY CMS-2020-01-R: HCPCS

## 2022-02-09 ENCOUNTER — HOSPITAL ENCOUNTER (OUTPATIENT)
Dept: MRI IMAGING | Facility: CLINIC | Age: 32
Discharge: HOME | End: 2022-02-09
Attending: PHYSICIAN ASSISTANT | Admitting: PHYSICIAN ASSISTANT
Payer: COMMERCIAL

## 2022-02-09 ENCOUNTER — OFFICE VISIT (OUTPATIENT)
Dept: FAMILY MEDICINE | Facility: CLINIC | Age: 32
End: 2022-02-09
Payer: COMMERCIAL

## 2022-02-09 VITALS
WEIGHT: 158.38 LBS | HEART RATE: 95 BPM | DIASTOLIC BLOOD PRESSURE: 81 MMHG | HEIGHT: 67 IN | SYSTOLIC BLOOD PRESSURE: 130 MMHG | BODY MASS INDEX: 24.86 KG/M2

## 2022-02-09 DIAGNOSIS — M54.12 CERVICAL RADICULAR PAIN: ICD-10-CM

## 2022-02-09 DIAGNOSIS — N92.4 EXCESSIVE BLEEDING IN PREMENOPAUSAL PERIOD: ICD-10-CM

## 2022-02-09 DIAGNOSIS — F90.0 ADHD (ATTENTION DEFICIT HYPERACTIVITY DISORDER), INATTENTIVE TYPE: ICD-10-CM

## 2022-02-09 DIAGNOSIS — Z79.899 CONTROLLED SUBSTANCE AGREEMENT SIGNED: ICD-10-CM

## 2022-02-09 DIAGNOSIS — Z01.818 PREOP GENERAL PHYSICAL EXAM: Primary | ICD-10-CM

## 2022-02-09 DIAGNOSIS — R10.13 DYSPEPSIA: ICD-10-CM

## 2022-02-09 PROBLEM — N92.0 MENORRHAGIA: Status: ACTIVE | Noted: 2021-12-03

## 2022-02-09 LAB
ERYTHROCYTE [DISTWIDTH] IN BLOOD BY AUTOMATED COUNT: 11.9 % (ref 10–15)
HCG UR QL: NEGATIVE
HCT VFR BLD AUTO: 39.4 % (ref 35–47)
HGB BLD-MCNC: 13.6 G/DL (ref 11.7–15.7)
MCH RBC QN AUTO: 31.3 PG (ref 26.5–33)
MCHC RBC AUTO-ENTMCNC: 34.5 G/DL (ref 31.5–36.5)
MCV RBC AUTO: 91 FL (ref 78–100)
PLATELET # BLD AUTO: 287 10E3/UL (ref 150–450)
RBC # BLD AUTO: 4.34 10E6/UL (ref 3.8–5.2)
SARS-COV-2 RNA RESP QL NAA+PROBE: NEGATIVE
WBC # BLD AUTO: 5 10E3/UL (ref 4–11)

## 2022-02-09 PROCEDURE — 85027 COMPLETE CBC AUTOMATED: CPT | Performed by: FAMILY MEDICINE

## 2022-02-09 PROCEDURE — 36415 COLL VENOUS BLD VENIPUNCTURE: CPT | Performed by: FAMILY MEDICINE

## 2022-02-09 PROCEDURE — 72141 MRI NECK SPINE W/O DYE: CPT

## 2022-02-09 PROCEDURE — 99214 OFFICE O/P EST MOD 30 MIN: CPT | Performed by: FAMILY MEDICINE

## 2022-02-09 PROCEDURE — 81025 URINE PREGNANCY TEST: CPT | Performed by: FAMILY MEDICINE

## 2022-02-09 ASSESSMENT — MIFFLIN-ST. JEOR: SCORE: 1453.07

## 2022-02-09 NOTE — PATIENT INSTRUCTIONS
Preparing for Your Surgery  Getting started  A nurse will call you to review your health history and instructions. They will give you an arrival time based on your scheduled surgery time. Please be ready to share:    Your doctor's clinic name and phone number    Your medical, surgical and anesthesia history    A list of allergies and sensitivities    A list of medicines, including herbal treatments and over-the-counter drugs    Whether the patient has a legal guardian (ask how to send us the papers in advance)  Please tell us if you're pregnant--or if there's any chance you might be pregnant. Some surgeries may injure a fetus (unborn baby), so they require a pregnancy test. Surgeries that are safe for a fetus don't always need a test, and you can choose whether to have one.   If you have a child who's having surgery, please ask for a copy of Preparing for Your Child's Surgery.    Preparing for surgery    Within 30 days of surgery: Have a pre-op exam (sometimes called an H&P, or History and Physical). This can be done at a clinic or pre-operative center.  ? If you're having a , you may not need this exam. Talk to your care team.    At your pre-op exam, talk to your care team about all medicines you take. If you need to stop any medicines before surgery, ask when to start taking them again.  ? We do this for your safety. Many medicines can make you bleed too much during surgery. Some change how well surgery (anesthesia) drugs work.    Call your insurance company to let them know you're having surgery. (If you don't have insurance, call [phone].)    Call your clinic if there's any change in your health. This includes signs of a cold or flu (sore throat, runny nose, cough, rash, fever). It also includes a scrape or scratch near the surgery site.    If you have questions on the day of surgery, call your hospital or surgery center.  COVID testing  You may need to be tested for COVID-19 before having  surgery. If so, your surgical team will give you instructions for scheduling this test, separate from your preoperative history and physical.  Eating and drinking guidelines  For your safety: Unless your surgeon tells you otherwise, follow the guidelines below.    Eat and drink as usual until 8 hours before surgery. After that, no food or milk.    Drink clear liquids until 2 hours before surgery. These are liquids you can see through, like water, Gatorade and Propel Water. You may also have black coffee and tea (no cream or milk).    Nothing by mouth within 2 hours of surgery. This includes gum, candy and breath mints.    If you drink alcohol: Stop drinking it the night before surgery.    If your care team tells you to take medicine on the morning of surgery, it's okay to take it with a sip of water.  Preventing infection    Shower or bathe the night before and morning of your surgery. Follow the instructions your clinic gave you. (If no instructions, use regular soap.)    Don't shave or clip hair near your surgery site. We'll remove the hair if needed.    Don't smoke or vape the morning of surgery. You may chew nicotine gum up to 2 hours before surgery. A nicotine patch is okay.  ? Note: Some surgeries require you to completely quit smoking and nicotine. Check with your surgeon.    Your care team will make every effort to keep you safe from infection. We will:  ? Clean our hands often with soap and water (or an alcohol-based hand rub).  ? Clean the skin at your surgery site with a special soap that kills germs.  ? Give you a special gown to keep you warm. (Cold raises the risk of infection.)  ? Wear special hair covers, masks, gowns and gloves during surgery.  ? Give antibiotic medicine, if prescribed. Not all surgeries need antibiotics.  What to bring on the day of surgery    Photo ID and insurance card    Copy of your health care directive, if you have one    Glasses and hearing aides (bring cases)  ? You can't  wear contacts during surgery    Inhaler and eye drops, if you use them (tell us about these when you arrive)    CPAP machine or breathing device, if you use them    A few personal items, if spending the night    If you have . . .  ? A pacemaker, ICD (cardiac defibrillator) or other implant: Bring the ID card.  ? An implanted stimulator: Bring the remote control.  ? A legal guardian: Bring a copy of the certified (court-stamped) guardianship papers.  Please remove any jewelry, including body piercings. Leave jewelry and other valuables at home.  If you're going home the day of surgery    You must have a responsible adult drive you home. They should stay with you overnight as well.    If you don't have someone to stay with you, and you aren't safe to go home alone, we may keep you overnight. Insurance often won't pay for this.  After surgery  If it's hard to control your pain or you need more pain medicine, please call your surgeon's office.  Questions?   If you have any questions for your care team, list them here: _________________________________________________________________________________________________________________________________________________________________________ ____________________________________ ____________________________________ ____________________________________  For informational purposes only. Not to replace the advice of your health care provider. Copyright   2003, 2019 [facility]. All rights reserved. Clinically reviewed by Shruthi Oakley MD. [employer] 615577 - REV 07/21.    How to Take Your Medication Before Surgery  - HOLD (do not take) your medications on morning of surgery.

## 2022-02-09 NOTE — H&P (VIEW-ONLY)
[facility]  [address] [facility] [address]  Phone: [phone]  Fax: [phone]  Primary Provider: Antonia Womack  Pre-op Performing Provider: ANTONIA WOMACK      PREOPERATIVE EVALUATION:  Today's date: 2/9/2022    Georgie Scott is a 31 year old female who presents for a preoperative evaluation.    Surgical Information:  Surgery/Procedure: Hysterectomy  Surgery Location: [facility]  Surgeon: Dr. Hurley  Surgery Date: 2/11/22  Time of Surgery: 9:30 am  Where patient plans to recover: At home with family  Fax number for surgical facility: Note does not need to be faxed, will be available electronically in Epic.    Type of Anesthesia Anticipated: General    Assessment & Plan     The proposed surgical procedure is considered INTERMEDIATE risk.    1. Preop general physical exam  2. Excessive bleeding in premenopausal period  - CBC with platelets  - HCG qualitative urine    Risks and Recommendations:  The patient has the following additional risks and recommendations for perioperative complications:   - No identified additional risk factors other than previously addressed    Medication Instructions:  Georgie will hold all medications on morning of surgery.  She is not regularly taking her PPI.    RECOMMENDATION:  APPROVAL GIVEN to proceed with proposed procedure, without further diagnostic evaluation.    3. ADHD (attention deficit hyperactivity disorder), inattentive type  4. Controlled substance agreement - signed 2/9/22  Symptoms are adequately controlled on current dose of Adderall.  Blood pressure well controlled.  Weight is stable.  Controlled substance agreement updated today, will follow-up in 6 months for med check.  Send [employer] message 1 to 2 weeks prior to being due for refill.    5. Dyspepsia  Symptoms are improved, continues with omeprazole as needed.        Subjective     HPI related to upcoming procedure:     Menorrhagia, scheduled for hysterectomy.    COVID test already done and negative.     Preop Questions 2/9/2022   1. Have you ever had a heart attack or stroke? No   2. Have you ever had surgery on your heart or blood vessels, such as a stent placement, a coronary artery bypass, or surgery on an artery in your head, neck, heart, or legs? No   3. Do you have chest pain with activity? No   4. Do you have a history of  heart failure? No   5. Do you currently have a cold, bronchitis or symptoms of other infection? No   6. Do you have a cough, shortness of breath, or wheezing? No   7. Do you or anyone in your family have previous history of blood clots? No   8. Do you or does anyone in your family have a serious bleeding problem such as prolonged bleeding following surgeries or cuts? No   9. Have you ever had problems with anemia or been told to take iron pills? No   10. Have you had any abnormal blood loss such as black, tarry or bloody stools, or abnormal vaginal bleeding? No   11. Have you ever had a blood transfusion? No   12. Are you willing to have a blood transfusion if it is medically needed before, during, or after your surgery? Yes   13. Have you or any of your relatives ever had problems with anesthesia? No   14. Do you have sleep apnea, excessive snoring or daytime drowsiness? No   15. Do you have any artifical heart valves or other implanted medical devices like a pacemaker, defibrillator, or continuous glucose monitor? No   16. Do you have artificial joints? No   17. Are you allergic to latex? No   18. Is there any chance that you may be pregnant? No       Health Care Directive:  Patient does not have a Health Care Directive or Living Will: Discussed advance care planning with patient; information given to patient to review.    Preoperative Review of :   reviewed - controlled substances reflected in medication list.      Status of Chronic Conditions:  See problem list for active medical problems.  Problems all longstanding and stable, except  as noted/documented.  See ROS for pertinent symptoms related to these conditions.      Review of Systems  Constitutional, neuro, ENT, endocrine, pulmonary, cardiac, gastrointestinal, genitourinary, musculoskeletal, integument and psychiatric systems are negative, except as otherwise noted.    Patient Active Problem List    Diagnosis Date Noted     Controlled substance agreement signed 07/09/2021     Priority: Medium     Bloating 06/26/2021     Priority: Medium     Dyspepsia 06/26/2021     Priority: Medium     Heartburn 06/26/2021     Priority: Medium     Nausea 06/26/2021     Priority: Medium     Rectal bleeding 06/26/2021     Priority: Medium     Cervical high risk HPV (human papillomavirus) test positive 03/31/2021     Priority: Medium     Formatting of this note might be different from the original.  10/1/14 NIL  1/5/18 NIL  3/31/21 NIL pap, +HR HPV (not 16/18). Plan: cotest in 1 year  4/8/21 pt notified       ADHD (attention deficit hyperactivity disorder), inattentive type 03/13/2019     Priority: Medium     Dysmenorrhea 08/01/2018     Priority: Medium     Persistent insomnia 01/05/2018     Priority: Medium      Past Medical History:   Diagnosis Date     ADHD      Cervical high risk HPV (human papillomavirus) test positive 3/31/2021    10/1/14 NIL 1/5/18 NIL 3/31/21 NIL pap, +HR HPV (not 16/18). Plan: cotest in 1 year     Medical history non-contributory 01/22/2019     Past Surgical History:   Procedure Laterality Date     COLONOSCOPY N/A 8/31/2021    Procedure: COLONOSCOPY;  Surgeon: Billy Thurston DO;  Location:  GI     OTHER SURGICAL HISTORY  01/22/2019    No surgery history     Current Outpatient Medications   Medication Sig Dispense Refill     amphetamine-dextroamphetamine (ADDERALL XR) 15 MG 24 hr capsule Take 1 capsule (15 mg) by mouth daily 30 capsule 0     [START ON 3/1/2022] amphetamine-dextroamphetamine (ADDERALL XR) 15 MG 24 hr capsule Take 1 capsule (15 mg) by mouth daily 30 capsule 0      "amphetamine-dextroamphetamine (ADDERALL) 10 MG tablet Take 1-2 tablets (10-20 mg) by mouth daily 40 tablet 0     amphetamine-dextroamphetamine (ADDERALL) 10 MG tablet Take 1-2 tablets (10-20 mg) by mouth daily 40 tablet 0     [START ON 2/27/2022] amphetamine-dextroamphetamine (ADDERALL) 10 MG tablet Take 1-2 tablets (10-20 mg) by mouth daily 40 tablet 0     cyclobenzaprine (FLEXERIL) 5 MG tablet TAKE ONE TABLET BY MOUTH THREE TIMES A DAY AS NEEDED FOR MUSCLE SPASMS. NEED APPOINTMENT FOR MORE REFILLS 30 tablet 0     omeprazole (PRILOSEC) 40 MG DR capsule Take 40 mg by mouth       Omega-3 1000 MG capsule Take 2 g by mouth (Patient not taking: Reported on 12/29/2021)         Allergies   Allergen Reactions     Bactrim [Sulfamethoxazole W/Trimethoprim] Hives     Amoxicillin Rash     Morphine Rash        Social History     Tobacco Use     Smoking status: Never Smoker     Smokeless tobacco: Never Used   Substance Use Topics     Alcohol use: Yes     Comment: moderate     Family History   Problem Relation Age of Onset     Hypertension Father      Hypertension Paternal Grandmother      Colon Cancer Paternal Grandfather      Diabetes Paternal Uncle      Diabetes Paternal Aunt      Cerebrovascular Disease Maternal Grandmother      Dementia Maternal Grandmother      Brain Cancer Maternal Grandfather      Diabetes Type 2  Paternal Uncle      Diabetes Type 1 Paternal Uncle      Diabetes Paternal Aunt      Diabetes Paternal Aunt      History   Drug Use No         Objective     /81   Pulse 95   Ht 1.689 m (5' 6.5\")   Wt 71.8 kg (158 lb 6 oz)   LMP  (LMP Unknown)   Breastfeeding No   BMI 25.18 kg/m      Physical Exam    GENERAL APPEARANCE: healthy, alert and no distress     EYES: EOMI, PERRL     HENT: ear canals and TM's normal and nose and mouth without ulcers or lesions     NECK: no adenopathy, no asymmetry, masses, or scars and thyroid normal to palpation     RESP: lungs clear to auscultation - no rales, rhonchi or " wheezes     CV: regular rates and rhythm, normal S1 S2, no S3 or S4 and no murmur, click or rub     ABDOMEN:  soft, nontender, no HSM or masses and bowel sounds normal     MS: extremities normal- no gross deformities noted, no evidence of inflammation in joints, FROM in all extremities.     SKIN: no suspicious lesions or rashes     NEURO: Normal strength and tone, sensory exam grossly normal, mentation intact and speech normal     PSYCH: mentation appears normal. and affect normal/bright     LYMPHATICS: No cervical adenopathy    Recent Labs   Lab Test 08/30/21  1028 03/31/21  1234   HGB 13.3 13.6    286        Diagnostics:  Recent Results (from the past 24 hour(s))   CBC with platelets    Collection Time: 02/09/22 12:16 PM   Result Value Ref Range    WBC Count 5.0 4.0 - 11.0 10e3/uL    RBC Count 4.34 3.80 - 5.20 10e6/uL    Hemoglobin 13.6 11.7 - 15.7 g/dL    Hematocrit 39.4 35.0 - 47.0 %    MCV 91 78 - 100 fL    MCH 31.3 26.5 - 33.0 pg    MCHC 34.5 31.5 - 36.5 g/dL    RDW 11.9 10.0 - 15.0 %    Platelet Count 287 150 - 450 10e3/uL   HCG qualitative urine    Collection Time: 02/09/22 12:16 PM   Result Value Ref Range    hCG Urine Qualitative Negative Negative      No EKG required, no history of coronary heart disease, significant arrhythmia, peripheral arterial disease or other structural heart disease.    Revised Cardiac Risk Index (RCRI):  The patient has the following serious cardiovascular risks for perioperative complications:   - No serious cardiac risks = 0 points     RCRI Interpretation: 0 points: Class I (very low risk - 0.4% complication rate)           Signed Electronically by: Taylor Womack DO  Copy of this evaluation report is provided to requesting physician.

## 2022-02-09 NOTE — PROGRESS NOTES
[facility]  [address] [facility] [address]  Phone: [phone]  Fax: [phone]  Primary Provider: Antonia Womack  Pre-op Performing Provider: ANTONIA WOMACK      PREOPERATIVE EVALUATION:  Today's date: 2/9/2022    Georgie Scott is a 31 year old female who presents for a preoperative evaluation.    Surgical Information:  Surgery/Procedure: Hysterectomy  Surgery Location: [facility]  Surgeon: Dr. Hurley  Surgery Date: 2/11/22  Time of Surgery: 9:30 am  Where patient plans to recover: At home with family  Fax number for surgical facility: Note does not need to be faxed, will be available electronically in Epic.    Type of Anesthesia Anticipated: General    Assessment & Plan     The proposed surgical procedure is considered INTERMEDIATE risk.    1. Preop general physical exam  2. Excessive bleeding in premenopausal period  - CBC with platelets  - HCG qualitative urine    Risks and Recommendations:  The patient has the following additional risks and recommendations for perioperative complications:   - No identified additional risk factors other than previously addressed    Medication Instructions:  Georgie will hold all medications on morning of surgery.  She is not regularly taking her PPI.    RECOMMENDATION:  APPROVAL GIVEN to proceed with proposed procedure, without further diagnostic evaluation.    3. ADHD (attention deficit hyperactivity disorder), inattentive type  4. Controlled substance agreement - signed 2/9/22  Symptoms are adequately controlled on current dose of Adderall.  Blood pressure well controlled.  Weight is stable.  Controlled substance agreement updated today, will follow-up in 6 months for med check.  Send [employer] message 1 to 2 weeks prior to being due for refill.    5. Dyspepsia  Symptoms are improved, continues with omeprazole as needed.        Subjective     HPI related to upcoming procedure:     Menorrhagia, scheduled for hysterectomy.    COVID test already done and negative.     Preop Questions 2/9/2022   1. Have you ever had a heart attack or stroke? No   2. Have you ever had surgery on your heart or blood vessels, such as a stent placement, a coronary artery bypass, or surgery on an artery in your head, neck, heart, or legs? No   3. Do you have chest pain with activity? No   4. Do you have a history of  heart failure? No   5. Do you currently have a cold, bronchitis or symptoms of other infection? No   6. Do you have a cough, shortness of breath, or wheezing? No   7. Do you or anyone in your family have previous history of blood clots? No   8. Do you or does anyone in your family have a serious bleeding problem such as prolonged bleeding following surgeries or cuts? No   9. Have you ever had problems with anemia or been told to take iron pills? No   10. Have you had any abnormal blood loss such as black, tarry or bloody stools, or abnormal vaginal bleeding? No   11. Have you ever had a blood transfusion? No   12. Are you willing to have a blood transfusion if it is medically needed before, during, or after your surgery? Yes   13. Have you or any of your relatives ever had problems with anesthesia? No   14. Do you have sleep apnea, excessive snoring or daytime drowsiness? No   15. Do you have any artifical heart valves or other implanted medical devices like a pacemaker, defibrillator, or continuous glucose monitor? No   16. Do you have artificial joints? No   17. Are you allergic to latex? No   18. Is there any chance that you may be pregnant? No       Health Care Directive:  Patient does not have a Health Care Directive or Living Will: Discussed advance care planning with patient; information given to patient to review.    Preoperative Review of :   reviewed - controlled substances reflected in medication list.      Status of Chronic Conditions:  See problem list for active medical problems.  Problems all longstanding and stable, except  as noted/documented.  See ROS for pertinent symptoms related to these conditions.      Review of Systems  Constitutional, neuro, ENT, endocrine, pulmonary, cardiac, gastrointestinal, genitourinary, musculoskeletal, integument and psychiatric systems are negative, except as otherwise noted.    Patient Active Problem List    Diagnosis Date Noted     Controlled substance agreement signed 07/09/2021     Priority: Medium     Bloating 06/26/2021     Priority: Medium     Dyspepsia 06/26/2021     Priority: Medium     Heartburn 06/26/2021     Priority: Medium     Nausea 06/26/2021     Priority: Medium     Rectal bleeding 06/26/2021     Priority: Medium     Cervical high risk HPV (human papillomavirus) test positive 03/31/2021     Priority: Medium     Formatting of this note might be different from the original.  10/1/14 NIL  1/5/18 NIL  3/31/21 NIL pap, +HR HPV (not 16/18). Plan: cotest in 1 year  4/8/21 pt notified       ADHD (attention deficit hyperactivity disorder), inattentive type 03/13/2019     Priority: Medium     Dysmenorrhea 08/01/2018     Priority: Medium     Persistent insomnia 01/05/2018     Priority: Medium      Past Medical History:   Diagnosis Date     ADHD      Cervical high risk HPV (human papillomavirus) test positive 3/31/2021    10/1/14 NIL 1/5/18 NIL 3/31/21 NIL pap, +HR HPV (not 16/18). Plan: cotest in 1 year     Medical history non-contributory 01/22/2019     Past Surgical History:   Procedure Laterality Date     COLONOSCOPY N/A 8/31/2021    Procedure: COLONOSCOPY;  Surgeon: Billy Thurston DO;  Location:  GI     OTHER SURGICAL HISTORY  01/22/2019    No surgery history     Current Outpatient Medications   Medication Sig Dispense Refill     amphetamine-dextroamphetamine (ADDERALL XR) 15 MG 24 hr capsule Take 1 capsule (15 mg) by mouth daily 30 capsule 0     [START ON 3/1/2022] amphetamine-dextroamphetamine (ADDERALL XR) 15 MG 24 hr capsule Take 1 capsule (15 mg) by mouth daily 30 capsule 0      "amphetamine-dextroamphetamine (ADDERALL) 10 MG tablet Take 1-2 tablets (10-20 mg) by mouth daily 40 tablet 0     amphetamine-dextroamphetamine (ADDERALL) 10 MG tablet Take 1-2 tablets (10-20 mg) by mouth daily 40 tablet 0     [START ON 2/27/2022] amphetamine-dextroamphetamine (ADDERALL) 10 MG tablet Take 1-2 tablets (10-20 mg) by mouth daily 40 tablet 0     cyclobenzaprine (FLEXERIL) 5 MG tablet TAKE ONE TABLET BY MOUTH THREE TIMES A DAY AS NEEDED FOR MUSCLE SPASMS. NEED APPOINTMENT FOR MORE REFILLS 30 tablet 0     omeprazole (PRILOSEC) 40 MG DR capsule Take 40 mg by mouth       Omega-3 1000 MG capsule Take 2 g by mouth (Patient not taking: Reported on 12/29/2021)         Allergies   Allergen Reactions     Bactrim [Sulfamethoxazole W/Trimethoprim] Hives     Amoxicillin Rash     Morphine Rash        Social History     Tobacco Use     Smoking status: Never Smoker     Smokeless tobacco: Never Used   Substance Use Topics     Alcohol use: Yes     Comment: moderate     Family History   Problem Relation Age of Onset     Hypertension Father      Hypertension Paternal Grandmother      Colon Cancer Paternal Grandfather      Diabetes Paternal Uncle      Diabetes Paternal Aunt      Cerebrovascular Disease Maternal Grandmother      Dementia Maternal Grandmother      Brain Cancer Maternal Grandfather      Diabetes Type 2  Paternal Uncle      Diabetes Type 1 Paternal Uncle      Diabetes Paternal Aunt      Diabetes Paternal Aunt      History   Drug Use No         Objective     /81   Pulse 95   Ht 1.689 m (5' 6.5\")   Wt 71.8 kg (158 lb 6 oz)   LMP  (LMP Unknown)   Breastfeeding No   BMI 25.18 kg/m      Physical Exam    GENERAL APPEARANCE: healthy, alert and no distress     EYES: EOMI, PERRL     HENT: ear canals and TM's normal and nose and mouth without ulcers or lesions     NECK: no adenopathy, no asymmetry, masses, or scars and thyroid normal to palpation     RESP: lungs clear to auscultation - no rales, rhonchi or " wheezes     CV: regular rates and rhythm, normal S1 S2, no S3 or S4 and no murmur, click or rub     ABDOMEN:  soft, nontender, no HSM or masses and bowel sounds normal     MS: extremities normal- no gross deformities noted, no evidence of inflammation in joints, FROM in all extremities.     SKIN: no suspicious lesions or rashes     NEURO: Normal strength and tone, sensory exam grossly normal, mentation intact and speech normal     PSYCH: mentation appears normal. and affect normal/bright     LYMPHATICS: No cervical adenopathy    Recent Labs   Lab Test 08/30/21  1028 03/31/21  1234   HGB 13.3 13.6    286        Diagnostics:  Recent Results (from the past 24 hour(s))   CBC with platelets    Collection Time: 02/09/22 12:16 PM   Result Value Ref Range    WBC Count 5.0 4.0 - 11.0 10e3/uL    RBC Count 4.34 3.80 - 5.20 10e6/uL    Hemoglobin 13.6 11.7 - 15.7 g/dL    Hematocrit 39.4 35.0 - 47.0 %    MCV 91 78 - 100 fL    MCH 31.3 26.5 - 33.0 pg    MCHC 34.5 31.5 - 36.5 g/dL    RDW 11.9 10.0 - 15.0 %    Platelet Count 287 150 - 450 10e3/uL   HCG qualitative urine    Collection Time: 02/09/22 12:16 PM   Result Value Ref Range    hCG Urine Qualitative Negative Negative      No EKG required, no history of coronary heart disease, significant arrhythmia, peripheral arterial disease or other structural heart disease.    Revised Cardiac Risk Index (RCRI):  The patient has the following serious cardiovascular risks for perioperative complications:   - No serious cardiac risks = 0 points     RCRI Interpretation: 0 points: Class I (very low risk - 0.4% complication rate)           Signed Electronically by: Taylor Womack DO  Copy of this evaluation report is provided to requesting physician.

## 2022-02-09 NOTE — LETTER
[facility]  02/09/22  Patient: Georgie Scott  YOB: 1990  Medical Record Number: [id number]                                                                                  Non-Opioid Controlled Substance Agreement    This is an agreement between you and your provider regarding safe and appropriate use of controlled substances prescribed by your care team. Controlled substances are?medicines that can cause physical and mental dependence (abuse).     There are strict laws about having and using these medicines. We here at [facility] are  committed to working with you in your efforts to get better. To support you in this work, we'll help you schedule regular office appointments for medicine refills. If we must cancel or change your appointment for any reason, we'll make sure you have enough medicine to last until your next appointment.     As a Provider, I will:     Listen carefully to your concerns while treating you with respect.     Recommend a treatment plan that I believe is in your best interest and may involve therapies other than medicine.      Talk with you often about the possible benefits and the risk of harm of any medicine that we prescribe for you.    Assess the safety of this medicine and check how well it works.      Provide a plan on how to taper (discontinue or go off) using this medicine if the decision is made to stop its use.      ::  As a Patient, I understand controlled substances:       Are prescribed by my care provider to help me function or work and manage my condition(s).?    Are strong medicines and can cause serious side effects.       Need to be taken exactly as prescribed.?Combining controlled substances with certain medicines or chemicals (such as illegal drugs, alcohol, sedatives, sleeping pills, and benzodiazepines) can be dangerous or even fatal.? If I stop taking my medicines suddenly, I may have severe withdrawal symptoms.      The risks, benefits, and side effects of these medicine(s) were explained to me. I agree that:    1. I will take part in other treatments as advised by my care team. This may be psychiatry or counseling, physical therapy, behavioral therapy, group treatment or a referral to specialist.    2. I will keep all my appointments and understand this is part of the monitoring of controlled substances.?My care team may require an office visit for EVERY controlled substance refill. If I miss appointments or don t follow instructions, my care team may stop my medicine    3. I will take my medicines as prescribed. I will not change the dose or schedule unless my care team tells me to. There will be no refills if I run out early.      4. I may be asked to come to the clinic and complete a urine drug test or complete a pill count. If I don t give a urine sample or participate in a pill count, the care team may stop my medicine.    5. I will only receive controlled substance prescriptions from this clinic. If I am treated by another provider, I will tell them that I am taking controlled substances and that I have a treatment agreement with this provider. I will inform my [facility] care team within one business day if I am given a prescription for any controlled substance by another healthcare provider. My [facility] care team can contact other providers and pharmacists about my use of any medicines.    6. It is up to me to make sure that I don't run out of my medicines on weekends or holidays.?If my care team is willing to refill my prescription without a visit, I must request refills only during office hours. Refills may take up to 3 business days to process. I will use one pharmacy to fill all my controlled substance prescriptions. I will notify the clinic about any changes to my insurance or medicine availability.    7. I am responsible for my prescriptions. If the medicine/prescription is lost, stolen or  destroyed, it will not be replaced.?I also agree not to share controlled substance medicines with anyone.     8. I am aware I should not use any illegal or recreational drugs. I agree not to drink alcohol unless my care team says I can.     9. If I enroll in the [place] Medical Cannabis program, I will tell my care team before my next refill.    10. I will tell my care team right away if I become pregnant, have a new medical problem treated outside of my regular clinic, or have a change in my medicines.     11. I understand that this medicine can affect my thinking, judgment and reaction time.? Alcohol and drugs affect the brain and body, which can affect the safety of my driving. Being under the influence of alcohol or drugs can affect my decision-making, behaviors, personal safety and the safety of others. Driving while impaired (DWI) can occur if a person is driving, operating or in physical control of a car, motorcycle, boat, snowmobile, ATV, motorbike, off-road vehicle or any other motor vehicle ([place] Statute 169A.20). I understand the risk if I choose to drive or operate any vehicle or machinery.    I understand that if I do not follow any of the conditions above, my prescriptions or treatment may be stopped or changed.   I agree that my provider, clinic care team and pharmacy may work with any city, state or federal law enforcement agency that investigates the misuse, sale or other diversion of my controlled medicine. I will allow my provider to discuss my care with, or share a copy of, this agreement with any other treating provider, pharmacy or emergency room where I receive care.     I have read this agreement and have asked questions about anything I did not understand.    ________________________________________________________  Patient Signature - Georgie VILLEGAS Scott     ___________________                   Date     ________________________________________________________  Provider Signature - Taylor DUDLEY  Womack, DO       ___________________                   Date     ________________________________________________________  Witness Signature (required if provider not present while patient signing)          ___________________                   Date

## 2022-02-11 ENCOUNTER — ANCILLARY PROCEDURE (OUTPATIENT)
Dept: ULTRASOUND IMAGING | Facility: HOSPITAL | Age: 32
End: 2022-02-11
Attending: ANESTHESIOLOGY
Payer: COMMERCIAL

## 2022-02-11 ENCOUNTER — HOSPITAL ENCOUNTER (OUTPATIENT)
Facility: HOSPITAL | Age: 32
Discharge: HOME | End: 2022-02-11
Attending: STUDENT IN AN ORGANIZED HEALTH CARE EDUCATION/TRAINING PROGRAM | Admitting: STUDENT IN AN ORGANIZED HEALTH CARE EDUCATION/TRAINING PROGRAM
Payer: COMMERCIAL

## 2022-02-11 ENCOUNTER — ANESTHESIA EVENT (OUTPATIENT)
Dept: SURGERY | Facility: HOSPITAL | Age: 32
End: 2022-02-11
Payer: COMMERCIAL

## 2022-02-11 ENCOUNTER — ANESTHESIA (OUTPATIENT)
Dept: SURGERY | Facility: HOSPITAL | Age: 32
End: 2022-02-11
Payer: COMMERCIAL

## 2022-02-11 VITALS
BODY MASS INDEX: 24.98 KG/M2 | RESPIRATION RATE: 14 BRPM | HEART RATE: 81 BPM | DIASTOLIC BLOOD PRESSURE: 63 MMHG | TEMPERATURE: 99 F | WEIGHT: 157.1 LBS | SYSTOLIC BLOOD PRESSURE: 109 MMHG | OXYGEN SATURATION: 100 %

## 2022-02-11 DIAGNOSIS — Z90.710 S/P HYSTERECTOMY: Primary | ICD-10-CM

## 2022-02-11 LAB
ABO/RH(D): NORMAL
ABO/RH(D): NORMAL
ANTIBODY SCREEN: NEGATIVE
BASOPHILS # BLD AUTO: 0 10E3/UL (ref 0–0.2)
BASOPHILS NFR BLD AUTO: 1 %
EOSINOPHIL # BLD AUTO: 0.1 10E3/UL (ref 0–0.7)
EOSINOPHIL NFR BLD AUTO: 1 %
ERYTHROCYTE [DISTWIDTH] IN BLOOD BY AUTOMATED COUNT: 12 % (ref 10–15)
HCG UR QL: NEGATIVE
HCT VFR BLD AUTO: 38.3 % (ref 35–47)
HGB BLD-MCNC: 13.2 G/DL (ref 11.7–15.7)
HOLD SPECIMEN: NORMAL
IMM GRANULOCYTES # BLD: 0 10E3/UL
IMM GRANULOCYTES NFR BLD: 0 %
LYMPHOCYTES # BLD AUTO: 1.5 10E3/UL (ref 0.8–5.3)
LYMPHOCYTES NFR BLD AUTO: 35 %
MCH RBC QN AUTO: 31.4 PG (ref 26.5–33)
MCHC RBC AUTO-ENTMCNC: 34.5 G/DL (ref 31.5–36.5)
MCV RBC AUTO: 91 FL (ref 78–100)
MONOCYTES # BLD AUTO: 0.4 10E3/UL (ref 0–1.3)
MONOCYTES NFR BLD AUTO: 9 %
NEUTROPHILS # BLD AUTO: 2.4 10E3/UL (ref 1.6–8.3)
NEUTROPHILS NFR BLD AUTO: 54 %
NRBC # BLD AUTO: 0 10E3/UL
NRBC BLD AUTO-RTO: 0 /100
PLATELET # BLD AUTO: 280 10E3/UL (ref 150–450)
RBC # BLD AUTO: 4.2 10E6/UL (ref 3.8–5.2)
SPECIMEN EXPIRATION DATE: NORMAL
SPECIMEN EXPIRATION DATE: NORMAL
WBC # BLD AUTO: 4.4 10E3/UL (ref 4–11)

## 2022-02-11 PROCEDURE — 250N000011 HC RX IP 250 OP 636: Performed by: NURSE ANESTHETIST, CERTIFIED REGISTERED

## 2022-02-11 PROCEDURE — 250N000009 HC RX 250

## 2022-02-11 PROCEDURE — 36415 COLL VENOUS BLD VENIPUNCTURE: CPT | Performed by: STUDENT IN AN ORGANIZED HEALTH CARE EDUCATION/TRAINING PROGRAM

## 2022-02-11 PROCEDURE — 999N000127 HC STATISTIC PERIPHERAL IV START W US GUIDANCE

## 2022-02-11 PROCEDURE — 86901 BLOOD TYPING SEROLOGIC RH(D): CPT | Performed by: NURSE PRACTITIONER

## 2022-02-11 PROCEDURE — 250N000009 HC RX 250: Performed by: STUDENT IN AN ORGANIZED HEALTH CARE EDUCATION/TRAINING PROGRAM

## 2022-02-11 PROCEDURE — 710N000012 HC RECOVERY PHASE 2, PER MINUTE: Performed by: STUDENT IN AN ORGANIZED HEALTH CARE EDUCATION/TRAINING PROGRAM

## 2022-02-11 PROCEDURE — 250N000013 HC RX MED GY IP 250 OP 250 PS 637: Performed by: NURSE PRACTITIONER

## 2022-02-11 PROCEDURE — 370N000017 HC ANESTHESIA TECHNICAL FEE, PER MIN: Performed by: STUDENT IN AN ORGANIZED HEALTH CARE EDUCATION/TRAINING PROGRAM

## 2022-02-11 PROCEDURE — 250N000009 HC RX 250: Performed by: NURSE ANESTHETIST, CERTIFIED REGISTERED

## 2022-02-11 PROCEDURE — 250N000011 HC RX IP 250 OP 636

## 2022-02-11 PROCEDURE — 250N000011 HC RX IP 250 OP 636: Performed by: ANESTHESIOLOGY

## 2022-02-11 PROCEDURE — 250N000011 HC RX IP 250 OP 636: Performed by: NURSE PRACTITIONER

## 2022-02-11 PROCEDURE — 999N000141 HC STATISTIC PRE-PROCEDURE NURSING ASSESSMENT: Performed by: STUDENT IN AN ORGANIZED HEALTH CARE EDUCATION/TRAINING PROGRAM

## 2022-02-11 PROCEDURE — 272N000001 HC OR GENERAL SUPPLY STERILE: Performed by: STUDENT IN AN ORGANIZED HEALTH CARE EDUCATION/TRAINING PROGRAM

## 2022-02-11 PROCEDURE — 86901 BLOOD TYPING SEROLOGIC RH(D): CPT | Performed by: STUDENT IN AN ORGANIZED HEALTH CARE EDUCATION/TRAINING PROGRAM

## 2022-02-11 PROCEDURE — 250N000013 HC RX MED GY IP 250 OP 250 PS 637: Performed by: STUDENT IN AN ORGANIZED HEALTH CARE EDUCATION/TRAINING PROGRAM

## 2022-02-11 PROCEDURE — 258N000003 HC RX IP 258 OP 636: Performed by: ANESTHESIOLOGY

## 2022-02-11 PROCEDURE — 250N000013 HC RX MED GY IP 250 OP 250 PS 637: Performed by: ANESTHESIOLOGY

## 2022-02-11 PROCEDURE — 360N000077 HC SURGERY LEVEL 4, PER MIN: Performed by: STUDENT IN AN ORGANIZED HEALTH CARE EDUCATION/TRAINING PROGRAM

## 2022-02-11 PROCEDURE — 36415 COLL VENOUS BLD VENIPUNCTURE: CPT | Performed by: NURSE PRACTITIONER

## 2022-02-11 PROCEDURE — 88305 TISSUE EXAM BY PATHOLOGIST: CPT | Mod: 26 | Performed by: PATHOLOGY

## 2022-02-11 PROCEDURE — 88305 TISSUE EXAM BY PATHOLOGIST: CPT | Mod: TC | Performed by: STUDENT IN AN ORGANIZED HEALTH CARE EDUCATION/TRAINING PROGRAM

## 2022-02-11 PROCEDURE — 81025 URINE PREGNANCY TEST: CPT | Performed by: NURSE PRACTITIONER

## 2022-02-11 PROCEDURE — 710N000009 HC RECOVERY PHASE 1, LEVEL 1, PER MIN: Performed by: STUDENT IN AN ORGANIZED HEALTH CARE EDUCATION/TRAINING PROGRAM

## 2022-02-11 PROCEDURE — 88307 TISSUE EXAM BY PATHOLOGIST: CPT | Mod: 26 | Performed by: PATHOLOGY

## 2022-02-11 PROCEDURE — 86850 RBC ANTIBODY SCREEN: CPT | Performed by: NURSE PRACTITIONER

## 2022-02-11 PROCEDURE — 85025 COMPLETE CBC W/AUTO DIFF WBC: CPT | Performed by: NURSE PRACTITIONER

## 2022-02-11 RX ORDER — ONDANSETRON 4 MG/1
4 TABLET, ORALLY DISINTEGRATING ORAL EVERY 30 MIN PRN
Status: DISCONTINUED | OUTPATIENT
Start: 2022-02-11 | End: 2022-02-11 | Stop reason: HOSPADM

## 2022-02-11 RX ORDER — ACETAMINOPHEN 325 MG/1
975 TABLET ORAL ONCE
Status: COMPLETED | OUTPATIENT
Start: 2022-02-11 | End: 2022-02-11

## 2022-02-11 RX ORDER — BUPIVACAINE HYDROCHLORIDE 5 MG/ML
INJECTION, SOLUTION PERINEURAL PRN
Status: DISCONTINUED | OUTPATIENT
Start: 2022-02-11 | End: 2022-02-11 | Stop reason: HOSPADM

## 2022-02-11 RX ORDER — OXYCODONE HYDROCHLORIDE 5 MG/1
5-10 TABLET ORAL EVERY 4 HOURS PRN
Qty: 12 TABLET | Refills: 0 | COMMUNITY
Start: 2022-02-11 | End: 2022-03-19

## 2022-02-11 RX ORDER — ONDANSETRON 2 MG/ML
4 INJECTION INTRAMUSCULAR; INTRAVENOUS EVERY 30 MIN PRN
Status: DISCONTINUED | OUTPATIENT
Start: 2022-02-11 | End: 2022-02-11 | Stop reason: HOSPADM

## 2022-02-11 RX ORDER — ACETAMINOPHEN 325 MG/1
975 TABLET ORAL ONCE
Status: DISCONTINUED | OUTPATIENT
Start: 2022-02-11 | End: 2022-02-11

## 2022-02-11 RX ORDER — AMOXICILLIN 250 MG
1-2 CAPSULE ORAL 2 TIMES DAILY
Qty: 30 TABLET | Refills: 0 | COMMUNITY
Start: 2022-02-11 | End: 2022-03-19

## 2022-02-11 RX ORDER — SODIUM CHLORIDE, SODIUM LACTATE, POTASSIUM CHLORIDE, CALCIUM CHLORIDE 600; 310; 30; 20 MG/100ML; MG/100ML; MG/100ML; MG/100ML
INJECTION, SOLUTION INTRAVENOUS CONTINUOUS
Status: DISCONTINUED | OUTPATIENT
Start: 2022-02-11 | End: 2022-02-11 | Stop reason: HOSPADM

## 2022-02-11 RX ORDER — MAGNESIUM SULFATE 4 G/50ML
4 INJECTION INTRAVENOUS ONCE
Status: COMPLETED | OUTPATIENT
Start: 2022-02-11 | End: 2022-02-11

## 2022-02-11 RX ORDER — NALOXONE HYDROCHLORIDE 1 MG/ML
0.2 INJECTION INTRAMUSCULAR; INTRAVENOUS; SUBCUTANEOUS
Status: DISCONTINUED | OUTPATIENT
Start: 2022-02-11 | End: 2022-02-11 | Stop reason: HOSPADM

## 2022-02-11 RX ORDER — CEFAZOLIN SODIUM 2 G/100ML
2 INJECTION, SOLUTION INTRAVENOUS
Status: COMPLETED | OUTPATIENT
Start: 2022-02-11 | End: 2022-02-11

## 2022-02-11 RX ORDER — DEXAMETHASONE SODIUM PHOSPHATE 10 MG/ML
INJECTION, SOLUTION INTRAMUSCULAR; INTRAVENOUS PRN
Status: DISCONTINUED | OUTPATIENT
Start: 2022-02-11 | End: 2022-02-11

## 2022-02-11 RX ORDER — OXYCODONE HYDROCHLORIDE 5 MG/1
5 TABLET ORAL EVERY 4 HOURS PRN
Status: DISCONTINUED | OUTPATIENT
Start: 2022-02-11 | End: 2022-02-11 | Stop reason: HOSPADM

## 2022-02-11 RX ORDER — IBUPROFEN 200 MG
800 TABLET ORAL ONCE
Status: DISCONTINUED | OUTPATIENT
Start: 2022-02-11 | End: 2022-02-11 | Stop reason: HOSPADM

## 2022-02-11 RX ORDER — ACETAMINOPHEN 325 MG/1
975 TABLET ORAL EVERY 6 HOURS PRN
Qty: 50 TABLET | Refills: 0 | COMMUNITY
Start: 2022-02-11 | End: 2022-03-19

## 2022-02-11 RX ORDER — FENTANYL CITRATE 50 UG/ML
INJECTION, SOLUTION INTRAMUSCULAR; INTRAVENOUS PRN
Status: DISCONTINUED | OUTPATIENT
Start: 2022-02-11 | End: 2022-02-11

## 2022-02-11 RX ORDER — IBUPROFEN 800 MG/1
800 TABLET, FILM COATED ORAL EVERY 6 HOURS PRN
Qty: 30 TABLET | Refills: 0 | COMMUNITY
Start: 2022-02-11 | End: 2022-03-19

## 2022-02-11 RX ORDER — PROPOFOL 10 MG/ML
INJECTION, EMULSION INTRAVENOUS PRN
Status: DISCONTINUED | OUTPATIENT
Start: 2022-02-11 | End: 2022-02-11

## 2022-02-11 RX ORDER — OXYCODONE HYDROCHLORIDE 5 MG/1
5 TABLET ORAL
Status: DISCONTINUED | OUTPATIENT
Start: 2022-02-11 | End: 2022-02-11 | Stop reason: HOSPADM

## 2022-02-11 RX ORDER — FENTANYL CITRATE 50 UG/ML
25 INJECTION, SOLUTION INTRAMUSCULAR; INTRAVENOUS
Status: DISCONTINUED | OUTPATIENT
Start: 2022-02-11 | End: 2022-02-11 | Stop reason: HOSPADM

## 2022-02-11 RX ORDER — FENTANYL CITRATE 50 UG/ML
50 INJECTION, SOLUTION INTRAMUSCULAR; INTRAVENOUS
Status: DISCONTINUED | OUTPATIENT
Start: 2022-02-11 | End: 2022-02-11 | Stop reason: HOSPADM

## 2022-02-11 RX ORDER — ONDANSETRON 2 MG/ML
INJECTION INTRAMUSCULAR; INTRAVENOUS PRN
Status: DISCONTINUED | OUTPATIENT
Start: 2022-02-11 | End: 2022-02-11

## 2022-02-11 RX ORDER — OXYCODONE HYDROCHLORIDE 5 MG/1
5 TABLET ORAL EVERY 6 HOURS PRN
Qty: 12 TABLET | Refills: 0 | Status: SHIPPED | OUTPATIENT
Start: 2022-02-11 | End: 2022-02-14

## 2022-02-11 RX ORDER — NALOXONE HYDROCHLORIDE 1 MG/ML
0.4 INJECTION INTRAMUSCULAR; INTRAVENOUS; SUBCUTANEOUS
Status: DISCONTINUED | OUTPATIENT
Start: 2022-02-11 | End: 2022-02-11 | Stop reason: HOSPADM

## 2022-02-11 RX ORDER — KETAMINE HYDROCHLORIDE 50 MG/ML
INJECTION, SOLUTION INTRAMUSCULAR; INTRAVENOUS PRN
Status: DISCONTINUED | OUTPATIENT
Start: 2022-02-11 | End: 2022-02-11

## 2022-02-11 RX ORDER — FENTANYL CITRATE 50 UG/ML
25 INJECTION, SOLUTION INTRAMUSCULAR; INTRAVENOUS EVERY 5 MIN PRN
Status: DISCONTINUED | OUTPATIENT
Start: 2022-02-11 | End: 2022-02-11 | Stop reason: HOSPADM

## 2022-02-11 RX ORDER — KETOROLAC TROMETHAMINE 30 MG/ML
INJECTION, SOLUTION INTRAMUSCULAR; INTRAVENOUS PRN
Status: DISCONTINUED | OUTPATIENT
Start: 2022-02-11 | End: 2022-02-11

## 2022-02-11 RX ORDER — LIDOCAINE HYDROCHLORIDE 20 MG/ML
INJECTION, SOLUTION INFILTRATION; PERINEURAL PRN
Status: DISCONTINUED | OUTPATIENT
Start: 2022-02-11 | End: 2022-02-11

## 2022-02-11 RX ORDER — HYDROMORPHONE HYDROCHLORIDE 1 MG/ML
0.2 INJECTION, SOLUTION INTRAMUSCULAR; INTRAVENOUS; SUBCUTANEOUS EVERY 5 MIN PRN
Status: DISCONTINUED | OUTPATIENT
Start: 2022-02-11 | End: 2022-02-11 | Stop reason: HOSPADM

## 2022-02-11 RX ORDER — LIDOCAINE 40 MG/G
CREAM TOPICAL
Status: DISCONTINUED | OUTPATIENT
Start: 2022-02-11 | End: 2022-02-11 | Stop reason: HOSPADM

## 2022-02-11 RX ORDER — MEPERIDINE HYDROCHLORIDE 25 MG/ML
12.5 INJECTION INTRAMUSCULAR; INTRAVENOUS; SUBCUTANEOUS
Status: DISCONTINUED | OUTPATIENT
Start: 2022-02-11 | End: 2022-02-11 | Stop reason: HOSPADM

## 2022-02-11 RX ORDER — CEFAZOLIN SODIUM 2 G/100ML
2 INJECTION, SOLUTION INTRAVENOUS SEE ADMIN INSTRUCTIONS
Status: DISCONTINUED | OUTPATIENT
Start: 2022-02-11 | End: 2022-02-11 | Stop reason: HOSPADM

## 2022-02-11 RX ORDER — PROPOFOL 10 MG/ML
INJECTION, EMULSION INTRAVENOUS CONTINUOUS PRN
Status: DISCONTINUED | OUTPATIENT
Start: 2022-02-11 | End: 2022-02-11

## 2022-02-11 RX ADMIN — OXYCODONE HYDROCHLORIDE 5 MG: 5 TABLET ORAL at 15:12

## 2022-02-11 RX ADMIN — HYDROMORPHONE HYDROCHLORIDE 0.5 MG: 1 INJECTION, SOLUTION INTRAMUSCULAR; INTRAVENOUS; SUBCUTANEOUS at 10:26

## 2022-02-11 RX ADMIN — FENTANYL CITRATE 50 MCG: 50 INJECTION, SOLUTION INTRAMUSCULAR; INTRAVENOUS at 10:13

## 2022-02-11 RX ADMIN — ROCURONIUM BROMIDE 20 MG: 50 INJECTION, SOLUTION INTRAVENOUS at 10:41

## 2022-02-11 RX ADMIN — LIDOCAINE HYDROCHLORIDE 60 MG: 20 INJECTION, SOLUTION INFILTRATION; PERINEURAL at 09:55

## 2022-02-11 RX ADMIN — FENTANYL CITRATE 25 MCG: 50 INJECTION, SOLUTION INTRAMUSCULAR; INTRAVENOUS at 13:33

## 2022-02-11 RX ADMIN — KETAMINE HYDROCHLORIDE 50 MG: 50 INJECTION, SOLUTION INTRAMUSCULAR; INTRAVENOUS at 09:55

## 2022-02-11 RX ADMIN — KETOROLAC TROMETHAMINE 15 MG: 30 INJECTION, SOLUTION INTRAMUSCULAR at 12:14

## 2022-02-11 RX ADMIN — FENTANYL CITRATE 25 MCG: 50 INJECTION, SOLUTION INTRAMUSCULAR; INTRAVENOUS at 12:28

## 2022-02-11 RX ADMIN — MAGNESIUM SULFATE HEPTAHYDRATE 4 G: 80 INJECTION, SOLUTION INTRAVENOUS at 09:15

## 2022-02-11 RX ADMIN — SODIUM CHLORIDE, POTASSIUM CHLORIDE, SODIUM LACTATE AND CALCIUM CHLORIDE: 600; 310; 30; 20 INJECTION, SOLUTION INTRAVENOUS at 11:42

## 2022-02-11 RX ADMIN — SUGAMMADEX 200 MG: 100 INJECTION, SOLUTION INTRAVENOUS at 12:20

## 2022-02-11 RX ADMIN — DEXAMETHASONE SODIUM PHOSPHATE 10 MG: 10 INJECTION, SOLUTION INTRAMUSCULAR; INTRAVENOUS at 09:55

## 2022-02-11 RX ADMIN — ACETAMINOPHEN 975 MG: 325 TABLET ORAL at 08:52

## 2022-02-11 RX ADMIN — ROCURONIUM BROMIDE 20 MG: 50 INJECTION, SOLUTION INTRAVENOUS at 11:13

## 2022-02-11 RX ADMIN — FENTANYL CITRATE 25 MCG: 50 INJECTION, SOLUTION INTRAMUSCULAR; INTRAVENOUS at 13:38

## 2022-02-11 RX ADMIN — HYDROMORPHONE HYDROCHLORIDE 0.5 MG: 1 INJECTION, SOLUTION INTRAMUSCULAR; INTRAVENOUS; SUBCUTANEOUS at 12:00

## 2022-02-11 RX ADMIN — MIDAZOLAM 2 MG: 1 INJECTION INTRAMUSCULAR; INTRAVENOUS at 09:50

## 2022-02-11 RX ADMIN — CEFAZOLIN SODIUM 2 G: 2 INJECTION, SOLUTION INTRAVENOUS at 09:59

## 2022-02-11 RX ADMIN — ONDANSETRON 4 MG: 2 INJECTION INTRAMUSCULAR; INTRAVENOUS at 11:45

## 2022-02-11 RX ADMIN — PROPOFOL 200 MCG/KG/MIN: 10 INJECTION, EMULSION INTRAVENOUS at 09:55

## 2022-02-11 RX ADMIN — FENTANYL CITRATE 50 MCG: 50 INJECTION, SOLUTION INTRAMUSCULAR; INTRAVENOUS at 10:18

## 2022-02-11 RX ADMIN — FENTANYL CITRATE 25 MCG: 50 INJECTION, SOLUTION INTRAMUSCULAR; INTRAVENOUS at 13:43

## 2022-02-11 RX ADMIN — PROPOFOL 50 MG: 10 INJECTION, EMULSION INTRAVENOUS at 09:55

## 2022-02-11 RX ADMIN — ROCURONIUM BROMIDE 10 MG: 50 INJECTION, SOLUTION INTRAVENOUS at 11:54

## 2022-02-11 RX ADMIN — SODIUM CHLORIDE, POTASSIUM CHLORIDE, SODIUM LACTATE AND CALCIUM CHLORIDE: 600; 310; 30; 20 INJECTION, SOLUTION INTRAVENOUS at 09:15

## 2022-02-11 RX ADMIN — ROCURONIUM BROMIDE 50 MG: 50 INJECTION, SOLUTION INTRAVENOUS at 09:55

## 2022-02-11 RX ADMIN — ACETAMINOPHEN 975 MG: 325 TABLET ORAL at 15:12

## 2022-02-11 NOTE — ANESTHESIA PROCEDURE NOTES
Airway       Patient location during procedure: OR       Procedure Start/Stop Times: 2/11/2022 9:57 AM  Staff -        CRNA: Carlie Curiel APRN CRNA       Performed By: CRNA  Consent for Airway        Urgency: elective  Indications and Patient Condition       Indications for airway management: jv-procedural       Induction type:intravenous       Mask difficulty assessment: 1 - vent by mask    Final Airway Details       Final airway type: endotracheal airway       Successful airway: ETT - single  Endotracheal Airway Details        ETT size (mm): 7.0       Cuffed: yes       Successful intubation technique: direct laryngoscopy       DL Blade Type: Collier 2       Grade View of Cords: 1       Adjucts: stylet and tooth guard       Position: Right       Measured from: lips       Secured at (cm): 20    Post intubation assessment        Placement verified by: capnometry, equal breath sounds and chest rise        Number of attempts at approach: 1       Number of other approaches attempted: 0       Secured with: silk tape       Ease of procedure: easy       Dentition: Intact and Unchanged

## 2022-02-11 NOTE — PHARMACY-ADMISSION MEDICATION HISTORY
Pharmacy Note - Admission Medication History     ______________________________________________________________________    Prior To Admission (PTA) med list completed and updated in EMR.       PTA Med List   Medication Sig Last Dose     amphetamine-dextroamphetamine (ADDERALL XR) 15 MG 24 hr capsule Take 1 capsule (15 mg) by mouth daily 2/10/2022 at 0800     amphetamine-dextroamphetamine (ADDERALL) 10 MG tablet Take 1-2 tablets (10-20 mg) by mouth daily 2/10/2022 at 0800     cyclobenzaprine (FLEXERIL) 5 MG tablet TAKE ONE TABLET BY MOUTH THREE TIMES A DAY AS NEEDED FOR MUSCLE SPASMS. NEED APPOINTMENT FOR MORE REFILLS Past Week at time     omeprazole (PRILOSEC) 40 MG DR capsule Take 40 mg by mouth daily as needed  Past Week       Information source(s): Patient, Clinic records and [facility]    Method of interview communication: in-person    Patient was asked about OTC/herbal products specifically.  PTA med list reflects this.    Based on the pharmacist's assessment, the PTA med list information appears reliable    Allergies were reviewed, assessed, and updated with the patient.      Patient does not use any multi-dose medications prior to admission.     Thank you for the opportunity to participate in the care of this patient.      Hans Jameson [facility]     2/11/2022     8:24 AM

## 2022-02-11 NOTE — INTERVAL H&P NOTE
I have reviewed the surgical (or preoperative) H&P that is linked to this encounter, and examined the patient. There are no significant changes.  Reviewed plan today for total laparoscopic hysterectomy with bilateral salpingectomy.  Discussed goal for ovarian preservation.  Discussed risks of surgery including pain, bleeding, infection, damage to nearby organs including bowel, bladder, ureters, possibly requiring additional surgery for repair.  Discussed expected post-op recovery and restrictions of no lifting more than 10-15 lbs for 6 weeks and pelvic rest for 6 weeks.  All questions answered.  Plan to proceed with surgery as scheduled.    MD Alcon

## 2022-02-11 NOTE — ANESTHESIA POSTPROCEDURE EVALUATION
Patient: Georgie Scott    Procedure: Procedure(s):  TOTAL LAPAROSCOPIC HYSTERECTOMY BILATERAL SALPINGECTOMY       Diagnosis:Menorrhagia [N92.0]  Diagnosis Additional Information: No value filed.    Anesthesia Type:  General    Note:  Disposition: Outpatient   Postop Pain Control: Uneventful            Sign Out: Well controlled pain   PONV: No   Neuro/Psych: Uneventful            Sign Out: Acceptable/Baseline neuro status   Airway/Respiratory: Uneventful            Sign Out: Acceptable/Baseline resp. status   CV/Hemodynamics: Uneventful            Sign Out: Acceptable CV status; No obvious hypovolemia; No obvious fluid overload   Other NRE: NONE   DID A NON-ROUTINE EVENT OCCUR? No           Last vitals:  Vitals Value Taken Time   /68 02/11/22 1310   Temp 36.9  C (98.4  F) 02/11/22 1235   Pulse 77 02/11/22 1315   Resp 14 02/11/22 1315   SpO2 98 % 02/11/22 1315   Vitals shown include unvalidated device data.    Electronically Signed By: Rin Small MD  February 11, 2022  1:16 PM

## 2022-02-11 NOTE — OP NOTE
[facility] assistant note    I was present for the entire care including placement of vaginal instruments, placement of the laparoscopic instruments, half of the hysterectomy, closure of the vaginal cuff, and closure of the skin incisions.     Feroz Rico M.D.

## 2022-02-11 NOTE — OP NOTE
Operative Note    Patient: Georgie Scott  : 1990  MRN: [id number]    Date of Service: 2022    Preoperative diagnosis:   1. Menorrhagia, failed medical  Management  2. Dysmenorrhea  3. Pelvic pain    Postoperative diagnosis:   - same as above    Procedure: Total laparoscopic Hysterectomy, bilateral salpingectomy    Surgeon: Tiesha Hurley MD  Assistants: Feroz Rico MD    Anesthesia: GETA  Complications:  none  EBL: 50 cc      Findings: Normal sized uterus in mid position on EUA, evidence of endometriosis along the uterosacral ligaments and posterior cul de sac, Normal uterus, fallopian tubes, and ovaries.  Normal appendix and liver edge    Indications: Georgie Scott is a 31 year old female who presented with heavy painful periods, she had failed medical management, and has elected to proceed with hysterectomy.  The risks, benefits and alternatives of surgery were discussed in detail with the patient and she agreed to proceed.  Informed consent was signed prior to the procedure.      Procedure:  The patient was taken to the operating room where she was prepped and draped in the usual sterile fashion. GETA was induced and found to be adequate. She was positioned to the dorsal lithotomy position with yellow-fin stirrups. Patient safety time out was performed. A sterile open sided speculum was placed in the patient's vagina and the cervix visualized.  A single toothed tenaculum was placed on the anterior lip of the cervix and used for traction.  A fornisee manipulator was placed    A 5 mm umbilical skin incision was made. The Veress needle was placed without difficulty with intraabdominal placement suggested with water drop test and an opening pressure was noted to be 2 mm of mercury. The abdomen was filled to a pressure of 15 mm Hg.  The Veress needle was removed and a 5 mm Visiport was placed. No intraabdominal injury was noted.  A 5 mm skin incision was then made in the RLQ. Through this a second  trochar sleeve was placed into the abdominal cavity using direct observation with the laparoscope.  A 5 mm incision was then made in the LLQ and a trocar was placed under direct visualization.  A survey of the abdomen and pelvis was completed with the above noted findings.      The manipulator was used to elevate the uterus.  The right round ligament was coagulated and divided with the Thunderbeat.  The right fallopian tube was elevated and the mesosalpinx inferior to the tube coagulated, and transected with the Thunderbeat.  The right uteroovarian ligament was then transected and the ovary fell away from the specimen. It was noted to be hemostatic. The vesicouterine peritoneum divided sharply and bluntly to create a bladder flap which was extended across the anterior surface of the uterus at vesicouterine junction. The right uterine artery was then completely isolated and coagulated using the Thunderbeat. It was then divided and initial blanching of the uterus was noted.     Attention was then turned to the left side.  The left round ligament was coagulated and cut with the THunderbeat.  The left fallopian tube was similarly elevated, coagulated, and transected along the mesosalpinx. The left uteroovarian ligament was divided. The broad ligament was then divided and the bladder flap completed. The left uterine artery was then cauterized in three locations and transected in the middle with further blanching of the uterus.    After further dissection of the bladder from the cervix the colpotomy was performed with Thunderbeat device and the uterus was completely .  The specimen was extracted vaginally.     Attention was then turned to the vagina. The cuff was closed with 0 Vicryl figure of 8 stitches of 0- vicryl.      The laparoscope was reintroduced and good hemostasis was noted. An additional suture was placed vaginally with good visual and palpable closure of the cuff.  The pelvis was thoroughly irrigated  and found to be hemostatic. The gas was released from the abdomen and the trochars were removed.  The skin was closed with 4-0 Vicryl and Dermabond.     A cystoscopy was then performed and efflux noted at bilateral ureteral orifices. No bladder injury noted.     The patient went to the postanesthesia recovery room in stable condition.     @came@

## 2022-02-11 NOTE — ANESTHESIA PREPROCEDURE EVALUATION
Anesthesia Pre-Procedure Evaluation    Patient: Georgie Scott   MRN: [id number] : 1990        Preoperative Diagnosis: Menorrhagia [N92.0]    Procedure : Procedure(s):  TOTAL LAPAROSCOPIC HYSTERECTOMY BILATERAL SALPINGECTOMY          Past Medical History:   Diagnosis Date     ADHD      Cervical high risk HPV (human papillomavirus) test positive 2021    10/1/14 NIL 18 NIL 3/31/21 NIL pap, +HR HPV (not 16/18). Plan: cotest in 1 year     Dyspepsia      Excessive bleeding in premenopausal period      Heartburn      Medical history non-contributory 2019     Persistent insomnia       Past Surgical History:   Procedure Laterality Date     COLONOSCOPY N/A 2021    Procedure: COLONOSCOPY;  Surgeon: Billy Thurston DO;  Location: [facility]     OTHER SURGICAL HISTORY  2019    No surgery history      Allergies   Allergen Reactions     Bactrim [Sulfamethoxazole W/Trimethoprim] Hives     Amoxicillin Rash     Morphine Rash      Social History     Tobacco Use     Smoking status: Never Smoker     Smokeless tobacco: Never Used   Substance Use Topics     Alcohol use: Yes     Comment: moderate      Wt Readings from Last 1 Encounters:   22 71.3 kg (157 lb 1.6 oz)        Anesthesia Evaluation   Pt has had prior anesthetic.     No history of anesthetic complications       ROS/MED HX  ENT/Pulmonary:       Neurologic:       Cardiovascular:       METS/Exercise Tolerance:     Hematologic:       Musculoskeletal:       GI/Hepatic:       Renal/Genitourinary:       Endo:       Psychiatric/Substance Use:       Infectious Disease:       Malignancy:       Other:            Physical Exam    Airway        Mallampati: II    Neck ROM: full     Respiratory Devices and Support         Dental  no notable dental history         Cardiovascular   cardiovascular exam normal          Pulmonary   pulmonary exam normal                OUTSIDE LABS:  CBC:   Lab Results   Component Value Date    WBC 5.0 2022    WBC 5.5  08/30/2021    HGB 13.6 02/09/2022    HGB 13.3 08/30/2021    HCT 39.4 02/09/2022    HCT 39.0 08/30/2021     02/09/2022     08/30/2021     BMP:   Lab Results   Component Value Date     01/07/2018    POTASSIUM 3.4 (L) 01/07/2018    CHLORIDE 105 01/07/2018    CO2 20 (L) 01/07/2018    BUN 6 (L) 01/07/2018    CR 0.76 01/07/2018    GLC 81 03/31/2021     01/07/2018     COAGS: No results found for: PTT, INR, FIBR  POC:   Lab Results   Component Value Date    HCG Negative 02/11/2022     HEPATIC:   Lab Results   Component Value Date    ALBUMIN 4.0 01/07/2018    PROTTOTAL 7.6 01/07/2018    ALT 13 01/07/2018    AST 17 01/07/2018    ALKPHOS 51 01/07/2018    BILITOTAL 2.9 (H) 01/07/2018     OTHER:   Lab Results   Component Value Date    BELA 9.6 01/07/2018    LIPASE 11 01/07/2018    TSH 1.26 07/09/2021       Anesthesia Plan    ASA Status:  1      Anesthesia Type: General.     - Airway: ETT              Consents    Anesthesia Plan(s) and associated risks, benefits, and realistic alternatives discussed. Questions answered and patient/representative(s) expressed understanding.     - Discussed: Risks, Benefits and Alternatives for the PROCEDURE were discussed     - Discussed with:  Patient         Postoperative Care    Pain management: Multi-modal analgesia.        Comments:                Rin Small MD

## 2022-02-11 NOTE — ANESTHESIA CARE TRANSFER NOTE
Patient: Georgie Scott    Procedure: Procedure(s):  TOTAL LAPAROSCOPIC HYSTERECTOMY BILATERAL SALPINGECTOMY       Diagnosis: Menorrhagia [N92.0]  Diagnosis Additional Information: No value filed.    Anesthesia Type:   General     Note:    Oropharynx: oropharynx clear of all foreign objects and spontaneously breathing  Level of Consciousness: drowsy  Oxygen Supplementation: face mask  Level of Supplemental Oxygen (L/min / FiO2): 8  Independent Airway: airway patency satisfactory and stable  Dentition: dentition unchanged  Vital Signs Stable: post-procedure vital signs reviewed and stable  Report to RN Given: handoff report given  Patient transferred to: PACU    Handoff Report: Identifed the Patient, Identified the Reponsible Provider, Reviewed the pertinent medical history, Discussed the surgical course, Reviewed Intra-OP anesthesia mangement and issues during anesthesia, Set expectations for post-procedure period and Allowed opportunity for questions and acknowledgement of understanding      Vitals:  Vitals Value Taken Time   /51 02/11/22 1235   Temp 36.9  C (98.4  F) 02/11/22 1235   Pulse 77 02/11/22 1237   Resp 20 02/11/22 1237   SpO2 100 % 02/11/22 1237   Vitals shown include unvalidated device data.    Electronically Signed By: ARI Salazar CRNA  February 11, 2022  12:39 PM

## 2022-02-14 LAB
PATH REPORT.COMMENTS IMP SPEC: NORMAL
PATH REPORT.COMMENTS IMP SPEC: NORMAL
PATH REPORT.FINAL DX SPEC: NORMAL
PATH REPORT.GROSS SPEC: NORMAL
PATH REPORT.MICROSCOPIC SPEC OTHER STN: NORMAL
PATH REPORT.RELEVANT HX SPEC: NORMAL
PHOTO IMAGE: NORMAL

## 2022-02-22 ENCOUNTER — OFFICE VISIT (OUTPATIENT)
Dept: PHYSICAL MEDICINE AND REHAB | Facility: CLINIC | Age: 32
End: 2022-02-22
Payer: COMMERCIAL

## 2022-02-22 VITALS — SYSTOLIC BLOOD PRESSURE: 117 MMHG | HEART RATE: 82 BPM | DIASTOLIC BLOOD PRESSURE: 74 MMHG

## 2022-02-22 DIAGNOSIS — M99.01 CERVICAL SOMATIC DYSFUNCTION: ICD-10-CM

## 2022-02-22 DIAGNOSIS — M99.03 LUMBAR REGION SOMATIC DYSFUNCTION: ICD-10-CM

## 2022-02-22 DIAGNOSIS — M99.08 RIB CAGE REGION SOMATIC DYSFUNCTION: ICD-10-CM

## 2022-02-22 DIAGNOSIS — M99.00 HEAD REGION SOMATIC DYSFUNCTION: ICD-10-CM

## 2022-02-22 DIAGNOSIS — M54.12 CERVICAL RADICULAR PAIN: Primary | ICD-10-CM

## 2022-02-22 DIAGNOSIS — M99.02 THORACIC REGION SOMATIC DYSFUNCTION: ICD-10-CM

## 2022-02-22 DIAGNOSIS — M99.05 SOMATIC DYSFUNCTION OF PELVIS REGION: ICD-10-CM

## 2022-02-22 DIAGNOSIS — M48.02 FORAMINAL STENOSIS OF CERVICAL REGION: ICD-10-CM

## 2022-02-22 DIAGNOSIS — M99.04 SACRAL REGION SOMATIC DYSFUNCTION: ICD-10-CM

## 2022-02-22 PROCEDURE — 99214 OFFICE O/P EST MOD 30 MIN: CPT | Mod: 25 | Performed by: PHYSICAL MEDICINE & REHABILITATION

## 2022-02-22 PROCEDURE — 98928 OSTEOPATH MANJ 7-8 REGIONS: CPT | Performed by: PHYSICAL MEDICINE & REHABILITATION

## 2022-02-22 ASSESSMENT — PAIN SCALES - GENERAL: PAINLEVEL: NO PAIN (1)

## 2022-02-22 NOTE — PATIENT INSTRUCTIONS
Georgie    You were treated with Osteopathic Manipulative Medicine (OMM) today.    Please rest today and drink plenty of water.   It is okay to do light activities but please do not do any intensive exercises/activities.    It is normal to have soreness following the treatment.  Please use ice over the sore areas.  You may take your usual pain medications.  Please call the clinic at [phone] or contact me (Dr. Ramos) via [employer] if the soreness is concerning to you.      Please follow-up with me (Dr. Ramos) in 1-2 weeks.

## 2022-02-22 NOTE — LETTER
2/22/2022         RE: Georgie Scott  [address]  [place] [address]        Dear Colleague,    Thank you for referring your patient, Georgie Scott, to the [facility]. Please see a copy of my visit note below.    Assessment:   Georgie Scott is a 31 year old y.o. female with past medical history significant for ADHD who presents today for follow-up regarding chronic right neck pain with radiation to the right upper trapezius muscle thought to be secondary to cervical radiculitis.  Her MRI shows foraminal stenosis at the C5-6 level.  She is only had temporary relief with previous cervical epidural steroid injections.  She has had good relief in the past with chiropractic manipulation and is here today specifically for osteopathic manipulation.  She does have multiple areas of osteopathic somatic dysfunction on exam.  She is without any red flag or myelopathic symptoms.    PSP:   Kay Mcrae       Plan:     A shared decision making plan was used.  The patient's values and choices were respected.  The following represents what was discussed and decided upon by the physician and the patient.      1.  DIAGNOSTIC TESTS:    -The patient's MRI of the cervical spine from February 9 of 2022 was personally reviewed today by the physician physician performing her own interpretation. She has maintained her there is right greater than left C5-6 foraminal stenosis.  There is mild right C3-4 foraminal stenosis.  There is disc degeneration seen at the C5-6 level with minimal disc degeneration seen at the C6-7 level mild facet arthropathy seen throughout the cervical spine.  The foraminal stenosis at the C5-6 level seems to correlate with her pain.  The images were shown to the patient and the findings were explained.  -The patient's EMG report from September 4 of 2020 was reviewed by the physician and is summarized as follows: This is a normal study.  There is no electrodiagnostic  evidence for cervical radiculopathy, brachial plexopathy, or focal neuropathy in the bilateral upper extremities.  -We will defer to Kay for any additional diagnostic studies.  2.  PHYSICAL THERAPY: No further physical therapy at this time.  The patient has previously completed physical therapy in November 2020 and is encouraged to continue doing the home exercise program daily.    3.  MEDICATIONS:    -She can continue with cyclobenzaprine 5 mg three times a day as needed for pain.  -She can continue with over-the-counter ibuprofen 400 to 600 mg up to three times a day as needed for pain  4.  INTERVENTIONS: She is deemed appropriate for osteopathic manipulation.  The risks and benefits of osteopathic manipulation were discussed with the patient.  These include feeling better, feeling worse, or having no change in pain.  I explained that this may feel different than chiropractic manipulation.  There will be no high velocity treatments today.  She gave verbal consent to proceed.  Osteopathic manipulation was performed to 7 areas today.  The patient tolerated the treatment well and she reported increased range of motion and a sensation of feeling looser after.   Please see below for the osteopathic manipulation that was performed today.  5.  PATIENT EDUCATION:    - The patient was advised to rest today and drink plenty of water.  Normal activities can be resumed as tolerated tomorrow.    - The patient was told that soreness following the treatment is normal and should improve within a few days.  Ice should be used (as opposed to heat) if soreness occurs.  If the soreness is concerning, the clinic should be notified so further instructions can be given.  6.  FOLLOW-UP: The patient is asked to follow-up in 1-2 weeks with Dr. Ramos for reassessment. . If there are any questions/concerns or any significant worsening of pain prior to that time, the patient is asked to call the clinic via the nurse navigation line or via  [facility].     Subjective:     Georgie Scott is a 31 year old female who presents today for follow-up regarding chronic neck pain and bilateral radicular arm symptoms. The patient reports that with her last C7-T1 interlaminar epidural steroid injection she did not have long-lasting relief, despite previous injections providing long-lasting relief. She is having right greater than left neck pain with right greater than left radicular arm symptoms, mainly in the medial aspect of the upper arms. She does have numbness and tingling but denies any weakness. She rates her pain today at a 1 out of 10. At worst it is a 5 out of 10. At best it is a 0 out of 10. Pain is worse with doing activities. Reports that she feels better with rest or doing no activities. Lying flat on her back also provides relief. She is getting worsening symptoms with sleeping on either side. This will cause tingling. She is taking ibuprofen as well as cyclobenzaprine as needed. These are somewhat helpful. She is here today specifically to trial osteopathic manipulation    Past medical history is reviewed and is unchanged in the interim.    Family history is reviewed and is unchanged in the interim.    Review of Systems: Positive for numbness in the bilateral upper arms and positive for headaches..  Pertinent negatives include no weakness, fevers, chills, unexplained weight loss, bowel incontinence, bladder incontinence, trips, stumbles, falls.  All others reviewed and are negative.     Objective:   CONSTITUTIONAL:  Vital signs as above.  No acute distress.  The patient is well nourished and well groomed.    PSYCHIATRIC:  The patient is awake, alert, oriented to person, place and time.  The patient is answering questions appropriately with clear speech.  Normal affect.  SKIN:  Skin over the face, posterior torso, bilateral upper and lower extremities is clean, dry, intact without rashes.  MUSCULOSKELETAL:  Gait is non-antalgic.  The patient is able  to transfer independently.      OSTEOPATHIC STRUCTURAL EXAM:  Positive standing flexion test on the left. Superior iliac crest on the left  Lumbar spine: L4-5 flex, rotated/side bent left  Sacrum: Right unilateral sacral flexion  Innominates/Pelvis: Left up slipped innominate, posterior/superior left  Thoracic spine: T10-12 flexed, rotated/side bent left  Rib cage: First rib elevated on the left  Cervical spine: C6 flex, rotated/side bent right, C2 flexed, rotated/side bent right  Head/OA joint: Side bent right/rotated left    OMT:  TREATMENTS IN PARENTHESES  Lumbar spine: L4-5 flex, rotated/side bent left  (Muscle energy, patient in lateral recumbent)  Sacrum: Right unilateral sacral flexion (Myofascial release, patient prone).  Innominates/Pelvis: Left up slipped innominate (Still technique, patient prone)., posterior/superior left  (Muscle energy with the patient supine).  Thoracic spine: T10-12 flexed, rotated/side bent left  (Muscle energy, patient in lateral recumbent)  Rib cage: First rib elevated on the left (Myofascial release, patient supine)  Cervical spine: C6 flex, rotated/side bent right, C2 flexed, rotated/side bent right  (Muscle energy with the patient supine; followed by manual traction with patient supine with the knees and hips flexed).  Head/OA joint: Side bent right/rotated left  (Muscle energy with the patient supine).        Again, thank you for allowing me to participate in the care of your patient.        Sincerely,        Melani Mcdaniels DO

## 2022-02-22 NOTE — PROGRESS NOTES
Assessment:   Georgie Scott is a 31 year old y.o. female with past medical history significant for ADHD who presents today for follow-up regarding chronic right neck pain with radiation to the right upper trapezius muscle thought to be secondary to cervical radiculitis.  Her MRI shows foraminal stenosis at the C5-6 level.  She is only had temporary relief with previous cervical epidural steroid injections.  She has had good relief in the past with chiropractic manipulation and is here today specifically for osteopathic manipulation.  She does have multiple areas of osteopathic somatic dysfunction on exam.  She is without any red flag or myelopathic symptoms.    PSP:   Kay Mcrae       Plan:     A shared decision making plan was used.  The patient's values and choices were respected.  The following represents what was discussed and decided upon by the physician and the patient.      1.  DIAGNOSTIC TESTS:    -The patient's MRI of the cervical spine from February 9 of 2022 was personally reviewed today by the physician physician performing her own interpretation. She has maintained her there is right greater than left C5-6 foraminal stenosis.  There is mild right C3-4 foraminal stenosis.  There is disc degeneration seen at the C5-6 level with minimal disc degeneration seen at the C6-7 level mild facet arthropathy seen throughout the cervical spine.  The foraminal stenosis at the C5-6 level seems to correlate with her pain.  The images were shown to the patient and the findings were explained.  -The patient's EMG report from September 4 of 2020 was reviewed by the physician and is summarized as follows: This is a normal study.  There is no electrodiagnostic evidence for cervical radiculopathy, brachial plexopathy, or focal neuropathy in the bilateral upper extremities.  -We will defer to Kay for any additional diagnostic studies.  2.  PHYSICAL THERAPY: No further physical therapy at this time.  The patient has  previously completed physical therapy in November 2020 and is encouraged to continue doing the home exercise program daily.    3.  MEDICATIONS:    -She can continue with cyclobenzaprine 5 mg three times a day as needed for pain.  -She can continue with over-the-counter ibuprofen 400 to 600 mg up to three times a day as needed for pain  4.  INTERVENTIONS: She is deemed appropriate for osteopathic manipulation.  The risks and benefits of osteopathic manipulation were discussed with the patient.  These include feeling better, feeling worse, or having no change in pain.  I explained that this may feel different than chiropractic manipulation.  There will be no high velocity treatments today.  She gave verbal consent to proceed.  Osteopathic manipulation was performed to 7 areas today.  The patient tolerated the treatment well and she reported increased range of motion and a sensation of feeling looser after.   Please see below for the osteopathic manipulation that was performed today.  5.  PATIENT EDUCATION:    - The patient was advised to rest today and drink plenty of water.  Normal activities can be resumed as tolerated tomorrow.    - The patient was told that soreness following the treatment is normal and should improve within a few days.  Ice should be used (as opposed to heat) if soreness occurs.  If the soreness is concerning, the clinic should be notified so further instructions can be given.  6.  FOLLOW-UP: The patient is asked to follow-up in 1-2 weeks with Dr. Ramos for reassessment. . If there are any questions/concerns or any significant worsening of pain prior to that time, the patient is asked to call the clinic via the nurse navigation line or via [employer].     Subjective:     Georgie Scott is a 31 year old female who presents today for follow-up regarding chronic neck pain and bilateral radicular arm symptoms. The patient reports that with her last C7-T1 interlaminar epidural steroid injection she  did not have long-lasting relief, despite previous injections providing long-lasting relief. She is having right greater than left neck pain with right greater than left radicular arm symptoms, mainly in the medial aspect of the upper arms. She does have numbness and tingling but denies any weakness. She rates her pain today at a 1 out of 10. At worst it is a 5 out of 10. At best it is a 0 out of 10. Pain is worse with doing activities. Reports that she feels better with rest or doing no activities. Lying flat on her back also provides relief. She is getting worsening symptoms with sleeping on either side. This will cause tingling. She is taking ibuprofen as well as cyclobenzaprine as needed. These are somewhat helpful. She is here today specifically to trial osteopathic manipulation    Past medical history is reviewed and is unchanged in the interim.    Family history is reviewed and is unchanged in the interim.    Review of Systems: Positive for numbness in the bilateral upper arms and positive for headaches..  Pertinent negatives include no weakness, fevers, chills, unexplained weight loss, bowel incontinence, bladder incontinence, trips, stumbles, falls.  All others reviewed and are negative.     Objective:   CONSTITUTIONAL:  Vital signs as above.  No acute distress.  The patient is well nourished and well groomed.    PSYCHIATRIC:  The patient is awake, alert, oriented to person, place and time.  The patient is answering questions appropriately with clear speech.  Normal affect.  SKIN:  Skin over the face, posterior torso, bilateral upper and lower extremities is clean, dry, intact without rashes.  MUSCULOSKELETAL:  Gait is non-antalgic.  The patient is able to transfer independently.      OSTEOPATHIC STRUCTURAL EXAM:  Positive standing flexion test on the left. Superior iliac crest on the left  Lumbar spine: L4-5 flex, rotated/side bent left  Sacrum: Right unilateral sacral flexion  Innominates/Pelvis: Left up  slipped innominate, posterior/superior left  Thoracic spine: T10-12 flexed, rotated/side bent left  Rib cage: First rib elevated on the left  Cervical spine: C6 flex, rotated/side bent right, C2 flexed, rotated/side bent right  Head/OA joint: Side bent right/rotated left    OMT:  TREATMENTS IN PARENTHESES  Lumbar spine: L4-5 flex, rotated/side bent left  (Muscle energy, patient in lateral recumbent)  Sacrum: Right unilateral sacral flexion (Myofascial release, patient prone).  Innominates/Pelvis: Left up slipped innominate (Still technique, patient prone)., posterior/superior left  (Muscle energy with the patient supine).  Thoracic spine: T10-12 flexed, rotated/side bent left  (Muscle energy, patient in lateral recumbent)  Rib cage: First rib elevated on the left (Myofascial release, patient supine)  Cervical spine: C6 flex, rotated/side bent right, C2 flexed, rotated/side bent right  (Muscle energy with the patient supine; followed by manual traction with patient supine with the knees and hips flexed).  Head/OA joint: Side bent right/rotated left  (Muscle energy with the patient supine).

## 2022-02-28 NOTE — PROGRESS NOTES
Assessment:   Georgie Scott is a 31 year old y.o. female with past medical history significant for ADHD who presents today for follow-up regarding chronic right neck pain with radiation to the right upper trapezius muscle secondary to cervical radiculitis. Her MRI shows foraminal stenosis at the C5-6 level, but she is only had temporary relief with previous cervical epidural steroid injections.  She also has some chronic left-sided low back pain without sciatica.  She is status post her first osteopathic manipulation treatment on February 22 of 2022. At this time, she is noting some improvement in both areas (right neck and left low back). She continues to have multiple areas of osteopathic somatic dysfunction on examination. She is without any red flag or myelopathic signs/symptoms    PSP:  Kay Mcrae       Plan:     A shared decision making plan was used.  The patient's values and choices were respected.  The following represents what was discussed and decided upon by the physician and the patient.      1.  DIAGNOSTIC TESTS:    -She has had an MRI of the cervical spine on February 9 of 2022. The report is reviewed today and is summarized as follows: There is very greater than left C5-6 foraminal stenosis. There is mild right C3-4 foraminal stenosis. There is disc degeneration seen at the C5-6 level with more mild disc degeneration seen at the C6-7 level. There is facet arthropathy seen throughout the cervical spine.  -She had a normal EMG on September 4 of 2020.  -We will defer to Kay for any additional diagnostic studies.  2.  PHYSICAL THERAPY: No further physical therapy at this time.  The patient has previously completed physical therapy in November 2020 and is encouraged to continue doing the home exercise program daily.    3.  MEDICATIONS:    -She can continue with cyclobenzaprine 5 mg 3 times a day as needed for pain.  -She can continue with over-the-counter ibuprofen 4 to 600 mg up to 3 times a day as  needed for pain.  4.  INTERVENTIONS: She is deemed appropriate for continued osteopathic manipulation. Osteopathic manipulation was performed to 7 areas today.  The patient tolerated the treatment well.   Please see below for the osteopathic manipulation that was performed today.  5.  PATIENT EDUCATION:    - The patient was advised to rest today and drink plenty of water.  Normal activities can be resumed as tolerated tomorrow.    - The patient was told that soreness following the treatment is normal and should improve within a few days.  Ice should be used (as opposed to heat) if soreness occurs.  If the soreness is concerning, the clinic should be notified so further instructions can be given.  -I did show her the pigeon stretch for the gluteal muscles.  She is asked to do this at least once a day, holding for at least 30 seconds on each side.  6.  FOLLOW-UP: The patient is asked to follow-up in 2-3 weeks. If there are any questions/concerns or any significant worsening of pain prior to that time, the patient is asked to call the clinic via the nurse navigation line or via [employer].     Subjective:     Georgie Scott is a 31 year old female who presents today for follow-up regarding chronic right-sided neck pain and chronic left-sided low back pain.  Both areas are without significant radiation into the arm or the leg.  She is status post her first osteopathic manipulation treatment.  Reports that she is feeling overall better.  She says is a little bit hard to gauge exactly how much better she is doing because she is still recovering from her hysterectomy surgery, but she does feel like things are better overall.  She is rating her pain today at a 0 out of 10.  At worst it is a 5 out of 10.  At best is a 0 out of 10.  Her pain is worse with general activities.  She does feel better with laying down.  She denies any new symptoms since her last visit.  She is doing her home exercise program and she asked about  exercises or stretches that she can be doing on her own.  She is taking ibuprofen and Tylenol as needed for pain.  She does have cyclobenzaprine that she takes as needed.  She is using oxycodone mainly for the recovery from the hysterectomy.  The medications are somewhat helpful    Past medical history is reviewed and is unchanged in the interim.    Family history is reviewed and is unchanged in the interim.    Review of Systems: Positive for headaches..  Pertinent negatives include no numbness, tingling, weakness, fevers, chills, unexplained weight loss, bowel incontinence, bladder incontinence, trips, stumbles, falls.  All others reviewed and are negative.     Objective:   CONSTITUTIONAL:  Vital signs as above.  No acute distress.  The patient is well nourished and well groomed.    PSYCHIATRIC:  The patient is awake, alert, oriented to person, place and time.  The patient is answering questions appropriately with clear speech.  Normal affect.  SKIN:  Skin over the face, posterior torso, bilateral upper and lower extremities is clean, dry, intact without rashes.  MUSCULOSKELETAL:  Gait is non-antalgic.  The patient is able to transfer independently.      OSTEOPATHIC STRUCTURAL EXAM:  Negative standing flexion test.  Symmetric iliac crest.  Lumbar spine: Hypertonic left lumbar paraspinal muscles.  Sacrum: Left on left forward sacral torsion  Innominates/Pelvis: Anterior/inferior right, posterior/superior  Thoracic spine: T10-12 flexed, rotated/side bent left  Rib cage: First rib elevated on the left  Cervical spine: C6/7 flexed, rotated/side bent right  Head/OA joint: Side bent right/rotated left    OMT:  TREATMENTS IN PARENTHESES  Lumbar spine: Hypertonic left lumbar paraspinal muscles.  (Myofascial release, patient prone).  Sacrum: Left on left forward sacral torsion  (Muscle energy, patient in lateral recumbent)  Innominates/Pelvis: Anterior/inferior right, posterior/superior  (Muscle energy with the patient  supine).  Thoracic spine: T10-12 flexed, rotated/side bent left (Muscle energy, patient seated)  Rib cage: First rib elevated on the left (Myofascial release, patient supine)  Cervical spine: C6/7 flexed, rotated/side bent right  (Muscle energy with the patient supine).  Head/OA joint: Side bent right/rotated left  (Muscle energy with the patient supine).

## 2022-03-01 ENCOUNTER — OFFICE VISIT (OUTPATIENT)
Dept: PHYSICAL MEDICINE AND REHAB | Facility: CLINIC | Age: 32
End: 2022-03-01
Attending: PHYSICAL MEDICINE & REHABILITATION
Payer: COMMERCIAL

## 2022-03-01 VITALS — HEART RATE: 82 BPM | SYSTOLIC BLOOD PRESSURE: 122 MMHG | DIASTOLIC BLOOD PRESSURE: 77 MMHG

## 2022-03-01 DIAGNOSIS — M48.02 FORAMINAL STENOSIS OF CERVICAL REGION: ICD-10-CM

## 2022-03-01 DIAGNOSIS — G89.29 CHRONIC LEFT-SIDED LOW BACK PAIN WITHOUT SCIATICA: ICD-10-CM

## 2022-03-01 DIAGNOSIS — M99.02 THORACIC REGION SOMATIC DYSFUNCTION: ICD-10-CM

## 2022-03-01 DIAGNOSIS — M99.08 RIB CAGE REGION SOMATIC DYSFUNCTION: ICD-10-CM

## 2022-03-01 DIAGNOSIS — M54.12 CERVICAL RADICULAR PAIN: Primary | ICD-10-CM

## 2022-03-01 DIAGNOSIS — M54.50 CHRONIC LEFT-SIDED LOW BACK PAIN WITHOUT SCIATICA: ICD-10-CM

## 2022-03-01 DIAGNOSIS — M99.00 HEAD REGION SOMATIC DYSFUNCTION: ICD-10-CM

## 2022-03-01 DIAGNOSIS — M99.04 SACRAL REGION SOMATIC DYSFUNCTION: ICD-10-CM

## 2022-03-01 DIAGNOSIS — M99.03 LUMBAR REGION SOMATIC DYSFUNCTION: ICD-10-CM

## 2022-03-01 DIAGNOSIS — M99.01 CERVICAL SOMATIC DYSFUNCTION: ICD-10-CM

## 2022-03-01 DIAGNOSIS — M99.05 SOMATIC DYSFUNCTION OF PELVIS REGION: ICD-10-CM

## 2022-03-01 PROCEDURE — 99213 OFFICE O/P EST LOW 20 MIN: CPT | Mod: 25 | Performed by: PHYSICAL MEDICINE & REHABILITATION

## 2022-03-01 PROCEDURE — 98928 OSTEOPATH MANJ 7-8 REGIONS: CPT | Performed by: PHYSICAL MEDICINE & REHABILITATION

## 2022-03-01 ASSESSMENT — PAIN SCALES - GENERAL: PAINLEVEL: NO PAIN (0)

## 2022-03-01 NOTE — PATIENT INSTRUCTIONS
Georgie    You were treated with Osteopathic Manipulative Medicine (OMM) today.    Please rest today and drink plenty of water.   It is okay to do light activities but please do not do any intensive exercises/activities.    It is normal to have soreness following the treatment.  Please use ice over the sore areas.  You may take your usual pain medications.  Please call the clinic at [phone] or contact me (Dr. Ramos) via [employer] if the soreness is concerning to you.      Please follow-up with me (Dr. Ramos) in 2-3 weeks.

## 2022-03-01 NOTE — LETTER
3/1/2022         RE: Georgie Scott  [address]  [place] [address]        Dear Colleague,    Thank you for referring your patient, Georgie Scott, to the [facility] [place]. Please see a copy of my visit note below.    Assessment:   Georgie Scott is a 31 year old y.o. female with past medical history significant for ADHD who presents today for follow-up regarding chronic right neck pain with radiation to the right upper trapezius muscle secondary to cervical radiculitis. Her MRI shows foraminal stenosis at the C5-6 level, but she is only had temporary relief with previous cervical epidural steroid injections.  She also has some chronic left-sided low back pain without sciatica.  She is status post her first osteopathic manipulation treatment on February 22 of 2022. At this time, she is noting some improvement in both areas (right neck and left low back). She continues to have multiple areas of osteopathic somatic dysfunction on examination. She is without any red flag or myelopathic signs/symptoms    PSP:  Kay Mcrae       Plan:     A shared decision making plan was used.  The patient's values and choices were respected.  The following represents what was discussed and decided upon by the physician and the patient.      1.  DIAGNOSTIC TESTS:    -She has had an MRI of the cervical spine on February 9 of 2022. The report is reviewed today and is summarized as follows: There is very greater than left C5-6 foraminal stenosis. There is mild right C3-4 foraminal stenosis. There is disc degeneration seen at the C5-6 level with more mild disc degeneration seen at the C6-7 level. There is facet arthropathy seen throughout the cervical spine.  -She had a normal EMG on September 4 of 2020.  -We will defer to Kay for any additional diagnostic studies.  2.  PHYSICAL THERAPY: No further physical therapy at this time.  The patient has previously completed physical therapy in November  2020 and is encouraged to continue doing the home exercise program daily.    3.  MEDICATIONS:    -She can continue with cyclobenzaprine 5 mg 3 times a day as needed for pain.  -She can continue with over-the-counter ibuprofen 4 to 600 mg up to 3 times a day as needed for pain.  4.  INTERVENTIONS: She is deemed appropriate for continued osteopathic manipulation. Osteopathic manipulation was performed to 7 areas today.  The patient tolerated the treatment well.   Please see below for the osteopathic manipulation that was performed today.  5.  PATIENT EDUCATION:    - The patient was advised to rest today and drink plenty of water.  Normal activities can be resumed as tolerated tomorrow.    - The patient was told that soreness following the treatment is normal and should improve within a few days.  Ice should be used (as opposed to heat) if soreness occurs.  If the soreness is concerning, the clinic should be notified so further instructions can be given.  -I did show her the pigeon stretch for the gluteal muscles.  She is asked to do this at least once a day, holding for at least 30 seconds on each side.  6.  FOLLOW-UP: The patient is asked to follow-up in 2-3 weeks. If there are any questions/concerns or any significant worsening of pain prior to that time, the patient is asked to call the clinic via the nurse navigation line or via [employer].     Subjective:     Georgie Scott is a 31 year old female who presents today for follow-up regarding chronic right-sided neck pain and chronic left-sided low back pain.  Both areas are without significant radiation into the arm or the leg.  She is status post her first osteopathic manipulation treatment.  Reports that she is feeling overall better.  She says is a little bit hard to gauge exactly how much better she is doing because she is still recovering from her hysterectomy surgery, but she does feel like things are better overall.  She is rating her pain today at a 0 out of  10.  At worst it is a 5 out of 10.  At best is a 0 out of 10.  Her pain is worse with general activities.  She does feel better with laying down.  She denies any new symptoms since her last visit.  She is doing her home exercise program and she asked about exercises or stretches that she can be doing on her own.  She is taking ibuprofen and Tylenol as needed for pain.  She does have cyclobenzaprine that she takes as needed.  She is using oxycodone mainly for the recovery from the hysterectomy.  The medications are somewhat helpful    Past medical history is reviewed and is unchanged in the interim.    Family history is reviewed and is unchanged in the interim.    Review of Systems: Positive for headaches..  Pertinent negatives include no numbness, tingling, weakness, fevers, chills, unexplained weight loss, bowel incontinence, bladder incontinence, trips, stumbles, falls.  All others reviewed and are negative.     Objective:   CONSTITUTIONAL:  Vital signs as above.  No acute distress.  The patient is well nourished and well groomed.    PSYCHIATRIC:  The patient is awake, alert, oriented to person, place and time.  The patient is answering questions appropriately with clear speech.  Normal affect.  SKIN:  Skin over the face, posterior torso, bilateral upper and lower extremities is clean, dry, intact without rashes.  MUSCULOSKELETAL:  Gait is non-antalgic.  The patient is able to transfer independently.      OSTEOPATHIC STRUCTURAL EXAM:  Negative standing flexion test.  Symmetric iliac crest.  Lumbar spine: Hypertonic left lumbar paraspinal muscles.  Sacrum: Left on left forward sacral torsion  Innominates/Pelvis: Anterior/inferior right, posterior/superior  Thoracic spine: T10-12 flexed, rotated/side bent left  Rib cage: First rib elevated on the left  Cervical spine: C6/7 flexed, rotated/side bent right  Head/OA joint: Side bent right/rotated left    OMT:  TREATMENTS IN PARENTHESES  Lumbar spine: Hypertonic left  lumbar paraspinal muscles.  (Myofascial release, patient prone).  Sacrum: Left on left forward sacral torsion  (Muscle energy, patient in lateral recumbent)  Innominates/Pelvis: Anterior/inferior right, posterior/superior  (Muscle energy with the patient supine).  Thoracic spine: T10-12 flexed, rotated/side bent left (Muscle energy, patient seated)  Rib cage: First rib elevated on the left (Myofascial release, patient supine)  Cervical spine: C6/7 flexed, rotated/side bent right  (Muscle energy with the patient supine).  Head/OA joint: Side bent right/rotated left  (Muscle energy with the patient supine).        Again, thank you for allowing me to participate in the care of your patient.        Sincerely,        Melani Mcdaniels, DO

## 2022-03-15 ENCOUNTER — PATIENT OUTREACH (OUTPATIENT)
Dept: FAMILY MEDICINE | Facility: CLINIC | Age: 32
End: 2022-03-15
Payer: COMMERCIAL

## 2022-03-15 PROBLEM — R87.810 CERVICAL HIGH RISK HPV (HUMAN PAPILLOMAVIRUS) TEST POSITIVE: Status: ACTIVE | Noted: 2021-03-31

## 2022-03-15 NOTE — LETTER
March 15, 2022      Georgie Scott  [address]  [place] [address]        Dear ,    This letter is to remind you that you are due for your follow-up Pap smear and Human Papillomavirus (HPV) test.    Please call [phone] to schedule your appointment at your earliest convenience.    If you have completed the appointment outside of the [facility] system, please have the records forwarded to our office. We will update your chart for your provider to review before your next annual wellness visit.     Thank you for choosing [facility]!      Sincerely,    Your [facility] Care Team

## 2022-03-15 NOTE — LETTER
Letter by Nora Marrero MD at      Author: Nora Marrero MD Service: -- Author Type: --    Filed:  Encounter Date: 3/31/2021 Status: (Other)       Non-Opioid Controlled Substance Agreement    This is an agreement between you and your provider regarding safe and appropriate controlled substance prescribing.? Controlled substances are?medicines that can cause physical and mental dependence. The manufacturing, possession and use of these medicines are regulated by law.  We here at [facility] are making a commitment to work with you in your efforts to get better.? To support you in this work, we will help you schedule regular appointments for medicine refills. If we must cancel or change your appointment for any reason, we will make sure you have enough medication to last until your next appointment.      As a Provider, I will:     Listen carefully to your concerns while treating you with dignity.     Recommend a treatment plan that I believe is in your best interest and may involve therapies other than medication.      Review the chance of benefit and the chance of harm of this medicine with you regularly and evaluate the safety and effectiveness of this therapy.       Provide a plan on how to discontinue if the decision is made to stop this medicine.       As a Patient, I understand controlled substances:       Are prescribed by my care provider to help me function or work and manage my condition(s).?    Are strong medicines and can cause serious side effects such as:     Drowsiness, which can seriously affect my driving ability    A lower breathing rate, enough to cause death    Harm to my thinking ability     Depression     Abuse of and addiction to this medicine.      Need to be taken exactly as prescribed.?Combining controlled substances with certain medicines or chemicals (such as illegal drugs, opioids, sedatives, sleeping pills, and benzodiazepines) can be dangerous or even fatal.? If I stop taking  my medicines suddenly, I may have severe withdrawal symptoms.     The risks, benefits, and side effects of these medicine(s) were explained to me. I agree that:    1. I will take part in other treatments as advised by my care team. This may be psychiatry or counseling, physical therapy, behavioral therapy, group treatment or a referral to specialist.    2. I will keep all my appointments and understand this is part of the monitoring of controlled substance.?My care team may require an office visit for EVERY controlled substance refill. If I miss appointments or dont follow instructions, my care team may stop my medicine    3. I will take my medicines as prescribed. I will not change the dose or schedule unless my care team tells me to. There will be no refills if I run out early.      4. I may be contactedwithout warning and asked to complete a urine drug test or pill count at any time. If I dont give a urine sample or participate in a pill count, the care team may stop my medicine.    5. I will only receive controlled substance prescriptions from this clinic. If treated by another provider, I will let them know I am taking controlled substances and that I have a treatment agreement with this provider. I will inform this care team within one business day if I am given a prescription for any controlled substance by another healthcare provider. This care team may contact other providers and pharmacists about my use of the medicines.     6. It is up to me to make sure that I do not run out of my medicines on weekends or holidays.?If my care team is willing to refill my prescription without a visit, I must request refills only during office hours. Refills may take up to 3 business days to process.  I will use one pharmacy to fill all my controlled substance prescriptions.  I will notify the clinic if any changes are made due to insurance changes or medication availability.    7. I am responsible for my prescriptions. If  the medicine/prescription is lost, stolen or destroyed, it will not be replaced.?I also agree not to share controlled substance medicines with anyone.     8. I am aware I should not use any illegal or recreational drugs. I agree not to drink alcohol unless my care team says I can.     9. If I enroll in the [place] [facility] program, I will tell my care team.?    10. I will tell my care team right away if I become pregnant, have a new medical problem treated outside of my regular clinic, or have a change in my medications.     11. I understand that this medicine can affect my thinking, judgment and reaction time.? Alcohol and drugs affect the brain and body, which can affect the safety of a persons driving. Being under the influence of alcohol or drugs can affect a persons decision-making, behaviors, personal safety, and the safety of others. Driving while impaired (DWI) can occur if a person is driving, operating, or in physical control of a car, motorcycle, boat, snowmobile, ATV, motorbike, off-road vehicle, or any other motor vehicle ([place] Statute 169[place].20). I understand the risk if I choose to drive or operate machinery  I understand that if I do not follow any of the conditions above, my prescriptions or treatment may be stopped or changed.     I agree that my provider, clinic care team, and pharmacy may work with any city, state or federal law enforcement agency that investigates the misuse, sale, or other diversion of my controlled medicine. I will allow my provider to discuss my care with or share a copy of this agreement with any other treating provider, pharmacy or emergency room where I receive care.?    I have read this agreement and have asked questions about anything I did not understand.    ________________________________________________________  Patient Signature - Georgie C Scott     ___________________                   Date      ________________________________________________________  Provider Signature - Nora T Marrero, MD       ___________________                   Date     ________________________________________________________  Witness Signature (required if provider not present while patient signing)          ___________________                   Date           done

## 2022-03-19 ENCOUNTER — OFFICE VISIT (OUTPATIENT)
Dept: URGENT CARE | Facility: URGENT CARE | Age: 32
End: 2022-03-19
Payer: COMMERCIAL

## 2022-03-19 VITALS
TEMPERATURE: 99.1 F | WEIGHT: 165.8 LBS | DIASTOLIC BLOOD PRESSURE: 75 MMHG | BODY MASS INDEX: 26.36 KG/M2 | SYSTOLIC BLOOD PRESSURE: 138 MMHG | OXYGEN SATURATION: 98 % | HEART RATE: 97 BPM | RESPIRATION RATE: 16 BRPM

## 2022-03-19 DIAGNOSIS — L08.9 LOCAL INFECTION OF WOUND: Primary | ICD-10-CM

## 2022-03-19 DIAGNOSIS — T14.8XXA LOCAL INFECTION OF WOUND: Primary | ICD-10-CM

## 2022-03-19 DIAGNOSIS — Z90.710 S/P HYSTERECTOMY: ICD-10-CM

## 2022-03-19 PROCEDURE — 99214 OFFICE O/P EST MOD 30 MIN: CPT | Performed by: PHYSICIAN ASSISTANT

## 2022-03-19 RX ORDER — CEPHALEXIN 500 MG/1
500 CAPSULE ORAL 3 TIMES DAILY
Qty: 21 CAPSULE | Refills: 0 | Status: SHIPPED | OUTPATIENT
Start: 2022-03-19 | End: 2022-03-26

## 2022-03-19 NOTE — PROGRESS NOTES
ASSESSMENT:    ICD-10-CM    1. Local infection of wound  T14.8XXA cephALEXin (KEFLEX) 500 MG capsule    L08.9    2. S/P hysterectomy  Z90.710 cephALEXin (KEFLEX) 500 MG capsule         PLAN: Wound port infection, early cellulitis, likely from scratching it.  Had hysterectomy with bilateral salpingectomy on 2/11/2022.  Developed vaginal cuff cellulitis and finished cephalexin 1 week ago.  Now with wound port infection.  Will cover with cephalexin 500 mg 3 times a day for 7 days.  Keep appointment with OB/GYN next week.  Keep covered with Band-Aid so it does not rub on her clothing.  Topical bacitracin.    Follow-up with primary clinic if not improving.  Advised about symptoms which might herald more serious problems.        Laura Gandara PA-C         SUBJECTIVE:   31 year old female who presents with rule out wound infection.  On  2/11/2022 total laparoscopic hysterectomy with bilateral salpingectomy.  Developed a vaginal cuff cellulitis and was treated with Keflex 4 times a day for 10 days which she finished on 3/11/2022.  Antibiotic significantly helped and resolved her vaginal discharge and pelvic cramping.  However last couple days has noted some return, although mild.  In addition last night she scratched one of the abdominal port wounds and noticed today some increased redness around it with extreme tenderness.  Right abdominal port wound, no issues.  No fever or chills.         Allergies   Allergen Reactions     Bactrim [Sulfamethoxazole W/Trimethoprim] Hives     Amoxicillin Rash     Morphine Rash       Past Medical History:   Diagnosis Date     ADHD      Cervical high risk HPV (human papillomavirus) test positive 03/31/2021    10/1/14 NIL 1/5/18 NIL 3/31/21 NIL pap, +HR HPV (not 16/18). Plan: cotest in 1 year     Dyspepsia      Excessive bleeding in premenopausal period      Heartburn      Medical history non-contributory 01/22/2019     Persistent insomnia        acetaminophen (TYLENOL) 325 MG  tablet, Take 3 tablets (975 mg) by mouth every 6 hours as needed for mild pain  amphetamine-dextroamphetamine (ADDERALL XR) 15 MG 24 hr capsule, Take 1 capsule (15 mg) by mouth daily  amphetamine-dextroamphetamine (ADDERALL) 10 MG tablet, Take 1-2 tablets (10-20 mg) by mouth daily  amphetamine-dextroamphetamine (ADDERALL) 10 MG tablet, Take 1-2 tablets (10-20 mg) by mouth daily  amphetamine-dextroamphetamine (ADDERALL) 10 MG tablet, Take 1-2 tablets (10-20 mg) by mouth daily  cyclobenzaprine (FLEXERIL) 5 MG tablet, TAKE ONE TABLET BY MOUTH THREE TIMES A DAY AS NEEDED FOR MUSCLE SPASMS. NEED APPOINTMENT FOR MORE REFILLS  ibuprofen (ADVIL/MOTRIN) 800 MG tablet, Take 1 tablet (800 mg) by mouth every 6 hours as needed for other (mild and/or inflammatory pain)  oxyCODONE (ROXICODONE) 5 MG tablet, Take 1-2 tablets (5-10 mg) by mouth every 4 hours as needed for moderate to severe pain  senna-docusate (SENOKOT-S/PERICOLACE) 8.6-50 MG tablet, Take 1-2 tablets by mouth 2 times daily    No current facility-administered medications on file prior to visit.      Social History     Tobacco Use     Smoking status: Never Smoker     Smokeless tobacco: Never Used   Substance Use Topics     Alcohol use: Yes     Comment: moderate     Drug use: No       ROS:  General: negative for fever  SKIN: + as above    Physcial Exam:  /75 (BP Location: Left arm, Patient Position: Sitting, Cuff Size: Adult Large)   Pulse 97   Temp 99.1  F (37.3  C) (Tympanic)   Resp 16   Wt 75.2 kg (165 lb 12.8 oz)   SpO2 98%   BMI 26.36 kg/m          GENERAL: alert, no acute distress  EYES: conjunctival clear  RESP: Regular breathing rate  NEURO: awake .  SKIN: Left 1 cm wound port is with tiny white lump on one end.  I used a forceps to target it.  Does not appear to be a stitch abscess.  Looks more like dried, crusted skin.  There is a tiny bit of surrounding erythema.  The the left port wound is tender to palpation whereas the right port wound is  nontender to palpation.    Abdomen-soft, nondistended.  Laura Gandara PA-C

## 2022-03-22 ENCOUNTER — OFFICE VISIT (OUTPATIENT)
Dept: PHYSICAL MEDICINE AND REHAB | Facility: CLINIC | Age: 32
End: 2022-03-22
Attending: PHYSICAL MEDICINE & REHABILITATION
Payer: COMMERCIAL

## 2022-03-22 VITALS — SYSTOLIC BLOOD PRESSURE: 139 MMHG | HEART RATE: 76 BPM | DIASTOLIC BLOOD PRESSURE: 77 MMHG | TEMPERATURE: 99 F

## 2022-03-22 DIAGNOSIS — M99.05 SOMATIC DYSFUNCTION OF PELVIS REGION: ICD-10-CM

## 2022-03-22 DIAGNOSIS — M99.08 RIB CAGE REGION SOMATIC DYSFUNCTION: ICD-10-CM

## 2022-03-22 DIAGNOSIS — M99.03 LUMBAR REGION SOMATIC DYSFUNCTION: ICD-10-CM

## 2022-03-22 DIAGNOSIS — M99.00 HEAD REGION SOMATIC DYSFUNCTION: ICD-10-CM

## 2022-03-22 DIAGNOSIS — M54.50 CHRONIC LEFT-SIDED LOW BACK PAIN WITHOUT SCIATICA: ICD-10-CM

## 2022-03-22 DIAGNOSIS — M48.02 FORAMINAL STENOSIS OF CERVICAL REGION: ICD-10-CM

## 2022-03-22 DIAGNOSIS — M99.01 CERVICAL SOMATIC DYSFUNCTION: ICD-10-CM

## 2022-03-22 DIAGNOSIS — M54.12 CERVICAL RADICULAR PAIN: Primary | ICD-10-CM

## 2022-03-22 DIAGNOSIS — M99.02 THORACIC REGION SOMATIC DYSFUNCTION: ICD-10-CM

## 2022-03-22 DIAGNOSIS — G89.29 CHRONIC LEFT-SIDED LOW BACK PAIN WITHOUT SCIATICA: ICD-10-CM

## 2022-03-22 DIAGNOSIS — M99.04 SACRAL REGION SOMATIC DYSFUNCTION: ICD-10-CM

## 2022-03-22 PROCEDURE — 99213 OFFICE O/P EST LOW 20 MIN: CPT | Mod: 25 | Performed by: PHYSICAL MEDICINE & REHABILITATION

## 2022-03-22 PROCEDURE — 98928 OSTEOPATH MANJ 7-8 REGIONS: CPT | Performed by: PHYSICAL MEDICINE & REHABILITATION

## 2022-03-22 ASSESSMENT — PAIN SCALES - GENERAL: PAINLEVEL: MILD PAIN (2)

## 2022-03-22 NOTE — LETTER
3/22/2022         RE: Georgie Scott  [address]  [place] [address]        Dear Colleague,    Thank you for referring your patient, Georgie Scott, to the [facility] [place]. Please see a copy of my visit note below.    Assessment:   Georgie Scott is a 31 year old y.o. female with past medical history significant for ADHD who presents today for follow-up regarding chronic right neck pain with cervical radiculitis, with pain radiating into the right upper trapezius area.  Her MRI shows foraminal stenosis at the C5-6 level, but she has failed to have long-lasting relief with previous cervical epidural steroid injections.  She reports that this pain is under good control with osteopathic manipulation, her second treatment being on March 1 of 2022.  She continues to have chronic left-sided low back pain without sciatica, that has been exacerbated recently, which she attributes to sleeping on a different mattress.    She continues to have multiple areas of osteopathic somatic dysfunction as listed below.  She remains without any red flag or myelopathic signs/symptoms.    PSP: Kay Mcrae       Plan:     A shared decision making plan was used.  The patient's values and choices were respected.  The following represents what was discussed and decided upon by the physician and the patient.      1.  DIAGNOSTIC TESTS:    -She has had an MRI of the cervical spine on February 9 of 2022.  -She had a normal EMG of the bilateral upper extremities on September 4 of 2020.  -We will defer to Kay Mcrae for additional diagnostic studies.  2.  PHYSICAL THERAPY: No further physical therapy at this time.  The patient has previously completed physical therapy in November 2020 and is encouraged to continue doing the home exercise program daily.    3.  MEDICATIONS:  No changes to the medications today.  -She can continue with cyclobenzaprine 5 mg 3 times a day as needed for pain.  -She can  continue with over-the-counter ibuprofen 4 to 600 mg up to 3 times a day as needed for pain.  4.  INTERVENTIONS: She is considered appropriate for continued osteopathic manipulation.  Osteopathic manipulation was performed to 7 areas today.  The patient tolerated the treatment well and reported some relief immediately afterwards..   Please see below for the osteopathic manipulation that was performed today.  5.  PATIENT EDUCATION:    - The patient was advised to rest today and drink plenty of water.  Normal activities can be resumed as tolerated tomorrow.    - The patient was told that soreness following the treatment is normal and should improve within a few days.  Ice should be used (as opposed to heat) if soreness occurs.  If the soreness is concerning, the clinic should be notified so further instructions can be given.  -She is encouraged to use a lacrosse ball to roll out the left gluteal area.  6.  FOLLOW-UP: The patient is asked to follow-up in 3-4 weeks. If there are any questions/concerns or any significant worsening of pain prior to that time, the patient is asked to call the clinic via the nurse navigation line or via [facility].     Subjective:     Georgie Scott is a 31 year old female who presents today for follow-up regarding chronic right-sided neck pain with right radicular arm symptoms.  She reports that since the last treatment that this area has been under good control.  She did receive a couple of chiropractic adjustments in between, but this area seems to be doing well.  Says that her main concern is the chronic left-sided low back pain with radiation into the left lateral buttock.  She says that this pain has been a little bit worse recently, and she attributes this to sleeping on a different mattress.  It does not go distal to that.  She denies any numbness tingling or weakness in the leg.  No significant symptoms on the right side.  She rates her pain today at a 2 out of 10.  At worst it is a  5 out of 10.  At best is a 0 out of 10.  Her pain is worse with general activities.  She does feel better with laying flat on her back.  Again she does feel that the osteopathic manipulation has been helpful.  She is taking over-the-counter ibuprofen as well as cyclobenzaprine.  These are somewhat helpful in managing her pain.  She would like to continue with osteopathic manipulation.    Past medical history is reviewed and is unchanged in the interim.    Family history is reviewed and is unchanged in the interim.    Review of Systems:  Pertinent negatives include no numbness, tingling, weakness, headaches fevers, chills, unexplained weight loss, bowel incontinence, bladder incontinence, trips, stumbles, falls.  All others reviewed and are negative.     Objective:   CONSTITUTIONAL:  Vital signs as above.  No acute distress.  The patient is well nourished and well groomed.    PSYCHIATRIC:  The patient is awake, alert, oriented to person, place and time.  The patient is answering questions appropriately with clear speech.  Normal affect.  SKIN:  Skin over the face, posterior torso, bilateral upper and lower extremities is clean, dry, intact without rashes.  MUSCULOSKELETAL:  Gait is non-antalgic.  The patient is able to transfer independently.      OSTEOPATHIC STRUCTURAL EXAM:  Positive standing flexion test on the left.  Left superior iliac crest.  Lumbar spine: L5 flexed, rotated/side bent left  Sacrum: Right unilateral sacral flexion  Innominates/Pelvis: Left up slipped innominate, posterior/superior left  Thoracic spine: T10-12 flexed, rotated/side bent  Rib cage: First rib elevated on the left  Cervical spine: C4-7 flexed, rotated/side bent right  Head/OA joint: Side bent right/rotated left    OMT:  TREATMENTS IN PARENTHESES  Lumbar spine: L5 flexed, rotated/side bent left  (Muscle energy, patient in lateral recumbent)  Sacrum: Right unilateral sacral flexion (Myofascial release, patient  prone).  Innominates/Pelvis: Left up slipped innominate (Still technique, patient prone)., posterior/superior left  (Muscle energy with the patient supine).  Thoracic spine: T10-12 flexed, rotated/side bent  (Muscle energy, patient in lateral recumbent)  Rib cage: First rib elevated on the left (Myofascial release, patient supine)  Cervical spine: C4-7 flexed, rotated/side bent right  (Muscle energy with the patient supine).  Head/OA joint: Side bent right/rotated left  (Muscle energy with the patient supine).        Again, thank you for allowing me to participate in the care of your patient.        Sincerely,        Melani Mcdaniels, DO

## 2022-03-22 NOTE — PROGRESS NOTES
Assessment:   Georgie Scott is a 31 year old y.o. female with past medical history significant for ADHD who presents today for follow-up regarding chronic right neck pain with cervical radiculitis, with pain radiating into the right upper trapezius area.  Her MRI shows foraminal stenosis at the C5-6 level, but she has failed to have long-lasting relief with previous cervical epidural steroid injections.  She reports that this pain is under good control with osteopathic manipulation, her second treatment being on March 1 of 2022.  She continues to have chronic left-sided low back pain without sciatica, that has been exacerbated recently, which she attributes to sleeping on a different mattress.    She continues to have multiple areas of osteopathic somatic dysfunction as listed below.  She remains without any red flag or myelopathic signs/symptoms.    PSP: Kay Mcrae       Plan:     A shared decision making plan was used.  The patient's values and choices were respected.  The following represents what was discussed and decided upon by the physician and the patient.      1.  DIAGNOSTIC TESTS:    -She has had an MRI of the cervical spine on February 9 of 2022.  -She had a normal EMG of the bilateral upper extremities on September 4 of 2020.  -We will defer to Kay Mcrae for additional diagnostic studies.  2.  PHYSICAL THERAPY: No further physical therapy at this time.  The patient has previously completed physical therapy in November 2020 and is encouraged to continue doing the home exercise program daily.    3.  MEDICATIONS:  No changes to the medications today.  -She can continue with cyclobenzaprine 5 mg 3 times a day as needed for pain.  -She can continue with over-the-counter ibuprofen 4 to 600 mg up to 3 times a day as needed for pain.  4.  INTERVENTIONS: She is considered appropriate for continued osteopathic manipulation.  Osteopathic manipulation was performed to 7 areas today.  The patient tolerated the  treatment well and reported some relief immediately afterwards..   Please see below for the osteopathic manipulation that was performed today.  5.  PATIENT EDUCATION:    - The patient was advised to rest today and drink plenty of water.  Normal activities can be resumed as tolerated tomorrow.    - The patient was told that soreness following the treatment is normal and should improve within a few days.  Ice should be used (as opposed to heat) if soreness occurs.  If the soreness is concerning, the clinic should be notified so further instructions can be given.  -She is encouraged to use a lacrosse ball to roll out the left gluteal area.  6.  FOLLOW-UP: The patient is asked to follow-up in 3-4 weeks. If there are any questions/concerns or any significant worsening of pain prior to that time, the patient is asked to call the clinic via the nurse navigation line or via [employer].     Subjective:     Georgie Scott is a 31 year old female who presents today for follow-up regarding chronic right-sided neck pain with right radicular arm symptoms.  She reports that since the last treatment that this area has been under good control.  She did receive a couple of chiropractic adjustments in between, but this area seems to be doing well.  Says that her main concern is the chronic left-sided low back pain with radiation into the left lateral buttock.  She says that this pain has been a little bit worse recently, and she attributes this to sleeping on a different mattress.  It does not go distal to that.  She denies any numbness tingling or weakness in the leg.  No significant symptoms on the right side.  She rates her pain today at a 2 out of 10.  At worst it is a 5 out of 10.  At best is a 0 out of 10.  Her pain is worse with general activities.  She does feel better with laying flat on her back.  Again she does feel that the osteopathic manipulation has been helpful.  She is taking over-the-counter ibuprofen as well as  cyclobenzaprine.  These are somewhat helpful in managing her pain.  She would like to continue with osteopathic manipulation.    Past medical history is reviewed and is unchanged in the interim.    Family history is reviewed and is unchanged in the interim.    Review of Systems:  Pertinent negatives include no numbness, tingling, weakness, headaches fevers, chills, unexplained weight loss, bowel incontinence, bladder incontinence, trips, stumbles, falls.  All others reviewed and are negative.     Objective:   CONSTITUTIONAL:  Vital signs as above.  No acute distress.  The patient is well nourished and well groomed.    PSYCHIATRIC:  The patient is awake, alert, oriented to person, place and time.  The patient is answering questions appropriately with clear speech.  Normal affect.  SKIN:  Skin over the face, posterior torso, bilateral upper and lower extremities is clean, dry, intact without rashes.  MUSCULOSKELETAL:  Gait is non-antalgic.  The patient is able to transfer independently.      OSTEOPATHIC STRUCTURAL EXAM:  Positive standing flexion test on the left.  Left superior iliac crest.  Lumbar spine: L5 flexed, rotated/side bent left  Sacrum: Right unilateral sacral flexion  Innominates/Pelvis: Left up slipped innominate, posterior/superior left  Thoracic spine: T10-12 flexed, rotated/side bent  Rib cage: First rib elevated on the left  Cervical spine: C4-7 flexed, rotated/side bent right  Head/OA joint: Side bent right/rotated left    OMT:  TREATMENTS IN PARENTHESES  Lumbar spine: L5 flexed, rotated/side bent left  (Muscle energy, patient in lateral recumbent)  Sacrum: Right unilateral sacral flexion (Myofascial release, patient prone).  Innominates/Pelvis: Left up slipped innominate (Still technique, patient prone)., posterior/superior left  (Muscle energy with the patient supine).  Thoracic spine: T10-12 flexed, rotated/side bent  (Muscle energy, patient in lateral recumbent)  Rib cage: First rib elevated on  the left (Myofascial release, patient supine)  Cervical spine: C4-7 flexed, rotated/side bent right  (Muscle energy with the patient supine).  Head/OA joint: Side bent right/rotated left  (Muscle energy with the patient supine).

## 2022-03-22 NOTE — PATIENT INSTRUCTIONS
Georgie    You were treated with Osteopathic Manipulative Medicine (OMM) today.    Please rest today and drink plenty of water.   It is okay to do light activities but please do not do any intensive exercises/activities.    It is normal to have soreness following the treatment.  Please use ice over the sore areas.  You may take your usual pain medications.  Please call the clinic at [phone] or contact me (Dr. Ramos) via [facility] if the soreness is concerning to you.      Please follow-up with me (Dr. Ramos) in 4 weeks.

## 2022-03-23 DIAGNOSIS — M79.18 MYOFASCIAL PAIN: ICD-10-CM

## 2022-03-23 RX ORDER — CYCLOBENZAPRINE HCL 5 MG
TABLET ORAL
Qty: 30 TABLET | Refills: 0 | Status: ON HOLD | OUTPATIENT
Start: 2022-03-23 | End: 2022-06-16

## 2022-04-12 NOTE — PROGRESS NOTES
Assessment:   Georgie Scott is a 32 year old y.o. female with past medical history significant for ADHD who presents today for follow-up regarding chronic right neck pain with cervical radiculitis radiating into the right upper trapezius area.  Her MRI shows cervical foraminal stenosis at the C5-6 level.  She has failed to have long-lasting relief with previous cervical epidural steroid injections.  She reports that since her last treatment, she has had worsening of her right-sided neck pain with new/worsening paresthesias in the right forearm and hand.  This is in a C6 distribution.  Her exacerbation of chronic left-sided low back pain that she had at the last visit has improved.   She continues to have multiple areas of osteopathic somatic dysfunction as listed below.  She remains without any red flag or myopathic signs/symptoms.  She is neurologically intact on exam today.    PSP:  Kay Mcrae       Plan:     A shared decision making plan was used.  The patient's values and choices were respected.  The following represents what was discussed and decided upon by the physician and the patient.      1.  DIAGNOSTIC TESTS:    -She had an MRI of the cervical spine on February 9 of 2022.  Given the worsening symptoms today it was personally reviewed today by the physician of the physician performing her own interpretation.  She does have the bone spur at the right C5-6 level causing right greater than left foraminal stenosis which is likely the cause of her pain given that her symptoms are in a C6 distribution.  -She had a normal EMG of the bilateral upper extremities on September 4 of 2020.  -I will defer to Kay Mcrae for additional diagnostic studies.  2.  PHYSICAL THERAPY: No further physical therapy at this time.  The patient has previously completed physical therapy in November 2020 and is encouraged to continue doing the home exercise program daily.    3.  MEDICATIONS:    -I did prescribe her prednisone 50 mg,  1 tablet once a day for 5 days.  She should take this medication in the morning as taking it later in the day can give her insomnia.  She should avoid taking any over-the-counter NSAIDs while she takes the prednisone.  -Discussed Gabapentin/Neurontin (nerve pain medication) mechanism of action as well as potential side effects (drowsiness/dizziness) with the patient.  The patient wanted to try this medication so Gabapentin 300 mg was prescribed.  A chart was given with how to increase the dose to a maximum of 3 tablets three times a day.  The lowest therapeutic dose should be used.   The patient is asked to call the clinic if there are any side effects to the Gabapentin or if questions arise as to how to increase the dose.  -She can continue with cyclobenzaprine 5 mg, 3 times a day as needed for pain.  It is safe to take this medication with the gabapentin.  -After she completes the prednisone course, she can continue with over-the-counter ibuprofen 4 to 600 mg up to 3 times a day as needed for pain.  4.  INTERVENTIONS:  The patient is considered appropriate for continued osteopathic manipulation.  Osteopathic manipulation was performed to 7 areas today.  The patient tolerated the treatment well.   Please see below for the osteopathic manipulation that was performed today.  5.  PATIENT EDUCATION:    - The patient was advised to rest today and drink plenty of water.  Normal activities can be resumed as tolerated tomorrow.    - The patient was told that soreness following the treatment is normal and should improve within a few days.  Ice should be used (as opposed to heat) if soreness occurs.  If the soreness is concerning, the clinic should be notified so further instructions can be given.  -She was concerned about if there is any nerve damage.  I reassured her that her neuro exam is normal today indicating that the chance for recurrent nerve damage at this time is low.  We will continue to monitor her symptoms  closely.  6.  FOLLOW-UP: I did create a "[employer],"t test to check in with her on Monday to see how she is doing after taking the medications the patient is asked to follow-up in approximately 2-4 weeks for evaluation.. If there are any questions/concerns or any significant worsening of pain prior to that time, the patient is asked to call the clinic via the nurse navigation line or via "[employer],"t.     Subjective:     Georgie Scott is a 32 year old female who presents today for follow-up regarding chronic right-sided neck pain with radiation into the right upper trapezius area.  Since her last osteopathic manipulation treatment on March 22, 2022, she reports that things were going well up until about 3 days ago when she started to develop paresthesias in the hand.  She says that on April 7 she woke up and she felt like something had shifted in her neck.  She had some increased neck pain at that time and then she has had this numbness going down the lateral aspect of her forearm into the thumb.  She does not feel like she is weak.  No symptoms on the left side.  Rates her pain today at a 4 out of 10.  At worst it is a 4 out of 10.  At best it is a 0 out of 10.  She says that doing any activity, especially turning her head will significantly aggravate the symptoms.  She does feel little bit better if she can lay down on her back.  The tingling in the right arm is very concerning to her and she is worried about nerve damage.  She is taking cyclobenzaprine and ibuprofen.  These have not really been helpful in controlling her symptoms    Past medical history is reviewed and is unchanged in the interim.    Family history is reviewed and is unchanged in the interim.    Review of Systems: Positive for numbness and tingling in the right upper extremity.  Positive for occasional headaches..  Pertinent negatives include no weakness, fevers, chills, unexplained weight loss, bowel incontinence, bladder incontinence, trips, stumbles,  falls.  All others reviewed and are negative.     Objective:   CONSTITUTIONAL:  Vital signs as above.  No acute distress.  The patient is well nourished and well groomed.    PSYCHIATRIC:  The patient is awake, alert, oriented to person, place and time.  The patient is answering questions appropriately with clear speech.  Normal affect.  SKIN:  Skin over the face, posterior torso, bilateral upper and lower extremities is clean, dry, intact without rashes.  MUSCULOSKELETAL:  Gait is non-antalgic.  The patient is able to transfer independently.  She has 5/5 strength with bilateral shoulder abductors, elbow flexors/extensors, wrist extensors, finger flexors/abductors.  NEURO: Trace biceps, brachioradialis, triceps reflexes bilaterally which are symmetric.  Negative Marbin's bilaterally.  She does report impaired sensation to light touch in the right thumb compared to the left.  Otherwise sensation light touch is intact in all the digits of both upper extremities.    OSTEOPATHIC STRUCTURAL EXAM:  Positive standing flexion test on the left.  Left superior iliac crest  Lumbar spine: L4-5 flexed, rotated and side bent left  Sacrum: Right unilateral sacral flexion  Innominates/Pelvis: Left up slipped innominate, posterior/superior left  Thoracic spine: T10-12 flexed, rotated left side bent left  Rib cage: First rib elevated on the left  Cervical spine: C6/7 flexed, rotated left side bent right, C2-3 flexed, rotated left side bent right  Head/OA joint: Side bent right/rotated left    OMT:  TREATMENTS IN PARENTHESES  Lumbar spine: L4-5 flexed, rotated and side bent left  (Muscle energy, patient in lateral recumbent)  Sacrum: Right unilateral sacral flexion (Myofascial release, patient prone).  Innominates/Pelvis: Left up slipped innominate, posterior/superior left  (Muscle energy with the patient supine).  Thoracic spine: T10-12 flexed, rotated left side bent left  (Muscle energy, patient in lateral recumbent)  Rib cage:  First rib elevated on the left (Myofascial release, patient supine)  Cervical spine: C6/7 flexed, rotated left side bent right, C2-3 flexed, rotated left side bent right  (Muscle energy with the patient supine).  Head/OA joint: Side bent right/rotated left  (Muscle energy with the patient supine).

## 2022-04-15 ENCOUNTER — OFFICE VISIT (OUTPATIENT)
Dept: PHYSICAL MEDICINE AND REHAB | Facility: CLINIC | Age: 32
End: 2022-04-15
Payer: COMMERCIAL

## 2022-04-15 VITALS — DIASTOLIC BLOOD PRESSURE: 70 MMHG | SYSTOLIC BLOOD PRESSURE: 125 MMHG | OXYGEN SATURATION: 100 % | HEART RATE: 86 BPM

## 2022-04-15 DIAGNOSIS — M99.08 RIB CAGE REGION SOMATIC DYSFUNCTION: ICD-10-CM

## 2022-04-15 DIAGNOSIS — M99.03 LUMBAR REGION SOMATIC DYSFUNCTION: ICD-10-CM

## 2022-04-15 DIAGNOSIS — M54.12 CERVICAL RADICULAR PAIN: ICD-10-CM

## 2022-04-15 DIAGNOSIS — M54.2 NECK PAIN: Primary | ICD-10-CM

## 2022-04-15 DIAGNOSIS — M48.02 FORAMINAL STENOSIS OF CERVICAL REGION: ICD-10-CM

## 2022-04-15 DIAGNOSIS — M99.05 SOMATIC DYSFUNCTION OF PELVIS REGION: ICD-10-CM

## 2022-04-15 DIAGNOSIS — M99.02 THORACIC REGION SOMATIC DYSFUNCTION: ICD-10-CM

## 2022-04-15 DIAGNOSIS — M99.04 SACRAL REGION SOMATIC DYSFUNCTION: ICD-10-CM

## 2022-04-15 DIAGNOSIS — M99.01 CERVICAL SOMATIC DYSFUNCTION: ICD-10-CM

## 2022-04-15 DIAGNOSIS — M99.00 HEAD REGION SOMATIC DYSFUNCTION: ICD-10-CM

## 2022-04-15 PROCEDURE — 99214 OFFICE O/P EST MOD 30 MIN: CPT | Mod: 25 | Performed by: PHYSICAL MEDICINE & REHABILITATION

## 2022-04-15 PROCEDURE — 98928 OSTEOPATH MANJ 7-8 REGIONS: CPT | Performed by: PHYSICAL MEDICINE & REHABILITATION

## 2022-04-15 RX ORDER — PREDNISONE 50 MG/1
TABLET ORAL
Qty: 5 TABLET | Refills: 0 | Status: SHIPPED | OUTPATIENT
Start: 2022-04-15 | End: 2022-04-22

## 2022-04-15 RX ORDER — GABAPENTIN 300 MG/1
CAPSULE ORAL
Qty: 270 CAPSULE | Refills: 2 | Status: SHIPPED | OUTPATIENT
Start: 2022-04-15 | End: 2022-06-27

## 2022-04-15 ASSESSMENT — PAIN SCALES - GENERAL: PAINLEVEL: MODERATE PAIN (4)

## 2022-04-15 NOTE — LETTER
4/15/2022         RE: Georgie Scott  [address]  [place] [address]        Dear Colleague,    Thank you for referring your patient, Georgie Scott, to the [facility]. Please see a copy of my visit note below.    Assessment:   Georgie Scott is a 32 year old y.o. female with past medical history significant for ADHD who presents today for follow-up regarding chronic right neck pain with cervical radiculitis radiating into the right upper trapezius area.  Her MRI shows cervical foraminal stenosis at the C5-6 level.  She has failed to have long-lasting relief with previous cervical epidural steroid injections.  She reports that since her last treatment, she has had worsening of her right-sided neck pain with new/worsening paresthesias in the right forearm and hand.  This is in a C6 distribution.  Her exacerbation of chronic left-sided low back pain that she had at the last visit has improved.   She continues to have multiple areas of osteopathic somatic dysfunction as listed below.  She remains without any red flag or myopathic signs/symptoms.  She is neurologically intact on exam today.    PSP:  Kay Mcrae       Plan:     A shared decision making plan was used.  The patient's values and choices were respected.  The following represents what was discussed and decided upon by the physician and the patient.      1.  DIAGNOSTIC TESTS:    -She had an MRI of the cervical spine on February 9 of 2022.  Given the worsening symptoms today it was personally reviewed today by the physician of the physician performing her own interpretation.  She does have the bone spur at the right C5-6 level causing right greater than left foraminal stenosis which is likely the cause of her pain given that her symptoms are in a C6 distribution.  -She had a normal EMG of the bilateral upper extremities on September 4 of 2020.  -I will defer to Kay Mcrae for additional diagnostic studies.  2.   PHYSICAL THERAPY: No further physical therapy at this time.  The patient has previously completed physical therapy in November 2020 and is encouraged to continue doing the home exercise program daily.    3.  MEDICATIONS:    -I did prescribe her prednisone 50 mg, 1 tablet once a day for 5 days.  She should take this medication in the morning as taking it later in the day can give her insomnia.  She should avoid taking any over-the-counter NSAIDs while she takes the prednisone.  -Discussed Gabapentin/Neurontin (nerve pain medication) mechanism of action as well as potential side effects (drowsiness/dizziness) with the patient.  The patient wanted to try this medication so Gabapentin 300 mg was prescribed.  A chart was given with how to increase the dose to a maximum of 3 tablets three times a day.  The lowest therapeutic dose should be used.   The patient is asked to call the clinic if there are any side effects to the Gabapentin or if questions arise as to how to increase the dose.  -She can continue with cyclobenzaprine 5 mg, 3 times a day as needed for pain.  It is safe to take this medication with the gabapentin.  -After she completes the prednisone course, she can continue with over-the-counter ibuprofen 4 to 600 mg up to 3 times a day as needed for pain.  4.  INTERVENTIONS:  The patient is considered appropriate for continued osteopathic manipulation.  Osteopathic manipulation was performed to 7 areas today.  The patient tolerated the treatment well.   Please see below for the osteopathic manipulation that was performed today.  5.  PATIENT EDUCATION:    - The patient was advised to rest today and drink plenty of water.  Normal activities can be resumed as tolerated tomorrow.    - The patient was told that soreness following the treatment is normal and should improve within a few days.  Ice should be used (as opposed to heat) if soreness occurs.  If the soreness is concerning, the clinic should be notified so  further instructions can be given.  -She was concerned about if there is any nerve damage.  I reassured her that her neuro exam is normal today indicating that the chance for recurrent nerve damage at this time is low.  We will continue to monitor her symptoms closely.  6.  FOLLOW-UP: I did create a [employer] test to check in with her on Monday to see how she is doing after taking the medications the patient is asked to follow-up in approximately 2-4 weeks for evaluation.. If there are any questions/concerns or any significant worsening of pain prior to that time, the patient is asked to call the clinic via the nurse navigation line or via [employer].     Subjective:     Georgie Scott is a 32 year old female who presents today for follow-up regarding chronic right-sided neck pain with radiation into the right upper trapezius area.  Since her last osteopathic manipulation treatment on March 22, 2022, she reports that things were going well up until about 3 days ago when she started to develop paresthesias in the hand.  She says that on April 7 she woke up and she felt like something had shifted in her neck.  She had some increased neck pain at that time and then she has had this numbness going down the lateral aspect of her forearm into the thumb.  She does not feel like she is weak.  No symptoms on the left side.  Rates her pain today at a 4 out of 10.  At worst it is a 4 out of 10.  At best it is a 0 out of 10.  She says that doing any activity, especially turning her head will significantly aggravate the symptoms.  She does feel little bit better if she can lay down on her back.  The tingling in the right arm is very concerning to her and she is worried about nerve damage.  She is taking cyclobenzaprine and ibuprofen.  These have not really been helpful in controlling her symptoms    Past medical history is reviewed and is unchanged in the interim.    Family history is reviewed and is unchanged in the  interim.    Review of Systems: Positive for numbness and tingling in the right upper extremity.  Positive for occasional headaches..  Pertinent negatives include no weakness, fevers, chills, unexplained weight loss, bowel incontinence, bladder incontinence, trips, stumbles, falls.  All others reviewed and are negative.     Objective:   CONSTITUTIONAL:  Vital signs as above.  No acute distress.  The patient is well nourished and well groomed.    PSYCHIATRIC:  The patient is awake, alert, oriented to person, place and time.  The patient is answering questions appropriately with clear speech.  Normal affect.  SKIN:  Skin over the face, posterior torso, bilateral upper and lower extremities is clean, dry, intact without rashes.  MUSCULOSKELETAL:  Gait is non-antalgic.  The patient is able to transfer independently.  She has 5/5 strength with bilateral shoulder abductors, elbow flexors/extensors, wrist extensors, finger flexors/abductors.  NEURO: Trace biceps, brachioradialis, triceps reflexes bilaterally which are symmetric.  Negative Marbin's bilaterally.  She does report impaired sensation to light touch in the right thumb compared to the left.  Otherwise sensation light touch is intact in all the digits of both upper extremities.    OSTEOPATHIC STRUCTURAL EXAM:  Positive standing flexion test on the left.  Left superior iliac crest  Lumbar spine: L4-5 flexed, rotated and side bent left  Sacrum: Right unilateral sacral flexion  Innominates/Pelvis: Left up slipped innominate, posterior/superior left  Thoracic spine: T10-12 flexed, rotated left side bent left  Rib cage: First rib elevated on the left  Cervical spine: C6/7 flexed, rotated left side bent right, C2-3 flexed, rotated left side bent right  Head/OA joint: Side bent right/rotated left    OMT:  TREATMENTS IN PARENTHESES  Lumbar spine: L4-5 flexed, rotated and side bent left  (Muscle energy, patient in lateral recumbent)  Sacrum: Right unilateral sacral  flexion (Myofascial release, patient prone).  Innominates/Pelvis: Left up slipped innominate, posterior/superior left  (Muscle energy with the patient supine).  Thoracic spine: T10-12 flexed, rotated left side bent left  (Muscle energy, patient in lateral recumbent)  Rib cage: First rib elevated on the left (Myofascial release, patient supine)  Cervical spine: C6/7 flexed, rotated left side bent right, C2-3 flexed, rotated left side bent right  (Muscle energy with the patient supine).  Head/OA joint: Side bent right/rotated left  (Muscle energy with the patient supine).        Again, thank you for allowing me to participate in the care of your patient.        Sincerely,        Melani Mcdaniels, DO

## 2022-04-15 NOTE — PATIENT INSTRUCTIONS
Georgie,    I am sorry that you had some increased numbness and tingling in your arm/hand.    Prednisone 50 mg has been prescribed today.  Please take 50 mg by mouth in the morning, once a day for 5 days.  Please take this medication with food to prevent any stomach upset.  Taking the medication later in the day can make it difficult to sleep.  Please do not take any over the counter NSAIDS (such as ibuprofen/advil/motrin/aleve/naproxen) while you are taking Prednisone.  If you have side effects to this medication, please discontinue it and call the clinic at [phone].    You can continue to take the cyclobenzaprine medication with the gabapentin.     Gabapentin (nerve pain medication) was prescribed today.  Please use the chart to increase the dose to an amount that controls your pain (do not exceed 3 tablets three times a day).  If you have any questions on how to increase the dose or any side effects to this medication, please call the clinic.    Gabapentin 300mg Dosing Chart    DATE  MORNING AFTERNOON BEDTIME    Day 1 0 0 1    Day 2 0 0 1    Day 3 0 0 1    Day 4 1 0 1    Day 5 1 0 1    Day 6 1 0 1    Day 7 1 1 1    Day 8 1 1 1    Day 9 1 1 1    Day 10 1 1 2    Day 11 1 1 2    Day 12 1 1 2    Day 13 2 1 2    Day 14 2 1 2    Day 15 2 1 2    Day 16 2 2 2    Day 17 2 2 2    Day 18 2 2 2    Day 19 2 2 3    Day 20 2 2 3    Day 21 2 2 3    Day 22 3 2 3    Day 23 3 2 3    Day 24 3 2 3    Day 25 3 3 3    Day 26 3 3 3    Day 27 3 3 3     Continue medication, taking 3 capsules three times daily    Please call if you have any questions regarding how to take your medication  Clinic Phone # [phone]         I will check in with you through [employer] on Monday.  You can make an appointment to follow-up with me in 2 to 4 weeks.

## 2022-04-21 NOTE — PROGRESS NOTES
Assessment:   Georgie Scott is a 32 y.o. y.o. female with past medical history significant for ADHD who presents today for follow-up regarding an acute flare of chronic right neck pain with radiation into the right upper trapezius muscle with new right upper extremity paresthesias in a C6 pattern for the past 1 to 2 weeks.  My review of an MRI cervical spine shows moderate right C5-6 foraminal stenosis which has progressed slightly compared with a MRI from 2019.  Flexion-extension x-rays do not show any instability.  EMG bilateral upper extremities September 2020 was normal.  Symptoms are consistent with C6 radiculitis with secondary myofascial pain.  She reported a sensory deficit right C6 dermatome.  Strength and reflexes were intact.             Plan:     A shared decision making plan was used.  The patient's values and choices were respected.  The following represents what was discussed and decided upon by the physician assistant and the patient.      1.  DIAGNOSTIC TESTS: I reviewed the MRI cervical spine and flexion-extension x-rays.  No further diagnostic tests were ordered.  -If paresthesias persist for 4 weeks I would recommend obtaining an updated EMG right upper extremity.    2.  PHYSICAL THERAPY:  - Entered a new referral for physical therapy.  I would like the patient to trial cervical traction.  -Patient previously completed 9 sessions physical therapy November 4, 2020.    -Patient also completed 11 sessions of occupational therapy July 13, 2021 for wrist pain.    3.  MEDICATIONS:  -Patient completed prednisone 50 mg daily x5 days.  This did not provide any relief of her pain.  -She is currently taking gabapentin 300 mg 3 times daily.  She will titrate her dose up to 900 mg 3 times daily.  - I prescribed Valium 5 mg, #2 with no refills for preprocedure sedation before a repeat C7-T1 interlaminar epidural steroid injection.  - I also provided a small quantity for oxycodone/acetaminophen 5/325 mg 1  tab every 6 hours as needed, #12 with no refills.  I checked the [place] prescription monitoring database.  Only opiate prescription was for postoperative pain in February.  She is seem to be low risk.  Risks reviewed.  I will not provide this medication long-term.  I will not provide telephone refills.  -I prescribed methocarbamol 500 mg 3 times daily as needed.  She will take this instead of cyclobenzaprine.  She has been taking cyclobenzaprine 5 mg at bedtime.  She does not tolerate this during the day due to drowsiness.  Hopefully she tolerates the methocarbamol.  -Patient can continues ibuprofen as needed.    4.  INTERVENTIONS:   -I offer the patient a paramedian right C7-T1 interlaminar epidural steroid injection.  I told her I cannot guarantee it would provide longer lasting relief than when this injection was performed in October 2021.  She voiced understanding to this.  She is willing to accept this risk because she is quite desperate for some relief.   - A right C5-6 transforaminal epidural steroid injection was attempted twice but had to be aborted due to vascular uptake.  If there is a new area of neural impingement on the right we could try a transforaminal epidural steroid injection there.  -Patient has had osteopathic manipulation x4.    5.  PATIENT EDUCATION: Patient is in agreement the above plan.  All questions were answered.  - We also discussed a referral to neurosurgery.  Patient just went back to work after recovering from a hysterectomy.  She would like to avoid additional surgery if needed.  However, if symptoms fail to improve with conservative measures I would recommend this.  - Patient just returned to full duty work after recovering from her hysterectomy.  She is a mammogram tech.  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  She will let me know if she changes her mind.    6.  FOLLOW-UP: Patient follow-up with me 2 weeks after her C7-T1 interlaminar  epidural steroid injection.  If she has questions or concerns in the meantime, she should not hesitate to call.    Subjective:     Georgie Scott is a 32 y.o. female who presents today for follow-up regarding chronic right neck pain with radiation into the right upper trapezius muscle.  I last saw this patient February 3, 2022.  At that visit I ordered an updated MRI cervical spine and cervical spine flexion-extension x-rays which will be described below.  Since I saw the patient she has had osteopathic manipulation x4.  Unfortunately, on May 7, 2022 she felt a shift in her neck while rolling over in bed and had a flare of her right neck pain.  She states the muscles tensed up.  Then just over a week ago she woke up with new numbness and tingling radiating all the way down the right arm to the hand which she has never had before.  She saw Dr. Ramos and Dr. Ramos prescribed a 5-day course of prednisone which has not provided any relief.    Patient complains of right neck pain.  Pain radiates into the right shoulder blade area.  She denies pain down the arm but she has numbness and tingling that radiates down the right upper arm into the radial forearm and into the hand affecting fingers 1.  When the numbness and tingling first began she also had numbness in the index finger but that has resolved.  She denies weakness.  She is having a difficult time working because of this.  She rates her pain today as a 5 out of 10.  Doing any activity with her arm makes her pain much worse.  Pain is alleviated with lying on her back.  She denies loss of bowel or bladder control.  Denies recent fevers.    Patient completed 9 sessions of medics physical therapy November 4, 2020.  She does her home exercises.  She is taking gabapentin 300 mg 3 times daily.  She does not feel that this is helping.  She completed a prednisone course which was not helpful.  She takes cyclobenzaprine at bedtime as needed which helps with  sleep.      Review of Systems:  Positive for numbness/tingling, headache.  Negative for weakness, weakness, loss of bowel/bladder control, inability to urinate, pain much worse at night, trip/stumble/falls, difficulty swallowing, difficulty with hand skills, fevers, unintentional weight loss.     Objective:   CONSTITUTIONAL:  Vital signs as above.  No acute distress.  The patient is well nourished and well groomed.    PSYCHIATRIC:  The patient is awake, alert, oriented to person, place and time.  The patient is answering questions appropriately with clear speech.  Normal affect.  HEENT: Normocephalic, atraumatic.  Sclera clear.  Neck is supple.  SKIN:  Skin over the face, neck bilateral upper extremities is clean, dry, intact without rashes.  MUSCULOSKELETAL: The patient has 5/5 strength for the bilateral shoulder abductors, elbow flexors/extensors, wrist extensors, finger flexors/abductors.  Significant hypertonicity right cervical paraspinous muscles with tenderness to palpation.    NEUROLOGICAL: 1-2+ bilateral biceps, triceps, brachioradialis reflexes.  Negative Johnson's bilaterally.  Diminished sensation right C6 dermatome.    RESULTS: I reviewed the MRI cervical spine from [facility] dated February 9, 2022.  At C5-6 there is moderate right foraminal stenosis which has increased slightly compared to the scan from 2019.  At C3-4 there is mild right foraminal stenosis.  Please see report for further details.    I also reviewed flexion-extension cervical spine x-rays from [facility] dated February 7, 2022.  This shows no evidence for instability.    EMG bilateral upper extremity September 4, 2020:  EMG/NCS  results: Please see scanned full report     Comment NCS: Normal study  1.  Normal nerve conduction studies bilateral upper extremities.     Comment EMG: Normal study  1.  Normal needle EMG bilateral upper extremities.     Interpretation: Normal study     1. There is no electrodiagnostic evidence of cervical  radiculopathy, brachial plexopathy, or focal neuropathy in the bilateral upper extremities.  Specifically, there is no electrodiagnostic evidence of median neuropathy at the wrist in the bilateral upper extremities.

## 2022-04-22 ENCOUNTER — OFFICE VISIT (OUTPATIENT)
Dept: PHYSICAL MEDICINE AND REHAB | Facility: CLINIC | Age: 32
End: 2022-04-22
Payer: COMMERCIAL

## 2022-04-22 VITALS — DIASTOLIC BLOOD PRESSURE: 76 MMHG | SYSTOLIC BLOOD PRESSURE: 118 MMHG | HEART RATE: 80 BPM

## 2022-04-22 DIAGNOSIS — M54.12 CERVICAL RADICULAR PAIN: Primary | ICD-10-CM

## 2022-04-22 PROCEDURE — 99214 OFFICE O/P EST MOD 30 MIN: CPT | Performed by: PHYSICIAN ASSISTANT

## 2022-04-22 RX ORDER — METHOCARBAMOL 500 MG/1
500 TABLET, FILM COATED ORAL 3 TIMES DAILY PRN
Qty: 90 TABLET | Refills: 0 | Status: ON HOLD | OUTPATIENT
Start: 2022-04-22 | End: 2022-06-16

## 2022-04-22 RX ORDER — DIAZEPAM 5 MG
TABLET ORAL
Qty: 2 TABLET | Refills: 0 | Status: SHIPPED | OUTPATIENT
Start: 2022-04-22 | End: 2022-06-03

## 2022-04-22 RX ORDER — OXYCODONE AND ACETAMINOPHEN 5; 325 MG/1; MG/1
1 TABLET ORAL EVERY 6 HOURS PRN
Qty: 12 TABLET | Refills: 0 | Status: SHIPPED | OUTPATIENT
Start: 2022-04-22 | End: 2022-04-25

## 2022-04-22 ASSESSMENT — PAIN SCALES - GENERAL: PAINLEVEL: MODERATE PAIN (5)

## 2022-04-22 NOTE — PATIENT INSTRUCTIONS
Prescribed Gabapentin today, 300 mg tablets, to be titrated up to 3 tablets 3 times a day as tolerated for your nerve pain. Please follow Gabapentin dosing chart below.    Gabapentin 300mg Dosing Chart    DATE  MORNING AFTERNOON BEDTIME    Day 1 0 0 1    Day 2 0 0 1    Day 3 0 0 1    Day 4 1 0 1    Day 5 1 0 1    Day 6 1 0 1    Day 7 1 1 1    Day 8 1 1 1    Day 9 1 1 1    Day 10 1 1 2    Day 11 1 1 2    Day 12 1 1 2    Day 13 2 1 2    Day 14 2 1 2    Day 15 2 1 2    Day 16 2 2 2    Day 17 2 2 2    Day 18 2 2 2    Day 19 2 2 3    Day 20 2 2 3    Day 21 2 2 3    Day 22 3 2 3    Day 23 3 2 3    Day 24 3 2 3    Day 25 3 3 3    Day 26 3 3 3    Day 27 3 3 3     Continue medication, taking 3 capsules three times daily  Please call if you have any questions regarding how to take your medication      Oxycodone/acetaminophen was prescribed today. Please lock this medication up when you are not taking it. Do not share this medication with other people. Do not increase the dose without permission from your physician. Do not drink alcohol while you take this medication as this can lead to death. Do not take other pain medications without approval from your physician or this can also lead to death. If you need a refill of this medication, you must come in to clinic by appointment. Please call if you have any questions on how to take this medication.    An order for physicaltherapy has been provided today.  Someone will call you to schedule physical therapy or you can call [phone] to schedule physical therapy.  It will be very important for you to do your physical therapy exercises on aregular basis to decrease your pain and prevent future flares of pain.

## 2022-04-22 NOTE — LETTER
4/22/2022         RE: Georgie Scott  [address]  [place] [address]        Dear Colleague,    Thank you for referring your patient, Georgie Scott, to the [facility]. Please see a copy of my visit note below.    Assessment:   Georgie Scott is a 32 y.o. y.o. female with past medical history significant for ADHD who presents today for follow-up regarding an acute flare of chronic right neck pain with radiation into the right upper trapezius muscle with new right upper extremity paresthesias in a C6 pattern for the past 1 to 2 weeks.  My review of an MRI cervical spine shows moderate right C5-6 foraminal stenosis which has progressed slightly compared with a MRI from 2019.  Flexion-extension x-rays do not show any instability.  EMG bilateral upper extremities September 2020 was normal.  Symptoms are consistent with C6 radiculitis with secondary myofascial pain.  She reported a sensory deficit right C6 dermatome.  Strength and reflexes were intact.             Plan:     A shared decision making plan was used.  The patient's values and choices were respected.  The following represents what was discussed and decided upon by the physician assistant and the patient.      1.  DIAGNOSTIC TESTS: I reviewed the MRI cervical spine and flexion-extension x-rays.  No further diagnostic tests were ordered.  -If paresthesias persist for 4 weeks I would recommend obtaining an updated EMG right upper extremity.    2.  PHYSICAL THERAPY:  - Entered a new referral for physical therapy.  I would like the patient to trial cervical traction.  -Patient previously completed 9 sessions physical therapy November 4, 2020.    -Patient also completed 11 sessions of occupational therapy July 13, 2021 for wrist pain.    3.  MEDICATIONS:  -Patient completed prednisone 50 mg daily x5 days.  This did not provide any relief of her pain.  -She is currently taking gabapentin 300 mg 3 times daily.  She  will titrate her dose up to 900 mg 3 times daily.  - I prescribed Valium 5 mg, #2 with no refills for preprocedure sedation before a repeat C7-T1 interlaminar epidural steroid injection.  - I also provided a small quantity for oxycodone/acetaminophen 5/325 mg 1 tab every 6 hours as needed, #12 with no refills.  I checked the [place] prescription monitoring database.  Only opiate prescription was for postoperative pain in February.  She is seem to be low risk.  Risks reviewed.  I will not provide this medication long-term.  I will not provide telephone refills.  -I prescribed methocarbamol 500 mg 3 times daily as needed.  She will take this instead of cyclobenzaprine.  She has been taking cyclobenzaprine 5 mg at bedtime.  She does not tolerate this during the day due to drowsiness.  Hopefully she tolerates the methocarbamol.  -Patient can continues ibuprofen as needed.    4.  INTERVENTIONS:   -I offer the patient a paramedian right C7-T1 interlaminar epidural steroid injection.  I told her I cannot guarantee it would provide longer lasting relief than when this injection was performed in October 2021.  She voiced understanding to this.  She is willing to accept this risk because she is quite desperate for some relief.   - A right C5-6 transforaminal epidural steroid injection was attempted twice but had to be aborted due to vascular uptake.  If there is a new area of neural impingement on the right we could try a transforaminal epidural steroid injection there.  -Patient has had osteopathic manipulation x4.    5.  PATIENT EDUCATION: Patient is in agreement the above plan.  All questions were answered.  - We also discussed a referral to neurosurgery.  Patient just went back to work after recovering from a hysterectomy.  She would like to avoid additional surgery if needed.  However, if symptoms fail to improve with conservative measures I would recommend this.  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  She will let me know if she changes her mind.    6.  FOLLOW-UP: Patient follow-up with me 2 weeks after her C7-T1 interlaminar epidural steroid injection.  If she has questions or concerns in the meantime, she should not hesitate to call.    Subjective:     Georgie Scott is a 32 y.o. female who presents today for follow-up regarding chronic right neck pain with radiation into the right upper trapezius muscle.  I last saw this patient February 3, 2022.  At that visit I ordered an updated MRI cervical spine and cervical spine flexion-extension x-rays which will be described below.  Since I saw the patient she has had osteopathic manipulation x4.  Unfortunately, on May 7, 2022 she felt a shift in her neck while rolling over in bed and had a flare of her right neck pain.  She states the muscles tensed up.  Then just over a week ago she woke up with new numbness and tingling radiating all the way down the right arm to the hand which she has never had before.  She saw Dr. Ramos and Dr. Ramos prescribed a 5-day course of prednisone which has not provided any relief.    Patient complains of right neck pain.  Pain radiates into the right shoulder blade area.  She denies pain down the arm but she has numbness and tingling that radiates down the right upper arm into the radial forearm and into the hand affecting fingers 1.  When the numbness and tingling first began she also had numbness in the index finger but that has resolved.  She denies weakness.  She is having a difficult time working because of this.  She rates her pain today as a 5 out of 10.  Doing any activity with her arm makes her pain much worse.  Pain is alleviated with lying on her back.  She denies loss of bowel or bladder control.  Denies recent fevers.    Patient completed 9 sessions of medics physical therapy  November 4, 2020.  She does her home exercises.  She is taking gabapentin 300 mg 3 times daily.  She does not feel that this is helping.  She completed a prednisone course which was not helpful.  She takes cyclobenzaprine at bedtime as needed which helps with sleep.      Review of Systems:  Positive for numbness/tingling, headache.  Negative for weakness, weakness, loss of bowel/bladder control, inability to urinate, pain much worse at night, trip/stumble/falls, difficulty swallowing, difficulty with hand skills, fevers, unintentional weight loss.     Objective:   CONSTITUTIONAL:  Vital signs as above.  No acute distress.  The patient is well nourished and well groomed.    PSYCHIATRIC:  The patient is awake, alert, oriented to person, place and time.  The patient is answering questions appropriately with clear speech.  Normal affect.  HEENT: Normocephalic, atraumatic.  Sclera clear.  Neck is supple.  SKIN:  Skin over the face, neck bilateral upper extremities is clean, dry, intact without rashes.  MUSCULOSKELETAL: The patient has 5/5 strength for the bilateral shoulder abductors, elbow flexors/extensors, wrist extensors, finger flexors/abductors.  Significant hypertonicity right cervical paraspinous muscles with tenderness to palpation.    NEUROLOGICAL: 1-2+ bilateral biceps, triceps, brachioradialis reflexes.  Negative Johnson's bilaterally.  Diminished sensation right C6 dermatome.    RESULTS: I reviewed the MRI cervical spine from [facility] dated February 9, 2022.  At C5-6 there is moderate right foraminal stenosis which has increased slightly compared to the scan from 2019.  At C3-4 there is mild right foraminal stenosis.  Please see report for further details.    I also reviewed flexion-extension cervical spine x-rays from [facility] dated February 7, 2022.  This shows no evidence for instability.    EMG bilateral upper extremity September 4, 2020:  EMG/NCS  results: Please see scanned full report     Comment  NCS: Normal study  1.  Normal nerve conduction studies bilateral upper extremities.     Comment EMG: Normal study  1.  Normal needle EMG bilateral upper extremities.     Interpretation: Normal study     1. There is no electrodiagnostic evidence of cervical radiculopathy, brachial plexopathy, or focal neuropathy in the bilateral upper extremities.  Specifically, there is no electrodiagnostic evidence of median neuropathy at the wrist in the bilateral upper extremities.          Again, thank you for allowing me to participate in the care of your patient.        Sincerely,        Kay Mcrae PA-C

## 2022-04-27 ENCOUNTER — MEDICAL CORRESPONDENCE (OUTPATIENT)
Dept: NEUROSURGERY | Facility: CLINIC | Age: 32
End: 2022-04-27
Payer: COMMERCIAL

## 2022-04-29 ENCOUNTER — OFFICE VISIT (OUTPATIENT)
Dept: NEUROSURGERY | Facility: CLINIC | Age: 32
End: 2022-04-29
Attending: PHYSICIAN ASSISTANT

## 2022-04-29 VITALS
RESPIRATION RATE: 18 BRPM | HEART RATE: 76 BPM | WEIGHT: 165 LBS | BODY MASS INDEX: 26.52 KG/M2 | SYSTOLIC BLOOD PRESSURE: 124 MMHG | HEIGHT: 66 IN | DIASTOLIC BLOOD PRESSURE: 86 MMHG

## 2022-04-29 DIAGNOSIS — M54.12 CERVICAL RADICULAR PAIN: ICD-10-CM

## 2022-04-29 PROCEDURE — 99204 OFFICE O/P NEW MOD 45 MIN: CPT | Performed by: NEUROLOGICAL SURGERY

## 2022-04-29 NOTE — LETTER
4/29/2022         RE: Georgie Scott  [address]  [place] [address]        Dear Colleague,    Thank you for referring your patient, Georgie Scott, to the [facility]. Please see a copy of my visit note below.    Neurosurgery consultation was requested by:TOO Davies for evaluation of  moderate right C5-6 foraminal stenosis which has progressed slightly compared with a MRI from 2019  Pain: right sided neck over the last 10 years, last flare up 3 weeks ago  Radicular Pain is present: intermittent pain in the right arm in a C6 dermatome  Lhermitte sign: denies  Motor complaints: bilateral hand weakness ( EMG bilateral upper extremities September 2020 was normal.   Sensory complaints: persistent numbness in the right upper trapezius and forearm/first finger  Gait and balance issues: absent  Bowel or bladder issues: denies  Duration of SX is: chronic  The symptoms are worse with: looking up  The symptoms are better with: laying  Injury: denies  Severity is: moderate  Patient has tried the following conservative measures: failed right C5-6 transforaminal epidural steroid injection x 2, PT, Valium, Flexeril, Oxycodone, Robaxin  NDI score is : 30%  Cindi Ruiz RN, CNRN          Ms. Scott is a 32-year-old woman seen today for evaluation of acute exacerbation of chronic right arm symptoms consistent with a C6 radiculopathy.  Symptoms have been present to some degree for almost 15 years, more prominent over the past 5 years with a significant exacerbation just over 3 weeks ago.  There is no known antecedent event.  Patient was awakened from sleep 3 weeks ago with the abrupt onset of severe right-sided neck pain with paresthesias and numbness into her right arm.  She has been treated in the past with physical therapy, cervical traction, anti-inflammatory medications, activity reduction and cervical injections with intermittent control in some time resolution of the  symptoms.  However, she notes that over the past 3 weeks of numbness into her dominant right upper extremity has been constant and the use of a home cervical traction unit has not resulted in significant symptomatic or functional improvement.  She also complains of pain and weakness with repetitive use of her wrist bilaterally.  Otherwise, she has no symptoms referable to the left upper extremity.  She has no symptoms consistent with cervical myelopathy.  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    On examination, she is awake alert and oriented x3 with memory intact for recent and remote events and speech clear in character and content.  There are no focal cranial nerve abnormalities identified.  Cervical range of motion is somewhat limited in flexion, extension and rotation toward the right side because of right-sided neck, scapular and right arm symptoms.  Motor examination of her bilateral upper extremities however shows 5/5 strength throughout including deltoid, biceps, triceps and brachioradialis bilaterally.  Gait is normal.  Sensory examination is consistent with a right C6 dermatomal sensory disturbance as detailed above including her right thumb and index finger.  Due to reflex examination is 1+ and equal to biceps, triceps and brachioradialis bilaterally.  There are no long tract findings noted.    Review of her recent cervical MRI scan shows minimal spondylitic change with most significant abnormality seen at the right C5-6 level where she appears to have a focal disc protrusion in association with a small spondylitic spur producing severe proximal foraminal stenosis.  There is visible impingement of the exiting right C6 nerve root on the study.  There is no significant cervical spinal canal stenosis and no intrinsic signal change within the cervical spinal cord at this or any other level.  There is no evidence of spinal cord compression at any level.  There is no  evidence of other source of focal neurologic impingement at adjacent levels.  Cervical alignment is otherwise satisfactory.    Assessment: Chronic neck pain with associated intermittent and persistent right cervical radicular symptoms in this patient with objective evidence of focal neurologic deficit and C6 radiculopathy.  I have reviewed the clinical and radiographic findings in substantial detail with the patient and discussed management alternatives including both surgical and nonsurgical means of management.  She has recently undergone another surgery and is reluctant to consider surgery in such a short timeframe.  Also, she has significant social events coming up.  She has been scheduled for a trial of supervised cervical traction as well as a cervical transforaminal epidural steroid injection.  She is somewhat impatient regarding her current symptoms and if these measures are not effective in reducing her pain and sensory loss as well as producing significant functional improvement she wishes to proceed with surgical intervention.  We discussed very surgical alternatives including anterior and posterior approaches, cervical fusion versus cervical arthroplasty, etc.  At the conclusion of our discussion I recommended an anterior cervical discectomy and arthroplasty placement at C5-6 for relief of foraminal stenosis and preservation of segmental motion at that level.  I spoke with the patient at length regarding the details of that procedure as well as potential risks and benefits.  I illustrated this discussion with the use of her cervical MRI scan and anatomic models.  I told her that the risks of surgery include failure to improve her symptoms, increased pain weakness or numbness, injury to the nerve root or dural covering, injury to spinal cord up to and including permanent quadriplegia, injury to other cervical structures including but not limited to the trachea, esophagus, great vessels, recurrent  laryngeal nerve, difficulty with swallowing, misplacement or displacement of the arthroplasty, need for revision surgery, etc.  She appeared to have a good understanding at the conclusion of our discussion, asked appropriate questions which I answered to her satisfaction and wished to proceed with surgery as discussed if these interval measures are not effective in reducing her symptoms and findings.    Approximately 40 minutes in total was spent with the patient and the majority reviewing clinical and radiographic findings, management alternatives, risks and benefits.      Again, thank you for allowing me to participate in the care of your patient.        Sincerely,        Sam Oseguera MD

## 2022-04-29 NOTE — PROGRESS NOTES
Neurosurgery consultation was requested by:TOO Davies for evaluation of  moderate right C5-6 foraminal stenosis which has progressed slightly compared with a MRI from 2019  Pain: right sided neck over the last 10 years, last flare up 3 weeks ago  Radicular Pain is present: intermittent pain in the right arm in a C6 dermatome  Lhermitte sign: denies  Motor complaints: bilateral hand weakness ( EMG bilateral upper extremities September 2020 was normal.   Sensory complaints: persistent numbness in the right upper trapezius and forearm/first finger  Gait and balance issues: absent  Bowel or bladder issues: denies  Duration of SX is: chronic  The symptoms are worse with: looking up  The symptoms are better with: laying  Injury: denies  Severity is: moderate  Patient has tried the following conservative measures: failed right C5-6 transforaminal epidural steroid injection x 2, PT, Valium, Flexeril, Oxycodone, Robaxin  NDI score is : 30%  Cindi Ruiz RN, CNRN

## 2022-04-29 NOTE — PROGRESS NOTES
Ms. Scott is a 32-year-old woman seen today for evaluation of acute exacerbation of chronic right arm symptoms consistent with a C6 radiculopathy.  Symptoms have been present to some degree for almost 15 years, more prominent over the past 5 years with a significant exacerbation just over 3 weeks ago.  There is no known antecedent event.  Patient was awakened from sleep 3 weeks ago with the abrupt onset of severe right-sided neck pain with paresthesias and numbness into her right arm.  She has been treated in the past with physical therapy, cervical traction, anti-inflammatory medications, activity reduction and cervical injections with intermittent control in some time resolution of the symptoms.  However, she notes that over the past 3 weeks of numbness into her dominant right upper extremity has been constant and the use of a home cervical traction unit has not resulted in significant symptomatic or functional improvement.  She also complains of pain and weakness with repetitive use of her wrist bilaterally.  Otherwise, she has no symptoms referable to the left upper extremity.  She has no symptoms consistent with cervical myelopathy.  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    On examination, she is awake alert and oriented x3 with memory intact for recent and remote events and speech clear in character and content.  There are no focal cranial nerve abnormalities identified.  Cervical range of motion is somewhat limited in flexion, extension and rotation toward the right side because of right-sided neck, scapular and right arm symptoms.  Motor examination of her bilateral upper extremities however shows 5/5 strength throughout including deltoid, biceps, triceps and brachioradialis bilaterally.  Gait is normal.  Sensory examination is consistent with a right C6 dermatomal sensory disturbance as detailed above including her right thumb and index finger.  Due to reflex  examination is 1+ and equal to biceps, triceps and brachioradialis bilaterally.  There are no long tract findings noted.    Review of her recent cervical MRI scan shows minimal spondylitic change with most significant abnormality seen at the right C5-6 level where she appears to have a focal disc protrusion in association with a small spondylitic spur producing severe proximal foraminal stenosis.  There is visible impingement of the exiting right C6 nerve root on the study.  There is no significant cervical spinal canal stenosis and no intrinsic signal change within the cervical spinal cord at this or any other level.  There is no evidence of spinal cord compression at any level.  There is no evidence of other source of focal neurologic impingement at adjacent levels.  Cervical alignment is otherwise satisfactory.    Assessment: Chronic neck pain with associated intermittent and persistent right cervical radicular symptoms in this patient with objective evidence of focal neurologic deficit and C6 radiculopathy.  I have reviewed the clinical and radiographic findings in substantial detail with the patient and discussed management alternatives including both surgical and nonsurgical means of management.  She has recently undergone another surgery and is reluctant to consider surgery in such a short timeframe.  Also, she has significant social events coming up.  She has been scheduled for a trial of supervised cervical traction as well as a cervical transforaminal epidural steroid injection.  She is somewhat impatient regarding her current symptoms and if these measures are not effective in reducing her pain and sensory loss as well as producing significant functional improvement she wishes to proceed with surgical intervention.  We discussed very surgical alternatives including anterior and posterior approaches, cervical fusion versus cervical arthroplasty, etc.  At the conclusion of our discussion I recommended an  anterior cervical discectomy and arthroplasty placement at C5-6 for relief of foraminal stenosis and preservation of segmental motion at that level.  I spoke with the patient at length regarding the details of that procedure as well as potential risks and benefits.  I illustrated this discussion with the use of her cervical MRI scan and anatomic models.  I told her that the risks of surgery include failure to improve her symptoms, increased pain weakness or numbness, injury to the nerve root or dural covering, injury to spinal cord up to and including permanent quadriplegia, injury to other cervical structures including but not limited to the trachea, esophagus, great vessels, recurrent laryngeal nerve, difficulty with swallowing, misplacement or displacement of the arthroplasty, need for revision surgery, etc.  She appeared to have a good understanding at the conclusion of our discussion, asked appropriate questions which I answered to her satisfaction and wished to proceed with surgery as discussed if these interval measures are not effective in reducing her symptoms and findings.    Approximately 40 minutes in total was spent with the patient and the majority reviewing clinical and radiographic findings, management alternatives, risks and benefits.

## 2022-05-03 ENCOUNTER — TELEPHONE (OUTPATIENT)
Dept: PHYSICAL MEDICINE AND REHAB | Facility: CLINIC | Age: 32
End: 2022-05-03
Payer: COMMERCIAL

## 2022-05-03 RX ORDER — DEXAMETHASONE SODIUM PHOSPHATE 10 MG/ML
4 INJECTION, SOLUTION INTRAMUSCULAR; INTRAVENOUS ONCE
Status: CANCELLED | OUTPATIENT
Start: 2022-05-03 | End: 2022-05-03

## 2022-05-03 NOTE — TELEPHONE ENCOUNTER
PHONE CONSENT:    The patient wished to take pre-procedure oral sedation so a phone consent was obtained prior to their injection.  The patient has been offered other conservative treatment (physical therapy, medications, etc.) but has opted to proceed with an injection.  The risks and benefits of the injection (C7-T1 interlaminar epidural steroid injection) were discussed with the patient over the phone on 5-3-22 at 13:50 pm.   Risks of the injection include infection, bleeding, damage to surrounding structures, nerve injury, permanent weakness, permanent paralysis, worsened pain, soreness, headache, allergic reaction, no change in pain.      The patient's most recent COVID vaccine was on 11-21-21.    The patient understands that they cannot have symptoms of an infection or be on antibiotics at the time of the injection as this would increase their risk of infection. If they start antibiotics prior to the procedure, they should call the clinic to see if the procedure will need to be rescheduled.  The patient understands that if they start any blood thinners prior to the injection, the provider must be notified immediately.  The patient denies any allergies to iodine contrast dye or iodine products.  The patient denies any symptoms of an active infection (cough, fevers, myalgias) and denies taking antibiotics.  The patient knows not to come in to clinic if they have any symptoms of an active infection, particularly cough, fevers, myalgias.   The patient denies taking any prescription blood thinning medications. The patient denies any allergies to iodine or iodine contrast.       The patient verbalized understanding of the information given. The patient was given the opportunity to ask questions of the physician.  The patient elected to proceed and gave consent over the phone with KALPESH Davies as a witness.  Informed consent form was signed by the physician and the witness.

## 2022-05-04 ENCOUNTER — HOSPITAL ENCOUNTER (OUTPATIENT)
Dept: PHYSICAL THERAPY | Facility: REHABILITATION | Age: 32
Discharge: HOME | End: 2022-05-04
Attending: PHYSICIAN ASSISTANT
Payer: COMMERCIAL

## 2022-05-04 DIAGNOSIS — M54.12 CERVICAL RADICULAR PAIN: ICD-10-CM

## 2022-05-04 PROCEDURE — 97110 THERAPEUTIC EXERCISES: CPT | Mod: GP

## 2022-05-04 PROCEDURE — 97161 PT EVAL LOW COMPLEX 20 MIN: CPT | Mod: GP

## 2022-05-04 PROCEDURE — 97535 SELF CARE MNGMENT TRAINING: CPT | Mod: GP

## 2022-05-06 ENCOUNTER — RADIOLOGY INJECTION OFFICE VISIT (OUTPATIENT)
Dept: PHYSICAL MEDICINE AND REHAB | Facility: CLINIC | Age: 32
End: 2022-05-06
Attending: PHYSICIAN ASSISTANT
Payer: COMMERCIAL

## 2022-05-06 VITALS
SYSTOLIC BLOOD PRESSURE: 116 MMHG | HEART RATE: 76 BPM | DIASTOLIC BLOOD PRESSURE: 72 MMHG | RESPIRATION RATE: 16 BRPM | OXYGEN SATURATION: 96 % | TEMPERATURE: 98.1 F

## 2022-05-06 DIAGNOSIS — M54.12 CERVICAL RADICULAR PAIN: ICD-10-CM

## 2022-05-06 PROCEDURE — 62321 NJX INTERLAMINAR CRV/THRC: CPT | Performed by: PHYSICAL MEDICINE & REHABILITATION

## 2022-05-06 RX ORDER — LIDOCAINE HYDROCHLORIDE 10 MG/ML
INJECTION, SOLUTION EPIDURAL; INFILTRATION; INTRACAUDAL; PERINEURAL
Status: COMPLETED | OUTPATIENT
Start: 2022-05-06 | End: 2022-05-06

## 2022-05-06 RX ORDER — METHYLPREDNISOLONE ACETATE 80 MG/ML
INJECTION, SUSPENSION INTRA-ARTICULAR; INTRALESIONAL; INTRAMUSCULAR; SOFT TISSUE
Status: COMPLETED | OUTPATIENT
Start: 2022-05-06 | End: 2022-05-06

## 2022-05-06 RX ADMIN — METHYLPREDNISOLONE ACETATE 80 MG: 80 INJECTION, SUSPENSION INTRA-ARTICULAR; INTRALESIONAL; INTRAMUSCULAR; SOFT TISSUE at 15:15

## 2022-05-06 RX ADMIN — LIDOCAINE HYDROCHLORIDE 2.5 ML: 10 INJECTION, SOLUTION EPIDURAL; INFILTRATION; INTRACAUDAL; PERINEURAL at 15:14

## 2022-05-06 ASSESSMENT — PAIN SCALES - GENERAL
PAINLEVEL: MILD PAIN (2)
PAINLEVEL: MILD PAIN (3)

## 2022-05-06 NOTE — TELEPHONE ENCOUNTER
Hi Dr. Marrero,   Patient is lost to pap tracking follow-up. Attempts to contact pt have been made per reminder process and there has been no reply and/or no appt scheduled.       Pap Hx:  10/1/14 NIL  1/5/18 NIL  3/31/21 NIL pap, +HR HPV (not 16/18). Plan: cotest in 1 year  4/8/21 pt notified  03/15/22 Reminder Mychart, Letter   04/12/22 Reminder call-lm  05/6/22 Lost to follow-up for pap tracking, fyi routed to provider

## 2022-05-06 NOTE — PATIENT INSTRUCTIONS
Follow-up visit with TOO Davies in 2 weeks to discuss injection outcome and determine care plan going forward.       DISCHARGE INSTRUCTIONS    During office hours (8:00 a.m.- 4:00 p.m.) questions or concerns may be answered  by calling [facility] Nurses at  [phone].  Messages received after hours will be returned the following business day.      In the case of an emergency, please dial 911 or seek assistance at the nearest Emergency Room/Urgent Care facility.     All Patients:    You may experience an increase in your symptoms for the first 2 days (It may take anywhere between 2 days- 2 weeks for the steroid to have maximum effect).    You may use ice on the injection site, as frequently as 20 minutes each hour if needed.    You may take your pain medicine.    You may continue taking your regular medication after your injection. If you have had a Medial Branch Block you may resume pain medication once your pain diary is completed.    You may shower. No swimming, tub bath or hot tub for 48 hours.  You may remove your bandaid/bandage as soon as you are home.    You may resume light activities, as tolerated.    Resume your usual diet as tolerated.    It is strongly advised that you do not drive for 1-3 hours post injection.    If you have had oral sedation:  Do not drive for 8 hours post injection.      If you have had IV sedation:  Do not drive for 24 hours post injection.  Do not operate hazardous machinery or make important personal/business decisions for 24 hours.      POSSIBLE STEROID SIDE EFFECTS (If steroid/cortisone was used for your procedure)    -If you experience these symptoms, it should only last for a short period    Swelling of the legs              Skin redness (flushing)     Mouth (oral) irritation   Blood sugar (glucose) levels            Sweats                    Mood changes  Headache  Sleeplessness  Weakened immune system for up to 14 days, which could increase the risk of  rony the COVID-19 virus and/or experiencing more severe symptoms of the disease, if exposed.  Decreased effectiveness of the flu vaccine if given within 2 weeks of the steroid.         POSSIBLE PROCEDURE SIDE EFFECTS  -Call the [facility] if you are concerned  Increased Pain           Increased numbness/tingling      Nausea/Vomiting          Bruising/bleeding at site      Redness or swelling                                              Difficulty walking      Weakness           Fever greater than 100.5    *In the event of a severe headache after an epidural steroid injection that is relieved by lying down, please call the [facility] to speak with a clinical staff member*

## 2022-05-10 ENCOUNTER — TELEPHONE (OUTPATIENT)
Dept: NEUROSURGERY | Facility: CLINIC | Age: 32
End: 2022-05-10
Payer: COMMERCIAL

## 2022-05-10 NOTE — TELEPHONE ENCOUNTER
Talked with patient yesterday and offered her June 15th for surgery with Dr. Oseguera. She was wondering if there was another date before or after June 15th.     I just left her a message, offering June 29th as an alternate. Waiting for her to return call.

## 2022-05-10 NOTE — LETTER
"Dear Ms. Sonia,    This letter will help in preparation for your upcoming surgery. Please contact us with any additional questions you may have regarding your surgery. Contact information for your surgery scheduler:   Dr. King: [phone] ~ Azul Oseguera and Wang: [phone] ~ David    You are scheduled for: Cervical Discectomy/Artificial Disc Replacement  With: Sam Oseguera MD  Date/Time: Wednesday, Abbey 15, 2022 at 12:45 p.m. (time subject to change)  Location: [place] [address]    Park in the [facility] and check in at [facility]. They will direct you to the surgery waiting area. Please arrive at 10:45 a.m. A nurse from [facility] (surgery admit unit) at the hospital will call you with your exact arrival time to the hospital - typically two hours prior to your scheduled surgery time.     In the event of an emergency surgery case, there may be an adjustment to your start time for surgery.     PREPARING FOR YOUR SURGERY    *Pre-op Physical: 06/03/2022 at 12:00 p.m., with Dr. Womack at [facility]. Please arrive at 11:40 a.m.     *Please discuss the necessity of receiving a pneumococcal vaccine prior to surgery at your pre-op physical. Recommended for all patients over the age of 65 or based on certain medical conditions.     *After the pre-op physical is complete, please have your clinic fax the visit note to our office at [phone].    *Pre-op Lab Work: Determined at your pre-op physical with Dr. Womack    *Covid-19 Pre-procedure Test: 06/11/2022 at 10:00 a.m., at [facility]. [facility] [place] [address]. Once you arrive, drive around to the northwest side of the building, and follow the gold \"COVID Testing\" signs. Park in one of the reserved parking spots and enter the building.    *Readiness Visit: Dr. Oseguera's nurse will call you prior to the day of surgery to go " over the results of your pre-op physical/lab results/Covid result, along with additional information you will need for the day of surgery.     *Ensure that you have completed your pre-op physical, along with any other necessary tests/appointments (listed above), prior to your Readiness Visit.                       ADDITIONAL INFORMATION REGARDING YOUR SURGERY    Medications    Bring a list ALL medications, including any over-the-counter vitamins and herbal supplements with you to the hospital on the day of surgery.    DO NOT bring your medications with you the day of surgery.    Please see attached third sheet for more details on medications/vitamins/herbal supplements that should be discontinued prior to your surgery date.     If you are unsure if you should discontinue a medication/ vitamin/herbal supplement, please call our office and discuss with a nurse.    Continue taking your medications/vitamins/herbal supplements unless they are on the attached list.     Failure to follow the instructions regarding medications/vitamins/herbal supplements will result in cancellation of your surgery.    Day BEFORE Surgery    DO NOT shave near your surgical site. This can cause irritation of the skin    Using a washcloth and provided bottle of Hibiclens, shower the night BEFORE surgery, using a half bottle of Hibiclens to wash your body, avoiding face and genitals. The morning OF surgery, shower and use the second half of the bottle to wash your body, avoiding face and genitals. If you are unable to take a shower the morning of surgery, please discuss your options with the nurse at your readiness visit.     NOTHING to eat after 11:00 p.m. the night prior to surgery.    CLEAR LIQUIDS: May have the following liquids up to two (2) hours before your arrival time at the hospital: water, plain black coffee (no cream or milk), plain black tea or plain green tea (no cream or milk), Gatorade or Propel Water.    SMOKING: Stop smoking as  far before surgery as possible, or as directed by your surgeon. NO tobacco products of any kind (cigarettes, e-cigarettes, chewing tobacco) beginning at 11:00 p.m. the night prior to your procedure.     ALCOHOL: You should stop drinking alcohol beginning at 11:00 p.m. the night prior to your procedure    Contact our office if you have symptoms of illness such as a fever of 101 or greater, chills, cough, sore throat, or if you develop a rash or any open sore    Day OF Surgery    If you ve been instructed to take a medication(s) on the morning of surgery, please take with a very small sip of water.    Wear loose & comfortable clothing and flat shoes, Leave jewelry/valuables at home. If you wear contact lenses, remove them at home and wear glasses. Remove any body piercings. Remove nail polish.     Planning for Discharge    Start planning for your care after discharge as soon as you receive this letter.    If you have not made arrangements for someone to take you home and stay with you for the first 48 hours after discharge, your surgery may be cancelled.                        PRE-OPERATIVE MEDICATION INSTRUCTIONS    Review this information with your primary care physician prior to discontinuing any of the medications listed below.  Notify your primary care physician that you have been instructed to discontinue these medications.    TEN (10) Days Prior to Surgery, STOP the Following Medications   Arlyn-Anderson  Anacin  Aspirin  Excedrin  Pepto Bismol    **Before taking ANY over-the-counter medications, check the label for Aspirin   Non-steroidal   Anti-inflammatory Medications (NSAIDS)    Celebrex  Diclofenac (Cataflam)  Etodolac (Iodine)  Fenoprofen (Nalfon)  Ibuprofen (Advil, Motrin, Nuprin)  Indomethacin (Indocin)  Ketoprofen  Ketorolac (Toradol)  Meloxicam (Mobic)  Naproxen (AnaProx, Aleve, Naprosyn)  Relafen (Nabumetone)  Sulindac (Clinoril)   Herbal Supplements (this is a partial list of herbals to be  discontinued)    Mary Leahy  Ephedra  Feverfew  Fish Oil  Flaxseed Oil  Garlic  Kathy  Gingko  Ginseng  Goldenseal  Imitrex (Sumatriptan)  Kava  Krill Oil  Licorice  Multi Vitamins  Lone Tree s Wort  Turmeric  Valerian  Vitamin E  Yohimbe   CHECK WITH YOUR PRESCRIBING DOCTOR BEFORE STOPPING ANY BLOOD THINNERS (approximately 7 days prior to surgery)  (Coumadin, Plavix, Eliquis, Xarelto, Pradaxa, Platel, Aggrenox, Effient (Prasugrel), Ticlid)      ALWAYS CHECK WITH YOUR PRESCRIBING DOCTOR REGARDING THE MEDICATIONS LISTED BELOW; RECOMMENDED STOP TIME IS ALSO LISTED      If you are taking Lovenox, discontinue 24 HOURS prior to surgery    If you are taking weight loss medication, discontinue 7 days prior to surgery    If you are taking Metformin or Simvastatin, check with your primary care physician (or whoever has prescribed you this medication) regarding when to discontinue prior to surgery

## 2022-05-11 ENCOUNTER — HOSPITAL ENCOUNTER (OUTPATIENT)
Dept: PHYSICAL THERAPY | Facility: REHABILITATION | Age: 32
Discharge: HOME | End: 2022-05-11
Payer: COMMERCIAL

## 2022-05-11 DIAGNOSIS — M54.12 CERVICAL RADICULAR PAIN: Primary | ICD-10-CM

## 2022-05-11 DIAGNOSIS — M47.812 CERVICAL FACET SYNDROME: ICD-10-CM

## 2022-05-11 DIAGNOSIS — G89.29 CHRONIC NECK PAIN: ICD-10-CM

## 2022-05-11 DIAGNOSIS — M54.2 CHRONIC NECK PAIN: ICD-10-CM

## 2022-05-11 PROCEDURE — 97012 MECHANICAL TRACTION THERAPY: CPT | Mod: GP

## 2022-05-11 PROCEDURE — 97110 THERAPEUTIC EXERCISES: CPT | Mod: GP

## 2022-05-16 DIAGNOSIS — Z11.52 ENCOUNTER FOR PREOPERATIVE SCREENING LABORATORY TESTING FOR COVID-19 VIRUS: Primary | ICD-10-CM

## 2022-05-16 DIAGNOSIS — Z01.812 ENCOUNTER FOR PREOPERATIVE SCREENING LABORATORY TESTING FOR COVID-19 VIRUS: Primary | ICD-10-CM

## 2022-05-16 NOTE — TELEPHONE ENCOUNTER
LM for patient to return call to finalize surgery date of June 15th. Will then arrange pre-op physical and Covid test.

## 2022-05-17 NOTE — TELEPHONE ENCOUNTER
Confirmed with Dexter Garsia PA-C that this pt will not need a cervical collar prior to surgery as she is not having a fusion.     Chelsea Sharma RN

## 2022-05-17 NOTE — TELEPHONE ENCOUNTER
ORDER FROM: Dr. Oseguera    PRE AUTHORIZATION: Pending PA    METHOD OF PATIENT CONTACT: Spoke with patient over the phone; Best number to reach her: [phone]    PROCEDURE: Anterior cervical discectomy at C5-C6; Placement of artificial disc for treatment of right arm symptoms; Right incision    SURGICAL DATE: 06.15.22 @ 12:45 p.m. *[facility]*    COVID TEST: 06.11.22 @ 10:00 a.m.    READINESS VISIT: Please call    PCP, CLINIC, PHONE#: Dr. Taylor Womack; [facility]; [phone]; Pre-op physical: 06.03.22 @ 12:00 p.m.    FILM INFO: Cervical MRI: 02.09.22 @ [facility]; Cervical XR flex/ext: 02.07.22 @ [facility]    SURGICAL LETTER: e-mailed 05.17.22

## 2022-05-19 ENCOUNTER — TELEPHONE (OUTPATIENT)
Dept: NEUROSURGERY | Facility: CLINIC | Age: 32
End: 2022-05-19
Payer: COMMERCIAL

## 2022-05-19 ENCOUNTER — HOSPITAL ENCOUNTER (OUTPATIENT)
Dept: PHYSICAL THERAPY | Facility: REHABILITATION | Age: 32
Discharge: HOME | End: 2022-05-19
Payer: COMMERCIAL

## 2022-05-19 DIAGNOSIS — M54.12 CERVICAL RADICULAR PAIN: Primary | ICD-10-CM

## 2022-05-19 DIAGNOSIS — M47.812 CERVICAL FACET SYNDROME: ICD-10-CM

## 2022-05-19 DIAGNOSIS — M54.2 CHRONIC NECK PAIN: ICD-10-CM

## 2022-05-19 DIAGNOSIS — M62.81 GENERALIZED MUSCLE WEAKNESS: ICD-10-CM

## 2022-05-19 DIAGNOSIS — G89.29 CHRONIC NECK PAIN: ICD-10-CM

## 2022-05-19 PROCEDURE — 97012 MECHANICAL TRACTION THERAPY: CPT | Mod: GP

## 2022-05-19 PROCEDURE — 97110 THERAPEUTIC EXERCISES: CPT | Mod: GP

## 2022-05-19 PROCEDURE — 97140 MANUAL THERAPY 1/> REGIONS: CPT | Mod: GP

## 2022-05-19 NOTE — TELEPHONE ENCOUNTER
Returned call to pt who is scheduled to have a disc replacement (no fusion) at C5-6 with Dr. Oseguera.     Confirmed she does not need a cervical collar and reviewed post-op activity restrictions in detail and the 6 week restriction timeline.     Pt is a mammogram tech, her job involves large equipment and patient handling.     Answered pt's questions to her satisfaction.     Chelsea Sharma, RN

## 2022-05-19 NOTE — TELEPHONE ENCOUNTER
Patient is having surgery 6/15 and would like to know what her return to work will look like afterwards so her job can get her schedule together.

## 2022-05-21 ENCOUNTER — HEALTH MAINTENANCE LETTER (OUTPATIENT)
Age: 32
End: 2022-05-21

## 2022-05-25 ENCOUNTER — MEDICAL CORRESPONDENCE (OUTPATIENT)
Dept: NEUROSURGERY | Facility: CLINIC | Age: 32
End: 2022-05-25
Payer: COMMERCIAL

## 2022-05-26 ENCOUNTER — TRANSFERRED RECORDS (OUTPATIENT)
Dept: HEALTH INFORMATION MANAGEMENT | Facility: CLINIC | Age: 32
End: 2022-05-26
Payer: COMMERCIAL

## 2022-05-27 ENCOUNTER — MEDICAL CORRESPONDENCE (OUTPATIENT)
Dept: NEUROSURGERY | Facility: CLINIC | Age: 32
End: 2022-05-27
Payer: COMMERCIAL

## 2022-06-01 ENCOUNTER — HOSPITAL ENCOUNTER (OUTPATIENT)
Dept: PHYSICAL THERAPY | Facility: REHABILITATION | Age: 32
Discharge: HOME | End: 2022-06-01
Payer: COMMERCIAL

## 2022-06-01 DIAGNOSIS — M54.2 CHRONIC NECK PAIN: ICD-10-CM

## 2022-06-01 DIAGNOSIS — M62.81 GENERALIZED MUSCLE WEAKNESS: ICD-10-CM

## 2022-06-01 DIAGNOSIS — G89.29 CHRONIC NECK PAIN: ICD-10-CM

## 2022-06-01 DIAGNOSIS — M47.812 CERVICAL FACET SYNDROME: ICD-10-CM

## 2022-06-01 DIAGNOSIS — M54.12 CERVICAL RADICULAR PAIN: Primary | ICD-10-CM

## 2022-06-01 PROCEDURE — 97012 MECHANICAL TRACTION THERAPY: CPT | Mod: GP

## 2022-06-01 PROCEDURE — 97140 MANUAL THERAPY 1/> REGIONS: CPT | Mod: GP

## 2022-06-01 PROCEDURE — 97110 THERAPEUTIC EXERCISES: CPT | Mod: GP

## 2022-06-03 ENCOUNTER — OFFICE VISIT (OUTPATIENT)
Dept: FAMILY MEDICINE | Facility: CLINIC | Age: 32
End: 2022-06-03
Payer: COMMERCIAL

## 2022-06-03 VITALS
WEIGHT: 160.38 LBS | SYSTOLIC BLOOD PRESSURE: 114 MMHG | HEART RATE: 82 BPM | HEIGHT: 66 IN | BODY MASS INDEX: 25.78 KG/M2 | DIASTOLIC BLOOD PRESSURE: 78 MMHG

## 2022-06-03 DIAGNOSIS — Z79.899 CONTROLLED SUBSTANCE AGREEMENT SIGNED: ICD-10-CM

## 2022-06-03 DIAGNOSIS — M54.2 NECK PAIN: ICD-10-CM

## 2022-06-03 DIAGNOSIS — F90.0 ADHD (ATTENTION DEFICIT HYPERACTIVITY DISORDER), INATTENTIVE TYPE: ICD-10-CM

## 2022-06-03 DIAGNOSIS — Z01.818 PREOP GENERAL PHYSICAL EXAM: Primary | ICD-10-CM

## 2022-06-03 LAB
ERYTHROCYTE [DISTWIDTH] IN BLOOD BY AUTOMATED COUNT: 11.9 % (ref 10–15)
HCT VFR BLD AUTO: 40.4 % (ref 35–47)
HGB BLD-MCNC: 14 G/DL (ref 11.7–15.7)
MCH RBC QN AUTO: 31.6 PG (ref 26.5–33)
MCHC RBC AUTO-ENTMCNC: 34.7 G/DL (ref 31.5–36.5)
MCV RBC AUTO: 91 FL (ref 78–100)
PLATELET # BLD AUTO: 288 10E3/UL (ref 150–450)
RBC # BLD AUTO: 4.43 10E6/UL (ref 3.8–5.2)
WBC # BLD AUTO: 4.8 10E3/UL (ref 4–11)

## 2022-06-03 PROCEDURE — 36415 COLL VENOUS BLD VENIPUNCTURE: CPT | Performed by: FAMILY MEDICINE

## 2022-06-03 PROCEDURE — 99214 OFFICE O/P EST MOD 30 MIN: CPT | Performed by: FAMILY MEDICINE

## 2022-06-03 PROCEDURE — 85027 COMPLETE CBC AUTOMATED: CPT | Performed by: FAMILY MEDICINE

## 2022-06-03 RX ORDER — SPIRONOLACTONE 100 MG/1
TABLET, FILM COATED ORAL
COMMUNITY
Start: 2022-05-26 | End: 2022-12-02

## 2022-06-03 NOTE — RESULT ENCOUNTER NOTE
Patient updated by [employer] message with lab results.   Taylor Womack, DO       Ravin Jacques,  Your CBC has returned normal.  I hope everything goes smoothly for you.  Taylor Womack, DO  no

## 2022-06-03 NOTE — PROGRESS NOTES
[facility]  [address] [facility] [address]  Phone: [phone]  Fax: [phone]  Primary Provider: Antonia Womack  Pre-op Performing Provider: ANTONIA WOMACK      PREOPERATIVE EVALUATION:  Today's date: 6/3/2022    Georgie Scott is a 32 year old female who presents for a preoperative evaluation.    Surgical Information:  Surgery/Procedure: Anterior cervical discectomy at cervical 5-6; placement of artificial disc for treatment of right arm symptoms; right incision  Surgery Location: [facility]  Surgeon: Dr. Oseguera  Surgery Date: 6/15/22  Time of Surgery: 12:45 pm  Where patient plans to recover: At home with family  Fax number for surgical facility: Note does not need to be faxed, will be available electronically in Epic.    Type of Anesthesia Anticipated: General    Assessment & Plan     The proposed surgical procedure is considered INTERMEDIATE risk.    1. Preop general physical exam  2. Neck pain  - REVIEW OF HEALTH MAINTENANCE PROTOCOL ORDERS  - CBC with platelets; Future    Risks and Recommendations:  The patient has the following additional risks and recommendations for perioperative complications:   - No identified additional risk factors other than previously addressed    Medication Instructions:   - ibuprofen (Advil, Motrin): HOLD 1 day before surgery.    Not restarting spironolactone until after surgery.  She is weaning off of gabapentin.  Only takes muscle relaxer at bedtime.  We will hold Adderall on morning of surgery.    RECOMMENDATION:  APPROVAL GIVEN to proceed with proposed procedure, without further diagnostic evaluation.    3. Controlled substance agreement - signed 2/9/22  4. ADHD (attention deficit hyperactivity disorder), inattentive type  Continue current dose of Adderall. Tolerating well and effective. BP normal. Follow up med check in 6 months.      HM: Status post hysterectomy, Pap smears no longer indicated, requested staff  reach out to abstract team to discontinue Pap smear reminders.    Subjective     HPI related to upcoming procedure:   Was having pain, improved with injections, arm is numb. No weakness.   Anticipating outpatient procedure, new policy requires at home test.     Preop Questions 6/3/2022   1. Have you ever had a heart attack or stroke? No   2. Have you ever had surgery on your heart or blood vessels, such as a stent placement, a coronary artery bypass, or surgery on an artery in your head, neck, heart, or legs? No   3. Do you have chest pain with activity? No   4. Do you have a history of  heart failure? No   5. Do you currently have a cold, bronchitis or symptoms of other infection? No   6. Do you have a cough, shortness of breath, or wheezing? No   7. Do you or anyone in your family have previous history of blood clots? No   8. Do you or does anyone in your family have a serious bleeding problem such as prolonged bleeding following surgeries or cuts? No   9. Have you ever had problems with anemia or been told to take iron pills? No   10. Have you had any abnormal blood loss such as black, tarry or bloody stools, or abnormal vaginal bleeding? No   11. Have you ever had a blood transfusion? No   12. Are you willing to have a blood transfusion if it is medically needed before, during, or after your surgery? Yes   13. Have you or any of your relatives ever had problems with anesthesia? No   14. Do you have sleep apnea, excessive snoring or daytime drowsiness? No   15. Do you have any artifical heart valves or other implanted medical devices like a pacemaker, defibrillator, or continuous glucose monitor? No   16. Do you have artificial joints? No   17. Are you allergic to latex? No   18. Is there any chance that you may be pregnant? No       Health Care Directive:  Patient does not have a Health Care Directive or Living Will: Discussed advance care planning with patient; information given to patient to  review.    Preoperative Review of :   reviewed - controlled substances reflected in medication list.    Review of Systems  10 point ROS negative except for as reported above.     Patient Active Problem List    Diagnosis Date Noted     Menorrhagia 12/03/2021     Priority: Medium     Controlled substance agreement - signed 2/9/22 07/09/2021     Priority: Medium     Bloating 06/26/2021     Priority: Medium     Dyspepsia 06/26/2021     Priority: Medium     Heartburn 06/26/2021     Priority: Medium     Nausea 06/26/2021     Priority: Medium     Rectal bleeding 06/26/2021     Priority: Medium     Cervical high risk HPV (human papillomavirus) test positive 03/31/2021     Priority: Medium     10/1/14 NIL  1/5/18 NIL  3/31/21 NIL pap, +HR HPV (not 16/18). Plan: cotest in 1 year  4/8/21 pt notified  03/15/22 Reminder Mychart, Letter   04/12/22 Reminder call-lm  05/6/22 Lost to follow-up for pap tracking, fyi routed to provider       ADHD (attention deficit hyperactivity disorder), inattentive type 03/13/2019     Priority: Medium     Dysmenorrhea 08/01/2018     Priority: Medium     Persistent insomnia 01/05/2018     Priority: Medium      Past Medical History:   Diagnosis Date     ADHD      Cervical high risk HPV (human papillomavirus) test positive 03/31/2021    10/1/14 NIL 1/5/18 NIL 3/31/21 NIL pap, +HR HPV (not 16/18). Plan: cotest in 1 year     Dyspepsia      Excessive bleeding in premenopausal period      Heartburn      Medical history non-contributory 01/22/2019     Persistent insomnia      Past Surgical History:   Procedure Laterality Date     COLONOSCOPY N/A 8/31/2021    Procedure: COLONOSCOPY;  Surgeon: Billy Thurston DO;  Location:  GI     LAPAROSCOPIC HYSTERECTOMY TOTAL Bilateral 2/11/2022    Procedure: TOTAL LAPAROSCOPIC HYSTERECTOMY BILATERAL SALPINGECTOMY;  Surgeon: Tiesha Hurley MD;  [facility]     OTHER SURGICAL HISTORY  01/22/2019    No surgery history     Current Outpatient  Medications   Medication Sig Dispense Refill     amphetamine-dextroamphetamine (ADDERALL) 10 MG tablet Take 1-2 tablets (10-20 mg) by mouth daily 40 tablet 0     amphetamine-dextroamphetamine (ADDERALL) 10 MG tablet Take 1-2 tablets (10-20 mg) by mouth daily 40 tablet 0     amphetamine-dextroamphetamine (ADDERALL) 10 MG tablet Take 1-2 tablets (10-20 mg) by mouth daily 40 tablet 0     cyclobenzaprine (FLEXERIL) 5 MG tablet TAKE ONE TABLET BY MOUTH THREE TIMES A DAY AS NEEDED FOR MUSCLE SPASMS. NEED APPOINTMENT FOR MORE REFILLS 30 tablet 0     gabapentin (NEURONTIN) 300 MG capsule Increase as directed to a maximum of 900 mg  capsule 2     diazepam (VALIUM) 5 MG tablet Take 1 tab 2 hrs before procedure, take 1 tab 1 hr before procedure only if needed 2 tablet 0     methocarbamol (ROBAXIN) 500 MG tablet Take 1 tablet (500 mg) by mouth 3 times daily as needed for muscle spasms (Patient not taking: Reported on 6/3/2022) 90 tablet 0     spironolactone (ALDACTONE) 100 MG tablet  (Patient not taking: Reported on 6/3/2022)         Allergies   Allergen Reactions     Bactrim [Sulfamethoxazole W/Trimethoprim] Hives     Amoxicillin Rash     Morphine Rash        Social History     Tobacco Use     Smoking status: Never Smoker     Smokeless tobacco: Never Used   Substance Use Topics     Alcohol use: Yes     Comment: moderate     Family History   Problem Relation Age of Onset     Hypertension Father      Hypertension Paternal Grandmother      Colon Cancer Paternal Grandfather      Diabetes Paternal Uncle      Diabetes Paternal Aunt      Cerebrovascular Disease Maternal Grandmother      Dementia Maternal Grandmother      Brain Cancer Maternal Grandfather      Diabetes Type 2  Paternal Uncle      Diabetes Type 1 Paternal Uncle      Diabetes Paternal Aunt      Diabetes Paternal Aunt      History   Drug Use No         Objective     /78 (BP Location: Left arm, Patient Position: Sitting, Cuff Size: Adult Regular)   Pulse 82   " Ht 1.676 m (5' 6\")   Wt 72.7 kg (160 lb 6 oz)   LMP  (LMP Unknown)   BMI 25.89 kg/m      Physical Exam    GENERAL APPEARANCE: healthy, alert and no distress     EYES: EOMI, PERRL     HENT: ear canals and TM's normal and nose and mouth without ulcers or lesions     NECK: no adenopathy, no asymmetry, masses, or scars and thyroid normal to palpation     RESP: lungs clear to auscultation - no rales, rhonchi or wheezes     CV: regular rates and rhythm, normal S1 S2, no S3 or S4 and no murmur, click or rub     ABDOMEN:  soft, nontender, no HSM or masses and bowel sounds normal     MS: extremities normal- no gross deformities noted, no evidence of inflammation in joints, FROM in all extremities.     SKIN: no suspicious lesions or rashes     NEURO: Normal strength and tone, sensory exam grossly normal, mentation intact and speech normal     PSYCH: mentation appears normal. and affect normal/bright     LYMPHATICS: No cervical adenopathy    Recent Labs   Lab Test 02/11/22  0953 02/09/22  1216   HGB 13.2 13.6    287        Diagnostics:  Recent Results (from the past 24 hour(s))   CBC with platelets    Collection Time: 06/03/22 12:24 PM   Result Value Ref Range    WBC Count 4.8 4.0 - 11.0 10e3/uL    RBC Count 4.43 3.80 - 5.20 10e6/uL    Hemoglobin 14.0 11.7 - 15.7 g/dL    Hematocrit 40.4 35.0 - 47.0 %    MCV 91 78 - 100 fL    MCH 31.6 26.5 - 33.0 pg    MCHC 34.7 31.5 - 36.5 g/dL    RDW 11.9 10.0 - 15.0 %    Platelet Count 288 150 - 450 10e3/uL        No EKG required, no history of coronary heart disease, significant arrhythmia, peripheral arterial disease or other structural heart disease.    Revised Cardiac Risk Index (RCRI):  The patient has the following serious cardiovascular risks for perioperative complications:   - No serious cardiac risks = 0 points     RCRI Interpretation: 0 points: Class I (very low risk - 0.4% complication rate)           Signed Electronically by: Taylor Womack, DO  Copy of this " evaluation report is provided to requesting physician.

## 2022-06-03 NOTE — PATIENT INSTRUCTIONS
Preparing for Your Surgery  Getting started  A nurse will call you to review your health history and instructions. They will give you an arrival time based on your scheduled surgery time. Please be ready to share:    Your doctor's clinic name and phone number    Your medical, surgical and anesthesia history    A list of allergies and sensitivities    A list of medicines, including herbal treatments and over-the-counter drugs    Whether the patient has a legal guardian (ask how to send us the papers in advance)  Please tell us if you're pregnant--or if there's any chance you might be pregnant. Some surgeries may injure a fetus (unborn baby), so they require a pregnancy test. Surgeries that are safe for a fetus don't always need a test, and you can choose whether to have one.   If you have a child who's having surgery, please ask for a copy of Preparing for Your Child's Surgery.    Preparing for surgery    Within 30 days of surgery: Have a pre-op exam (sometimes called an H&P, or History and Physical). This can be done at a clinic or pre-operative center.  ? If you're having a , you may not need this exam. Talk to your care team.    At your pre-op exam, talk to your care team about all medicines you take. If you need to stop any medicines before surgery, ask when to start taking them again.  ? We do this for your safety. Many medicines can make you bleed too much during surgery. Some change how well surgery (anesthesia) drugs work.    Call your insurance company to let them know you're having surgery. (If you don't have insurance, call [phone].)    Call your clinic if there's any change in your health. This includes signs of a cold or flu (sore throat, runny nose, cough, rash, fever). It also includes a scrape or scratch near the surgery site.    If you have questions on the day of surgery, call your hospital or surgery center.  COVID testing  You may need to be tested for COVID-19 before having  surgery. If so, we will give you instructions.  Eating and drinking guidelines  For your safety: Unless your surgeon tells you otherwise, follow the guidelines below.    Eat and drink as usual until 8 hours before surgery. After that, no food or milk.    Drink clear liquids until 2 hours before surgery. These are liquids you can see through, like water, Gatorade and Propel Water. You may also have black coffee and tea (no cream or milk).    Nothing by mouth within 2 hours of surgery. This includes gum, candy and breath mints.    If you drink alcohol: Stop drinking it the night before surgery.    If your care team tells you to take medicine on the morning of surgery, it's okay to take it with a sip of water.  Preventing infection    Shower or bathe the night before and morning of your surgery. Follow the instructions your clinic gave you. (If no instructions, use regular soap.)    Don't shave or clip hair near your surgery site. We'll remove the hair if needed.    Don't smoke or vape the morning of surgery. You may chew nicotine gum up to 2 hours before surgery. A nicotine patch is okay.  ? Note: Some surgeries require you to completely quit smoking and nicotine. Check with your surgeon.    Your care team will make every effort to keep you safe from infection. We will:  ? Clean our hands often with soap and water (or an alcohol-based hand rub).  ? Clean the skin at your surgery site with a special soap that kills germs.  ? Give you a special gown to keep you warm. (Cold raises the risk of infection.)  ? Wear special hair covers, masks, gowns and gloves during surgery.  ? Give antibiotic medicine, if prescribed. Not all surgeries need antibiotics.  What to bring on the day of surgery    Photo ID and insurance card    Copy of your health care directive, if you have one    Glasses and hearing aides (bring cases)  ? You can't wear contacts during surgery    Inhaler and eye drops, if you use them (tell us about these when  you arrive)    CPAP machine or breathing device, if you use them    A few personal items, if spending the night    If you have . . .  ? A pacemaker, ICD (cardiac defibrillator) or other implant: Bring the ID card.  ? An implanted stimulator: Bring the remote control.  ? A legal guardian: Bring a copy of the certified (court-stamped) guardianship papers.  Please remove any jewelry, including body piercings. Leave jewelry and other valuables at home.  If you're going home the day of surgery    You must have a responsible adult drive you home. They should stay with you overnight as well.    If you don't have someone to stay with you, and you aren't safe to go home alone, we may keep you overnight. Insurance often won't pay for this.  After surgery  If it's hard to control your pain or you need more pain medicine, please call your surgeon's office.  Questions?   If you have any questions for your care team, list them here: _________________________________________________________________________________________________________________________________________________________________________ ____________________________________ ____________________________________ ____________________________________  For informational purposes only. Not to replace the advice of your health care provider. Copyright   2003, 2019 [facility]. All rights reserved. Clinically reviewed by Shruthi Oakley MD. [employer] 082328 - REV 07/21.    Before Your Procedure or Hospital Admission  Testing for COVID-19  Thank you for choosing [facility] for your health care needs.The COVID-19 pandemic is a very challenging time for everyone.   Our goal is to keep you and our team here at [facility] safe and healthy. We've taken many steps to make this happen. For example:    We test and screen our staff, care teams and patients for COVID-19.    Everyone at [facility] must wear a mask and stay 6 feet apart.    We are limiting  "hospital and clinic visitors.  Before you come in  You must get tested for COVID-19, even if you've been vaccinated.    If you're going home on the same day as your procedure (surgery):  ? 1 to 2 days before your procedure, take an at-home, rapid antigen test. You can buy these at many pharmacy stores. Or you can order free, at-home tests at [employer]gov/tests. If you can't find an at-home, rapid antigen test, please schedule a PCR test with [employer] by calling [employer], or visiting [email or web address]/covid19. We can't accept tests that are more than 4 days old.  ? If your test is negative, please take a photo of the test. Show the photo to the nurse when you come in for your procedure.  ? If your test is positive, please see the \"If your test shows you have COVID-19\" section on this page.    If you're staying overnight in the hospital:  ? 4 days before your procedure, please get a   PCR test from a lab.  ? To schedule a PCR test with [employer], call [phone]. Or, visit   [facility]/RE2/covid19.  ? If your test is negative, please ask the testing location to fax your results to us at [phone].  ? If your test is positive, please see the \"If your test shows you have COVID-19\" section below.  After your test and before your procedure, please follow these safetyguidelines:    Limit trips outside your home.    Limit the number of people you see.    Always wear a face covering outside your home.    Use social distancing. Stay 6 feet away from others whenever you can.    Wash your hands often.  If your test shows you have COVID-19  If your test is positive, please tell your surgeon's office right away. A positive test means that you have COVID-19.  We'll probably have to postpone your admission, surgery or procedure. Your care team will discuss this with you. After that, we'll let you know what to do and when you can re-schedule.  If your test shows you DON'T have " COVID-19  Even if your test is negative, you can still get COVID-19. It's rare, but sometimes the test result is wrong. You could also catch the virus after taking the test.   There's a very small chance that you could catch COVID-19 in the hospital or surgery center. [facility] has taken many steps to prevent this from happening.   Possible surgery delay   Like you, we want your surgery to happen when it's scheduled. But sometimes the hospital is so full that it's not safe for you to have your surgery. This is especially true during the pandemic. Your surgery may need to be re-scheduled at a later date. If thishappens, we will call and tell you.   Day of your surgery or procedure    Please come wearing a face covering that covers both your nose and mouth.    When you arrive, we'll ask you some questions to find out if you've had any exposures to COVID-19, or have any signs of COVID-19.    No matter where you took a test, we'll need to have the results. Please ask your testing location to fax the results to us at [phone]. If you took a rapid antigen at-home test, you can bring a photo of the results with you to your procedure.    Ask your care team if you can have visitors. All visitors must wear face coverings and will be screened for exposure to, or signs of, COVID-19.    The rules for visitors change often, depending on how much the virus is spreading. To learn more, see Visiting a Loved One in the Hospital during the COVID-19 Outbreak.  Please call your care team, hospital or surgery center if you have any questions. We thank you for your understanding and for choosing [facility] for your care.   Questions and answers  Does it matter how I get tested for COVID-19?  Yes. If the plan is for you to go home on the same day as your procedure, you can take a rapid antigen, at-home test 1 to 2 days before you come in. If your test is negative, please take a photo of your test. Show it to the nurse  when you come to the hospital. If you can't find a rapid antigen, at-home test, you can take a PCR test at a lab instead.  If the plan is for you to stay in the hospital after your surgery, you'll need to get a PCR test from a lab 4 days before your procedure. Ask the testing location to fax your results to [phone].  If we don't get your results, we may have to delay or cancel your treatment.  Do I need to get tested at [facility]?  No. However, if you need a PCR test from a lab, we recommend using an [facility] lab. We'll get the results more quickly and easily that way. To schedule a test, please call [phone]. Or, visit [email or web address].  For informational purposes only. Not to replace the advice of your health care provider. Copyright   2020 [facility]. All rights reserved. Clinically reviewed by Infection Prevention and the [facility] COVID-19 Clinical Team. [employer] 924888 - Rev 05/26/22.    How to Take Your Medication Before Surgery  - Hold medications on morning of surgery.

## 2022-06-06 ENCOUNTER — TELEPHONE (OUTPATIENT)
Dept: NEUROSURGERY | Facility: CLINIC | Age: 32
End: 2022-06-06
Payer: COMMERCIAL

## 2022-06-06 NOTE — TELEPHONE ENCOUNTER
Left message for pt to return call to discuss discontinuing NSAIDs 7 days prior to surgery, to ensure she rec'd Hibiclens soap and to answer pre-op questions.     Chelsea Sharma RN

## 2022-06-06 NOTE — TELEPHONE ENCOUNTER
Called patient, discussed surgery, post-op course, expectations, follow up plan.    Reviewed H&P from - 06/15/2022 cleared for surgery.   Labs - WNL     MRI done on  2/9/22 - in [place]    To OR as planned. Check in - 11:00    Nothing to eat or drink after midnight the night before surgery.     Bring all pertinent films to hospital the day of surgery.     Continue to refrain from NSAIDS (Ibuprofen, Aleve, Naprosyn), ASA, Over the counter herbal medications or supplements, anti-coagulants and blood thinners.     Shower Instructions: using a washcloth and a bottle of provided Hibiclens, wash your body, avoiding your face, hair, and genitals. Preferably, shower the night before surgery and the morning of surgery using a half a bottle each time for your whole body shower.    Patient confirmed they have help/assistance in place at home upon discharge.    Patient was informed that we will provide up to 1 week prescription of pain medication for post-operative pain.     Instructed patient about the following: After your surgery, if you will be staying in-patient, a nursing provider team will be monitoring you closely throughout your stay and communicate your health status to your surgeon and other providers.  You will be seen by Advanced Practice Providers (e.g., nurse practitioners, clinic nurse specialist, and physician assistants) who will check on you regularly to assess the status of your surgery.   All of pt's questions were answered to her satisfaction. She was informed that we will call after discharge to schedule all necessary post-op follow up appointments.     Chelsea Sharma RN

## 2022-06-14 ENCOUNTER — TELEPHONE (OUTPATIENT)
Dept: NURSING | Facility: CLINIC | Age: 32
End: 2022-06-14

## 2022-06-14 ENCOUNTER — OFFICE VISIT (OUTPATIENT)
Dept: FAMILY MEDICINE | Facility: CLINIC | Age: 32
End: 2022-06-14
Payer: COMMERCIAL

## 2022-06-14 ENCOUNTER — ANESTHESIA EVENT (OUTPATIENT)
Dept: SURGERY | Facility: CLINIC | Age: 32
DRG: 518 | End: 2022-06-14
Payer: COMMERCIAL

## 2022-06-14 VITALS
HEART RATE: 92 BPM | RESPIRATION RATE: 15 BRPM | OXYGEN SATURATION: 98 % | DIASTOLIC BLOOD PRESSURE: 86 MMHG | BODY MASS INDEX: 26.15 KG/M2 | TEMPERATURE: 98.6 F | SYSTOLIC BLOOD PRESSURE: 127 MMHG | WEIGHT: 162 LBS

## 2022-06-14 DIAGNOSIS — N89.8 VAGINAL DISCHARGE: Primary | ICD-10-CM

## 2022-06-14 LAB
CLUE CELLS: ABNORMAL
TRICHOMONAS, WET PREP: ABNORMAL
WBC'S/HIGH POWER FIELD, WET PREP: ABNORMAL
YEAST, WET PREP: ABNORMAL

## 2022-06-14 PROCEDURE — 87210 SMEAR WET MOUNT SALINE/INK: CPT | Performed by: PHYSICIAN ASSISTANT

## 2022-06-14 PROCEDURE — 99213 OFFICE O/P EST LOW 20 MIN: CPT | Performed by: PHYSICIAN ASSISTANT

## 2022-06-14 NOTE — PROGRESS NOTES
Assessment & Plan:      Problem List Items Addressed This Visit    None     Visit Diagnoses     Vaginal discharge    -  Primary    Relevant Orders    Wet prep - Clinic Collect        Medical Decision Making  Patient presents with cute onset vaginal discharge and itchiness.  Patient describes symptoms as mild.  Wet prep is negative for bacterial vaginosis as well as vaginal candidiasis.  Suspect drainage could be a normal vaginal drainage secondary to recent hysterectomy in February.  Discussed signs of worsening symptoms and when to follow-up with PCP if no symptom improvement.     Subjective:      Georgie Scott is a 32 year old female here for evaluation of vaginal discharge and itchiness.  Onset of symptoms was 1 week ago.  Patient had a hysterectomy in February of this year without complication.  She denies fevers and abdominal pains.     The following portions of the patient's history were reviewed and updated as appropriate: allergies, current medications, and problem list.     Review of Systems  Pertinent items are noted in HPI.    Allergies  Allergies   Allergen Reactions     Bactrim [Sulfamethoxazole W/Trimethoprim] Hives     Amoxicillin Rash     Morphine Rash       Family History   Problem Relation Age of Onset     Hypertension Father      Hypertension Paternal Grandmother      Colon Cancer Paternal Grandfather      Diabetes Paternal Uncle      Diabetes Paternal Aunt      Cerebrovascular Disease Maternal Grandmother      Dementia Maternal Grandmother      Brain Cancer Maternal Grandfather      Diabetes Type 2  Paternal Uncle      Diabetes Type 1 Paternal Uncle      Diabetes Paternal Aunt      Diabetes Paternal Aunt        Social History     Tobacco Use     Smoking status: Never Smoker     Smokeless tobacco: Never Used   Substance Use Topics     Alcohol use: Yes     Comment: moderate        Objective:      /86   Pulse 92   Temp 98.6  F (37  C)   Resp 15   Wt 73.5 kg (162 lb)   LMP  (LMP  Unknown)   SpO2 98%   Breastfeeding No   BMI 26.15 kg/m    General appearance - alert, well appearing, and in no distress and non-toxic     Lab & Imaging Results    No results found for any visits on 06/14/22.    I personally reviewed these results and discussed findings with the patient.    The use of Dragon/[employer] dictation services was used to construct the content of this note; any grammatical errors are non-intentional. Please contact the author directly if you are in need of any clarification.

## 2022-06-14 NOTE — H&P (VIEW-ONLY)
Assessment & Plan:      Problem List Items Addressed This Visit    None     Visit Diagnoses     Vaginal discharge    -  Primary    Relevant Orders    Wet prep - Clinic Collect        Medical Decision Making  Patient presents with cute onset vaginal discharge and itchiness.  Patient describes symptoms as mild.  Wet prep is negative for bacterial vaginosis as well as vaginal candidiasis.  Suspect drainage could be a normal vaginal drainage secondary to recent hysterectomy in February.  Discussed signs of worsening symptoms and when to follow-up with PCP if no symptom improvement.     Subjective:      Georgie Scott is a 32 year old female here for evaluation of vaginal discharge and itchiness.  Onset of symptoms was 1 week ago.  Patient had a hysterectomy in February of this year without complication.  She denies fevers and abdominal pains.     The following portions of the patient's history were reviewed and updated as appropriate: allergies, current medications, and problem list.     Review of Systems  Pertinent items are noted in HPI.    Allergies  Allergies   Allergen Reactions     Bactrim [Sulfamethoxazole W/Trimethoprim] Hives     Amoxicillin Rash     Morphine Rash       Family History   Problem Relation Age of Onset     Hypertension Father      Hypertension Paternal Grandmother      Colon Cancer Paternal Grandfather      Diabetes Paternal Uncle      Diabetes Paternal Aunt      Cerebrovascular Disease Maternal Grandmother      Dementia Maternal Grandmother      Brain Cancer Maternal Grandfather      Diabetes Type 2  Paternal Uncle      Diabetes Type 1 Paternal Uncle      Diabetes Paternal Aunt      Diabetes Paternal Aunt        Social History     Tobacco Use     Smoking status: Never Smoker     Smokeless tobacco: Never Used   Substance Use Topics     Alcohol use: Yes     Comment: moderate        Objective:      /86   Pulse 92   Temp 98.6  F (37  C)   Resp 15   Wt 73.5 kg (162 lb)   LMP  (LMP  Unknown)   SpO2 98%   Breastfeeding No   BMI 26.15 kg/m    General appearance - alert, well appearing, and in no distress and non-toxic     Lab & Imaging Results    No results found for any visits on 06/14/22.    I personally reviewed these results and discussed findings with the patient.    The use of Dragon/[employer] dictation services was used to construct the content of this note; any grammatical errors are non-intentional. Please contact the author directly if you are in need of any clarification.

## 2022-06-14 NOTE — TELEPHONE ENCOUNTER
Outreach to patient to discuss COVID testing for upcoming procedure.     Spoke with: patient     Patient will complete testing outside of [facility]. No additional needs at this time. home test planned    Laura Norris LPN

## 2022-06-15 ENCOUNTER — ANESTHESIA (OUTPATIENT)
Dept: SURGERY | Facility: CLINIC | Age: 32
DRG: 518 | End: 2022-06-15
Payer: COMMERCIAL

## 2022-06-15 ENCOUNTER — APPOINTMENT (OUTPATIENT)
Dept: GENERAL RADIOLOGY | Facility: CLINIC | Age: 32
DRG: 518 | End: 2022-06-15
Attending: NEUROLOGICAL SURGERY
Payer: COMMERCIAL

## 2022-06-15 ENCOUNTER — HOSPITAL ENCOUNTER (INPATIENT)
Facility: CLINIC | Age: 32
LOS: 1 days | Discharge: HOME | DRG: 518 | End: 2022-06-16
Attending: NEUROLOGICAL SURGERY | Admitting: NEUROLOGICAL SURGERY
Payer: COMMERCIAL

## 2022-06-15 DIAGNOSIS — M50.20 HERNIATED CERVICAL DISC: Primary | ICD-10-CM

## 2022-06-15 DIAGNOSIS — M54.12 CERVICAL RADICULAR PAIN: ICD-10-CM

## 2022-06-15 LAB — SARS-COV-2 RNA RESP QL NAA+PROBE: NEGATIVE

## 2022-06-15 PROCEDURE — 250N000011 HC RX IP 250 OP 636: Performed by: REGISTERED NURSE

## 2022-06-15 PROCEDURE — C1776 JOINT DEVICE (IMPLANTABLE): HCPCS | Performed by: NEUROLOGICAL SURGERY

## 2022-06-15 PROCEDURE — 999N000179 XR SURGERY CARM FLUORO LESS THAN 5 MIN W STILLS

## 2022-06-15 PROCEDURE — 258N000003 HC RX IP 258 OP 636: Performed by: REGISTERED NURSE

## 2022-06-15 PROCEDURE — 250N000011 HC RX IP 250 OP 636: Performed by: PHYSICIAN ASSISTANT

## 2022-06-15 PROCEDURE — 370N000017 HC ANESTHESIA TECHNICAL FEE, PER MIN: Performed by: NEUROLOGICAL SURGERY

## 2022-06-15 PROCEDURE — 250N000011 HC RX IP 250 OP 636: Performed by: NEUROLOGICAL SURGERY

## 2022-06-15 PROCEDURE — 250N000009 HC RX 250: Performed by: ANESTHESIOLOGY

## 2022-06-15 PROCEDURE — 250N000011 HC RX IP 250 OP 636: Performed by: ANESTHESIOLOGY

## 2022-06-15 PROCEDURE — 272N000001 HC OR GENERAL SUPPLY STERILE: Performed by: NEUROLOGICAL SURGERY

## 2022-06-15 PROCEDURE — 120N000001 HC R&B MED SURG/OB

## 2022-06-15 PROCEDURE — 710N000009 HC RECOVERY PHASE 1, LEVEL 1, PER MIN: Performed by: NEUROLOGICAL SURGERY

## 2022-06-15 PROCEDURE — 999N000141 HC STATISTIC PRE-PROCEDURE NURSING ASSESSMENT: Performed by: NEUROLOGICAL SURGERY

## 2022-06-15 PROCEDURE — 258N000003 HC RX IP 258 OP 636: Performed by: ANESTHESIOLOGY

## 2022-06-15 PROCEDURE — 250N000009 HC RX 250: Performed by: REGISTERED NURSE

## 2022-06-15 PROCEDURE — 250N000009 HC RX 250: Performed by: NEUROLOGICAL SURGERY

## 2022-06-15 PROCEDURE — 360N000085 HC SURGERY LEVEL 5 W/ FLUORO, PER MIN: Performed by: NEUROLOGICAL SURGERY

## 2022-06-15 PROCEDURE — 250N000025 HC SEVOFLURANE, PER MIN: Performed by: NEUROLOGICAL SURGERY

## 2022-06-15 PROCEDURE — 22856 TOT DISC ARTHRP 1NTRSPC CRV: CPT | Performed by: NEUROLOGICAL SURGERY

## 2022-06-15 PROCEDURE — 22856 TOT DISC ARTHRP 1NTRSPC CRV: CPT | Mod: AS | Performed by: PHYSICIAN ASSISTANT

## 2022-06-15 PROCEDURE — 250N000013 HC RX MED GY IP 250 OP 250 PS 637: Performed by: ANESTHESIOLOGY

## 2022-06-15 PROCEDURE — 0RB30ZZ EXCISION OF CERVICAL VERTEBRAL DISC, OPEN APPROACH: ICD-10-PCS | Performed by: NEUROLOGICAL SURGERY

## 2022-06-15 PROCEDURE — U0005 INFEC AGEN DETEC AMPLI PROBE: HCPCS | Performed by: NEUROLOGICAL SURGERY

## 2022-06-15 PROCEDURE — 0RR30JZ REPLACEMENT OF CERVICAL VERTEBRAL DISC WITH SYNTHETIC SUBSTITUTE, OPEN APPROACH: ICD-10-PCS | Performed by: NEUROLOGICAL SURGERY

## 2022-06-15 PROCEDURE — 250N000013 HC RX MED GY IP 250 OP 250 PS 637: Performed by: PHYSICIAN ASSISTANT

## 2022-06-15 DEVICE — PRESTIGE LP DISC 6X14MM
Type: IMPLANTABLE DEVICE | Site: SPINE CERVICAL | Status: FUNCTIONAL
Brand: PRESTIGE® LP CERVICAL DISC SYSTEM

## 2022-06-15 RX ORDER — CEFAZOLIN SODIUM/WATER 2 G/20 ML
2 SYRINGE (ML) INTRAVENOUS
Status: COMPLETED | OUTPATIENT
Start: 2022-06-15 | End: 2022-06-15

## 2022-06-15 RX ORDER — LIDOCAINE HYDROCHLORIDE 20 MG/ML
INJECTION, SOLUTION INFILTRATION; PERINEURAL PRN
Status: DISCONTINUED | OUTPATIENT
Start: 2022-06-15 | End: 2022-06-15

## 2022-06-15 RX ORDER — ACETAMINOPHEN 325 MG/1
975 TABLET ORAL ONCE
Status: COMPLETED | OUTPATIENT
Start: 2022-06-15 | End: 2022-06-15

## 2022-06-15 RX ORDER — CLINDAMYCIN PHOSPHATE 900 MG/50ML
900 INJECTION, SOLUTION INTRAVENOUS EVERY 8 HOURS
Status: CANCELLED | OUTPATIENT
Start: 2022-06-15 | End: 2022-06-16

## 2022-06-15 RX ORDER — ONDANSETRON 2 MG/ML
INJECTION INTRAMUSCULAR; INTRAVENOUS PRN
Status: DISCONTINUED | OUTPATIENT
Start: 2022-06-15 | End: 2022-06-15

## 2022-06-15 RX ORDER — LIDOCAINE 40 MG/G
CREAM TOPICAL
Status: DISCONTINUED | OUTPATIENT
Start: 2022-06-15 | End: 2022-06-16 | Stop reason: HOSPADM

## 2022-06-15 RX ORDER — DEXAMETHASONE SODIUM PHOSPHATE 4 MG/ML
INJECTION, SOLUTION INTRA-ARTICULAR; INTRALESIONAL; INTRAMUSCULAR; INTRAVENOUS; SOFT TISSUE PRN
Status: DISCONTINUED | OUTPATIENT
Start: 2022-06-15 | End: 2022-06-15

## 2022-06-15 RX ORDER — DIAZEPAM 5 MG
5 TABLET ORAL EVERY 6 HOURS PRN
Status: DISCONTINUED | OUTPATIENT
Start: 2022-06-15 | End: 2022-06-16

## 2022-06-15 RX ORDER — NALOXONE HYDROCHLORIDE 0.4 MG/ML
0.2 INJECTION, SOLUTION INTRAMUSCULAR; INTRAVENOUS; SUBCUTANEOUS
Status: DISCONTINUED | OUTPATIENT
Start: 2022-06-15 | End: 2022-06-16 | Stop reason: HOSPADM

## 2022-06-15 RX ORDER — AMOXICILLIN 250 MG
1 CAPSULE ORAL 2 TIMES DAILY
Status: DISCONTINUED | OUTPATIENT
Start: 2022-06-15 | End: 2022-06-16 | Stop reason: HOSPADM

## 2022-06-15 RX ORDER — CEFAZOLIN SODIUM/WATER 2 G/20 ML
2 SYRINGE (ML) INTRAVENOUS SEE ADMIN INSTRUCTIONS
Status: DISCONTINUED | OUTPATIENT
Start: 2022-06-15 | End: 2022-06-15

## 2022-06-15 RX ORDER — GLYCOPYRROLATE 0.2 MG/ML
INJECTION, SOLUTION INTRAMUSCULAR; INTRAVENOUS PRN
Status: DISCONTINUED | OUTPATIENT
Start: 2022-06-15 | End: 2022-06-15

## 2022-06-15 RX ORDER — ACETAMINOPHEN 325 MG/1
975 TABLET ORAL EVERY 8 HOURS
Status: DISCONTINUED | OUTPATIENT
Start: 2022-06-15 | End: 2022-06-16 | Stop reason: HOSPADM

## 2022-06-15 RX ORDER — PROPOFOL 10 MG/ML
INJECTION, EMULSION INTRAVENOUS PRN
Status: DISCONTINUED | OUTPATIENT
Start: 2022-06-15 | End: 2022-06-15

## 2022-06-15 RX ORDER — OXYCODONE HYDROCHLORIDE 5 MG/1
10 TABLET ORAL EVERY 4 HOURS PRN
Status: DISCONTINUED | OUTPATIENT
Start: 2022-06-15 | End: 2022-06-16 | Stop reason: HOSPADM

## 2022-06-15 RX ORDER — HYDROMORPHONE HCL IN WATER/PF 6 MG/30 ML
0.4 PATIENT CONTROLLED ANALGESIA SYRINGE INTRAVENOUS
Status: DISCONTINUED | OUTPATIENT
Start: 2022-06-15 | End: 2022-06-16 | Stop reason: HOSPADM

## 2022-06-15 RX ORDER — ACETAMINOPHEN 325 MG/1
650 TABLET ORAL EVERY 4 HOURS PRN
Status: DISCONTINUED | OUTPATIENT
Start: 2022-06-18 | End: 2022-06-16 | Stop reason: HOSPADM

## 2022-06-15 RX ORDER — NALOXONE HYDROCHLORIDE 0.4 MG/ML
0.4 INJECTION, SOLUTION INTRAMUSCULAR; INTRAVENOUS; SUBCUTANEOUS
Status: DISCONTINUED | OUTPATIENT
Start: 2022-06-15 | End: 2022-06-16 | Stop reason: HOSPADM

## 2022-06-15 RX ORDER — KETAMINE HYDROCHLORIDE 10 MG/ML
INJECTION INTRAMUSCULAR; INTRAVENOUS PRN
Status: DISCONTINUED | OUTPATIENT
Start: 2022-06-15 | End: 2022-06-15

## 2022-06-15 RX ORDER — SODIUM CHLORIDE AND POTASSIUM CHLORIDE 150; 900 MG/100ML; MG/100ML
INJECTION, SOLUTION INTRAVENOUS CONTINUOUS
Status: DISCONTINUED | OUTPATIENT
Start: 2022-06-15 | End: 2022-06-16 | Stop reason: HOSPADM

## 2022-06-15 RX ORDER — BISACODYL 10 MG
10 SUPPOSITORY, RECTAL RECTAL DAILY PRN
Status: DISCONTINUED | OUTPATIENT
Start: 2022-06-15 | End: 2022-06-16 | Stop reason: HOSPADM

## 2022-06-15 RX ORDER — NEOSTIGMINE METHYLSULFATE 1 MG/ML
VIAL (ML) INJECTION PRN
Status: DISCONTINUED | OUTPATIENT
Start: 2022-06-15 | End: 2022-06-15

## 2022-06-15 RX ORDER — LABETALOL HYDROCHLORIDE 5 MG/ML
10-40 INJECTION, SOLUTION INTRAVENOUS EVERY 10 MIN PRN
Status: DISCONTINUED | OUTPATIENT
Start: 2022-06-15 | End: 2022-06-16 | Stop reason: HOSPADM

## 2022-06-15 RX ORDER — ONDANSETRON 4 MG/1
4 TABLET, ORALLY DISINTEGRATING ORAL EVERY 30 MIN PRN
Status: DISCONTINUED | OUTPATIENT
Start: 2022-06-15 | End: 2022-06-15 | Stop reason: HOSPADM

## 2022-06-15 RX ORDER — SODIUM CHLORIDE, SODIUM LACTATE, POTASSIUM CHLORIDE, CALCIUM CHLORIDE 600; 310; 30; 20 MG/100ML; MG/100ML; MG/100ML; MG/100ML
INJECTION, SOLUTION INTRAVENOUS CONTINUOUS
Status: DISCONTINUED | OUTPATIENT
Start: 2022-06-15 | End: 2022-06-16

## 2022-06-15 RX ORDER — HYDRALAZINE HYDROCHLORIDE 20 MG/ML
10-20 INJECTION INTRAMUSCULAR; INTRAVENOUS EVERY 30 MIN PRN
Status: DISCONTINUED | OUTPATIENT
Start: 2022-06-15 | End: 2022-06-16 | Stop reason: HOSPADM

## 2022-06-15 RX ORDER — SODIUM CHLORIDE, SODIUM LACTATE, POTASSIUM CHLORIDE, CALCIUM CHLORIDE 600; 310; 30; 20 MG/100ML; MG/100ML; MG/100ML; MG/100ML
INJECTION, SOLUTION INTRAVENOUS CONTINUOUS
Status: DISCONTINUED | OUTPATIENT
Start: 2022-06-15 | End: 2022-06-15 | Stop reason: HOSPADM

## 2022-06-15 RX ORDER — PROCHLORPERAZINE MALEATE 10 MG
10 TABLET ORAL EVERY 6 HOURS PRN
Status: DISCONTINUED | OUTPATIENT
Start: 2022-06-15 | End: 2022-06-16 | Stop reason: HOSPADM

## 2022-06-15 RX ORDER — HYDROMORPHONE HCL IN WATER/PF 6 MG/30 ML
0.2 PATIENT CONTROLLED ANALGESIA SYRINGE INTRAVENOUS EVERY 5 MIN PRN
Status: DISCONTINUED | OUTPATIENT
Start: 2022-06-15 | End: 2022-06-15 | Stop reason: HOSPADM

## 2022-06-15 RX ORDER — ONDANSETRON 2 MG/ML
4 INJECTION INTRAMUSCULAR; INTRAVENOUS EVERY 6 HOURS PRN
Status: DISCONTINUED | OUTPATIENT
Start: 2022-06-15 | End: 2022-06-16 | Stop reason: HOSPADM

## 2022-06-15 RX ORDER — OXYCODONE HYDROCHLORIDE 5 MG/1
5 TABLET ORAL EVERY 4 HOURS PRN
Status: DISCONTINUED | OUTPATIENT
Start: 2022-06-15 | End: 2022-06-15 | Stop reason: HOSPADM

## 2022-06-15 RX ORDER — FENTANYL CITRATE 50 UG/ML
25 INJECTION, SOLUTION INTRAMUSCULAR; INTRAVENOUS EVERY 5 MIN PRN
Status: DISCONTINUED | OUTPATIENT
Start: 2022-06-15 | End: 2022-06-15 | Stop reason: HOSPADM

## 2022-06-15 RX ORDER — SODIUM CHLORIDE, SODIUM LACTATE, POTASSIUM CHLORIDE, CALCIUM CHLORIDE 600; 310; 30; 20 MG/100ML; MG/100ML; MG/100ML; MG/100ML
INJECTION, SOLUTION INTRAVENOUS CONTINUOUS PRN
Status: DISCONTINUED | OUTPATIENT
Start: 2022-06-15 | End: 2022-06-15

## 2022-06-15 RX ORDER — FENTANYL CITRATE 50 UG/ML
INJECTION, SOLUTION INTRAMUSCULAR; INTRAVENOUS PRN
Status: DISCONTINUED | OUTPATIENT
Start: 2022-06-15 | End: 2022-06-15

## 2022-06-15 RX ORDER — HYDROMORPHONE HCL IN WATER/PF 6 MG/30 ML
0.2 PATIENT CONTROLLED ANALGESIA SYRINGE INTRAVENOUS
Status: DISCONTINUED | OUTPATIENT
Start: 2022-06-15 | End: 2022-06-16 | Stop reason: HOSPADM

## 2022-06-15 RX ORDER — ONDANSETRON 2 MG/ML
4 INJECTION INTRAMUSCULAR; INTRAVENOUS EVERY 30 MIN PRN
Status: DISCONTINUED | OUTPATIENT
Start: 2022-06-15 | End: 2022-06-15 | Stop reason: HOSPADM

## 2022-06-15 RX ORDER — POLYETHYLENE GLYCOL 3350 17 G/17G
17 POWDER, FOR SOLUTION ORAL DAILY
Status: DISCONTINUED | OUTPATIENT
Start: 2022-06-16 | End: 2022-06-16 | Stop reason: HOSPADM

## 2022-06-15 RX ORDER — OXYCODONE HYDROCHLORIDE 5 MG/1
5 TABLET ORAL EVERY 4 HOURS PRN
Status: DISCONTINUED | OUTPATIENT
Start: 2022-06-15 | End: 2022-06-16 | Stop reason: HOSPADM

## 2022-06-15 RX ORDER — ONDANSETRON 4 MG/1
4 TABLET, ORALLY DISINTEGRATING ORAL EVERY 6 HOURS PRN
Status: DISCONTINUED | OUTPATIENT
Start: 2022-06-15 | End: 2022-06-16 | Stop reason: HOSPADM

## 2022-06-15 RX ORDER — DEXAMETHASONE SODIUM PHOSPHATE 10 MG/ML
4 INJECTION, SOLUTION INTRAMUSCULAR; INTRAVENOUS ONCE
Status: DISCONTINUED | OUTPATIENT
Start: 2022-06-15 | End: 2022-06-16 | Stop reason: HOSPADM

## 2022-06-15 RX ADMIN — ONDANSETRON 4 MG: 2 INJECTION INTRAMUSCULAR; INTRAVENOUS at 15:07

## 2022-06-15 RX ADMIN — ROCURONIUM BROMIDE 50 MG: 50 INJECTION, SOLUTION INTRAVENOUS at 14:57

## 2022-06-15 RX ADMIN — Medication 30 MG: at 15:02

## 2022-06-15 RX ADMIN — Medication 2 G: at 14:53

## 2022-06-15 RX ADMIN — PHENYLEPHRINE HYDROCHLORIDE 0.3 MCG/KG/MIN: 10 INJECTION INTRAVENOUS at 15:49

## 2022-06-15 RX ADMIN — MIDAZOLAM 2 MG: 1 INJECTION INTRAMUSCULAR; INTRAVENOUS at 14:53

## 2022-06-15 RX ADMIN — HYDROMORPHONE HYDROCHLORIDE 0.2 MG: 0.2 INJECTION, SOLUTION INTRAMUSCULAR; INTRAVENOUS; SUBCUTANEOUS at 23:18

## 2022-06-15 RX ADMIN — SODIUM CHLORIDE, POTASSIUM CHLORIDE, SODIUM LACTATE AND CALCIUM CHLORIDE: 600; 310; 30; 20 INJECTION, SOLUTION INTRAVENOUS at 12:12

## 2022-06-15 RX ADMIN — ROCURONIUM BROMIDE 20 MG: 50 INJECTION, SOLUTION INTRAVENOUS at 15:33

## 2022-06-15 RX ADMIN — ROCURONIUM BROMIDE 10 MG: 50 INJECTION, SOLUTION INTRAVENOUS at 16:19

## 2022-06-15 RX ADMIN — POTASSIUM CHLORIDE AND SODIUM CHLORIDE: 900; 150 INJECTION, SOLUTION INTRAVENOUS at 20:51

## 2022-06-15 RX ADMIN — ROCURONIUM BROMIDE 20 MG: 50 INJECTION, SOLUTION INTRAVENOUS at 15:58

## 2022-06-15 RX ADMIN — DEXMEDETOMIDINE HYDROCHLORIDE 0.4 MCG/KG/HR: 100 INJECTION, SOLUTION INTRAVENOUS at 14:57

## 2022-06-15 RX ADMIN — HYDROMORPHONE HYDROCHLORIDE 0.2 MG: 0.2 INJECTION, SOLUTION INTRAMUSCULAR; INTRAVENOUS; SUBCUTANEOUS at 18:32

## 2022-06-15 RX ADMIN — LIDOCAINE HYDROCHLORIDE 100 MG: 20 INJECTION, SOLUTION INFILTRATION; PERINEURAL at 14:57

## 2022-06-15 RX ADMIN — SODIUM CHLORIDE, POTASSIUM CHLORIDE, SODIUM LACTATE AND CALCIUM CHLORIDE: 600; 310; 30; 20 INJECTION, SOLUTION INTRAVENOUS at 14:53

## 2022-06-15 RX ADMIN — GLYCOPYRROLATE 0.8 MG: 0.2 INJECTION, SOLUTION INTRAMUSCULAR; INTRAVENOUS at 17:09

## 2022-06-15 RX ADMIN — NEOSTIGMINE METHYLSULFATE 4 MG: 1 INJECTION, SOLUTION INTRAVENOUS at 17:09

## 2022-06-15 RX ADMIN — PROPOFOL 200 MG: 10 INJECTION, EMULSION INTRAVENOUS at 14:57

## 2022-06-15 RX ADMIN — PHENYLEPHRINE HYDROCHLORIDE 50 MCG: 10 INJECTION INTRAVENOUS at 17:05

## 2022-06-15 RX ADMIN — FENTANYL CITRATE 25 MCG: 50 INJECTION, SOLUTION INTRAMUSCULAR; INTRAVENOUS at 18:04

## 2022-06-15 RX ADMIN — PHENYLEPHRINE HYDROCHLORIDE 100 MCG: 10 INJECTION INTRAVENOUS at 15:52

## 2022-06-15 RX ADMIN — DIAZEPAM 5 MG: 5 TABLET ORAL at 23:52

## 2022-06-15 RX ADMIN — FENTANYL CITRATE 25 MCG: 50 INJECTION, SOLUTION INTRAMUSCULAR; INTRAVENOUS at 18:09

## 2022-06-15 RX ADMIN — DEXAMETHASONE SODIUM PHOSPHATE 8 MG: 4 INJECTION, SOLUTION INTRA-ARTICULAR; INTRALESIONAL; INTRAMUSCULAR; INTRAVENOUS; SOFT TISSUE at 14:57

## 2022-06-15 RX ADMIN — HYDROMORPHONE HYDROCHLORIDE 0.5 MG: 1 INJECTION, SOLUTION INTRAMUSCULAR; INTRAVENOUS; SUBCUTANEOUS at 15:36

## 2022-06-15 RX ADMIN — ONDANSETRON 4 MG: 2 INJECTION INTRAMUSCULAR; INTRAVENOUS at 20:58

## 2022-06-15 RX ADMIN — ACETAMINOPHEN 975 MG: 325 TABLET ORAL at 11:29

## 2022-06-15 RX ADMIN — FENTANYL CITRATE 25 MCG: 50 INJECTION, SOLUTION INTRAMUSCULAR; INTRAVENOUS at 14:57

## 2022-06-15 RX ADMIN — ROCURONIUM BROMIDE 20 MG: 50 INJECTION, SOLUTION INTRAVENOUS at 16:46

## 2022-06-15 ASSESSMENT — COPD QUESTIONNAIRES: COPD: 0

## 2022-06-15 ASSESSMENT — ACTIVITIES OF DAILY LIVING (ADL)
ADLS_ACUITY_SCORE: 35
ADLS_ACUITY_SCORE: 35

## 2022-06-15 ASSESSMENT — LIFESTYLE VARIABLES: TOBACCO_USE: 0

## 2022-06-15 NOTE — ANESTHESIA PREPROCEDURE EVALUATION
Anesthesia Pre-Procedure Evaluation    Patient: Georgie Scott   MRN: [id number] : 1990        Procedure : Procedure(s):  Anterior cervical discectomy at cervical 5-6; placement of artificial disc for treatment of right arm symptoms; right incision          Past Medical History:   Diagnosis Date     ADHD      Cervical high risk HPV (human papillomavirus) test positive 2021    10/1/14 NIL 18 NIL 3/31/21 NIL pap, +HR HPV (not 16/18). Plan: cotest in 1 year     Dyspepsia      Excessive bleeding in premenopausal period      Heartburn      Medical history non-contributory 2019     Persistent insomnia       Past Surgical History:   Procedure Laterality Date     COLONOSCOPY N/A 2021    Procedure: COLONOSCOPY;  Surgeon: Billy Thurston DO;  Location:  GI     LAPAROSCOPIC HYSTERECTOMY TOTAL Bilateral 2022    Procedure: TOTAL LAPAROSCOPIC HYSTERECTOMY BILATERAL SALPINGECTOMY;  Surgeon: Tiesha Hurley MD;  [place] [facility] [place]     OTHER SURGICAL HISTORY  2019    No surgery history      Allergies   Allergen Reactions     Bactrim [Sulfamethoxazole W/Trimethoprim] Hives     Amoxicillin Rash     Morphine Rash      Social History     Tobacco Use     Smoking status: Never Smoker     Smokeless tobacco: Never Used   Substance Use Topics     Alcohol use: Yes     Comment: moderate      Wt Readings from Last 1 Encounters:   22 73.5 kg (162 lb)        Anesthesia Evaluation   Pt has had prior anesthetic.     No history of anesthetic complications       ROS/MED HX  ENT/Pulmonary:    (-) tobacco use, asthma and COPD   Neurologic: Comment: R arm radiculopathy      Cardiovascular:  - neg cardiovascular ROS     METS/Exercise Tolerance:     Hematologic:       Musculoskeletal:   (+) arthritis,     GI/Hepatic:     (+) GERD, Asymptomatic on medication,     Renal/Genitourinary: Comment: Menorrhagia. S/P hysterectomy.       Endo:       Psychiatric/Substance Use: Comment: ADHD      Infectious  Disease:       Malignancy:       Other:            Physical Exam    Airway        Mallampati: II   TM distance: > 3 FB   Neck ROM: full   Mouth opening: > 3 cm    Respiratory Devices and Support         Dental  no notable dental history         Cardiovascular   cardiovascular exam normal       Rhythm and rate: regular     Pulmonary   pulmonary exam normal        breath sounds clear to auscultation           OUTSIDE LABS:  CBC:   Lab Results   Component Value Date    WBC 4.8 06/03/2022    WBC 4.4 02/11/2022    HGB 14.0 06/03/2022    HGB 13.2 02/11/2022    HCT 40.4 06/03/2022    HCT 38.3 02/11/2022     06/03/2022     02/11/2022     BMP:   Lab Results   Component Value Date     01/07/2018    POTASSIUM 3.4 (L) 01/07/2018    CHLORIDE 105 01/07/2018    CO2 20 (L) 01/07/2018    BUN 6 (L) 01/07/2018    CR 0.76 01/07/2018    GLC 81 03/31/2021     01/07/2018     COAGS: No results found for: PTT, INR, FIBR  POC:   Lab Results   Component Value Date    HCG Negative 02/11/2022     HEPATIC:   Lab Results   Component Value Date    ALBUMIN 4.0 01/07/2018    PROTTOTAL 7.6 01/07/2018    ALT 13 01/07/2018    AST 17 01/07/2018    ALKPHOS 51 01/07/2018    BILITOTAL 2.9 (H) 01/07/2018     OTHER:   Lab Results   Component Value Date    BELA 9.6 01/07/2018    LIPASE 11 01/07/2018    TSH 1.26 07/09/2021       Anesthesia Plan    ASA Status:  2   NPO Status:  NPO Appropriate    Anesthesia Type: General.     - Airway: ETT   Induction: Intravenous.   Maintenance: Balanced.   Techniques and Equipment:     - Airway: Video-Laryngoscope         Consents    Anesthesia Plan(s) and associated risks, benefits, and realistic alternatives discussed. Questions answered and patient/representative(s) expressed understanding.    - Discussed:     - Discussed with:  Patient      - Extended Intubation/Ventilatory Support Discussed: No.      - Patient is DNR/DNI Status: No    Use of blood products discussed: Yes.     - Discussed with:  Patient.     - Consented: consented to blood products            Reason for refusal: other.     Postoperative Care    Pain management: Multi-modal analgesia.   PONV prophylaxis: Ondansetron (or other 5HT-3), Dexamethasone or Solumedrol     Comments:    Other Comments: Patient is counseled on the anesthesia plan and relevant anesthesia procedures including all risks and benefits. All patient questions were answered.       Multi Model Analgesia:  -Tylenol 1000 mg po.  -Precedex gtt at 0.2-0.5 mcg/kg/hour  -Ketamine 30 mg prior to incision.  -Decadron 8mg IV after induction.   -Dilaudid titrated prn.             Myles Felipe MD

## 2022-06-15 NOTE — OP NOTE
Name of procedure: Anterior cervical discectomy at C5-6 with bilateral foraminotomies; cervical disc arthroplasty at C5-6 using GeoIQ LP device; use the operating microscope    Preoperative diagnosis: Intractable right arm pain believed secondary to chronic disc herniation at right C5-6    Postoperative diagnosis: Same    Surgeon: Sam Oseguera MD    First assistant: Emerald Marrufo PA-C    Material for the laboratory for examination: None    EBL: 10 cc    Description of the procedure: Patient was brought the operating room where she is placed under suitable general endotracheal anesthesia and subsequently positioned in the supine position with her neck slightly extended and her head turned slightly toward the left side.  Right side of her neck was sterilely prepped and draped in the usual fashion and a short approximate 4 cm incision was made using a #10 blade and a skin fold at the level chosen with the use of lateral fluoroscopic guidance.  Dissection through the platysma layer and along the anterior border the sternocleidomastoid muscle, through the middle cervical fascia and directly onto the ventral surface of the cervical vertebra of C5 and C6 followed.  Medial edges longus coli muscle were coagulated and elevated and 45 mm  retractor was placed.  The C5-6 level was marked and verified with lateral fluoroscopy.  Sunday retractors were then placed into C5 and C6 without difficulty and distraction was initiated across the disc space.  The operating microscope was brought into use and used throughout the remainder of the procedure for visualization, illumination and microsurgical technique.  This space was entered and exonerated.  At the posterior margin a chronic appearing disc herniation eccentric toward the right side was discovered.  This was removed exposing a large and quite prominent osteophyte extending into the proximal foramen.  This was removed with a variety of curettes and rongeurs.   Posterior longitudinal ligament was opened from foramen to foramen and the left-sided foraminotomies were also performed.  At the conclusion of this portion of the procedure the exiting C6 nerve roots appear to be well decompressed and there is no residual impingement of the ventral thecal sac.  The Chazin cortical endplates were then slightly decorticated and the site was prepared for a Prestige LP device.    This was sized to a 6 x 14 mm device to prevent undue distraction at the disc base.  Using standard technique channels were cut and subsequently device was gently tamped into place.  Final placement was verified with lateral fluoroscopy and appeared satisfactory.  Retropharyngeal space were then copiously irrigated with Ancef containing irrigation and meticulous hemostasis was achieved with bipolar cautery.  Retractors were removed and there was no evidence of injury to medial or lateral structures.  Small amount of thrombin was left in the retropharyngeal space to assist with postoperative hemostasis and incision was closed with interrupted inverted 3-0 Vicryl suture through the platysma and subcutaneous tissue.  Skin was closed with a running undyed 4-0 Vicryl in a subcuticular stitch and a sterile dressing was applied.  Patient was then awakened, extubated and transported to recovery room in stable condition.    First Assistant Ms. Emerald Marrufo was present throughout the course of the of the surgery.  Her presence of assistance was needed for retraction of vital structures including esophagus trachea and great vessels as well as assistance with placement of the Prestige LP

## 2022-06-15 NOTE — OP NOTE
Name of procedure: Minimally invasive lumbar decompression L3-4 and L4-5; use the operating microscope    Preoperative diagnosis: High-grade lumbar spinal stenosis L3-4 and L4-5    Postoperative diagnosis: Same    Surgeon: Sam Ferreira MD    First Assistant: None    Material for laboratory for examination: None    EBL: 30 cc    Description of the procedure: Patient was brought to the operating room where she is placed under suitable general endotracheal anesthesia and subsequently position in the prone position on lumbar rolls.  Her lower back was sterilely prepped and draped in usual fashion and a left paramedian incision 2 cm in length was made centered at the L4 spinous process as determined with lateral fluoroscopy.  Standard Metrix technique was used to place a 1.8 x 5 cm Metrix retractor to with the left L3 hemilamina.  Tube was secured to the table using articulated mechanical arm and final placement was verified with lateral fluoroscopy.  The operating microscope was brought into use and used throughout the remainder of the procedure for visualization, illumination and microsurgical technique.  Midas Loyd drill was used to perform a near complete L3 hemilaminectomy and ligamentum flavum underlying was fenestrated and removed.  This was found to be extremely thickened with marked focal spinal stenosis at and slightly below the level of the disc space.  Good decompression was performed from the lower portion of L3 to the mid body of L4 and out to the fullest extent of the lateral recess.  Metrix retractor to present immediately in the undersurface the spinous process and contralateral facet were undercut using standard MIS technique to accomplish contralateral decompression over the same range.  No difficulty with the dura was encountered and neural elements appear to be well decompressed at the conclusion of this portion of procedure.  Hemostasis was achieved with bipolar cautery and a small amount of  Surgiflo.  Metrix retractor tube was then withdrawn and replaced through the same incision at the left L4 hemilamina where essentially the same technical procedure was performed.  Once again high-grade spinal stenosis was present bilaterally primarily on the basis of facet facet and ligamentous hypertrophy.  Good bilateral decompression was accomplished.  Small rent in the dura appeared over the lateral aspect of the right descending L5 nerve root.  However, the arachnoid remained intact and there is no spinal fluid leakage.  This was easily reduced in the lateral recess with a very small pledget of Gelfoam.  At the conclusion of the procedure there is no evidence of residual neurologic impingement from mid by L3 to mid body of L5.  Surgiflo was again used at the L4-5 level and the Metrix retractor tube was withdrawn.  Wound was closed in layers using interrupted inverted 3-0 Vicryl suture with a running undyed 4-0 Vicryl in a subcuticular stitch for skin.  Sterile dressing was applied and patient was then returned to supine position where she was awakened extubated and transported to recovery room in stable condition.

## 2022-06-15 NOTE — ANESTHESIA CARE TRANSFER NOTE
Patient: Georgie Scott    Procedure: Procedure(s):  Anterior cervical discectomy at cervical 5-6; placement of artificial disc for treatment of right arm symptoms; right incision       Diagnosis: Cervical radicular pain [M54.12]  Diagnosis Additional Information: No value filed.    Anesthesia Type:   General     Note:    Oropharynx: oropharynx clear of all foreign objects  Level of Consciousness: awake  Oxygen Supplementation: face mask    Independent Airway: airway patency satisfactory and stable  Dentition: dentition unchanged  Vital Signs Stable: post-procedure vital signs reviewed and stable  Report to RN Given: handoff report given  Patient transferred to: PACU    Handoff Report: Identifed the Patient, Identified the Reponsible Provider, Reviewed the pertinent medical history, Discussed the surgical course, Reviewed Intra-OP anesthesia mangement and issues during anesthesia, Set expectations for post-procedure period and Allowed opportunity for questions and acknowledgement of understanding        Electronically Signed By: ARI Beckwith CRNA  Abbey 15, 2022  5:22 PM

## 2022-06-15 NOTE — ANESTHESIA PROCEDURE NOTES
Airway       Patient location during procedure: OR       Procedure Start/Stop Times: 6/15/2022 3:00 PM  Staff -        Anesthesiologist:  Myles Felipe MD       CRNA: Tyrone Mcnally APRN CRNA       Other Anesthesia Staff: Ravinder Chaparro       Performed By: SRNA  Consent for Airway        Urgency: elective  Indications and Patient Condition       Indications for airway management: jv-procedural       Induction type:intravenous       Mask difficulty assessment: 1 - vent by mask    Final Airway Details       Final airway type: endotracheal airway       Successful airway: ETT - single  Endotracheal Airway Details        ETT size (mm): 7.0       Cuffed: yes       Successful intubation technique: video laryngoscopy       VL Blade Size: Encarnacion 3       Grade View of Cords: 1       Adjucts: stylet       Position: Left       Measured from: gums/teeth       Secured at (cm): 21       Bite block used: None    Post intubation assessment        Placement verified by: capnometry, equal breath sounds and chest rise        Number of attempts at approach: 1       Number of other approaches attempted: 0       Secured with: silk tape       Ease of procedure: easy       Dentition: Intact and Unchanged    Medication(s) Administered   Medication Administration Time: 6/15/2022 3:00 PM

## 2022-06-16 ENCOUNTER — APPOINTMENT (OUTPATIENT)
Dept: PHYSICAL THERAPY | Facility: CLINIC | Age: 32
DRG: 518 | End: 2022-06-16
Attending: PHYSICIAN ASSISTANT
Payer: COMMERCIAL

## 2022-06-16 VITALS
HEIGHT: 67 IN | SYSTOLIC BLOOD PRESSURE: 114 MMHG | HEART RATE: 64 BPM | WEIGHT: 162 LBS | DIASTOLIC BLOOD PRESSURE: 56 MMHG | BODY MASS INDEX: 25.43 KG/M2 | OXYGEN SATURATION: 97 % | TEMPERATURE: 98.4 F | RESPIRATION RATE: 18 BRPM

## 2022-06-16 LAB — GLUCOSE BLD-MCNC: 90 MG/DL (ref 70–99)

## 2022-06-16 PROCEDURE — 97530 THERAPEUTIC ACTIVITIES: CPT | Mod: GP | Performed by: PHYSICAL THERAPIST

## 2022-06-16 PROCEDURE — 97161 PT EVAL LOW COMPLEX 20 MIN: CPT | Mod: GP | Performed by: PHYSICAL THERAPIST

## 2022-06-16 PROCEDURE — 36415 COLL VENOUS BLD VENIPUNCTURE: CPT | Performed by: NEUROLOGICAL SURGERY

## 2022-06-16 PROCEDURE — 250N000013 HC RX MED GY IP 250 OP 250 PS 637: Performed by: PHYSICIAN ASSISTANT

## 2022-06-16 PROCEDURE — 97116 GAIT TRAINING THERAPY: CPT | Mod: GP | Performed by: PHYSICAL THERAPIST

## 2022-06-16 PROCEDURE — 250N000011 HC RX IP 250 OP 636: Performed by: PHYSICIAN ASSISTANT

## 2022-06-16 PROCEDURE — 82947 ASSAY GLUCOSE BLOOD QUANT: CPT | Performed by: NEUROLOGICAL SURGERY

## 2022-06-16 RX ORDER — AMOXICILLIN 250 MG
1 CAPSULE ORAL 2 TIMES DAILY PRN
Qty: 40 TABLET | Refills: 0 | Status: SHIPPED | OUTPATIENT
Start: 2022-06-16 | End: 2022-06-27

## 2022-06-16 RX ORDER — DIAZEPAM 5 MG
5 TABLET ORAL EVERY 6 HOURS
Status: DISCONTINUED | OUTPATIENT
Start: 2022-06-16 | End: 2022-06-16

## 2022-06-16 RX ORDER — METHOCARBAMOL 500 MG/1
500 TABLET, FILM COATED ORAL 4 TIMES DAILY
Status: DISCONTINUED | OUTPATIENT
Start: 2022-06-16 | End: 2022-06-16 | Stop reason: HOSPADM

## 2022-06-16 RX ORDER — OXYCODONE HYDROCHLORIDE 5 MG/1
5 TABLET ORAL EVERY 4 HOURS PRN
Qty: 40 TABLET | Refills: 0 | Status: SHIPPED | OUTPATIENT
Start: 2022-06-16 | End: 2022-07-25

## 2022-06-16 RX ORDER — METHOCARBAMOL 500 MG/1
500 TABLET, FILM COATED ORAL 4 TIMES DAILY PRN
Qty: 40 TABLET | Refills: 0 | Status: SHIPPED | OUTPATIENT
Start: 2022-06-16 | End: 2022-12-02

## 2022-06-16 RX ADMIN — OXYCODONE HYDROCHLORIDE 5 MG: 5 TABLET ORAL at 08:25

## 2022-06-16 RX ADMIN — ACETAMINOPHEN 975 MG: 325 TABLET ORAL at 04:32

## 2022-06-16 RX ADMIN — OXYCODONE HYDROCHLORIDE 5 MG: 5 TABLET ORAL at 04:36

## 2022-06-16 RX ADMIN — POTASSIUM CHLORIDE AND SODIUM CHLORIDE: 900; 150 INJECTION, SOLUTION INTRAVENOUS at 10:21

## 2022-06-16 RX ADMIN — DIAZEPAM 5 MG: 5 TABLET ORAL at 10:21

## 2022-06-16 RX ADMIN — SENNOSIDES AND DOCUSATE SODIUM 1 TABLET: 50; 8.6 TABLET ORAL at 08:23

## 2022-06-16 RX ADMIN — METHOCARBAMOL 500 MG: 500 TABLET ORAL at 13:42

## 2022-06-16 RX ADMIN — ACETAMINOPHEN 975 MG: 325 TABLET ORAL at 12:38

## 2022-06-16 RX ADMIN — OXYCODONE HYDROCHLORIDE 10 MG: 5 TABLET ORAL at 12:38

## 2022-06-16 RX ADMIN — HYDROMORPHONE HYDROCHLORIDE 0.2 MG: 0.2 INJECTION, SOLUTION INTRAMUSCULAR; INTRAVENOUS; SUBCUTANEOUS at 02:42

## 2022-06-16 RX ADMIN — POLYETHYLENE GLYCOL 3350 17 G: 17 POWDER, FOR SOLUTION ORAL at 08:23

## 2022-06-16 ASSESSMENT — ACTIVITIES OF DAILY LIVING (ADL)
ADLS_ACUITY_SCORE: 19
ADLS_ACUITY_SCORE: 19
DOING_ERRANDS_INDEPENDENTLY_DIFFICULTY: NO
WEAR_GLASSES_OR_BLIND: NO
WALKING_OR_CLIMBING_STAIRS_DIFFICULTY: NO
CONCENTRATING,_REMEMBERING_OR_MAKING_DECISIONS_DIFFICULTY: NO
ADLS_ACUITY_SCORE: 36
ADLS_ACUITY_SCORE: 36
CHANGE_IN_FUNCTIONAL_STATUS_SINCE_ONSET_OF_CURRENT_ILLNESS/INJURY: NO
TOILETING_ISSUES: NO
DIFFICULTY_EATING/SWALLOWING: NO
ADLS_ACUITY_SCORE: 36
ADLS_ACUITY_SCORE: 36
FALL_HISTORY_WITHIN_LAST_SIX_MONTHS: NO
DRESSING/BATHING_DIFFICULTY: NO
ADLS_ACUITY_SCORE: 36

## 2022-06-16 NOTE — DISCHARGE SUMMARY
[facility]    Discharge Summary   [facility] Neurosurgery    Date of Admission:  6/15/2022  Date of Discharge:  6/16/2022  Discharging Provider: Judi Chaparro PA-C  Date of Service (when I saw the patient): 06/16/22    Discharge Diagnoses   Active Problems:    Herniated cervical disc      History of Present Illness   Georgie Scott is an 32 year old female who presented with intractable right arm pain believed secondary to chronic disc herniation at right C5-6. Surgical intervention was recommended.     Hospital Course   Georgie Scott was admitted on 6/15/2022 with intractable right arm pain believed secondary to chronic disc herniation at right C5-6. Underwent anterior cervical discectomy at C5-6 with bilateral foraminotomies; cervical disc arthroplasty at C5-6 using Pumant LP device; use the operating microscope on 06/15/2022 with Dr. Oseguera. No intraoperative complications. EBL 10 ml. Patient admitted for pain management, neurologic monitoring, wound care. Patient meeting all discharge on 06/16/22. Patient was cleared by Neurosurgery for discharge to home in stable condition.      - Routine post op care plan  - Routine wound care and activity restrictions  - Pain medications prescribed at discharge  - Follow up with NSG clinic as scheduled     I have discussed the following assessment and plan with Dr. Oseguera who is in agreement with initial plan and will follow up with any further recommendations.    Judi Chaparro PA-C  [facility] Neurosurgery  [address] [address]    Tel [phone]  Pager [phone]    Pending Results   These results will be followed up by n/a  Unresulted Labs Ordered in the Past 30 Days of this Admission     No orders found for last 31 day(s).          Code Status   Full Code    Primary Care Physician   Taylor Womack    Physical Exam   Temp: 98.4  F (36.9  C) Temp src: Oral BP:  106/59 Pulse: 67   Resp: 16 SpO2: 100 % O2 Device: None (Room air) Oxygen Delivery: 1 LPM  Vitals:    06/15/22 1103   Weight: 162 lb (73.5 kg)     Vital Signs with Ranges  Temp:  [98  F (36.7  C)-98.7  F (37.1  C)] 98.4  F (36.9  C)  Pulse:  [] 67  Resp:  [11-17] 16  BP: ()/(52-85) 106/59  SpO2:  [96 %-100 %] 100 %  I/O last 3 completed shifts:  In: 809.59 [I.V.:809.59]  Out: 15 [Blood:15]       Awake, alert, sitting in chair   Dressing dry, no acute swelling   WILCOX with 5/5 motor strength   Decreased sensation along forearm into thumb on right, baseline   Negative clonus     Time Spent on this Encounter   Judi MANDUJANO PA-C, personally saw the patient today and spent less than or equal to 30 minutes discharging this patient.    Discharge Disposition   Discharged to home  Condition at discharge: Stable    Consultations This Hospital Stay   PHYSICAL THERAPY ADULT IP CONSULT    Discharge Orders      Reason for your hospital stay    Anterior cervical discectomy at cervical 5-6; placement of artificial disc     Follow-up and recommended labs and tests     Your Neurosurgical follow up appointments have been scheduled. You may call [phone] to make, confirm or change your follow-up Neurosurgery appointment dates and/or times.     Activity    Your activity upon discharge: Please limit your lifting to no more that ten pounds and avoid excessive bending, twisting and turning at the lumbar spine. You should also avoid excessive jostling and jarring activities.     Wound care and dressings    Instructions to care for your wound at home: Keep your incision clean and dry. Okay to remove dressing on post-op day 2 and shower on post-op day 3. Okay for water to run over incision and gently pat dry. Do not scrub incision. No bathing, swimming, or submerging incision under water until follow-up visit. Call clinic or go to the ER with any incision drainage or swelling. Follow-up in clinic at 2 weeks post op for  incision check.     Diet    Follow this diet upon discharge: Regular     Discharge Medications   Current Discharge Medication List      START taking these medications    Details   oxyCODONE (ROXICODONE) 5 MG tablet Take 1 tablet (5 mg) by mouth every 4 hours as needed for moderate to severe pain  Qty: 40 tablet, Refills: 0    Associated Diagnoses: Herniated cervical disc      senna-docusate (SENOKOT-S/PERICOLACE) 8.6-50 MG tablet Take 1 tablet by mouth 2 times daily as needed for constipation  Qty: 40 tablet, Refills: 0    Associated Diagnoses: Herniated cervical disc         CONTINUE these medications which have CHANGED    Details   methocarbamol (ROBAXIN) 500 MG tablet Take 1 tablet (500 mg) by mouth 4 times daily as needed for muscle spasms  Qty: 40 tablet, Refills: 0    Associated Diagnoses: Cervical radicular pain         CONTINUE these medications which have NOT CHANGED    Details   !! amphetamine-dextroamphetamine (ADDERALL) 10 MG tablet Take 1-2 tablets (10-20 mg) by mouth daily  Qty: 40 tablet, Refills: 0    Associated Diagnoses: ADHD (attention deficit hyperactivity disorder), inattentive type      !! amphetamine-dextroamphetamine (ADDERALL) 10 MG tablet Take 1-2 tablets (10-20 mg) by mouth daily  Qty: 40 tablet, Refills: 0    Associated Diagnoses: ADHD (attention deficit hyperactivity disorder), inattentive type      !! amphetamine-dextroamphetamine (ADDERALL) 10 MG tablet Take 1-2 tablets (10-20 mg) by mouth daily  Qty: 40 tablet, Refills: 0    Associated Diagnoses: ADHD (attention deficit hyperactivity disorder), inattentive type      gabapentin (NEURONTIN) 300 MG capsule Increase as directed to a maximum of 900 mg TID  Qty: 270 capsule, Refills: 2    Associated Diagnoses: Neck pain; Cervical radicular pain      spironolactone (ALDACTONE) 100 MG tablet        !! - Potential duplicate medications found. Please discuss with provider.      STOP taking these medications       cyclobenzaprine (FLEXERIL) 5 MG  tablet Comments:   Reason for Stopping:             Allergies   Allergies   Allergen Reactions     Bactrim [Sulfamethoxazole W/Trimethoprim] Hives     Amoxicillin Rash     Morphine Rash

## 2022-06-16 NOTE — PROGRESS NOTES
[facility]    Neurosurgery  Daily Note    Assessment & Plan   Procedure(s):  Anterior cervical discectomy at cervical 5-6; placement of artificial disc for treatment of right arm symptoms; right incision   1 Day Post-Op  Admits to left neck pain. Admits to slight improve in RUE symptoms. Swallowing slightly improved compared to yesterday. Still continues to have spasms. Dressing is dry.     Plan:  -Advance activity as tolerated  -Continue supportive and symptomatic treatment  -Start or continue physical therapy  -Pain control measures  -Advance diet as tolerated  -Routine wound care  -Plans reviewed with Dr. Oseguera   -Page or call with questions    Judi Chaparro PA-C  [facility] Neurosurgery  [facility]  [address]  [address]  [place] [address]    Tel [phone]  Pager [phone]    Active Problems:    Herniated cervical disc    Judi Chaparro PA-C    Interval History   Stable     Physical Exam   Temp: 98.4  F (36.9  C) Temp src: Oral BP: 106/59 Pulse: 67   Resp: 16 SpO2: 100 % O2 Device: None (Room air) Oxygen Delivery: 1 LPM  Vitals:    06/15/22 1103   Weight: 162 lb (73.5 kg)     Vital Signs with Ranges  Temp:  [98  F (36.7  C)-98.7  F (37.1  C)] 98.4  F (36.9  C)  Pulse:  [] 67  Resp:  [11-17] 16  BP: ()/(52-85) 106/59  SpO2:  [96 %-100 %] 100 %  I/O last 3 completed shifts:  In: 809.59 [I.V.:809.59]  Out: 15 [Blood:15]    Awake, alert, sitting in chair   Dressing dry, no acute swelling   WILCOX with 5/5 motor strength   Decreased sensation along forearm into thumb on right, baseline   Negative clonus       Medications     0.9% sodium chloride + KCl 20 mEq/L 75 mL/hr at 06/16/22 1021        acetaminophen  975 mg Oral Q8H     dexamethasone PF  4 mg Intravenous Once     polyethylene glycol  17 g Oral Daily     senna-docusate  1 tablet Oral BID     sodium chloride (PF)  3 mL Intracatheter Q8H       Plans discussed with Dr. Oseguera who was in  agreement with plans    Judi CAMPBELL [facility] Neurosurgery  [facility]  [address] [place] [address], [place] [address]    Tel [phone]  Pager [phone]

## 2022-06-16 NOTE — PROGRESS NOTES
POD#0 s/p anterior cervical artifical disk.  Patient complains of left sided neck pain, feels it's muscular strain.    On exam, awake and alert moving extremities well.  Incision C/D/I.    PLAN:  Added Valium  Continue pain management.    Stephanie Sears MPAS  [facility] Neurosurgery  [address]  [address] [place] [id number]    Tel [phone]  Pager [phone]

## 2022-06-16 NOTE — PROGRESS NOTES
"   06/16/22 1600   Quick Adds   Type of Visit Initial PT Evaluation       Present no   Living Environment   People in Home significant other   Current Living Arrangements house   Home Accessibility stairs to enter home;stairs within home   Number of Stairs, Main Entrance 4   Stair Railings, Main Entrance none   Number of Stairs, Within Home, Primary ten   Stair Railings, Within Home, Primary none   Transportation Anticipated family or friend will provide   Living Environment Comments Pt lives in a house with her significant other. Pt reports needing to negotiate stairs to enter and within the home. Pt reports her mom will pick her up upon discharge and provide 24/7 assist as needed.   Self-Care   Usual Activity Tolerance excellent   Current Activity Tolerance good   Regular Exercise No   Equipment Currently Used at Home none   Fall history within last six months no   Activity/Exercise/Self-Care Comment Pt reports being IND at baseline with all ADLs. Pt does not have an AD at home. Pt was not limited with mobility prior to admission. Pt drives.   General Information   Onset of Illness/Injury or Date of Surgery 06/16/22   Referring Physician Judi Chaparro PA-C   Patient/Family Therapy Goals Statement (PT) \"To go home\"   Pertinent History of Current Problem (include personal factors and/or comorbidities that impact the POC) Per Chart: Georgie Scott is an 32 year old female who presented with intractable right arm pain believed secondary to chronic disc herniation at right C5-6. Surgical intervention was recommended.   Existing Precautions/Restrictions fall   Weight-Bearing Status - LLE full weight-bearing   Weight-Bearing Status - RLE full weight-bearing   Cognition   Orientation Status (Cognition) oriented x 4   Pain Assessment   Patient Currently in Pain Yes, see Vital Sign flowsheet   Integumentary/Edema   Integumentary/Edema no deficits were identifed   Posture    Posture Forward head " position;Protracted shoulders   Range of Motion (ROM)   Range of Motion ROM is WFL   Strength (Manual Muscle Testing)   Strength (Manual Muscle Testing) strength is WFL;Able to perform R SLR;Able to perform L SLR   Bed Mobility   Comment, (Bed Mobility) Supine>sit w/ IND   Transfers   Comment, (Transfers) Sit>stand w/o AD and IND   Gait/Stairs (Locomotion)   [place] Level (Gait) independent   Assistive Device (Gait)   (no AD)   Distance in Feet (Required for LE Total Joints) 400'  (10' eval)   Comment, (Gait/Stairs) Pt ambulated ~10' w/o AD and IND for eval. Pt negotiated 8 stairs w/ SBA   Balance   Balance no deficits were identified   Sensory Examination   Sensory Perception patient reports no sensory changes   Clinical Impression   Criteria for Skilled Therapeutic Intervention Yes, treatment indicated   PT Diagnosis (PT) Impaired gait   Influenced by the following impairments Decreased activity tolerance   Functional limitations due to impairments Impaired functional mobility   Clinical Presentation (PT Evaluation Complexity) Stable/Uncomplicated   Clinical Presentation Rationale Clinical Judgement   Clinical Decision Making (Complexity) low complexity   Planned Therapy Interventions (PT) balance training;bed mobility training;gait training;patient/family education;stair training;strengthening;transfer training   Risk & Benefits of therapy have been explained evaluation/treatment results reviewed;care plan/treatment goals reviewed;current/potential barriers reviewed;risks/benefits reviewed;participants voiced agreement with care plan;participants included;patient   PT Discharge Planning   PT Discharge Recommendation (DC Rec) home   PT Rationale for DC Rec Pt is at/near baseline for mobility. Pt demonstrates safe and effective techniques with all functional mobility. Pt will have assist from mother if needed.   PT Brief overview of current status Supine>sit w/ IND; sit>stand w/o AD and IND; gait w/o AD and  IND; stairs w/ SBA   Plan of Care Review   Plan of Care Reviewed With patient;mother   Total Evaluation Time   Total Evaluation Time (Minutes) 10   Physical Therapy Goals   PT Frequency One time eval and treatment only   PT Predicted Duration/Target Date for Goal Attainment 06/19/22   PT Goals Bed Mobility;Transfers;Gait;Stairs   PT: Bed Mobility Independent;Supine to/from sit;Goal Met   PT: Transfers Independent;Sit to/from stand;Goal Met   PT: Gait Independent;Greater than 200 feet;Goal Met   PT: Stairs Supervision/stand-by assist;10 stairs;Goal Met

## 2022-06-16 NOTE — ANESTHESIA POSTPROCEDURE EVALUATION
Patient: Georgie Scott    Procedure: Procedure(s):  Anterior cervical discectomy at cervical 5-6; placement of artificial disc for treatment of right arm symptoms; right incision       Anesthesia Type:  General    Note:  Disposition: Admission   Postop Pain Control: Uneventful            Sign Out: Well controlled pain   PONV: No   Neuro/Psych: Uneventful            Sign Out: Acceptable/Baseline neuro status   Airway/Respiratory: Uneventful            Sign Out: Acceptable/Baseline resp. status   CV/Hemodynamics: Uneventful            Sign Out: Acceptable CV status   Other NRE:    DID A NON-ROUTINE EVENT OCCUR? No           Last vitals:  Vitals Value Taken Time   /63 06/15/22 1930   Temp 36.7  C (98  F) 06/15/22 1900   Pulse 85 06/15/22 1933   Resp 11 06/15/22 1933   SpO2 97 % 06/15/22 1932   Vitals shown include unvalidated device data.    Electronically Signed By: Alisa Butler  Abbey 15, 2022  9:28 PM

## 2022-06-16 NOTE — PLAN OF CARE
Goal Outcome Evaluation:        Pt. Alert & oriented x 4.  On room air.  Up with standby assist.  Ambulating to bathroom, voiding. Prior nausea resolved per pt.  Tolerating PO clear liquids ex. reports sore throat with swallowing.  Neuro checks WDL ex reports focal numbness to R forearm; pt. Reports she had this prior to surgery but is less severe now.  Anterior neck dressing CDI.  Pt. Reports pain goal met with current PRN pain medications.

## 2022-06-16 NOTE — PLAN OF CARE
Physical Therapy Discharge Summary    Reason for therapy discharge:    All goals and outcomes met, no further needs identified.    Progress towards therapy goal(s). See goals on Care Plan in [facility] electronic health record for goal details.  Goals met    Therapy recommendation(s):    No further therapy is recommended.

## 2022-06-16 NOTE — PLAN OF CARE
Goal Outcome Evaluation:      Pt discharged to home with patients mother.  Discharge instructions reviewed, and medications filled and sent with patient.  No questions at this time.

## 2022-06-16 NOTE — PROGRESS NOTES
Pt came up to unit close to 2000 from PACU;A&Ox4; VSS on RA. Neuro checks done; intact. CMS intact. Unable to give scheduled meds this evening d/t swallowing issue. Pt stated that her throat is sore and can't swallow. Pt was nauseated; IV zofran given; effective. Not OOB yet d/t condition. Dressing on neck CDI. Pt has no code status; writer placed a call to [facility] and spoke with Shanel Villareal; per Shanel, pt can be full code if pt wants. Pt full code. C/o neck pain late this shift; IV dilaudid given per order.

## 2022-06-17 ENCOUNTER — TELEPHONE (OUTPATIENT)
Dept: NEUROSURGERY | Facility: CLINIC | Age: 32
End: 2022-06-17
Payer: COMMERCIAL

## 2022-06-17 DIAGNOSIS — M54.12 CERVICAL RADICULAR PAIN: Primary | ICD-10-CM

## 2022-06-17 NOTE — TELEPHONE ENCOUNTER
PATIENT NAME: Georgie Scott  YOB: 1990   MRN: [id number]  SURGEON: Oumar  DATE of SURGERY: 06/15/2022  PROCEDURE: Anterior cervical discectomy and artificial disc replacement at C5-6      FOLLOW-UP PLAN:   Wound Check:  7-10 days   Staples/Sutures Out : n/a   Post Op Visit: 6 weeks   Post-op Provider: HA on Dr. Oseguera clinic day   DIAGNOSTICS:  XR prior (order placed)   DISPOSITION: home     ADDITIONAL INSTRUCTIONS FOR MEDICAL STAFF:    Chelsea Sharma RN

## 2022-06-20 ENCOUNTER — MEDICAL CORRESPONDENCE (OUTPATIENT)
Dept: NEUROSURGERY | Facility: CLINIC | Age: 32
End: 2022-06-20
Payer: COMMERCIAL

## 2022-06-21 ENCOUNTER — TELEPHONE (OUTPATIENT)
Dept: NEUROSURGERY | Facility: OTHER | Age: 32
End: 2022-06-21
Payer: COMMERCIAL

## 2022-06-21 NOTE — TELEPHONE ENCOUNTER
Left VM for patient to return call to schedule 2wk 6wk and 12 wk follow up appts, per Judi Chaparro Staff msg.

## 2022-06-27 ENCOUNTER — ALLIED HEALTH/NURSE VISIT (OUTPATIENT)
Dept: NEUROSURGERY | Facility: CLINIC | Age: 32
End: 2022-06-27
Payer: COMMERCIAL

## 2022-06-27 VITALS
SYSTOLIC BLOOD PRESSURE: 116 MMHG | RESPIRATION RATE: 16 BRPM | HEART RATE: 76 BPM | OXYGEN SATURATION: 98 % | DIASTOLIC BLOOD PRESSURE: 71 MMHG

## 2022-06-27 DIAGNOSIS — M54.12 CERVICAL RADICULAR PAIN: Primary | ICD-10-CM

## 2022-06-27 PROCEDURE — 99207 PR NO CHARGE NURSE ONLY: CPT

## 2022-06-27 NOTE — PROGRESS NOTES
Georgie Scott is s/p Anterior cervical discectomy at cervical 5-6; placement of artificial disc on 6/15/22 with Dr. Oseguera.     Patient presents today for post-op incision check and is 12 days post-op. She presented preoperatively with neck and RUE pain with numbness and tingling. Today, she reports manageable neck and arm pain, slight improvement in the numbness/tingling. She notes left sided muscle spasms in her shoulder blades.  Gait and balance are normal. Patient is pleased with the outcome of the surgery thus far.    Taking tylenol, ibuprofen and robaxin for pain with adequate relief. She only uses oxycodone for severe pain. Pt is using ice/heat for muscle stiffness/soreness. Recommended using pain patches for left-sided muscle pain.    Patient is walking frequently without difficulty. Activity restrictions reinforced at this time as outlined the After Visit Summary.     Surgical wound WNL - C/D/I, no signs of infection or skin breakdown.  Incision covered with steri-strips, no redness or visible/palpable edema, no tenderness to palpation.  Pt denies fever, chills or sweats.    All of patient's questions addressed today. She was instructed to call with any additional questions/concerns. Follow up appts made.     Chelsea Sharma RN

## 2022-06-27 NOTE — PATIENT INSTRUCTIONS
WOUND CARE:  A dressing is not required.  Please allow the steri strips to fall off on their own. Do not pick them off. Pat them dry after showering.     Wash your hands before touching the wound.  Ensure that anyone assisting you in the care of your wound washes her/his hands before touching the wound. Good handwashing can decrease the risk of serious infection.    If you are unable to see your wound, have someone check the wound daily for redness, swelling,or drainage.   You may shower. Wash your wound daily.  Apply mild soap directly to the wound, form a light lather and rinse with running water. Pat the wound dry.  Do not rub, pick, or otherwise help the dermabond come off more quickly.  Do not place tape or adhesive over the dermabond.    Do not submerge the wound under water. No baths or swimming for 6 weeks.     If you develop redness, swelling, drainage, or temp 101 or greater, call our office at [phone]    Activity Restrictions:  *No lifting, pushing or pulling greater than 5-10 pounds (this is about a gallon ofmilk).  *No repetitive bending, twisting, or jarring activities  *No overhead work  *No aerobic or strenuous activity  *No activities with increased risk of falls  *You may move about your home as tolerated  *You may walk up and down stairs as tolerated  *You may increase your activity slowly over the next 4-6 weeks    WALKING PROGRAM: As you can tolerate, walk daily-start with 5-10 minutes of continuous walking. This is in addition to the walking that you do as part of your daily activities. Increase the time that you walk by 5 minutes every couple of days. Do not exceed 30-45 minutes of continuous walking until seen in follow-up. Walking is the best exercise after surgery.  **Listen to your body, if you find that you are more painful or fatigued, you may need to proceed more slowly.    **Do not smoke or expose yourself to second handsmoke. Cigarette smoke can delay healing and cause  complications.     DRIVING:  We recommend that you do not drive while taking medications for pain or muscle spasms. Always read and follow the advice on your prescription bottle. If you have questions, speak with your pharmacist. We recommend that you do not drive while wearing a brace, as it could limit your range of motion.    WORK: If you plan to return to work before your 6 week appointment, call and discuss with one of the nurses in the neurosurgery office.

## 2022-07-12 NOTE — ADDENDUM NOTE
Encounter addended by: Montrell Tam, PT on: 7/12/2022 9:03 AM   Actions taken: Clinical Note Signed, Episode resolved

## 2022-07-12 NOTE — PROGRESS NOTES
[facility]    Outpatient Physical Therapy Discharge Note  Patient: Georgie Scott  : 1990    Beginning/End Dates of Reporting Period:  22 to 22    Referring Provider: Kay Mcrae PA-C    Therapy Diagnosis: Neck pain with radicular symptoms     Client Self Report: Georgie reports little overall change since first visit and is planning on pursuing surgery.    Objective Measurements:  Objective Measure: Cervical Extension AROM  Details: 75% to 99% (painful)  Objective Measure: Cervical Right Rotation AROM  Details: 75% to 99%  Objective Measure: Cervical Right Side-bending AROM  Details: 75% to 99%  Objective Measure: Thoracic Spine Right/Left Rotation AROM  Details: 75% to 99% (tight)  Objective Measure: Worst Pain  Details: 2/10  Objective Measure: NDI  Details: 6%    Outcome Measures (most recent score):  NDI: 6%    Goals:  Goal Identifier NDI - Short Term   Goal Description Georgie will demonstrate improved function as evidenced by an improved (decreased) NDI score to less than 12%.   Target Date 22   Date Met  22   Progress (detail required for progress note): 6%     Goal Identifier NDI - Long Term   Goal Description Georgie will demonstrate improved function as evidenced by an improved (decreased) NDI score to less than 2%.   Target Date 22   Date Met      Progress (detail required for progress note): 6%     Goal Identifier Work   Goal Description Georgie will be able to consistently work a full shift at work with self-report pain no greater than 0-3/10.   Target Date 22   Date Met  22   Progress (detail required for progress note): 2/10     Goal Identifier Sleep   Goal Description Georgie will be able to sleep through the night without waking with increased symptoms on at least 5/7 nights a week.   Target Date 22   Date Met  22   Progress (detail  required for progress note): 7/7         Plan:  Discharge from therapy.    Discharge:    Reason for Discharge: Patient chooses to discontinue therapy to pursue surgery.    Equipment Issued: NA    Discharge Plan: Patient to continue home program and pursue surgery.

## 2022-07-25 ENCOUNTER — OFFICE VISIT (OUTPATIENT)
Dept: NEUROSURGERY | Facility: CLINIC | Age: 32
End: 2022-07-25
Payer: COMMERCIAL

## 2022-07-25 ENCOUNTER — HOSPITAL ENCOUNTER (OUTPATIENT)
Dept: RADIOLOGY | Facility: HOSPITAL | Age: 32
Discharge: HOME | End: 2022-07-25
Attending: NEUROLOGICAL SURGERY | Admitting: NEUROLOGICAL SURGERY
Payer: COMMERCIAL

## 2022-07-25 VITALS
WEIGHT: 162 LBS | DIASTOLIC BLOOD PRESSURE: 74 MMHG | SYSTOLIC BLOOD PRESSURE: 112 MMHG | HEART RATE: 88 BPM | OXYGEN SATURATION: 99 % | BODY MASS INDEX: 25.43 KG/M2 | HEIGHT: 67 IN

## 2022-07-25 DIAGNOSIS — M54.12 CERVICAL RADICULAR PAIN: ICD-10-CM

## 2022-07-25 DIAGNOSIS — M54.12 CERVICAL RADICULAR PAIN: Primary | ICD-10-CM

## 2022-07-25 PROCEDURE — 72040 X-RAY EXAM NECK SPINE 2-3 VW: CPT

## 2022-07-25 PROCEDURE — 99024 POSTOP FOLLOW-UP VISIT: CPT | Performed by: PHYSICIAN ASSISTANT

## 2022-07-25 NOTE — PATIENT INSTRUCTIONS
Presently she is doing very well she has been advised to increased her activity as tolerated by adding 5 pounds per week to her lifting restrictions until back to her normal. She is cleared to return to work as of July 28, 2022 without restrictions. Follow up will be on an as needed basis and she understands that should any symptoms return or arise to contact the office.

## 2022-07-25 NOTE — PROGRESS NOTES
"Neurosurgery Progress Note: 7/25/2022     A/P:   Postoperative C5-C6 anterior cervical disc arthoplasty on Abbey 15, 2022    Plan:  Presently she is doing very well she has been advised to increased her activity as tolerated by adding 5 pounds per week to her lifting restrictions until back to her normal. She is cleared to return to work as of July 28, 2022 without restrictions. Follow up will be on an as needed basis and she understands that should any symptoms return or arise to contact the office.     Mrs. Scott is a pleasant 32 year old right handed female who is postoperative C5-C6 anterior cervical disc arthoplasty on Abbey 15, 2022 by Dr. Oseguera. Presently she states she is doing very well. She states that her previous severe radicular arm pain has completely resolved. She denies any weakness, numbness, or tingling of her upper extremities. She is pleased with her outcome from surgery. She does not slight tenderness to her incision but states that it has improved since surgery.       Exam:  /74   Pulse 88   Ht 1.689 m (5' 6.5\")   Wt 73.5 kg (162 lb)   LMP  (LMP Unknown)   SpO2 99%   BMI 25.76 kg/m      General: alert and oriented x3     Strength is 5/5 throughout both upper extremities throughout.    Sensation is intact throughout both upper and lower extremities    Gait is smooth and coordinated    Incision: well healed without erythema, induration, or drainage      Imaging:  Xray reviewed personally noted stable cervical alignment with good placement of arthroplasty device     Renee Miguel PA-C  [facility]  O: [phone]  "

## 2022-07-25 NOTE — LETTER
"    7/25/2022         RE: Georgie Scott  [address]  [place] [address]        Dear Colleague,    Thank you for referring your patient, Georgie Scott, to the [facility]. Please see a copy of my visit note below.    Neurosurgery Progress Note: 7/25/2022     A/P:   Postoperative C5-C6 anterior cervical disc arthoplasty on Abbey 15, 2022    Plan:  Presently she is doing very well she has been advised to increased her activity as tolerated by adding 5 pounds per week to her lifting restrictions until back to her normal. She is cleared to return to work as of July 28, 2022 without restrictions. Follow up will be on an as needed basis and she understands that should any symptoms return or arise to contact the office.     Mrs. Scott is a pleasant 32 year old right handed female who is postoperative C5-C6 anterior cervical disc arthoplasty on Abbey 15, 2022 by Dr. Oseguera. Presently she states she is doing very well. She states that her previous severe radicular arm pain has completely resolved. She denies any weakness, numbness, or tingling of her upper extremities. She is pleased with her outcome from surgery. She does not slight tenderness to her incision but states that it has improved since surgery.       Exam:  /74   Pulse 88   Ht 1.689 m (5' 6.5\")   Wt 73.5 kg (162 lb)   LMP  (LMP Unknown)   SpO2 99%   BMI 25.76 kg/m      General: alert and oriented x3     Strength is 5/5 throughout both upper extremities throughout.    Sensation is intact throughout both upper and lower extremities    Gait is smooth and coordinated    Incision: well healed without erythema, induration, or drainage      Imaging:  Xray reviewed personally noted stable cervical alignment with good placement of arthroplasty device     Renee Miguel PA-C  [facility]  O: [phone]      Again, thank you for allowing me to participate in the care of your patient.  "       Sincerely,        Renee Miguel PA-C

## 2022-07-26 ENCOUNTER — MEDICAL CORRESPONDENCE (OUTPATIENT)
Dept: NEUROSURGERY | Facility: CLINIC | Age: 32
End: 2022-07-26

## 2022-07-29 ENCOUNTER — MEDICAL CORRESPONDENCE (OUTPATIENT)
Dept: NEUROSURGERY | Facility: CLINIC | Age: 32
End: 2022-07-29

## 2022-09-17 ENCOUNTER — HEALTH MAINTENANCE LETTER (OUTPATIENT)
Age: 32
End: 2022-09-17

## 2022-10-11 ENCOUNTER — E-VISIT (OUTPATIENT)
Dept: URGENT CARE | Facility: URGENT CARE | Age: 32
End: 2022-10-11
Payer: COMMERCIAL

## 2022-10-11 DIAGNOSIS — N39.0 ACUTE UTI (URINARY TRACT INFECTION): Primary | ICD-10-CM

## 2022-10-11 PROCEDURE — 99421 OL DIG E/M SVC 5-10 MIN: CPT | Performed by: PHYSICIAN ASSISTANT

## 2022-10-11 RX ORDER — NITROFURANTOIN 25; 75 MG/1; MG/1
100 CAPSULE ORAL 2 TIMES DAILY
Qty: 10 CAPSULE | Refills: 0 | Status: SHIPPED | OUTPATIENT
Start: 2022-10-11 | End: 2022-10-16

## 2022-10-11 RX ORDER — FLUCONAZOLE 150 MG/1
150 TABLET ORAL ONCE
Qty: 1 TABLET | Refills: 0 | Status: SHIPPED | OUTPATIENT
Start: 2022-10-11 | End: 2022-10-11

## 2022-10-11 NOTE — PATIENT INSTRUCTIONS
Dear Georgie Scott    After reviewing your responses, I've been able to diagnose you with a urinary tract infection, which is a common infection of the bladder with bacteria.  This is not a sexually transmitted infection, though urinating immediately after intercourse can help prevent infections.  Drinking lots of fluids is also helpful to clear your current infection and prevent the next one.      I have sent a prescription for antibiotics to your pharmacy to treat this infection.    It is important that you take all of your prescribed medication even if your symptoms are improving after a few doses.  Taking all of your medicine helps prevent the symptoms from returning.     If your symptoms worsen, you develop pain in your back or stomach, develop fevers, or are not improving in 5 days, please contact your primary care provider for an appointment or visit any of our convenient Walk-in or Urgent Care Centers to be seen, which can be found on our website here.    Thanks again for choosing us as your health care partner,    Hans Jimenez PA-C    Urinary Tract Infections in Women  Urinary tract infections (UTIs) are most often caused by bacteria. These bacteria enter the urinary tract. The bacteria may come from inside the body. Or they may travel from the skin outside the rectum or vagina into the urethra. Female anatomy makes it easy for bacteria from the bowel to enter a woman s urinary tract, which is the most common source of UTI. This means women develop UTIs more often than men. Pain in or around the urinary tract is a common UTI symptom. But the only way to know for sure if you have a UTI for the healthcare provider to test your urine. The two tests that may be done are the urinalysis and urine culture.     Types of UTIs    Cystitis. A bladder infection (cystitis) is the most common UTI in women. You may have urgent or frequent need to pee. You may also have pain, burning when you pee, and bloody  urine.    Urethritis. This is an inflamed urethra, which is the tube that carries urine from the bladder to outside the body. You may have lower stomach or back pain. You may also have urgent or frequent need to pee.    Pyelonephritis. This is a kidney infection. If not treated, it can be serious and damage your kidneys. In severe cases, you may need to stay in the hospital. You may have a fever and lower back pain.    Medicines to treat a UTI  Most UTIs are treated with antibiotics. These kill the bacteria. The length of time you need to take them depends on the type of infection. It may be as short as 3 days. If you have repeated UTIs, you may need a low-dose antibiotic for several months. Take antibiotics exactly as directed. Don t stop taking them until all of the medicine is gone. If you stop taking the antibiotic too soon, the infection may not go away. You may also develop a resistance to the antibiotic. This can make it much harder to treat.   Lifestyle changes to treat and prevent UTIs   The lifestyle changes below will help get rid of your UTI. They may also help prevent future UTIs.     Drink plenty of fluids. This includes water, juice, or other caffeine-free drinks. Fluids help flush bacteria out of your body.    Empty your bladder. Always empty your bladder when you feel the urge to pee. And always pee before going to sleep. Urine that stays in your bladder can lead to infection. Try to pee before and after sex as well.    Practice good personal hygiene. Wipe yourself from front to back after using the toilet. This helps keep bacteria from getting into the urethra.    Use condoms during sex. These help prevent UTIs caused by sexually transmitted bacteria. Also don't use spermicides during sex. These can increase the risk for UTIs. Choose other forms of birth control instead. For women who tend to get UTIs after sex, a low-dose of a preventive antibiotic may be used. Be sure to discuss this option with  your healthcare provider.    Follow up with your healthcare provider as directed. He or she may test to make sure the infection has cleared. If needed, more treatment may be started.  [employer] last reviewed this educational content on 7/1/2019 2000-2021 [employer]. All rights reserved. This information is not intended as a substitute for professional medical care. Always follow your healthcare professional's instructions.

## 2022-11-16 ENCOUNTER — E-VISIT (OUTPATIENT)
Dept: FAMILY MEDICINE | Facility: CLINIC | Age: 32
End: 2022-11-16
Payer: COMMERCIAL

## 2022-11-16 DIAGNOSIS — N76.0 BACTERIAL VAGINOSIS: Primary | ICD-10-CM

## 2022-11-16 DIAGNOSIS — B96.89 BACTERIAL VAGINOSIS: Primary | ICD-10-CM

## 2022-11-16 PROCEDURE — 99421 OL DIG E/M SVC 5-10 MIN: CPT | Performed by: PHYSICIAN ASSISTANT

## 2022-11-16 RX ORDER — METRONIDAZOLE 500 MG/1
500 TABLET ORAL 2 TIMES DAILY
Qty: 14 TABLET | Refills: 0 | Status: SHIPPED | OUTPATIENT
Start: 2022-11-16 | End: 2022-11-23

## 2022-11-16 NOTE — PATIENT INSTRUCTIONS
Thank you for choosing us for your care. I have placed an order for a prescription so that you can start treatment. View your full visit summary for details by clicking on the link below. Your pharmacist will able to address any questions you may have about the medication.     If you re not feeling better within 2-3 days, please schedule an appointment.  You can schedule an appointment right here in [facility], or call [phone]  If the visit is for the same symptoms as your eVisit, we ll refund the cost of your eVisit if seen within seven days.      Bacterial Vaginosis    You have a vaginal infection called bacterial vaginosis (BV). Both good and bad bacteria are present in a healthy vagina. BV occurs when these bacteria get out of balance. The number of bad bacteria increase. And the number of good bacteria decrease. BV is linked with sexual activity, but it's not a sexually transmitted infection (STI).   BV may or may not cause symptoms. If symptoms do occur, they can include:     Thin, gray, milky-white, or sometimes green discharge    Unpleasant odor or  fishy  smell    Itching, burning, or pain in or around the vagina  It is not known what causes BV, but certain factors can make the problem more likely. These can include:     Douching    Spermicides    Use of antibiotics    Change in hormone levels with pregnancy, breastfeeding, or menopause    Having sex with a new partner    Having sex with more than one partner  BV will sometimes go away on its own. But treatment is often advised. This is because untreated BV can raise the risk of more serious health problems such as:     Pelvic inflammatory disease (PID)     delivery (giving birth to a baby early if you re pregnant)    HIV and some other sexually transmitted infections (STIs)    Infection after surgery on the reproductive organs  Home care  General care    BV is most often treated with medicines called antibiotics. These may be given as pills or  as a vaginal cream. If antibiotics are prescribed, be sure to use them exactly as directed. And complete all of the medicine, even if your symptoms go away.    Don't douche or having sex during treatment.    If you have sex with a female partner, ask your healthcare provider if she should also be treated.  Prevention    Don't douche.    Don't have sex. If you do have sex, then take steps to lower your risk:  ? Use condoms when having sex.  ? Limit the number of sex partners you have.    Follow-up care  Follow up with your healthcare provider, or as advised.   When to get medical advice  Call your healthcare provider right away if:     You have a fever of 100.4 F (38 C) or higher, or as directed by your provider.    Your symptoms get worse, or they don t go away within a few days of starting treatment.    You have new pain in the lower belly or pelvic region.    You have side effects that bother you or a reaction to the pills or cream you re prescribed.    You or any of your sex partners have new symptoms, such as a rash, joint pain, or sores.  [employer] last reviewed this educational content on 6/1/2020 2000-2021 [employer]. All rights reserved. This information is not intended as a substitute for professional medical care. Always follow your healthcare professional's instructions.

## 2022-11-21 ENCOUNTER — OFFICE VISIT (OUTPATIENT)
Dept: FAMILY MEDICINE | Facility: CLINIC | Age: 32
End: 2022-11-21
Payer: COMMERCIAL

## 2022-11-21 ENCOUNTER — E-VISIT (OUTPATIENT)
Dept: FAMILY MEDICINE | Facility: CLINIC | Age: 32
End: 2022-11-21
Payer: COMMERCIAL

## 2022-11-21 VITALS
OXYGEN SATURATION: 99 % | RESPIRATION RATE: 16 BRPM | DIASTOLIC BLOOD PRESSURE: 92 MMHG | WEIGHT: 168 LBS | BODY MASS INDEX: 26.71 KG/M2 | TEMPERATURE: 98.1 F | SYSTOLIC BLOOD PRESSURE: 134 MMHG | HEART RATE: 88 BPM

## 2022-11-21 DIAGNOSIS — N89.8 VAGINAL ITCHING: ICD-10-CM

## 2022-11-21 DIAGNOSIS — N30.00 ACUTE CYSTITIS WITHOUT HEMATURIA: ICD-10-CM

## 2022-11-21 DIAGNOSIS — N34.2 URETHRITIS: Primary | ICD-10-CM

## 2022-11-21 DIAGNOSIS — R30.0 DYSURIA: ICD-10-CM

## 2022-11-21 DIAGNOSIS — N89.8 VAGINAL DISCHARGE: Primary | ICD-10-CM

## 2022-11-21 LAB
ALBUMIN UR-MCNC: NEGATIVE MG/DL
APPEARANCE UR: CLEAR
BACTERIA #/AREA URNS HPF: ABNORMAL /HPF
BILIRUB UR QL STRIP: NEGATIVE
CLUE CELLS: PRESENT
COLOR UR AUTO: YELLOW
GLUCOSE UR STRIP-MCNC: NEGATIVE MG/DL
HGB UR QL STRIP: NEGATIVE
KETONES UR STRIP-MCNC: NEGATIVE MG/DL
LEUKOCYTE ESTERASE UR QL STRIP: NEGATIVE
NITRATE UR QL: NEGATIVE
PH UR STRIP: 7 [PH] (ref 5–8)
RBC #/AREA URNS AUTO: ABNORMAL /HPF
SP GR UR STRIP: 1.01 (ref 1–1.03)
SQUAMOUS #/AREA URNS AUTO: ABNORMAL /LPF
TRICHOMONAS, WET PREP: ABNORMAL
UROBILINOGEN UR STRIP-ACNC: 0.2 E.U./DL
WBC #/AREA URNS AUTO: ABNORMAL /HPF
WBC'S/HIGH POWER FIELD, WET PREP: ABNORMAL
YEAST, WET PREP: ABNORMAL

## 2022-11-21 PROCEDURE — 87210 SMEAR WET MOUNT SALINE/INK: CPT | Performed by: PHYSICIAN ASSISTANT

## 2022-11-21 PROCEDURE — 87086 URINE CULTURE/COLONY COUNT: CPT | Performed by: PHYSICIAN ASSISTANT

## 2022-11-21 PROCEDURE — 99207 PR NON-BILLABLE SERV PER CHARTING: CPT | Performed by: PHYSICIAN ASSISTANT

## 2022-11-21 PROCEDURE — 99213 OFFICE O/P EST LOW 20 MIN: CPT | Performed by: PHYSICIAN ASSISTANT

## 2022-11-21 PROCEDURE — 81001 URINALYSIS AUTO W/SCOPE: CPT | Performed by: PHYSICIAN ASSISTANT

## 2022-11-21 NOTE — PATIENT INSTRUCTIONS
Dear Georgie Scott,    We are sorry you are not feeling well. Based on the responses you provided, it is recommended that you be seen in-person in urgent care so we can better evaluate your symptoms. Please click here to find the nearest urgent care location to you.   You will not be charged for this Visit. Thank you for trusting us with your care.    Brad Lopez PA-C

## 2022-11-22 NOTE — PROGRESS NOTES
Chief Complaint   Patient presents with     Dysuria     Ongoing for a few weeks       Vaginal Problem     Possible yeast infection         ASSESSMENT/PLAN:  Georgie was seen today for dysuria and vaginal problem.    Diagnoses and all orders for this visit:    Urethritis    Dysuria  -     UA macro with reflex to Microscopic and Culture - Clinc Collect  -     phenazopyridine (AZO) 97.5 MG tablet; Take 1 tablet (97.5 mg) by mouth 3 times daily as needed for pain or urinary tract discomfort  -     Urine Culture Aerobic Bacterial - lab collect; Future  -     [facility] - Urine Micro Only; Future  -     [facility] - Urine Micro Only  -     Urine Culture Aerobic Bacterial - lab collect    Vaginal itching  -     Wet prep - Clinic Collect    No noted respecters or STI  Being treated for BV, continue Flagyl  No signs of UTI today.  We will check culture.  Start antibiotics if cultures are positive.  May be irritant urethritis.  Seems less likely to be fungal given wet prep findings that were negative for yeast and trichomoniasis.    Started AZO, has follow-up appointment in 1 week with primary care provider, seek care if symptoms worsen    Hans Jimenez PA-C      SUBJECTIVE:  Georgie is a 32 year old female who presents to urgent care with ongoing dysuria.  Symptoms initially started 3 weeks ago when she had a UTI after camping.  She was then treated for UTI and subsequently developed a yeast infection and she was treated with Diflucan.  She has been having dysuria and urethral burning since then.  Was treated with Flagyl for BV 5 days ago.  Was having discharge at that time and that has improved.  Feels like her urine is darker than usual.   in a closed relationship  No feminine hygiene products use, no new detergents or soaps    ROS: Pertinent ROS neg other than the symptoms noted above in the HPI.     OBJECTIVE:  BP (!) 134/92 (BP Location: Right arm, Patient Position: Sitting, Cuff Size: Adult Regular)   Pulse 88   Temp  98.1  F (36.7  C) (Oral)   Resp 16   Wt 76.2 kg (168 lb)   LMP  (LMP Unknown)   SpO2 99%   BMI 26.71 kg/m     GENERAL: healthy, alert and no distress    DIAGNOSTICS    Results for orders placed or performed in visit on 11/21/22   UA macro with reflex to Microscopic and Culture - Clinc Collect     Status: Normal    Specimen: Urine, Clean Catch   Result Value Ref Range    Color Urine Yellow Colorless, Straw, Light Yellow, Yellow    Appearance Urine Clear Clear    Glucose Urine Negative Negative mg/dL    Bilirubin Urine Negative Negative    Ketones Urine Negative Negative mg/dL    Specific Gravity Urine 1.015 1.005 - 1.030    Blood Urine Negative Negative    pH Urine 7.0 5.0 - 8.0    Protein Albumin Urine Negative Negative mg/dL    Urobilinogen Urine 0.2 0.2, 1.0 E.U./dL    Nitrite Urine Negative Negative    Leukocyte Esterase Urine Negative Negative    Narrative    Microscopic not indicated   Wet prep - Clinic Collect     Status: Abnormal    Specimen: Vagina; Swab   Result Value Ref Range    Trichomonas Absent Absent    Yeast Absent Absent    Clue Cells Present (A) Absent    WBCs/high power field 1+ (A) None        Current Outpatient Medications   Medication     amphetamine-dextroamphetamine (ADDERALL XR) 15 MG 24 hr capsule     amphetamine-dextroamphetamine (ADDERALL) 10 MG tablet     amphetamine-dextroamphetamine (ADDERALL) 10 MG tablet     amphetamine-dextroamphetamine (ADDERALL) 10 MG tablet     amphetamine-dextroamphetamine (ADDERALL) 10 MG tablet     amphetamine-dextroamphetamine (ADDERALL) 10 MG tablet     methocarbamol (ROBAXIN) 500 MG tablet     metroNIDAZOLE (FLAGYL) 500 MG tablet     spironolactone (ALDACTONE) 100 MG tablet     No current facility-administered medications for this visit.      Patient Active Problem List   Diagnosis     Persistent insomnia     Dysmenorrhea     ADHD (attention deficit hyperactivity disorder), inattentive type     Bloating     Cervical high risk HPV (human papillomavirus)  test positive     Controlled substance agreement - signed 2/9/22     Dyspepsia     Heartburn     Nausea     Rectal bleeding     Menorrhagia     Herniated cervical disc      Past Medical History:   Diagnosis Date     ADHD      Cervical high risk HPV (human papillomavirus) test positive 03/31/2021    10/1/14 NIL 1/5/18 NIL 3/31/21 NIL pap, +HR HPV (not 16/18). Plan: cotest in 1 year     Dyspepsia      Excessive bleeding in premenopausal period      Heartburn      Medical history non-contributory 01/22/2019     Persistent insomnia      Past Surgical History:   Procedure Laterality Date     COLONOSCOPY N/A 8/31/2021    Procedure: COLONOSCOPY;  Surgeon: Billy Thurston DO;  Location:  GI     LAPAROSCOPIC HYSTERECTOMY TOTAL Bilateral 2/11/2022    Procedure: TOTAL LAPAROSCOPIC HYSTERECTOMY BILATERAL SALPINGECTOMY;  Surgeon: Tiesha Hurley MD;  Location: [facility] Main OR     OTHER SURGICAL HISTORY  01/22/2019    No surgery history     REPLACE DISK CERVICAL ANTERIOR Right 6/15/2022    Procedure: Anterior cervical discectomy at cervical 5-6; placement of artificial disc for treatment of right arm symptoms; right incision;  Surgeon: Sam Oseguera MD;  Location:  OR     Family History   Problem Relation Age of Onset     Hypertension Father      Hypertension Paternal Grandmother      Colon Cancer Paternal Grandfather      Diabetes Paternal Uncle      Diabetes Paternal Aunt      Cerebrovascular Disease Maternal Grandmother      Dementia Maternal Grandmother      Brain Cancer Maternal Grandfather      Diabetes Type 2  Paternal Uncle      Diabetes Type 1 Paternal Uncle      Diabetes Paternal Aunt      Diabetes Paternal Aunt      Social History     Tobacco Use     Smoking status: Never     Smokeless tobacco: Never   Substance Use Topics     Alcohol use: Yes     Comment: moderate              The plan of care was discussed with the patient. They understand and agree with the course of treatment prescribed. A printed  summary was given including instructions and medications.  The use of [employer] services may have been used to construct the content in this note; any grammatical or spelling errors are non-intentional. Please contact the author of this note directly if you are in need of any clarification.

## 2022-11-23 LAB — BACTERIA UR CULT: NO GROWTH

## 2022-12-02 ENCOUNTER — OFFICE VISIT (OUTPATIENT)
Dept: FAMILY MEDICINE | Facility: CLINIC | Age: 32
End: 2022-12-02
Payer: COMMERCIAL

## 2022-12-02 VITALS
BODY MASS INDEX: 25.94 KG/M2 | RESPIRATION RATE: 16 BRPM | DIASTOLIC BLOOD PRESSURE: 71 MMHG | HEART RATE: 85 BPM | HEIGHT: 67 IN | TEMPERATURE: 98.6 F | WEIGHT: 165.25 LBS | SYSTOLIC BLOOD PRESSURE: 126 MMHG | OXYGEN SATURATION: 100 %

## 2022-12-02 DIAGNOSIS — R87.810 CERVICAL HIGH RISK HPV (HUMAN PAPILLOMAVIRUS) TEST POSITIVE: ICD-10-CM

## 2022-12-02 DIAGNOSIS — M54.12 CERVICAL RADICULAR PAIN: ICD-10-CM

## 2022-12-02 DIAGNOSIS — L70.0 ACNE VULGARIS: ICD-10-CM

## 2022-12-02 DIAGNOSIS — M79.10 MUSCLE PAIN: ICD-10-CM

## 2022-12-02 DIAGNOSIS — Z00.00 ANNUAL PHYSICAL EXAM: Primary | ICD-10-CM

## 2022-12-02 DIAGNOSIS — N89.8 VAGINAL DISCHARGE: ICD-10-CM

## 2022-12-02 DIAGNOSIS — Z79.899 CONTROLLED SUBSTANCE AGREEMENT SIGNED: ICD-10-CM

## 2022-12-02 DIAGNOSIS — F90.0 ADHD (ATTENTION DEFICIT HYPERACTIVITY DISORDER), INATTENTIVE TYPE: ICD-10-CM

## 2022-12-02 PROBLEM — R12 HEARTBURN: Status: RESOLVED | Noted: 2021-06-26 | Resolved: 2022-12-02

## 2022-12-02 PROBLEM — R14.0 BLOATING: Status: RESOLVED | Noted: 2021-06-26 | Resolved: 2022-12-02

## 2022-12-02 PROBLEM — N92.0 MENORRHAGIA: Status: RESOLVED | Noted: 2021-12-03 | Resolved: 2022-12-02

## 2022-12-02 PROBLEM — K62.5 RECTAL BLEEDING: Status: RESOLVED | Noted: 2021-06-26 | Resolved: 2022-12-02

## 2022-12-02 PROBLEM — N94.6 DYSMENORRHEA: Status: RESOLVED | Noted: 2018-08-01 | Resolved: 2022-12-02

## 2022-12-02 PROBLEM — R11.0 NAUSEA: Status: RESOLVED | Noted: 2021-06-26 | Resolved: 2022-12-02

## 2022-12-02 PROBLEM — R10.13 DYSPEPSIA: Status: RESOLVED | Noted: 2021-06-26 | Resolved: 2022-12-02

## 2022-12-02 LAB
AMPHETAMINES UR QL SCN: ABNORMAL
ANION GAP SERPL CALCULATED.3IONS-SCNC: 9 MMOL/L (ref 7–15)
BARBITURATES UR QL SCN: ABNORMAL
BENZODIAZ UR QL SCN: ABNORMAL
BUN SERPL-MCNC: 10.2 MG/DL (ref 6–20)
BZE UR QL SCN: ABNORMAL
CALCIUM SERPL-MCNC: 9.5 MG/DL (ref 8.6–10)
CANNABINOIDS UR QL SCN: ABNORMAL
CHLORIDE SERPL-SCNC: 104 MMOL/L (ref 98–107)
CLUE CELLS: ABNORMAL
CREAT SERPL-MCNC: 0.81 MG/DL (ref 0.51–0.95)
DEPRECATED HCO3 PLAS-SCNC: 24 MMOL/L (ref 22–29)
ETHANOL UR QL SCN: ABNORMAL
GFR SERPL CREATININE-BSD FRML MDRD: >90 ML/MIN/1.73M2
GLUCOSE SERPL-MCNC: 103 MG/DL (ref 70–99)
OPIATES UR QL SCN: ABNORMAL
PCP QUAL URINE (ROCHE): ABNORMAL
POTASSIUM SERPL-SCNC: 4 MMOL/L (ref 3.4–5.3)
SODIUM SERPL-SCNC: 137 MMOL/L (ref 136–145)
TRICHOMONAS, WET PREP: ABNORMAL
WBC'S/HIGH POWER FIELD, WET PREP: ABNORMAL
YEAST, WET PREP: ABNORMAL

## 2022-12-02 PROCEDURE — 36415 COLL VENOUS BLD VENIPUNCTURE: CPT | Performed by: FAMILY MEDICINE

## 2022-12-02 PROCEDURE — 80048 BASIC METABOLIC PNL TOTAL CA: CPT | Performed by: FAMILY MEDICINE

## 2022-12-02 PROCEDURE — 87624 HPV HI-RISK TYP POOLED RSLT: CPT | Performed by: FAMILY MEDICINE

## 2022-12-02 PROCEDURE — 87210 SMEAR WET MOUNT SALINE/INK: CPT | Performed by: FAMILY MEDICINE

## 2022-12-02 PROCEDURE — 99395 PREV VISIT EST AGE 18-39: CPT | Performed by: FAMILY MEDICINE

## 2022-12-02 PROCEDURE — 80307 DRUG TEST PRSMV CHEM ANLYZR: CPT | Performed by: FAMILY MEDICINE

## 2022-12-02 PROCEDURE — 99214 OFFICE O/P EST MOD 30 MIN: CPT | Mod: 25 | Performed by: FAMILY MEDICINE

## 2022-12-02 PROCEDURE — G0145 SCR C/V CYTO,THINLAYER,RESCR: HCPCS | Performed by: FAMILY MEDICINE

## 2022-12-02 RX ORDER — METHOCARBAMOL 500 MG/1
500 TABLET, FILM COATED ORAL 4 TIMES DAILY PRN
Qty: 60 TABLET | Refills: 2 | Status: SHIPPED | OUTPATIENT
Start: 2022-12-02

## 2022-12-02 RX ORDER — DEXTROAMPHETAMINE SACCHARATE, AMPHETAMINE ASPARTATE, DEXTROAMPHETAMINE SULFATE AND AMPHETAMINE SULFATE 2.5; 2.5; 2.5; 2.5 MG/1; MG/1; MG/1; MG/1
TABLET ORAL
Qty: 40 TABLET | Refills: 0 | Status: SHIPPED | OUTPATIENT
Start: 2022-12-02 | End: 2023-02-23

## 2022-12-02 RX ORDER — DEXTROAMPHETAMINE SACCHARATE, AMPHETAMINE ASPARTATE MONOHYDRATE, DEXTROAMPHETAMINE SULFATE AND AMPHETAMINE SULFATE 3.75; 3.75; 3.75; 3.75 MG/1; MG/1; MG/1; MG/1
15 CAPSULE, EXTENDED RELEASE ORAL DAILY
Qty: 30 CAPSULE | Refills: 0 | Status: SHIPPED | OUTPATIENT
Start: 2022-12-02 | End: 2023-02-23

## 2022-12-02 RX ORDER — SPIRONOLACTONE 100 MG/1
100 TABLET, FILM COATED ORAL DAILY
Qty: 90 TABLET | Refills: 3 | Status: SHIPPED | OUTPATIENT
Start: 2022-12-02 | End: 2024-01-05

## 2022-12-02 ASSESSMENT — ENCOUNTER SYMPTOMS
FREQUENCY: 0
HEADACHES: 0
SHORTNESS OF BREATH: 0
NAUSEA: 0
WEAKNESS: 0
PALPITATIONS: 0
DIZZINESS: 0
COUGH: 0
ABDOMINAL PAIN: 0
ARTHRALGIAS: 0
MYALGIAS: 1
NERVOUS/ANXIOUS: 0
PARESTHESIAS: 0
CONSTIPATION: 0
BREAST MASS: 0
DYSURIA: 0
FEVER: 0
SORE THROAT: 0
EYE PAIN: 0
JOINT SWELLING: 0
HEMATURIA: 0
CHILLS: 0
HEARTBURN: 0
DIARRHEA: 0
HEMATOCHEZIA: 0

## 2022-12-02 NOTE — PROGRESS NOTES
SUBJECTIVE:   CC: Georgie is an 32 year old who presents for preventive health visit.     Chief Complaints and History of Present Illnesses   Patient presents with     Physical     Fasting for labs.     Recheck Medication        Patient has been advised of split billing requirements and indicates understanding: Yes  Healthy Habits:     Getting at least 3 servings of Calcium per day:  Yes    Bi-annual eye exam:  Yes    Dental care twice a year:  NO    Sleep apnea or symptoms of sleep apnea:  None    Diet:  Regular (no restrictions)    Frequency of exercise:  None    Taking medications regularly:  Yes    Medication side effects:  None    PHQ-2 Total Score: 0    Additional concerns today:  Yes    VAGINAL DISCHARGE: recent BV and discharge, concerned for BV and yeast.    SPIRONOLACTONE: Would like to continue 100mg daily.    NECK: Still having intermittent pain in neck s/p surgery, responds well to methocarbamol, requests refill today.    HEALTH MAINTENANCE:   - pap: hysterectomy for dysmenorrhea / has a history of abnormal pap smear    - UDS:     Today's PHQ-2 Score:   PHQ-2 ( 1999 [employer]) 12/2/2022   Q1: Little interest or pleasure in doing things 0   Q2: Feeling down, depressed or hopeless 0   PHQ-2 Score 0   Q1: Little interest or pleasure in doing things Not at all   Q2: Feeling down, depressed or hopeless Not at all   PHQ-2 Score 0       Have you ever done Advance Care Planning? (For example, a Health Directive, POLST, or a discussion with a medical provider or your loved ones about your wishes): No, advance care planning information given to patient to review.  Patient declined advance care planning discussion at this time.    Social History     Tobacco Use     Smoking status: Never     Smokeless tobacco: Never   Substance Use Topics     Alcohol use: Yes     Comment: moderate     If you drink alcohol do you typically have >3 drinks per day or >7 drinks per week? No    Alcohol Use 12/2/2022   Prescreen: >3  drinks/day or >7 drinks/week? Yes   Prescreen: >3 drinks/day or >7 drinks/week? -   AUDIT SCORE  5       Reviewed orders with patient.  Reviewed health maintenance and updated orders accordingly - Yes      Breast Cancer Screening:    FHS-7:   Breast CA Risk Assessment (FHS-7) 2/9/2022 6/3/2022 12/2/2022   Did any of your first-degree relatives have breast or ovarian cancer? No No No   Did any of your relatives have bilateral breast cancer? No No No   Did any man in your family have breast cancer? No No No   Did any woman in your family have breast and ovarian cancer? No No No   Did any woman in your family have breast cancer before age 50 y? No No No   Do you have 2 or more relatives with breast and/or ovarian cancer? No No No   Do you have 2 or more relatives with breast and/or bowel cancer? No No No     Patient under 40 years of age: Routine Mammogram Screening not recommended.   Pertinent mammograms are reviewed under the imaging tab.    History of abnormal Pap smear: Status post hysterectomy. Pap still indicated. Will repeat x 1 due to prior HPV positive   PAP / HPV Latest Ref Rng & Units 3/31/2021 1/5/2018   PAP Negative for squamous intraepithelial lesion or malignancy. Negative for squamous intraepithelial lesion or malignancy  Electronically signed by Shazia Lowery CT (ASCP) on 4/8/2021 at  7:30 AM   -   PAP (Historical) - - NIL   HPV16 NEG Negative -   HPV18 NEG Negative -   HRHPV NEG Positive(A) -     Reviewed and updated as needed this visit by clinical staff     Meds              Reviewed and updated as needed this visit by Provider   Tobacco  Allergies  Meds  Problems  Med Hx  Surg Hx  Fam Hx  Soc   Hx           Review of Systems   Constitutional: Negative for chills and fever.   HENT: Negative for congestion, ear pain, hearing loss and sore throat.    Eyes: Negative for pain and visual disturbance.   Respiratory: Negative for cough and shortness of breath.    Cardiovascular: Negative for  "chest pain and palpitations.   Gastrointestinal: Negative for abdominal pain, constipation, diarrhea, heartburn, hematochezia and nausea.   Breasts:  Negative for tenderness, breast mass and discharge.   Genitourinary: Positive for vaginal discharge. Negative for dysuria, frequency, genital sores, hematuria, pelvic pain, urgency and vaginal bleeding.   Musculoskeletal: Positive for myalgias. Negative for arthralgias and joint swelling.   Skin: Negative for rash.   Neurological: Negative for dizziness, weakness, headaches and paresthesias.   Psychiatric/Behavioral: Negative for mood changes. The patient is not nervous/anxious.      See HPI above.     OBJECTIVE:   /71 (BP Location: Left arm, Patient Position: Sitting, Cuff Size: Adult Regular)   Pulse 85   Temp 98.6  F (37  C) (Oral)   Resp 16   Ht 1.702 m (5' 7\")   Wt 75 kg (165 lb 4 oz)   LMP  (LMP Unknown)   SpO2 100%   BMI 25.88 kg/m    Physical Exam  GENERAL: healthy, alert and no distress  EYES: Eyes grossly normal to inspection, PERRL and conjunctivae and sclerae normal  HENT: ear canals and TM's normal, nose and mouth without ulcers or lesions  NECK: no adenopathy, no asymmetry, masses, or scars and thyroid normal to palpation  RESP: lungs clear to auscultation - no rales, rhonchi or wheezes  BREAST: normal without masses, tenderness or nipple discharge and no palpable axillary masses or adenopathy  CV: regular rate and rhythm, normal S1 S2, no S3 or S4, no murmur, click or rub, no peripheral edema and peripheral pulses strong  ABDOMEN: soft, nontender, no hepatosplenomegaly, no masses and bowel sounds normal   (female): Normal external genitalia, no vaginal odor, discharge is white and slightly thickened. Vaginal cuff without lesions.  MS: no gross musculoskeletal defects noted, no edema  SKIN: no suspicious lesions or rashes  NEURO: Normal strength and tone, mentation intact and speech normal  PSYCH: mentation appears normal, affect " normal/bright        ASSESSMENT/PLAN:     1. Annual physical exam  Reviewed health history and health maintenance recommendations.    2. Controlled substance agreement - signed 12/2/22  3. ADHD (attention deficit hyperactivity disorder), inattentive type  - amphetamine-dextroamphetamine (ADDERALL XR) 15 MG 24 hr capsule; Take 1 capsule (15 mg) by mouth daily  Dispense: 30 capsule; Refill: 0  - amphetamine-dextroamphetamine (ADDERALL) 10 MG tablet; Take 1-2 tablets (10-20 mg) by mouth daily  Dispense: 40 tablet; Refill: 0  - Drug abuse screen 8 urine (UR)    Updated CSA. Annual UDS obtained. Continue current Adderall, effective.     4. Vaginal discharge  - Wet prep - Clinic Collect    Recent treatment for BV with residual symptoms, will recheck wet prep.    5. Cervical radicular pain  6. Muscle pain  - methocarbamol (ROBAXIN) 500 MG tablet; Take 1 tablet (500 mg) by mouth 4 times daily as needed for muscle spasms  Dispense: 60 tablet; Refill: 2    Still having intermittent neck pain s/p surgery, requests refill.     7. Acne vulgaris  - Basic metabolic panel  (Ca, Cl, CO2, Creat, Gluc, K, Na, BUN); Future  - spironolactone (ALDACTONE) 100 MG tablet; Take 1 tablet (100 mg) by mouth daily  Dispense: 90 tablet; Refill: 3  - Basic metabolic panel  (Ca, Cl, CO2, Creat, Gluc, K, Na, BUN)    Will continue spironolactone, monitoring labs today. Symptoms well controlled on spironolactone.     8. Hx high risk HPV (human papillomavirus) test positive - s/p hysterectomy  - Gynecologic Cytology (PAP Smear)     Recommend follow up pap of vaginal cuff after hysterectomy due to prior high risk positive HPV (hysterectomy was for dysmenorrhea), if normal may discontinue pap smears.    Patient has been advised of split billing requirements and indicates understanding: Yes      COUNSELING:  Reviewed preventive health counseling, as reflected in patient instructions      BMI:   Estimated body mass index is 25.88 kg/m  as calculated from  "the following:    Height as of this encounter: 1.702 m (5' 7\").    Weight as of this encounter: 75 kg (165 lb 4 oz).   Weight management plan: Discussed healthy diet and exercise guidelines      She reports that she has never smoked. She has never used smokeless tobacco.      Taylor Womack [facility]  "

## 2022-12-02 NOTE — RESULT ENCOUNTER NOTE
Patient updated by [employer] message with lab results.     Wet prep normal/reassuring.  I will follow up when the rest of the labs return.  Taylor Womack, DO

## 2022-12-02 NOTE — LETTER
[facility]  12/02/22  Patient: Georgie Scott  YOB: 1990  Medical Record Number: [id number]                                                                                  Non-Opioid Controlled Substance Agreement    This is an agreement between you and your provider regarding safe and appropriate use of controlled substances prescribed by your care team. Controlled substances are?medicines that can cause physical and mental dependence (abuse).     There are strict laws about having and using these medicines. We here at [facility] are  committed to working with you in your efforts to get better. To support you in this work, we'll help you schedule regular office appointments for medicine refills. If we must cancel or change your appointment for any reason, we'll make sure you have enough medicine to last until your next appointment.     As a Provider, I will:     Listen carefully to your concerns while treating you with respect.     Recommend a treatment plan that I believe is in your best interest and may involve therapies other than medicine.      Talk with you often about the possible benefits and the risk of harm of any medicine that we prescribe for you.    Assess the safety of this medicine and check how well it works.      Provide a plan on how to taper (discontinue or go off) using this medicine if the decision is made to stop its use.      ::  As a Patient, I understand controlled substances:       Are prescribed by my care provider to help me function or work and manage my condition(s).?    Are strong medicines and can cause serious side effects.       Need to be taken exactly as prescribed.?Combining controlled substances with certain medicines or chemicals (such as illegal drugs, alcohol, sedatives, sleeping pills, and benzodiazepines) can be dangerous or even fatal.? If I stop taking my medicines suddenly, I may have severe withdrawal symptoms.      The risks, benefits, and side effects of these medicine(s) were explained to me. I agree that:    1. I will take part in other treatments as advised by my care team. This may be psychiatry or counseling, physical therapy, behavioral therapy, group treatment or a referral to specialist.    2. I will keep all my appointments and understand this is part of the monitoring of controlled substances.?My care team may require an office visit for EVERY controlled substance refill. If I miss appointments or don t follow instructions, my care team may stop my medicine    3. I will take my medicines as prescribed. I will not change the dose or schedule unless my care team tells me to. There will be no refills if I run out early.      4. I may be asked to come to the clinic and complete a urine drug test or complete a pill count. If I don t give a urine sample or participate in a pill count, the care team may stop my medicine.    5. I will only receive controlled substance prescriptions from this clinic. If I am treated by another provider, I will tell them that I am taking controlled substances and that I have a treatment agreement with this provider. I will inform my [facility] care team within one business day if I am given a prescription for any controlled substance by another healthcare provider. My [facility] care team can contact other providers and pharmacists about my use of any medicines.    6. It is up to me to make sure that I don't run out of my medicines on weekends or holidays.?If my care team is willing to refill my prescription without a visit, I must request refills only during office hours. Refills may take up to 3 business days to process. I will use one pharmacy to fill all my controlled substance prescriptions. I will notify the clinic about any changes to my insurance or medicine availability.    7. I am responsible for my prescriptions. If the medicine/prescription is lost, stolen or  destroyed, it will not be replaced.?I also agree not to share controlled substance medicines with anyone.     8. I am aware I should not use any illegal or recreational drugs. I agree not to drink alcohol unless my care team says I can.     9. If I enroll in the [place] Medical Cannabis program, I will tell my care team before my next refill.    10. I will tell my care team right away if I become pregnant, have a new medical problem treated outside of my regular clinic, or have a change in my medicines.     11. I understand that this medicine can affect my thinking, judgment and reaction time.? Alcohol and drugs affect the brain and body, which can affect the safety of my driving. Being under the influence of alcohol or drugs can affect my decision-making, behaviors, personal safety and the safety of others. Driving while impaired (DWI) can occur if a person is driving, operating or in physical control of a car, motorcycle, boat, snowmobile, ATV, motorbike, off-road vehicle or any other motor vehicle ([place] Statute 169A.20). I understand the risk if I choose to drive or operate any vehicle or machinery.    I understand that if I do not follow any of the conditions above, my prescriptions or treatment may be stopped or changed.   I agree that my provider, clinic care team and pharmacy may work with any city, state or federal law enforcement agency that investigates the misuse, sale or other diversion of my controlled medicine. I will allow my provider to discuss my care with, or share a copy of, this agreement with any other treating provider, pharmacy or emergency room where I receive care.     I have read this agreement and have asked questions about anything I did not understand.    ________________________________________________________  Patient Signature - Georgie VILLEGAS Scott     ___________________                   Date     ________________________________________________________  Provider Signature - Taylor DUDLEY  Womack, DO       ___________________                   Date     ________________________________________________________  Witness Signature (required if provider not present while patient signing)          ___________________                   Date

## 2022-12-05 NOTE — RESULT ENCOUNTER NOTE
Patient updated by [employer] message with lab results.       Deysi Jacques,  Labs look good. The pap team will follow up with pap smear results.   Taylor Womack, DO

## 2022-12-07 LAB
BKR LAB AP GYN ADEQUACY: NORMAL
BKR LAB AP GYN INTERPRETATION: NORMAL
BKR LAB AP HPV REFLEX: NORMAL
BKR LAB AP LMP: NORMAL
BKR LAB AP PREVIOUS ABNL DX: NORMAL
BKR LAB AP PREVIOUS ABNORMAL: NORMAL
PATH REPORT.COMMENTS IMP SPEC: NORMAL
PATH REPORT.COMMENTS IMP SPEC: NORMAL
PATH REPORT.RELEVANT HX SPEC: NORMAL

## 2022-12-09 PROBLEM — R87.810 CERVICAL HIGH RISK HPV (HUMAN PAPILLOMAVIRUS) TEST POSITIVE: Status: RESOLVED | Noted: 2021-03-31 | Resolved: 2022-12-09

## 2022-12-09 LAB
HUMAN PAPILLOMA VIRUS 16 DNA: NEGATIVE
HUMAN PAPILLOMA VIRUS 18 DNA: NEGATIVE
HUMAN PAPILLOMA VIRUS FINAL DIAGNOSIS: NORMAL
HUMAN PAPILLOMA VIRUS OTHER HR: NEGATIVE

## 2022-12-22 ENCOUNTER — E-VISIT (OUTPATIENT)
Dept: URGENT CARE | Facility: CLINIC | Age: 32
End: 2022-12-22
Payer: COMMERCIAL

## 2022-12-22 DIAGNOSIS — B37.31 CANDIDAL VULVOVAGINITIS: Primary | ICD-10-CM

## 2022-12-22 PROCEDURE — 99421 OL DIG E/M SVC 5-10 MIN: CPT | Performed by: NURSE PRACTITIONER

## 2022-12-22 RX ORDER — FLUCONAZOLE 150 MG/1
150 TABLET ORAL ONCE
Qty: 1 TABLET | Refills: 0 | Status: SHIPPED | OUTPATIENT
Start: 2022-12-22 | End: 2022-12-22

## 2022-12-23 NOTE — PATIENT INSTRUCTIONS
Thank you for choosing us for your care. I have placed an order for a prescription so that you can start treatment. View your full visit summary for details by clicking on the link below. Your pharmacist will able to address any questions you may have about the medication.     If you re not feeling better within 2-3 days, please schedule an appointment.  You can schedule an appointment right here in [place], or call [phone]  If the visit is for the same symptoms as your eVisit, we ll refund the cost of your eVisit if seen within seven days.      Yeast Infection (Candida Vaginal Infection)    You have a Candida vaginal infection. This is also known as a yeast infection. It's most often caused by a type of yeast (fungus) called Candida. Candida are normally found in the vagina. But if they increase in number, this can lead to infection and cause symptoms.   Symptoms of a yeast infection can include:     Clumpy or thin, white discharge, which may look like cottage cheese    Itching or burning    Burning with urination  Certain factors can make a yeast infection more likely. These can include:     Taking certain medicines, such as antibiotics or birth control pills    Pregnancy    Diabetes    Weak immune system  A yeast infection is most often treated with antifungal medicine. This may be given as a vaginal cream or pills you take by mouth. Treatment may last for about 1 to 7 days. Women with severe or recurrent infections may need longer courses of treatment.   Home care    If you re prescribed medicine, be sure to use it as directed. Finish all of the medicine, even if your symptoms go away. Don t try to treat yourself using over-the-counter products without talking with your provider first. They will let you know if this is a good option for you.    Ask your provider what steps you can take to help reduce your risk of having a yeast infection in the future.    Follow-up care  Follow up with your healthcare  provider, or as directed.   When to seek medical advice  Call your healthcare provider right away if:     You have a fever of 100.4 F (38 C) or higher, or as directed by your provider.    Your symptoms worsen, or they don t go away within a few days of starting treatment.    You have new pain in the lower belly or pelvic region.    You have side effects that bother you or a reaction to the cream or pills you re prescribed.    You or any partners you have sex with have new symptoms, such as a rash, joint pain, or sores.  [employer] last reviewed this educational content on 7/1/2020 2000-2021 [employer]. All rights reserved. This information is not intended as a substitute for professional medical care. Always follow your healthcare professional's instructions.

## 2023-02-24 ENCOUNTER — OFFICE VISIT (OUTPATIENT)
Dept: PHYSICAL MEDICINE AND REHAB | Facility: CLINIC | Age: 33
End: 2023-02-24
Payer: COMMERCIAL

## 2023-02-24 VITALS
WEIGHT: 165 LBS | HEIGHT: 67 IN | DIASTOLIC BLOOD PRESSURE: 72 MMHG | HEART RATE: 90 BPM | BODY MASS INDEX: 25.9 KG/M2 | SYSTOLIC BLOOD PRESSURE: 118 MMHG

## 2023-02-24 DIAGNOSIS — M79.18 MYOFASCIAL PAIN: Primary | ICD-10-CM

## 2023-02-24 PROCEDURE — 99214 OFFICE O/P EST MOD 30 MIN: CPT | Performed by: PHYSICIAN ASSISTANT

## 2023-02-24 ASSESSMENT — PAIN SCALES - GENERAL: PAINLEVEL: MILD PAIN (2)

## 2023-02-24 NOTE — LETTER
2/24/2023         RE: Georgie Scott  [address]  [place] [address]        Dear Colleague,    Thank you for referring your patient, Georgie Scott, to the [facility]. Please see a copy of my visit note below.    Assessment:   Georgie Scott is a 32 y.o. y.o. female with past medical history significant for ADHD who presents today for follow-up regarding greater than 6-week history of right thoracic pain which began after shoveling snow.  Patient has a history of a C5-6 artificial disc Abbey 15, 2022 with Dr. Oseguera.  She is doing well from a cervical spine standpoint.  She has some chronic right upper extremity numbness which is stable.  Thoracic pain is most consistent with strain.  It has not improved despite chiropractic treatment, massage, TENS unit, heat, ice, topical pain relief medications and medical management with methocarbamol and NSAIDs.             Plan:     A shared decision making plan was used.  The patient's values and choices were respected.  The following represents what was discussed and decided upon by the physician assistant and the patient.      1.  DIAGNOSTIC TESTS: I reviewed the preoperative MRI cervical spine and flexion-extension x-rays.  - I reviewed the postoperative x-ray cervical spine.  Disc arthroplasty hardware is intact with no complications.  - No additional diagnostic test were ordered.  If a right thoracic paraspinous muscle trigger point injection fails to provide relief, I recommend an MRI thoracic spine.    2.  PHYSICAL THERAPY: No additional physical therapy was ordered.  Patient will continue with home exercises/stretches.    3.  MEDICATIONS: No changes are made to the patient's medications.  -Patient takes methocarbamol as needed.  - Patient takes ibuprofen 600 mg as needed.    4.  INTERVENTIONS: I offered the patient a right thoracic  paraspinous muscle trigger point injection under ultrasound guidance.  Patient  indicated she would like to proceed and an order was placed     5.  PATIENT EDUCATION: Patient is in agreement the above plan.  All questions were answered.    6.  FOLLOW-UP: Patient to follow-up with me 2 weeks after her right thoracic paraspinous muscle trigger point injection.  If she has questions or concerns in the meantime, she should not hesitate to call.    Subjective:     Georgie Scott is a 32 y.o. female who presents today for follow-up regarding a greater than 6-week history of right thoracic pain.  Patient is status post a C5-6 disc arthroplasty Abbey 15, 2022 with Dr. Oseguera.  Patient did well after her surgery.  She has had some ongoing right forearm numbness but otherwise feels like surgery was very successful.  Unfortunately, patient developed new thoracic pain while she was shoveling snow.  Pain came on abruptly and has been persistent.    Patient complains of right thoracic pain.  Pain is located just to the right of midline at approximately T5.  She then experiences pain in the right latissimus dorsi muscle.  She states sometimes she feels a line of pain radiating from the paraspinous region to the latissimus dorsi.  She denies pain wrapping around the chest wall.  Denies pain down the arms or legs.  Denies any new numbness, tingling.  Denies weakness.  She rates her pain today as a 2 out of 10.  At its worst it is a 7 out of 10.  At its best it is a 0 out of 10.  Pain is aggravated with stretching, working, lying on her side.  Pain is alleviated with lying in bed.  She has tried a number of modalities which all provide temporary relief including chiropractic treatment (although adjustment does not last long), massage, cupping, TENS unit, self massage tool, heat, ice, IcyHot.    Treatment to date:  - 4 sessions of physical therapy May to June 2022  -Patient completed 9 sessions of medics physical therapy November 4, 2020.  - Methocarbamol as needed which is only slightly helpful  - Ibuprofen  610 mg daily with slight relief.      Review of Systems:    Negative for numbness/tingling, headache, weakness, weakness, loss of bowel/bladder control, inability to urinate, pain much worse at night, trip/stumble/falls, difficulty swallowing, difficulty with hand skills, fevers, unintentional weight loss.     Objective:   CONSTITUTIONAL:  Vital signs as above.  No acute distress.  The patient is well nourished and well groomed.    PSYCHIATRIC:  The patient is awake, alert, oriented to person, place and time.  The patient is answering questions appropriately with clear speech.  Normal affect.  HEENT: Normocephalic, atraumatic.  Sclera clear.  Neck is supple.  SKIN:  Skin over the face, neck bilateral upper extremities is clean, dry, intact without rashes.  MUSCULOSKELETAL: The patient has 5/5 strength for the bilateral shoulder abductors, elbow flexors/extensors, wrist extensors, finger flexors/abductors, hip flexors, knee flexors/extensors, ankle dorsi/plantar flexors.  Hypertonicity right thoracic paraspinous muscle with tenderness at approximately T5.  NEUROLOGICAL: 1-2+ bilateral biceps, triceps, brachioradialis, patellar, and Achilles reflexes.  Negative Johnson's bilaterally.  No ankle clonus.  Negative Babinski's.  Diminished sensation right extensor forearm.    RESULTS: I reviewed the MRI cervical spine from [facility] dated February 9, 2022.  At C5-6 there is moderate right foraminal stenosis which has increased slightly compared to the scan from 2019.  At C3-4 there is mild right foraminal stenosis.  Please see report for further details.    I also reviewed flexion-extension cervical spine x-rays from [facility] dated February 7, 2022.  This shows no evidence for instability.    EMG bilateral upper extremity September 4, 2020:  EMG/NCS  results: Please see scanned full report     Comment NCS: Normal study  1.  Normal nerve conduction studies bilateral upper extremities.     Comment EMG: Normal study  1.   Normal needle EMG bilateral upper extremities.     Interpretation: Normal study     1. There is no electrodiagnostic evidence of cervical radiculopathy, brachial plexopathy, or focal neuropathy in the bilateral upper extremities.  Specifically, there is no electrodiagnostic evidence of median neuropathy at the wrist in the bilateral upper extremities.    EXAM: XR CERVICAL SPINE 2/3 VIEWS  LOCATION: [facility]  DATE/TIME: 7/25/2022 8:07 AM     INDICATION:  Cervical radicular pain  COMPARISON: 02/07/2022  TECHNIQUE: CR Cervical Spine.                                                                      IMPRESSION: C5-C6 disc arthroplasty hardware is intact. No finding to suggest complication. Sagittal alignment is normal. Mild cervical levocurvature. No displaced fracture. Vertebral body heights are maintained. No pathologic marrow signal. Remaining   interbody spaces are maintained. No significant facet arthropathy. Prevertebral soft tissues and included lungs are normal.      Again, thank you for allowing me to participate in the care of your patient.        Sincerely,        Kay Mcrae PA-C

## 2023-02-24 NOTE — PATIENT INSTRUCTIONS
A right thoracic paraspinous muscle trigger point injection has been ordered today.      Please note that this injection uses cortisone.  The cortisone may somewhat weaken the immune system.  It is unknown how much the immune system is weakened.  It is unknown if it is weakened to the point that you may be more likely to get the COVID-19 virus, or if you do get the COVID-19 virus, if you would be sicker than you would have been if you had not had the cortisone injection.  If you do not wish to proceed with the injection, please let the nurse/physician know and do NOT schedule the injection.    Please note that since your immune system is weakened from the cortisone, having any vaccine/shot may be less effective if you have this vaccine within 2 weeks from your cortisone injection.  It is advised to wait 2 weeks after your cortisone injection to have any vaccine (or if you have a vaccine first, wait 2 weeks before you have the cortisone injection).    Please schedule this injection at least 1 week  from now to allow time for insurance prior authorization.  On the day of your injection, you cannot be sick or taking antibiotics.  If you become sick and are prescribed, please call the clinic so your injection can be rescheduled for once you have completed your antibiotics.   If you have any questions or concerns prior to your injection, please do not hesitate to call the nurse navigation line at [phone] or contact Kay Mcrae through [employer].

## 2023-02-24 NOTE — PROGRESS NOTES
Assessment:   Georgie Scott is a 32 y.o. y.o. female with past medical history significant for ADHD who presents today for follow-up regarding greater than 6-week history of right thoracic pain which began after shoveling snow.  Patient has a history of a C5-6 artificial disc Abbey 15, 2022 with Dr. Oseguera.  She is doing well from a cervical spine standpoint.  She has some chronic right upper extremity numbness which is stable.  Thoracic pain is most consistent with strain.  It has not improved despite chiropractic treatment, massage, TENS unit, heat, ice, topical pain relief medications and medical management with methocarbamol and NSAIDs.             Plan:     A shared decision making plan was used.  The patient's values and choices were respected.  The following represents what was discussed and decided upon by the physician assistant and the patient.      1.  DIAGNOSTIC TESTS: I reviewed the preoperative MRI cervical spine and flexion-extension x-rays.  - I reviewed the postoperative x-ray cervical spine.  Disc arthroplasty hardware is intact with no complications.  - No additional diagnostic test were ordered.  If a right thoracic paraspinous muscle trigger point injection fails to provide relief, I recommend an MRI thoracic spine.    2.  PHYSICAL THERAPY: No additional physical therapy was ordered.  Patient will continue with home exercises/stretches.    3.  MEDICATIONS: No changes are made to the patient's medications.  -Patient takes methocarbamol as needed.  - Patient takes ibuprofen 600 mg as needed.    4.  INTERVENTIONS: I offered the patient a right thoracic  paraspinous muscle trigger point injection under ultrasound guidance.  Patient indicated she would like to proceed and an order was placed     5.  PATIENT EDUCATION: Patient is in agreement the above plan.  All questions were answered.    6.  FOLLOW-UP: Patient to follow-up with me 2 weeks after her right thoracic paraspinous muscle trigger point  injection.  If she has questions or concerns in the meantime, she should not hesitate to call.    Subjective:     Georgie Scott is a 32 y.o. female who presents today for follow-up regarding a greater than 6-week history of right thoracic pain.  Patient is status post a C5-6 disc arthroplasty Abbey 15, 2022 with Dr. Oseguera.  Patient did well after her surgery.  She has had some ongoing right forearm numbness but otherwise feels like surgery was very successful.  Unfortunately, patient developed new thoracic pain while she was shoveling snow.  Pain came on abruptly and has been persistent.    Patient complains of right thoracic pain.  Pain is located just to the right of midline at approximately T5.  She then experiences pain in the right latissimus dorsi muscle.  She states sometimes she feels a line of pain radiating from the paraspinous region to the latissimus dorsi.  She denies pain wrapping around the chest wall.  Denies pain down the arms or legs.  Denies any new numbness, tingling.  Denies weakness.  She rates her pain today as a 2 out of 10.  At its worst it is a 7 out of 10.  At its best it is a 0 out of 10.  Pain is aggravated with stretching, working, lying on her side.  Pain is alleviated with lying in bed.  She has tried a number of modalities which all provide temporary relief including chiropractic treatment (although adjustment does not last long), massage, cupping, TENS unit, self massage tool, heat, ice, IcyHot.    Treatment to date:  - 4 sessions of physical therapy May to June 2022  -Patient completed 9 sessions of medics physical therapy November 4, 2020.  - Methocarbamol as needed which is only slightly helpful  - Ibuprofen 610 mg daily with slight relief.      Review of Systems:    Negative for numbness/tingling, headache, weakness, weakness, loss of bowel/bladder control, inability to urinate, pain much worse at night, trip/stumble/falls, difficulty swallowing, difficulty with hand skills,  fevers, unintentional weight loss.     Objective:   CONSTITUTIONAL:  Vital signs as above.  No acute distress.  The patient is well nourished and well groomed.    PSYCHIATRIC:  The patient is awake, alert, oriented to person, place and time.  The patient is answering questions appropriately with clear speech.  Normal affect.  HEENT: Normocephalic, atraumatic.  Sclera clear.  Neck is supple.  SKIN:  Skin over the face, neck bilateral upper extremities is clean, dry, intact without rashes.  MUSCULOSKELETAL: The patient has 5/5 strength for the bilateral shoulder abductors, elbow flexors/extensors, wrist extensors, finger flexors/abductors, hip flexors, knee flexors/extensors, ankle dorsi/plantar flexors.  Hypertonicity right thoracic paraspinous muscle with tenderness at approximately T5.  NEUROLOGICAL: 1-2+ bilateral biceps, triceps, brachioradialis, patellar, and Achilles reflexes.  Negative Johnson's bilaterally.  No ankle clonus.  Negative Babinski's.  Diminished sensation right extensor forearm.    RESULTS: I reviewed the MRI cervical spine from [facility] dated February 9, 2022.  At C5-6 there is moderate right foraminal stenosis which has increased slightly compared to the scan from 2019.  At C3-4 there is mild right foraminal stenosis.  Please see report for further details.    I also reviewed flexion-extension cervical spine x-rays from [facility] dated February 7, 2022.  This shows no evidence for instability.    EMG bilateral upper extremity September 4, 2020:  EMG/NCS  results: Please see scanned full report     Comment NCS: Normal study  1.  Normal nerve conduction studies bilateral upper extremities.     Comment EMG: Normal study  1.  Normal needle EMG bilateral upper extremities.     Interpretation: Normal study     1. There is no electrodiagnostic evidence of cervical radiculopathy, brachial plexopathy, or focal neuropathy in the bilateral upper extremities.  Specifically, there is no electrodiagnostic  evidence of median neuropathy at the wrist in the bilateral upper extremities.    EXAM: XR CERVICAL SPINE 2/3 VIEWS  LOCATION: [facility]  DATE/TIME: 7/25/2022 8:07 AM     INDICATION:  Cervical radicular pain  COMPARISON: 02/07/2022  TECHNIQUE: CR Cervical Spine.                                                                      IMPRESSION: C5-C6 disc arthroplasty hardware is intact. No finding to suggest complication. Sagittal alignment is normal. Mild cervical levocurvature. No displaced fracture. Vertebral body heights are maintained. No pathologic marrow signal. Remaining   interbody spaces are maintained. No significant facet arthropathy. Prevertebral soft tissues and included lungs are normal.

## 2023-03-20 ENCOUNTER — E-VISIT (OUTPATIENT)
Dept: OBGYN | Facility: CLINIC | Age: 33
End: 2023-03-20
Payer: COMMERCIAL

## 2023-03-20 DIAGNOSIS — N89.8 VAGINAL DISCHARGE: Primary | ICD-10-CM

## 2023-03-20 PROCEDURE — 99421 OL DIG E/M SVC 5-10 MIN: CPT | Performed by: FAMILY MEDICINE

## 2023-03-20 NOTE — PATIENT INSTRUCTIONS
Thank you for choosing us for your care. Given your symptoms, I would like you to do a lab-only visit to determine what is causing them.  I have placed the orders.  Please schedule an appointment with the lab right here in [email or web address], or call [phone].  I will let you know when the results are back and next steps to take.

## 2023-03-21 ENCOUNTER — APPOINTMENT (OUTPATIENT)
Dept: LAB | Facility: CLINIC | Age: 33
End: 2023-03-21
Payer: COMMERCIAL

## 2023-03-21 PROCEDURE — 87210 SMEAR WET MOUNT SALINE/INK: CPT | Performed by: FAMILY MEDICINE

## 2023-03-21 NOTE — RESULT ENCOUNTER NOTE
Patient updated by [employer] message with lab results.      Deysi Jacques,  Your wet prep is actually negative for clue cells which we see with bacterial vaginitis or yeast cells which we see with vaginal candidal infections.  What is the update on your symptoms?  Taylor Womack, DO

## 2023-05-25 ENCOUNTER — OFFICE VISIT (OUTPATIENT)
Dept: FAMILY MEDICINE | Facility: CLINIC | Age: 33
End: 2023-05-25
Payer: COMMERCIAL

## 2023-05-25 VITALS
RESPIRATION RATE: 10 BRPM | DIASTOLIC BLOOD PRESSURE: 79 MMHG | HEIGHT: 67 IN | SYSTOLIC BLOOD PRESSURE: 118 MMHG | WEIGHT: 166.2 LBS | HEART RATE: 81 BPM | OXYGEN SATURATION: 100 % | BODY MASS INDEX: 26.09 KG/M2 | TEMPERATURE: 98.4 F

## 2023-05-25 DIAGNOSIS — F90.0 ADHD (ATTENTION DEFICIT HYPERACTIVITY DISORDER), INATTENTIVE TYPE: ICD-10-CM

## 2023-05-25 DIAGNOSIS — Z79.899 CONTROLLED SUBSTANCE AGREEMENT SIGNED: ICD-10-CM

## 2023-05-25 PROCEDURE — 99213 OFFICE O/P EST LOW 20 MIN: CPT | Performed by: FAMILY MEDICINE

## 2023-05-25 RX ORDER — DEXTROAMPHETAMINE SACCHARATE, AMPHETAMINE ASPARTATE MONOHYDRATE, DEXTROAMPHETAMINE SULFATE AND AMPHETAMINE SULFATE 3.75; 3.75; 3.75; 3.75 MG/1; MG/1; MG/1; MG/1
15 CAPSULE, EXTENDED RELEASE ORAL DAILY
Qty: 30 CAPSULE | Refills: 0 | Status: CANCELLED | OUTPATIENT
Start: 2023-05-25

## 2023-05-25 RX ORDER — DEXTROAMPHETAMINE SACCHARATE, AMPHETAMINE ASPARTATE, DEXTROAMPHETAMINE SULFATE AND AMPHETAMINE SULFATE 5; 5; 5; 5 MG/1; MG/1; MG/1; MG/1
20 TABLET ORAL 2 TIMES DAILY
Qty: 60 TABLET | Refills: 0 | Status: SHIPPED | OUTPATIENT
Start: 2023-05-25 | End: 2023-07-06

## 2023-05-25 NOTE — PROGRESS NOTES
"  Assessment & Plan     1. Controlled substance agreement - signed 12/2/22  2. ADHD (attention deficit hyperactivity disorder), inattentive type  - amphetamine-dextroamphetamine (ADDERALL) 20 MG tablet; Take 1 tablet (20 mg) by mouth 2 times daily TAKE ONE TO TWO TABLETS (10-20 MG) BY MOUTH ONCE DAILY  Dispense: 60 tablet; Refill: 0     New insurance plan, extended release is cost prohibitive.  We will trial short acting twice daily.  If medication is wearing off too fast, trial 20 mg twice daily.  Send me an update via [employer] with how things are going and we can adjust as needed.    Taylor Womack, [facility]    Low Jacques is a 33 year old, presenting for the following health issues:  Med check        5/25/2023     9:51 AM   Additional Questions   Roomed by GUILHERME Shannon CMA([facility])     History of Present Illness       Reason for visit:  Med check    She eats 2-3 servings of fruits and vegetables daily.She consumes 1 sweetened beverage(s) daily.She exercises with enough effort to increase her heart rate 20 to 29 minutes per day.  She exercises with enough effort to increase her heart rate 4 days per week.   She is taking medications regularly.     ADHD: Wearing off, changed insurance to high deductible and extended release too expensive.     Doing well with spironolactone, doing well.     Review of Systems   See HPI above.       Objective    /79   Pulse 81   Temp 98.4  F (36.9  C) (Oral)   Resp 10   Ht 1.689 m (5' 6.5\")   Wt 75.4 kg (166 lb 3.2 oz)   LMP  (LMP Unknown)   SpO2 100%   BMI 26.42 kg/m    Body mass index is 26.42 kg/m .  Physical Exam   GENERAL: healthy, alert and no distress  NECK: no adenopathy, no asymmetry, masses, or scars and thyroid normal to palpation  RESP: lungs clear to auscultation - no rales, rhonchi or wheezes  CV: regular rate and rhythm, normal S1 S2, no S3 or S4, no murmur, click or rub, no peripheral edema and peripheral pulses " strong  MS: no gross musculoskeletal defects noted, no edema

## 2023-09-05 DIAGNOSIS — F90.0 ADHD (ATTENTION DEFICIT HYPERACTIVITY DISORDER), INATTENTIVE TYPE: ICD-10-CM

## 2023-09-05 DIAGNOSIS — Z79.899 CONTROLLED SUBSTANCE AGREEMENT SIGNED: ICD-10-CM

## 2023-09-05 RX ORDER — DEXTROAMPHETAMINE SACCHARATE, AMPHETAMINE ASPARTATE, DEXTROAMPHETAMINE SULFATE AND AMPHETAMINE SULFATE 5; 5; 5; 5 MG/1; MG/1; MG/1; MG/1
TABLET ORAL
Qty: 60 TABLET | Refills: 0 | Status: SHIPPED | OUTPATIENT
Start: 2023-09-05 | End: 2024-01-05

## 2023-09-05 NOTE — TELEPHONE ENCOUNTER
1. Controlled substance agreement - signed 12/2/22  2. ADHD (attention deficit hyperactivity disorder), inattentive type  - amphetamine-dextroamphetamine (ADDERALL) 20 MG tablet; TAKE ONE TO TWO TABLETS BY MOUTH EVERY DAY  Dispense: 60 tablet; Refill: 0     Reviewed [place] .  Patient is due for refill.  CSA up-to-date.  Next med check scheduled.  Refill sent to pharmacy.    Taylor Womack DO

## 2023-11-18 ENCOUNTER — E-VISIT (OUTPATIENT)
Dept: URGENT CARE | Facility: CLINIC | Age: 33
End: 2023-11-18
Payer: COMMERCIAL

## 2023-11-18 DIAGNOSIS — H92.09 OTALGIA, UNSPECIFIED LATERALITY: Primary | ICD-10-CM

## 2023-11-18 PROCEDURE — 99207 PR NON-BILLABLE SERV PER CHARTING: CPT | Performed by: FAMILY MEDICINE

## 2023-11-18 NOTE — PATIENT INSTRUCTIONS
Dear Georgie Scott,    We are sorry you are not feeling well. Based on the responses you provided, it is recommended that you be seen in-person in urgent care so we can better evaluate your symptoms. Please click here to find the nearest urgent care location to you.   You will not be charged for this Visit. Thank you for trusting us with your care.    Kareen Hutson MD

## 2023-11-23 ENCOUNTER — OFFICE VISIT (OUTPATIENT)
Dept: FAMILY MEDICINE | Facility: CLINIC | Age: 33
End: 2023-11-23
Payer: COMMERCIAL

## 2023-11-23 VITALS
BODY MASS INDEX: 25.77 KG/M2 | HEART RATE: 90 BPM | OXYGEN SATURATION: 99 % | SYSTOLIC BLOOD PRESSURE: 122 MMHG | WEIGHT: 162.1 LBS | TEMPERATURE: 98.7 F | RESPIRATION RATE: 16 BRPM | DIASTOLIC BLOOD PRESSURE: 83 MMHG

## 2023-11-23 DIAGNOSIS — H65.92 LEFT NON-SUPPURATIVE OTITIS MEDIA: Primary | ICD-10-CM

## 2023-11-23 PROCEDURE — 99213 OFFICE O/P EST LOW 20 MIN: CPT | Performed by: PHYSICIAN ASSISTANT

## 2023-11-23 RX ORDER — CEFDINIR 300 MG/1
300 CAPSULE ORAL 2 TIMES DAILY
Qty: 20 CAPSULE | Refills: 0 | Status: SHIPPED | OUTPATIENT
Start: 2023-11-23 | End: 2023-12-03

## 2023-11-23 ASSESSMENT — ENCOUNTER SYMPTOMS
FEVER: 0
COUGH: 1

## 2023-11-23 NOTE — PATIENT INSTRUCTIONS
1) Take antibiotic as prescribed. Take this medication with food to avoid stomach upset.   2) Ibuprofen or Tylenol as needed for fever or pain.  3) Follow up in 4 days if not improving, sooner if worsening or other concerns.

## 2023-11-23 NOTE — PROGRESS NOTES
Patient presents with:  Ear Problem        ICD-10-CM    1. Left non-suppurative otitis media  H65.92 cefdinir (OMNICEF) 300 MG capsule          Patient Instructions   1) Take antibiotic as prescribed. Take this medication with food to avoid stomach upset.   2) Ibuprofen or Tylenol as needed for fever or pain.  3) Follow up in 4 days if not improving, sooner if worsening or other concerns.       HPI:  Georgie Scott is a 33 year old female who presents today complaining of left ear pain x 2 days. Patient started having sick symptoms 1 week ago.     History obtained from the patient.    Problem List:  2022-06: Herniated cervical disc  2021-12: Menorrhagia  2021-07: Controlled substance agreement - signed 12/2/22 2021-06: Bloating  2021-06: Dyspepsia  2021-06: Heartburn  2021-06: Nausea  2021-06: Rectal bleeding  2021-03: Cervical high risk HPV (human papillomavirus) test positive  2019-03: ADHD (attention deficit hyperactivity disorder), inattentive   type  2018-08: Dysmenorrhea  2018-01: Persistent insomnia      Past Medical History:   Diagnosis Date    ADHD     Cervical high risk HPV (human papillomavirus) test positive 03/31/2021    10/1/14 NIL 1/5/18 NIL 3/31/21 NIL pap, +HR HPV (not 16/18). Plan: cotest in 1 year    Dyspepsia     Excessive bleeding in premenopausal period     Heartburn     Persistent insomnia        Social History     Tobacco Use    Smoking status: Never     Passive exposure: Never    Smokeless tobacco: Never   Substance Use Topics    Alcohol use: Yes     Comment: moderate       Review of Systems   Constitutional:  Negative for fever (resolved).   HENT:  Positive for congestion and ear pain (left).    Respiratory:  Positive for cough.        Vitals:    11/23/23 1021   BP: 122/83   Pulse: 90   Resp: 16   Temp: 98.7  F (37.1  C)   TempSrc: Oral   SpO2: 99%   Weight: 73.5 kg (162 lb 1.6 oz)       Physical Exam  Vitals and nursing note reviewed.   Constitutional:       General: She is not in acute  distress.     Appearance: She is not toxic-appearing or diaphoretic.   HENT:      Head: Normocephalic and atraumatic.      Right Ear: Tympanic membrane, ear canal and external ear normal.      Left Ear: Ear canal and external ear normal. Tympanic membrane is injected and bulging. Tympanic membrane is not perforated.      Mouth/Throat:      Pharynx: No posterior oropharyngeal erythema.   Eyes:      Conjunctiva/sclera: Conjunctivae normal.   Cardiovascular:      Rate and Rhythm: Normal rate and regular rhythm.      Heart sounds: No murmur heard.  Pulmonary:      Effort: Pulmonary effort is normal. No respiratory distress.      Breath sounds: No stridor. No wheezing, rhonchi or rales.   Neurological:      Mental Status: She is alert.   Psychiatric:         Mood and Affect: Mood normal.         Behavior: Behavior normal.         Thought Content: Thought content normal.         Judgment: Judgment normal.       At the end of the encounter, I discussed results, diagnosis, medications. Discussed red flags for immediate return to clinic/ER, as well as indications for follow up if no improvement. Patient understood and agreed to plan. Patient was stable for discharge.

## 2023-11-29 ENCOUNTER — E-VISIT (OUTPATIENT)
Dept: URGENT CARE | Facility: CLINIC | Age: 33
End: 2023-11-29
Payer: COMMERCIAL

## 2023-11-29 DIAGNOSIS — J01.90 ACUTE BACTERIAL SINUSITIS: ICD-10-CM

## 2023-11-29 DIAGNOSIS — J32.9 OTHER SINUSITIS, UNSPECIFIED CHRONICITY: Primary | ICD-10-CM

## 2023-11-29 DIAGNOSIS — B96.89 ACUTE BACTERIAL SINUSITIS: ICD-10-CM

## 2023-11-29 PROCEDURE — 99421 OL DIG E/M SVC 5-10 MIN: CPT | Performed by: EMERGENCY MEDICINE

## 2023-11-29 RX ORDER — AZITHROMYCIN 250 MG/1
TABLET, FILM COATED ORAL
Qty: 6 TABLET | Refills: 0 | Status: SHIPPED | OUTPATIENT
Start: 2023-11-29 | End: 2023-12-04

## 2023-11-29 NOTE — PATIENT INSTRUCTIONS
Acute Sinusitis: Care Instructions  Overview     Acute sinusitis is an inflammation of the mucous membranes inside the nose and sinuses. Sinuses are the hollow spaces in your skull around the eyes and nose. Acute sinusitis often follows a cold. Acute sinusitis causes thick, discolored mucus that drains from the nose or down the back of the throat. It also can cause pain and pressure in your head and face along with a stuffy or blocked nose.  In most cases, sinusitis gets better on its own in 1 to 2 weeks. But some mild symptoms may last for several weeks. Sometimes antibiotics are needed if there is a bacterial infection.  Follow-up care is a key part of your treatment and safety. Be sure to make and go to all appointments, and call your doctor if you are having problems. It's also a good idea to know your test results and keep a list of the medicines you take.  How can you care for yourself at home?    Use saline (saltwater) nasal washes. This can help keep your nasal passages open and wash out mucus and allergens.  ? You can buy saline nose washes at a grocery store or drugstore. Follow the instructions on the package.  ? You can make your own at home. Add 1 teaspoon of non-iodized salt and 1 teaspoon of baking soda to 2 cups of distilled or boiled and cooled water. Fill a squeeze bottle or a nasal cleansing pot (such as a neti pot) with the nasal wash. Then put the tip into your nostril, and lean over the sink. With your mouth open, gently squirt the liquid. Repeat on the other side.    Try a decongestant nasal spray like oxymetazoline (Afrin). Do not use it for more than 3 days in a row. Using it for more than 3 days can make your congestion worse.    If needed, take an over-the-counter pain medicine, such as acetaminophen (Tylenol), ibuprofen (Advil, Motrin), or naproxen (Aleve). Read and follow all instructions on the label.    If the doctor prescribed antibiotics, take them as directed. Do not stop taking  "them just because you feel better. You need to take the full course of antibiotics.    Be careful when taking over-the-counter cold or flu medicines and Tylenol at the same time. Many of these medicines have acetaminophen, which is Tylenol. Read the labels to make sure that you are not taking more than the recommended dose. Too much acetaminophen (Tylenol) can be harmful.    Try a steroid nasal spray. It may help with your symptoms.    Breathe warm, moist air. You can use a steamy shower, a hot bath, or a sink filled with hot water. Avoid cold, dry air. Using a humidifier in your home may help. Follow the directions for cleaning the machine.  When should you call for help?   Call your doctor now or seek immediate medical care if:      You have new or worse swelling, redness, or pain in your face or around one or both of your eyes.       You have double vision or a change in your vision.       You have a high fever.       You have a severe headache and a stiff neck.       You have mental changes, such as feeling confused or much less alert.   Watch closely for changes in your health, and be sure to contact your doctor if:      You are not getting better as expected.   Where can you learn more?  Go to https://www.[employer].net/patiented  Enter I933 in the search box to learn more about \"Acute Sinusitis: Care Instructions.\"  Current as of: February 28, 2023               Content Version: 13.8    9260-5196 [email or web address].   Care instructions adapted under license by your healthcare professional. If you have questions about a medical condition or this instruction, always ask your healthcare professional. [employer] disclaims any warranty or liability for your use of this information.      Dear Georgie Scott    .      Based on your responses and diagnosis, I have prescribed zpak to treat your symptoms. I have sent this to your pharmacy.?     It is also important to stay well hydrated, get lots " of rest and take over-the-counter decongestants,?tylenol?or ibuprofen if you?are able to?take those medications per your primary care provider to help relieve discomfort.?     It is important that you take?all of?your prescribed medication even if your symptoms are improving after a few doses.? Taking?all of?your medicine helps prevent the symptoms from returning.?     If your symptoms worsen, you develop severe headache, vomiting, high fever (>102), or are not improving in 7 days, please contact your primary care provider for an appointment or visit any of our convenient Walk-in Care or Urgent Care Centers to be seen which can be found on our website?here.?     Thanks again for choosing?us?as your health care partner,?   ?  Sheldon Schuster MD?

## 2023-12-29 ASSESSMENT — ENCOUNTER SYMPTOMS
COUGH: 0
CONSTIPATION: 0
EYE PAIN: 0
HEARTBURN: 0
FREQUENCY: 0
MYALGIAS: 0
ABDOMINAL PAIN: 0
DIARRHEA: 0
WEAKNESS: 0
HEMATOCHEZIA: 0
FEVER: 0
NERVOUS/ANXIOUS: 0
NAUSEA: 0
JOINT SWELLING: 0
HEADACHES: 0
SORE THROAT: 0
DYSURIA: 0
ARTHRALGIAS: 0
CHILLS: 0
SHORTNESS OF BREATH: 0
BREAST MASS: 0
PARESTHESIAS: 0
HEMATURIA: 0
PALPITATIONS: 0
DIZZINESS: 0

## 2024-01-05 ENCOUNTER — OFFICE VISIT (OUTPATIENT)
Dept: FAMILY MEDICINE | Facility: CLINIC | Age: 34
End: 2024-01-05
Attending: FAMILY MEDICINE
Payer: COMMERCIAL

## 2024-01-05 VITALS
HEIGHT: 67 IN | SYSTOLIC BLOOD PRESSURE: 115 MMHG | WEIGHT: 168.38 LBS | HEART RATE: 75 BPM | DIASTOLIC BLOOD PRESSURE: 76 MMHG | TEMPERATURE: 97.8 F | RESPIRATION RATE: 16 BRPM | BODY MASS INDEX: 26.43 KG/M2 | OXYGEN SATURATION: 100 %

## 2024-01-05 DIAGNOSIS — G47.00 PERSISTENT INSOMNIA: ICD-10-CM

## 2024-01-05 DIAGNOSIS — Z23 IMMUNIZATION DUE: ICD-10-CM

## 2024-01-05 DIAGNOSIS — L70.0 ACNE VULGARIS: ICD-10-CM

## 2024-01-05 DIAGNOSIS — Z00.00 ANNUAL PHYSICAL EXAM: Primary | ICD-10-CM

## 2024-01-05 DIAGNOSIS — Z79.899 CONTROLLED SUBSTANCE AGREEMENT SIGNED: ICD-10-CM

## 2024-01-05 DIAGNOSIS — F90.0 ADHD (ATTENTION DEFICIT HYPERACTIVITY DISORDER), INATTENTIVE TYPE: ICD-10-CM

## 2024-01-05 DIAGNOSIS — G47.34 NOCTURNAL HYPOXIA: ICD-10-CM

## 2024-01-05 PROBLEM — Z90.710 S/P HYSTERECTOMY: Status: ACTIVE | Noted: 2024-01-05

## 2024-01-05 PROBLEM — M50.20 HERNIATED CERVICAL DISC: Status: RESOLVED | Noted: 2022-06-15 | Resolved: 2024-01-05

## 2024-01-05 LAB
AMPHETAMINES UR QL SCN: NORMAL
ANION GAP SERPL CALCULATED.3IONS-SCNC: 8 MMOL/L (ref 7–15)
BARBITURATES UR QL SCN: NORMAL
BENZODIAZ UR QL SCN: NORMAL
BUN SERPL-MCNC: 10.4 MG/DL (ref 6–20)
BZE UR QL SCN: NORMAL
CALCIUM SERPL-MCNC: 9.1 MG/DL (ref 8.6–10)
CANNABINOIDS UR QL SCN: NORMAL
CHLORIDE SERPL-SCNC: 103 MMOL/L (ref 98–107)
CREAT SERPL-MCNC: 0.83 MG/DL (ref 0.51–0.95)
DEPRECATED HCO3 PLAS-SCNC: 27 MMOL/L (ref 22–29)
EGFRCR SERPLBLD CKD-EPI 2021: >90 ML/MIN/1.73M2
FENTANYL UR QL: NORMAL
GLUCOSE SERPL-MCNC: 87 MG/DL (ref 70–99)
OPIATES UR QL SCN: NORMAL
PCP QUAL URINE (ROCHE): NORMAL
POTASSIUM SERPL-SCNC: 4.1 MMOL/L (ref 3.4–5.3)
SODIUM SERPL-SCNC: 138 MMOL/L (ref 135–145)

## 2024-01-05 PROCEDURE — 80048 BASIC METABOLIC PNL TOTAL CA: CPT | Performed by: FAMILY MEDICINE

## 2024-01-05 PROCEDURE — 36415 COLL VENOUS BLD VENIPUNCTURE: CPT | Performed by: FAMILY MEDICINE

## 2024-01-05 PROCEDURE — 99214 OFFICE O/P EST MOD 30 MIN: CPT | Mod: 25 | Performed by: FAMILY MEDICINE

## 2024-01-05 PROCEDURE — 80307 DRUG TEST PRSMV CHEM ANLYZR: CPT | Performed by: FAMILY MEDICINE

## 2024-01-05 PROCEDURE — 99395 PREV VISIT EST AGE 18-39: CPT | Mod: 25 | Performed by: FAMILY MEDICINE

## 2024-01-05 PROCEDURE — 91320 SARSCV2 VAC 30MCG TRS-SUC IM: CPT | Performed by: FAMILY MEDICINE

## 2024-01-05 PROCEDURE — 90480 ADMN SARSCOV2 VAC 1/ONLY CMP: CPT | Performed by: FAMILY MEDICINE

## 2024-01-05 RX ORDER — DEXTROAMPHETAMINE SACCHARATE, AMPHETAMINE ASPARTATE, DEXTROAMPHETAMINE SULFATE AND AMPHETAMINE SULFATE 5; 5; 5; 5 MG/1; MG/1; MG/1; MG/1
TABLET ORAL
Qty: 60 TABLET | Refills: 0 | Status: SHIPPED | OUTPATIENT
Start: 2024-01-05 | End: 2024-07-26

## 2024-01-05 RX ORDER — SPIRONOLACTONE 100 MG/1
100 TABLET, FILM COATED ORAL DAILY
Qty: 90 TABLET | Refills: 3 | Status: SHIPPED | OUTPATIENT
Start: 2024-01-05

## 2024-01-05 RX ORDER — GABAPENTIN 300 MG/1
300 CAPSULE ORAL
Qty: 90 CAPSULE | Refills: 3 | Status: SHIPPED | OUTPATIENT
Start: 2024-01-05

## 2024-01-05 ASSESSMENT — ENCOUNTER SYMPTOMS
HEMATOCHEZIA: 0
NERVOUS/ANXIOUS: 0
NAUSEA: 0
ARTHRALGIAS: 0
JOINT SWELLING: 0
HEMATURIA: 0
CHILLS: 0
FEVER: 0
ABDOMINAL PAIN: 0
BREAST MASS: 0
EYE PAIN: 0
SHORTNESS OF BREATH: 0
HEARTBURN: 0
COUGH: 0
CONSTIPATION: 0
PARESTHESIAS: 0
DIARRHEA: 0
MYALGIAS: 0
FREQUENCY: 0
PALPITATIONS: 0
DYSURIA: 0
DIZZINESS: 0
SORE THROAT: 0
WEAKNESS: 0
HEADACHES: 0

## 2024-01-05 NOTE — LETTER
[facility]  01/05/24  Patient: Georgie Scott  YOB: 1990  Medical Record Number: [id number]                                                                                  Non-Opioid Controlled Substance Agreement    This is an agreement between you and your provider regarding safe and appropriate use of controlled substances prescribed by your care team. Controlled substances are?medicines that can cause physical and mental dependence (abuse).     There are strict laws about having and using these medicines. We here at [facility] are  committed to working with you in your efforts to get better. To support you in this work, we'll help you schedule regular office appointments for medicine refills. If we must cancel or change your appointment for any reason, we'll make sure you have enough medicine to last until your next appointment.     As a Provider, I will:   Listen carefully to your concerns while treating you with respect.   Recommend a treatment plan that I believe is in your best interest and may involve therapies other than medicine.    Talk with you often about the possible benefits and the risk of harm of any medicine that we prescribe for you.  Assess the safety of this medicine and check how well it works.    Provide a plan on how to taper (discontinue or go off) using this medicine if the decision is made to stop its use.      ::  As a Patient, I understand controlled substances:     Are prescribed by my care provider to help me function or work and manage my condition(s).?  Are strong medicines and can cause serious side effects.     Need to be taken exactly as prescribed.?Combining controlled substances with certain medicines or chemicals (such as illegal drugs, alcohol, sedatives, sleeping pills, and benzodiazepines) can be dangerous or even fatal.? If I stop taking my medicines suddenly, I may have severe withdrawal symptoms.     The risks,  benefits, and side effects of these medicine(s) were explained to me. I agree that:    I will take part in other treatments as advised by my care team. This may be psychiatry or counseling, physical therapy, behavioral therapy, group treatment or a referral to specialist.    I will keep all my appointments and understand this is part of the monitoring of controlled substances.?My care team may require an office visit for EVERY controlled substance refill. If I miss appointments or don t follow instructions, my care team may stop my medicine    I will take my medicines as prescribed. I will not change the dose or schedule unless my care team tells me to. There will be no refills if I run out early.      I may be asked to come to the clinic and complete a urine drug test or complete a pill count. If I don t give a urine sample or participate in a pill count, the care team may stop my medicine.    I will only receive controlled substance prescriptions from this clinic. If I am treated by another provider, I will tell them that I am taking controlled substances and that I have a treatment agreement with this provider. I will inform my [facility] care team within one business day if I am given a prescription for any controlled substance by another healthcare provider. My [facility] care team can contact other providers and pharmacists about my use of any medicines.    It is up to me to make sure that I don't run out of my medicines on weekends or holidays.?If my care team is willing to refill my prescription without a visit, I must request refills only during office hours. Refills may take up to 3 business days to process. I will use one pharmacy to fill all my controlled substance prescriptions. I will notify the clinic about any changes to my insurance or medicine availability.    I am responsible for my prescriptions. If the medicine/prescription is lost, stolen or destroyed, it will not be replaced.?I  also agree not to share controlled substance medicines with anyone.     I am aware I should not use any illegal or recreational drugs. I agree not to drink alcohol unless my care team says I can.     If I enroll in the [place] Medical Cannabis program, I will tell my care team before my next refill.    I will tell my care team right away if I become pregnant, have a new medical problem treated outside of my regular clinic, or have a change in my medicines.     I understand that this medicine can affect my thinking, judgment and reaction time.? Alcohol and drugs affect the brain and body, which can affect the safety of my driving. Being under the influence of alcohol or drugs can affect my decision-making, behaviors, personal safety and the safety of others. Driving while impaired (DWI) can occur if a person is driving, operating or in physical control of a car, motorcycle, boat, snowmobile, ATV, motorbike, off-road vehicle or any other motor vehicle ([place] Statute 169A.20). I understand the risk if I choose to drive or operate any vehicle or machinery.    I understand that if I do not follow any of the conditions above, my prescriptions or treatment may be stopped or changed.   I agree that my provider, clinic care team and pharmacy may work with any city, state or federal law enforcement agency that investigates the misuse, sale or other diversion of my controlled medicine. I will allow my provider to discuss my care with, or share a copy of, this agreement with any other treating provider, pharmacy or emergency room where I receive care.     I have read this agreement and have asked questions about anything I did not understand.    ________________________________________________________  Patient Signature - Georgie VILLEGAS Scott     ___________________                   Date     ________________________________________________________  Provider Signature - Taylor Womack, DO       ___________________                    Date     ________________________________________________________  Witness Signature (required if provider not present while patient signing)          ___________________                   Date

## 2024-01-05 NOTE — PROGRESS NOTES
SUBJECTIVE:   Georgie is a 33 year old, presenting for the following:  Physical (Fasting for labs.)    Chief Complaints and History of Present Illnesses   Patient presents with    Physical     Fasting for labs.             1/5/2024     8:00 AM   Additional Questions   Roomed by Sonya ACEVES CMA   Accompanied by Self       Healthy Habits:     Getting at least 3 servings of Calcium per day:  Yes    Bi-annual eye exam:  Yes    Dental care twice a year:  NO    Sleep apnea or symptoms of sleep apnea:  Excessive snoring    Diet:  Regular (no restrictions)    Frequency of exercise:  None    Taking medications regularly:  Yes    Medication side effects:  Not applicable    Additional concerns today:  Yes      NOCTURNAL HYPOXIA:   Reports O2 is low at night. Does snore,  annoyed with snoring.       ADHD: On Adderall 20mg BID, doing well.        INSOMNIA: Variable. Falls asleep okay, hard to fall asleep.   Uses melatonin as needed.    Not correlated.       ACNE: On spironolactone, working well.        HEALTH MAINTENANCE:   - covid: will do      Seeing dermatology next month for skin check.    Social History     Tobacco Use    Smoking status: Never     Passive exposure: Never    Smokeless tobacco: Never   Substance Use Topics    Alcohol use: Yes     Comment: moderate             12/29/2023     9:33 AM   Alcohol Use   Prescreen: >3 drinks/day or >7 drinks/week? Yes   AUDIT SCORE  5         12/29/2023     9:33 AM   AUDIT - Alcohol Use Disorders Identification Test - Reproduced from the World Health Organization Audit 2001 (Second Edition)   1.  How often do you have a drink containing alcohol? 2 to 3 times a week   2.  How many drinks containing alcohol do you have on a typical day when you are drinking? 1 or 2   3.  How often do you have five or more drinks on one occasion? Monthly   4.  How often during the last year have you found that you were not able to stop drinking once you had started? Never   5.  How often  during the last year have you failed to do what was normally expected of you because of drinking? Never   6.  How often during the last year have you needed a first drink in the morning to get yourself going after a heavy drinking session? Never   7.  How often during the last year have you had a feeling of guilt or remorse after drinking? Never   8.  How often during the last year have you been unable to remember what happened the night before because of your drinking? Never   9.  Have you or someone else been injured because of your drinking? No   10. Has a relative, friend, doctor or other health care worker been concerned about your drinking or suggested you cut down? No   TOTAL SCORE 5     Reviewed orders with patient.  Reviewed health maintenance and updated orders accordingly - Yes      Breast Cancer Screening:    FHS-7:       2/9/2022    11:32 AM 6/3/2022    11:39 AM 12/2/2022     8:38 AM 12/29/2023     9:34 AM   Breast CA Risk Assessment (FHS-7)   Did any of your first-degree relatives have breast or ovarian cancer? No No No No   Did any of your relatives have bilateral breast cancer? No No No No   Did any man in your family have breast cancer? No No No No   Did any woman in your family have breast and ovarian cancer? No No No No   Did any woman in your family have breast cancer before age 50 y? No No No No   Do you have 2 or more relatives with breast and/or ovarian cancer? No No No No   Do you have 2 or more relatives with breast and/or bowel cancer? No No No No       Patient under 40 years of age: Routine Mammogram Screening not recommended.   Pertinent mammograms are reviewed under the imaging tab.    History of abnormal Pap smear: S/p hysterectomy, pap no longer indicated       Latest Ref Rng & Units 12/2/2022     9:43 AM 3/31/2021     1:14 PM 1/5/2018     8:02 AM   PAP / HPV   PAP  Negative for Intraepithelial Lesion or Malignancy (NILM)  Negative for squamous intraepithelial lesion or  "malignancy  Electronically signed by Shazia Lowery CT (ASCP) on 4/8/2021 at  7:30 AM       PAP (Historical)    NIL    HPV 16 DNA Negative Negative  Negative     HPV 18 DNA Negative Negative  Negative     Other HR HPV Negative Negative  Positive       Reviewed and updated as needed this visit by clinical staff   Tobacco  Allergies  Meds              Reviewed and updated as needed this visit by Provider   Tobacco  Allergies  Meds  Problems  Med Hx  Surg Hx  Fam Hx             Review of Systems   Constitutional:  Negative for chills and fever.   HENT:  Negative for congestion, ear pain, hearing loss and sore throat.    Eyes:  Negative for pain and visual disturbance.   Respiratory:  Negative for cough and shortness of breath.    Cardiovascular:  Negative for chest pain, palpitations and peripheral edema.   Gastrointestinal:  Negative for abdominal pain, constipation, diarrhea, heartburn, hematochezia and nausea.   Breasts:  Negative for tenderness, breast mass and discharge.   Genitourinary:  Negative for dysuria, frequency, genital sores, hematuria, pelvic pain, urgency, vaginal bleeding and vaginal discharge.   Musculoskeletal:  Negative for arthralgias, joint swelling and myalgias.   Skin:  Negative for rash.   Neurological:  Negative for dizziness, weakness, headaches and paresthesias.   Psychiatric/Behavioral:  Negative for mood changes. The patient is not nervous/anxious.           OBJECTIVE:   /76 (BP Location: Left arm, Patient Position: Sitting, Cuff Size: Adult Regular)   Pulse 75   Temp 97.8  F (36.6  C) (Oral)   Resp 16   Ht 1.695 m (5' 6.75\")   Wt 76.4 kg (168 lb 6 oz)   LMP  (LMP Unknown)   SpO2 100%   BMI 26.57 kg/m    Physical Exam  GENERAL: healthy, alert and no distress  EYES: Eyes grossly normal to inspection, PERRL and conjunctivae and sclerae normal  HENT: ear canals and TM's normal, nose and mouth without ulcers or lesions  NECK: no adenopathy, no asymmetry, masses, or " scars and thyroid normal to palpation  RESP: lungs clear to auscultation - no rales, rhonchi or wheezes  CV: regular rate and rhythm, no murmurs  ABDOMEN: soft, nontender, no hepatosplenomegaly, no masses and bowel sounds normal  MS: no gross musculoskeletal defects noted, no edema  SKIN: no suspicious lesions or rashes  NEURO: Normal strength and tone, mentation intact and speech normal  PSYCH: mentation appears normal, affect normal/bright      ASSESSMENT/PLAN:     1. Annual physical exam  - REVIEW OF HEALTH MAINTENANCE PROTOCOL ORDERS    Reviewed health history and health maintenance recommendations.    2. Acne vulgaris  - spironolactone (ALDACTONE) 100 MG tablet; Take 1 tablet (100 mg) by mouth daily  Dispense: 90 tablet; Refill: 3  - Basic metabolic panel  (Ca, Cl, CO2, Creat, Gluc, K, Na, BUN)    Continue spironolactone.  Monitoring labs today for kidney function and potassium level.    3. Controlled substance agreement - signed 1/5/24  4. ADHD (attention deficit hyperactivity disorder), inattentive type  - Urine Drug Screen; Future  - amphetamine-dextroamphetamine (ADDERALL) 20 MG tablet; TAKE ONE TO TWO TABLETS BY MOUTH EVERY DAY  Dispense: 60 tablet; Refill: 0  - Urine Drug Screen    Symptoms well-controlled on Adderall 20 mg twice daily as needed.  Update urine drug screen.  Renewed controlled substance agreement today.  Follow-up 6 months controlled substance med check.    5. Persistent insomnia  - gabapentin (NEURONTIN) 300 MG capsule; Take 1 capsule (300 mg) by mouth nightly as needed for other (insomnia)  Dispense: 90 capsule; Refill: 3    Ongoing symptoms.  Not correlated with Adderall use.  Has tried lifestyle modifications and sleep hygiene without improvement.  Denies any underlying secondary causes.  Not in pain.  Not getting up to urinate.  Has tried trazodone in the past and did not tolerate due to morning grogginess.  She has tried antihistamines and melatonin without improvement.    Reviewed  "options such as Ambien, benzodiazepine.  Hesitant to try due to risk of developing tolerance and ongoing insomnia concern at such a young age.  She does note that she slept very well on gabapentin when she was using this for her neck pain.  Recommend we restart this at 300 mg nightly.  She seemed to tolerate well.    6. Nocturnal hypoxia  Watch is reporting mild hypoxia at night.  Unclear how accurate this is.  Reviewed STOP-BANG questionnaire, low risk.  Does snore, though not obese, no hypertension, otherwise healthy.  Denies excess daytime fatigue.  Discussed possibility of a sleep study to further evaluate.  Will see if she would like to proceed with this study at this time.    7. Immunization due  - COVID-19 12+ (2023-24) (PFIZER)     Patient has been advised of split billing requirements and indicates understanding: Yes      COUNSELING:  Reviewed preventive health counseling, as reflected in patient instructions      BMI:   Estimated body mass index is 26.57 kg/m  as calculated from the following:    Height as of this encounter: 1.695 m (5' 6.75\").    Weight as of this encounter: 76.4 kg (168 lb 6 oz).   Weight management plan: Discussed healthy diet and exercise guidelines      She reports that she has never smoked. She has never been exposed to tobacco smoke. She has never used smokeless tobacco.          Taylor Womack, [facility]  "

## 2024-01-10 ENCOUNTER — MYC MEDICAL ADVICE (OUTPATIENT)
Dept: FAMILY MEDICINE | Facility: CLINIC | Age: 34
End: 2024-01-10
Payer: COMMERCIAL

## 2024-01-10 DIAGNOSIS — N64.4 BREAST TENDERNESS IN FEMALE: Primary | ICD-10-CM

## 2024-01-10 NOTE — TELEPHONE ENCOUNTER
1. Breast tenderness in female  - MA Diagnostic Digital Bilateral; Future    Mammogram order placed to help evaluate bilateral breast tenderness.    Taylor Womack, DO

## 2024-02-02 ENCOUNTER — ANCILLARY PROCEDURE (OUTPATIENT)
Dept: MAMMOGRAPHY | Facility: CLINIC | Age: 34
End: 2024-02-02
Attending: FAMILY MEDICINE
Payer: COMMERCIAL

## 2024-02-02 DIAGNOSIS — N64.4 BREAST TENDERNESS IN FEMALE: ICD-10-CM

## 2024-02-02 PROCEDURE — 77062 BREAST TOMOSYNTHESIS BI: CPT

## 2024-02-02 NOTE — LETTER
Georgie Scott  [address]  [place] [address]        February 2, 2024    Date of Exam: 2/2/24    Dear Georgie:    Thank you for your recent visit.     Breast Imaging Result: We are pleased to inform you that the results of your recent breast imaging show no evidence of malignancy (cancer).    Your breast tissue is dense:  Breast tissue can be either dense or not dense. Dense tissue makes it harder to find breast cancer on a mammogram and also raises the risk of developing breast cancer. Your breast tissue is dense. In some people with dense tissue, other imaging tests in addition to a mammogram may help find cancers. Talk to your healthcare provider about breast density, risks for breast cancer, and your individual situation.    If you are having any problems such as a lump or pain in your breast, please discuss this with your health care team if you haven't already. You may need more testing.    Breast Cancer Screening Recommendation: Routine yearly mammography beginning at age 40 or as discussed with your provider.    A report of your breast imaging results was sent to: Taylor Womack    Your breast imaging will become part of your medical file here at [facility] for at least 10 years. You are responsible for telling any new health care team or breast imaging site of the date and place of this exam.     We appreciate the opportunity to participate in your health care.    Sincerely,  Ashley Morales MD  [facility] [place]

## 2024-02-02 NOTE — RESULT ENCOUNTER NOTE
Patient updated by [employer] message with imaging results.       Mammogram reassuring.  Taylor Womack, DO

## 2024-02-29 ENCOUNTER — TRANSFERRED RECORDS (OUTPATIENT)
Dept: HEALTH INFORMATION MANAGEMENT | Facility: CLINIC | Age: 34
End: 2024-02-29
Payer: COMMERCIAL

## 2024-03-07 ENCOUNTER — E-VISIT (OUTPATIENT)
Dept: URGENT CARE | Facility: CLINIC | Age: 34
End: 2024-03-07
Payer: COMMERCIAL

## 2024-03-07 DIAGNOSIS — N94.9 VAGINAL DISCOMFORT: Primary | ICD-10-CM

## 2024-03-07 PROCEDURE — 99207 PR NON-BILLABLE SERV PER CHARTING: CPT | Performed by: EMERGENCY MEDICINE

## 2024-03-07 NOTE — PATIENT INSTRUCTIONS
Thank you for choosing us for your care. Given your symptoms, I would like you to do a lab-only visit to determine what is causing them.  I have placed the orders.  Please schedule an appointment with the lab right here in [place], or call [phone].  I will let you know when the results are back and next steps to take.

## 2024-04-08 NOTE — PROGRESS NOTES
Assessment:   Georgie Scott is a 34 y.o. female with past medical history significant for ADHD who presents today for follow-up regarding chronic low back pain, worse over the past 3 months with radiation into the left lower extremity with associated numbness and tingling.  Patient has not had any imaging of the lumbar spine.  Symptoms are concerning for a disc bulge or protrusion with left L5 and/or S1 nerve root impingement.  She also appears to have some sacroiliac joint dysfunction.  On exam patient had a sensory deficit left S1 dermatome.  Pain has been refractory to conservative treatment for more than 3 months including massage, chiropractor, acupuncture, inversion table, TENS, medical management.             Plan:     A shared decision making plan was used.  The patient's values and choices were respected.  The following represents what was discussed and decided upon by the physician assistant and the patient.      1.  DIAGNOSTIC TESTS:  - I ordered an MRI lumbar spine for further evaluation.    2.  PHYSICAL THERAPY:  - Patient did physical therapy for neck pain in 2019, 2020, and 2022.  - Patient believes that she did physical therapy for her low back many years ago.  She may benefit from a return to physical therapy.  Will await her MRI results.    3.  MEDICATIONS:   - Patient takes ibuprofen 400 to 600 mg as needed.  - I told the patient she could also add Tylenol up to 1000 g 3 times daily.  - Patient takes gabapentin 300 mg at bedtime for sleep.  - Patient can also continue methocarbamol as needed.    4.  INTERVENTIONS: No interventions were ordered.  If her MRI shows left L5 and/or S1 nerve root impingement I would recommend an epidural steroid injection.  - We could also consider a left sacroiliac joint injection.    5.  PATIENT EDUCATION: Patient is in agreement the above plan.  All questions were answered.    6.  FOLLOW-UP: I will send the patient a [employer] message with her MRI results.  At that  time I will likely recommend physical therapy, I will also possibly offer an epidural steroid injection if there is left L5 and/or S1 nerve root impingement versus return to clinic to review the results and examine SI joint.  If she has questions or concerns in the meantime, she should not hesitate to call.    Subjective:     Georgie Scott is a 34 y.o. female who presents today for follow-up regarding low back pain with radiation into the left lower extremity with associated numbness and tingling.  Patient reports that she has had chronic low back pain.  She states it was never as bothersome as her neck so she did not think too much of it.  She does recall going through physical therapy many years ago for the back pain.  She states that historically she has gotten episodes of low back pain that would typically resolve on their own.  Unfortunately, 3 months ago she began to experience more severe pain.  Pain has been getting progressively worse.  Last month she flew to [place] which was very painful and difficult.    Patient complains of left-sided low back pain.  Pain radiates into the left SI joint region, through the left buttock it into the left posterolateral hip area.  She describes a tight sensation down the left lateral thigh and into the left lateral calf.  She then has numbness and tingling in the left foot affecting primarily the lateral foot and fourth and fifth toes but sometimes the whole foot feels numb and tingly.  She feels a weak sensation in her left leg.  She states that she is favoring it.  About 2 months ago patient has some dribbling of urine after she thought she emptied her bladder completely but that resolved after chiropractic treatment of her pelvis.  She denies loss of bowel or bladder control.  Denies fevers.  She rates her pain today as a 2 out of 10.  At its worst it is a 7 out of 10.  At its best it is a 1-10.  Pain is aggravated with sitting, lying on her left side, and lying on  her back.  Pain is alleviated with lying on her right side although this is flaring up her chronic right thoracic pain.  Sometimes walking around will help to alleviate the pain.    Treatment to date:  - 4 sessions of physical therapy May to June 2022  -Patient completed 9 sessions of medics physical therapy November 4, 2020.  - Physical therapy February through March 2019  - Chiropractic treatment 1-2 times per week which is somewhat helpful  - Acupuncture for the past 1 month, unsure if it is helping  - Massage therapy every 3 weeks which is somewhat helpful  - TENS unit as needed which is helpful  - C7-T1 interlaminar epidural steroid injection May 6, 2022  - C7-T1 interlaminar epidural steroid injection October 29, 2021  - C7-T1 interlaminar epidural steroid injection November 20, 2020  - Left C5-6, C6-7, C7-T1 facet joint injections December 19, 2019  - Left C5, C6, C7, C8 medial branch blocks November 27, 2019  - C7-T1 interlaminar epidural steroid injection May 3, 2019  - Right C5-6 transforaminal epidural steroid injection April 19, 2019  - Methocarbamol as needed which is only slightly helpful  - Ibuprofen 400 to 600 mg as needed which is somewhat helpful  - Steroid pack with modest relief      Review of Systems:  Positive for numbness/tingling, weakness, pain much worse at night.  Negative for loss of bowel/bladder control, inability to urinate, headache, trip/stumble/falls, difficulty swallowing, difficulty with hand skills, fevers, unintentional weight loss.     Objective:   CONSTITUTIONAL:  Vital signs as above.  No acute distress.  The patient is well nourished and well groomed.    PSYCHIATRIC:  The patient is awake, alert, oriented to person, place and time.  The patient is answering questions appropriately with clear speech.  Normal affect.  HEENT: Normocephalic, atraumatic.  Sclera clear.  Neck is supple.  SKIN:  Skin over the face, neck bilateral upper extremities is clean, dry, intact without  rashes.  MUSCULOSKELETAL: Tender to palpation left lower lumbar paraspinous muscles and left sacroiliac joint.  Hypertonicity in the left lower lumbar paraspinous muscles and left gluteal muscles.  Lumbar range of motion is full.  5/5 strength bilateral hip flexors, knee flexors/extensors, ankle dorsi/plantar flexors.  NEUROLOGICAL: 2+ bilateral patellar and Achilles reflexes bilaterally.  Diminished sensation left S1 dermatome.

## 2024-04-11 ENCOUNTER — OFFICE VISIT (OUTPATIENT)
Dept: PHYSICAL MEDICINE AND REHAB | Facility: CLINIC | Age: 34
End: 2024-04-11
Payer: COMMERCIAL

## 2024-04-11 VITALS
HEIGHT: 67 IN | WEIGHT: 168 LBS | SYSTOLIC BLOOD PRESSURE: 123 MMHG | HEART RATE: 82 BPM | DIASTOLIC BLOOD PRESSURE: 65 MMHG | BODY MASS INDEX: 26.37 KG/M2

## 2024-04-11 DIAGNOSIS — M53.3 SACROILIAC JOINT PAIN: ICD-10-CM

## 2024-04-11 DIAGNOSIS — M54.16 LUMBAR RADICULAR PAIN: Primary | ICD-10-CM

## 2024-04-11 PROCEDURE — 99214 OFFICE O/P EST MOD 30 MIN: CPT | Performed by: PHYSICIAN ASSISTANT

## 2024-04-11 ASSESSMENT — PAIN SCALES - GENERAL: PAINLEVEL: MILD PAIN (2)

## 2024-04-11 NOTE — LETTER
4/11/2024         RE: Georgie Scott  [address]  [place] [address]        Dear Colleague,    Thank you for referring your patient, Georgie Scott, to the [facility]. Please see a copy of my visit note below.    Assessment:   Georgie Scott is a 34 y.o. female with past medical history significant for ADHD who presents today for follow-up regarding chronic low back pain, worse over the past 3 months with radiation into the left lower extremity with associated numbness and tingling.  Patient has not had any imaging of the lumbar spine.  Symptoms are concerning for a disc bulge or protrusion with left L5 and/or S1 nerve root impingement.  She also appears to have some sacroiliac joint dysfunction.  On exam patient had a sensory deficit left S1 dermatome.  Pain has been refractory to conservative treatment for more than 3 months including massage, chiropractor, acupuncture, inversion table, TENS, medical management.             Plan:     A shared decision making plan was used.  The patient's values and choices were respected.  The following represents what was discussed and decided upon by the physician assistant and the patient.      1.  DIAGNOSTIC TESTS:  - I ordered an MRI lumbar spine for further evaluation.    2.  PHYSICAL THERAPY:  - Patient did physical therapy for neck pain in 2019, 2020, and 2022.  - Patient believes that she did physical therapy for her low back many years ago.  She may benefit from a return to physical therapy.  Will await her MRI results.    3.  MEDICATIONS:   - Patient takes ibuprofen 400 to 600 mg as needed.  - I told the patient she could also add Tylenol up to 1000 g 3 times daily.  - Patient takes gabapentin 300 mg at bedtime for sleep.  - Patient can also continue methocarbamol as needed.    4.  INTERVENTIONS: No interventions were ordered.  If her MRI shows left L5 and/or S1 nerve root impingement I would recommend an epidural  steroid injection.  - We could also consider a left sacroiliac joint injection.    5.  PATIENT EDUCATION: Patient is in agreement the above plan.  All questions were answered.    6.  FOLLOW-UP: I will send the patient a [employer] message with her MRI results.  At that time I will likely recommend physical therapy, I will also possibly offer an epidural steroid injection if there is left L5 and/or S1 nerve root impingement versus return to clinic to review the results and examine SI joint.  If she has questions or concerns in the meantime, she should not hesitate to call.    Subjective:     Georgie Scott is a 34 y.o. female who presents today for follow-up regarding low back pain with radiation into the left lower extremity with associated numbness and tingling.  Patient reports that she has had chronic low back pain.  She states it was never as bothersome as her neck so she did not think too much of it.  She does recall going through physical therapy many years ago for the back pain.  She states that historically she has gotten episodes of low back pain that would typically resolve on their own.  Unfortunately, 3 months ago she began to experience more severe pain.  Pain has been getting progressively worse.  Last month she flew to [place] which was very painful and difficult.    Patient complains of left-sided low back pain.  Pain radiates into the left SI joint region, through the left buttock it into the left posterolateral hip area.  She describes a tight sensation down the left lateral thigh and into the left lateral calf.  She then has numbness and tingling in the left foot affecting primarily the lateral foot and fourth and fifth toes but sometimes the whole foot feels numb and tingly.  She feels a weak sensation in her left leg.  She states that she is favoring it.  About 2 months ago patient has some dribbling of urine after she thought she emptied her bladder completely but that resolved after chiropractic  treatment of her pelvis.  She denies loss of bowel or bladder control.  Denies fevers.  She rates her pain today as a 2 out of 10.  At its worst it is a 7 out of 10.  At its best it is a 1-10.  Pain is aggravated with sitting, lying on her left side, and lying on her back.  Pain is alleviated with lying on her right side although this is flaring up her chronic right thoracic pain.  Sometimes walking around will help to alleviate the pain.    Treatment to date:  - 4 sessions of physical therapy May to June 2022  -Patient completed 9 sessions of medics physical therapy November 4, 2020.  - Physical therapy February through March 2019  - Chiropractic treatment 1-2 times per week which is somewhat helpful  - Acupuncture for the past 1 month, unsure if it is helping  - Massage therapy every 3 weeks which is somewhat helpful  - TENS unit as needed which is helpful  - C7-T1 interlaminar epidural steroid injection May 6, 2022  - C7-T1 interlaminar epidural steroid injection October 29, 2021  - C7-T1 interlaminar epidural steroid injection November 20, 2020  - Left C5-6, C6-7, C7-T1 facet joint injections December 19, 2019  - Left C5, C6, C7, C8 medial branch blocks November 27, 2019  - C7-T1 interlaminar epidural steroid injection May 3, 2019  - Right C5-6 transforaminal epidural steroid injection April 19, 2019  - Methocarbamol as needed which is only slightly helpful  - Ibuprofen 400 to 600 mg as needed which is somewhat helpful  - Steroid pack with modest relief      Review of Systems:  Positive for numbness/tingling, weakness, pain much worse at night.  Negative for loss of bowel/bladder control, inability to urinate, headache, trip/stumble/falls, difficulty swallowing, difficulty with hand skills, fevers, unintentional weight loss.     Objective:   CONSTITUTIONAL:  Vital signs as above.  No acute distress.  The patient is well nourished and well groomed.    PSYCHIATRIC:  The patient is awake, alert, oriented to person,  place and time.  The patient is answering questions appropriately with clear speech.  Normal affect.  HEENT: Normocephalic, atraumatic.  Sclera clear.  Neck is supple.  SKIN:  Skin over the face, neck bilateral upper extremities is clean, dry, intact without rashes.  MUSCULOSKELETAL: Tender to palpation left lower lumbar paraspinous muscles and left sacroiliac joint.  Hypertonicity in the left lower lumbar paraspinous muscles and left gluteal muscles.  Lumbar range of motion is full.  5/5 strength bilateral hip flexors, knee flexors/extensors, ankle dorsi/plantar flexors.  NEUROLOGICAL: 2+ bilateral patellar and Achilles reflexes bilaterally.  Diminished sensation left S1 dermatome.              Again, thank you for allowing me to participate in the care of your patient.        Sincerely,        Kay Mcrae PA-C

## 2024-04-11 NOTE — PATIENT INSTRUCTIONS
[facility] Scheduling    Please call [phone] to schedule your image(s) (select option#1).

## 2024-04-21 ENCOUNTER — HOSPITAL ENCOUNTER (OUTPATIENT)
Dept: MRI IMAGING | Facility: HOSPITAL | Age: 34
Discharge: HOME | End: 2024-04-21
Attending: PHYSICIAN ASSISTANT | Admitting: PHYSICIAN ASSISTANT
Payer: COMMERCIAL

## 2024-04-21 DIAGNOSIS — M54.16 LUMBAR RADICULAR PAIN: ICD-10-CM

## 2024-04-21 PROCEDURE — 72148 MRI LUMBAR SPINE W/O DYE: CPT

## 2024-04-22 ENCOUNTER — MYC MEDICAL ADVICE (OUTPATIENT)
Dept: PHYSICAL MEDICINE AND REHAB | Facility: CLINIC | Age: 34
End: 2024-04-22
Payer: COMMERCIAL

## 2024-04-22 DIAGNOSIS — M54.16 LUMBAR RADICULAR PAIN: Primary | ICD-10-CM

## 2024-04-23 NOTE — TELEPHONE ENCOUNTER
Order placed for Left L4-5 TFESI.   Called and discussed with patient. Reviewed injection requirements. Transferred to scheduling to make appt.

## 2024-05-09 ENCOUNTER — RADIOLOGY INJECTION OFFICE VISIT (OUTPATIENT)
Dept: PHYSICAL MEDICINE AND REHAB | Facility: CLINIC | Age: 34
End: 2024-05-09
Attending: PHYSICIAN ASSISTANT
Payer: COMMERCIAL

## 2024-05-09 VITALS
RESPIRATION RATE: 16 BRPM | SYSTOLIC BLOOD PRESSURE: 102 MMHG | TEMPERATURE: 97.2 F | HEART RATE: 66 BPM | OXYGEN SATURATION: 99 % | DIASTOLIC BLOOD PRESSURE: 70 MMHG

## 2024-05-09 DIAGNOSIS — M54.16 LUMBAR RADICULAR PAIN: ICD-10-CM

## 2024-05-09 PROCEDURE — 64483 NJX AA&/STRD TFRM EPI L/S 1: CPT | Mod: LT | Performed by: PAIN MEDICINE

## 2024-05-09 RX ORDER — LIDOCAINE HYDROCHLORIDE 10 MG/ML
INJECTION, SOLUTION EPIDURAL; INFILTRATION; INTRACAUDAL; PERINEURAL
Status: COMPLETED | OUTPATIENT
Start: 2024-05-09 | End: 2024-05-09

## 2024-05-09 RX ORDER — DEXAMETHASONE SODIUM PHOSPHATE 10 MG/ML
INJECTION, SOLUTION INTRAMUSCULAR; INTRAVENOUS
Status: COMPLETED | OUTPATIENT
Start: 2024-05-09 | End: 2024-05-09

## 2024-05-09 RX ADMIN — LIDOCAINE HYDROCHLORIDE 2 ML: 10 INJECTION, SOLUTION EPIDURAL; INFILTRATION; INTRACAUDAL; PERINEURAL at 15:29

## 2024-05-09 RX ADMIN — DEXAMETHASONE SODIUM PHOSPHATE 10 MG: 10 INJECTION, SOLUTION INTRAMUSCULAR; INTRAVENOUS at 15:29

## 2024-05-09 ASSESSMENT — PAIN SCALES - GENERAL
PAINLEVEL: NO PAIN (1)
PAINLEVEL: NO PAIN (1)

## 2024-05-09 NOTE — PATIENT INSTRUCTIONS
DISCHARGE INSTRUCTIONS    During office hours (8:00 a.m.- 4:00 p.m.) questions or concerns may be answered  by calling [facility] Navigation Nurses at  [phone].  Messages received after hours will be returned the following business day.      In the case of an emergency, please dial 911 or seek assistance at the nearest Emergency Room/Urgent Care facility.     All Patients:    You may experience an increase in your symptoms for the first 2 days (It may take anywhere between 2 days- 2 weeks for the steroid to have maximum effect).    You may use ice on the injection site, as frequently as 20 minutes each hour if needed.    You may take your pain medicine.    You may continue taking your regular medication after your injection. If you have had a Medial Branch Block you may resume pain medication once your pain diary is completed.    You may shower. No swimming, tub bath or hot tub for 48 hours.  You may remove your bandaid/bandage as soon as you are home.    You may resume light activities, as tolerated.    Resume your usual diet as tolerated.    It is strongly advised that you do not drive for 1-3 hours post injection.    If you have had oral sedation:  Do not drive for 8 hours post injection.      If you have had IV sedation:  Do not drive for 24 hours post injection.  Do not operate hazardous machinery or make important personal/business decisions for 24 hours.      POSSIBLE STEROID SIDE EFFECTS (If steroid/cortisone was used for your procedure)    -If you experience these symptoms, it should only last for a short period    Swelling of the legs              Skin redness (flushing)     Mouth (oral) irritation   Blood sugar (glucose) levels            Sweats                    Mood changes  Headache  Sleeplessness  Weakened immune system for up to 14 days, which could increase the risk of rony the COVID-19 virus and/or experiencing more severe symptoms of the disease, if exposed.  Decreased  effectiveness of the flu vaccine if given within 2 weeks of the steroid.         POSSIBLE PROCEDURE SIDE EFFECTS  -Call the Spine Center if you are concerned  Increased Pain           Increased numbness/tingling      Nausea/Vomiting          Bruising/bleeding at site      Redness or swelling                                              Difficulty walking      Weakness           Fever greater than 100.5    *In the event of a severe headache after an epidural steroid injection that is relieved by lying down, please call the [facility] to speak with a clinical staff member*

## 2024-05-20 NOTE — PROGRESS NOTES
Assessment:   Georgie Scott is a 34 y.o. female with past medical history significant for ADHD who presents today for follow-up regarding chronic low back pain, with radiation into the left lower extremity with associated numbness and tingling.  My review of an MRI lumbar spine shows a disc bulge and mild facet disease at L4-5 contributing to mild bilateral foraminal stenosis.  Patient is status post a left L4-5 transforaminal epidural steroid injection May 9, 2024 which has provided 30% relief of pain thus far.  She continues have intermittent tingling in the left foot affecting toes 2, 3, 4.  She has some residual pain in the sacroiliac joint region.             Plan:     A shared decision making plan was used.  The patient's values and choices were respected.  The following represents what was discussed and decided upon by the physician assistant and the patient.      1.  DIAGNOSTIC TESTS:  - I reviewed the MRI lumbar spine.  - We discussed getting an EMG left lower extremity due to her ongoing paresthesias.  She has some permanent paresthesias in her right arm from before her neck surgery and she wants to avoid any permanent neurologic deficit in her leg.  Since the tingling has improved since the injection she is going to hold off for now.  However, if it worsens again or fails to improve further, she will let me know and I will order an EMG left lower extremity.    2.  PHYSICAL THERAPY:  - Patient did physical therapy for neck pain in 2019, 2020, and 2022.  - I offered a referral to physical therapy specifically for pelvic joint dysfunction.  She will consider this and let me know if she wants to proceed.    3.  MEDICATIONS:   - Patient takes ibuprofen 400 to 600 mg as needed.  - Patient could take Tylenol as needed.  - Patient takes gabapentin 300 mg at bedtime for sleep.  - Patient can also continue methocarbamol as needed.  She has not needed to take this recently.    4.  INTERVENTIONS:  - We discussed  trying a left L5-S1 transforaminal epidural steroid injection.  I explained how this to be given the left L4-5 transforaminal epidural steroid injection.  We could see if it provides more relief.  She will consider this.  - We could also consider a left sacroiliac joint injection.  - We also discussed trying osteopathic manipulation for this problem.  She had osteopathic manipulation previously for her neck.  She does see a chiropractor regularly.    5.  PATIENT EDUCATION: Patient is in agreement the above plan.  All questions were answered.  - I told the patient she could try an SI belt.    6.  FOLLOW-UP: Patient is going to follow-up with me as needed.  If she has questions or concerns, she should not hesitate to call.    Subjective:     Georgie Scott is a 34 y.o. female who presents today for follow-up regarding low back pain with radiation into the left lower extremity with associated numbness and tingling.  Patient is following up after a left L4-5 transforaminal epidural steroid injection May 9, 2024.  Patient reports only 30% improvement in symptoms.    Patient reports that her buttock pain has improved significantly.  She also feels less pain on the left lateral thigh.  She continues to have some pain in the left low back at the lumbosacral junction and right sacral region.  She continues to have tingling in the left foot.  This now involves only toes 3, 4, 5.  It comes and goes.  She does state that it is slightly better compared with before the injection.  Denies weakness.  Denies loss of bowel or bladder control.  Denies fevers.  She rates her pain today as a 1 out of 10.  At its worst it is a 4 out of 10.  At its best it is a 0 out of 10.  Pain is aggravated prolonged sitting.  Pain is alleviated with using an inversion table.  She denies any new symptoms since she was last seen.    Treatment to date:  - 4 sessions of physical therapy May to June 2022  -Patient completed 9 sessions of medics physical  therapy November 4, 2020.  - Physical therapy February through March 2019  - Chiropractic treatment 1-2 times per week which is somewhat helpful  - Acupuncture for the past 1 month, unsure if it is helping  - Massage therapy every 3 weeks which is somewhat helpful  - TENS unit as needed which is helpful  - Left L4-5 transforaminal epidural steroid injection manage, 2024 with 30% relief  - C7-T1 interlaminar epidural steroid injection May 6, 2022  - C7-T1 interlaminar epidural steroid injection October 29, 2021  - C7-T1 interlaminar epidural steroid injection November 20, 2020  - Left C5-6, C6-7, C7-T1 facet joint injections December 19, 2019  - Left C5, C6, C7, C8 medial branch blocks November 27, 2019  - C7-T1 interlaminar epidural steroid injection May 3, 2019  - Right C5-6 transforaminal epidural steroid injection April 19, 2019  - Methocarbamol as needed which is only slightly helpful  - Ibuprofen 400 to 600 mg as needed which is somewhat helpful  - Steroid pack with modest relief      Review of Systems:  Positive for numbness/tingling.  Negative for loss of bowel/bladder control, inability to urinate, headache, trip/stumble/falls, difficulty swallowing, difficulty with hand skills, fevers, unintentional weight loss, weakness, pain much worse at night.     Objective:   CONSTITUTIONAL:  Vital signs as above.  No acute distress.  The patient is well nourished and well groomed.    PSYCHIATRIC:  The patient is awake, alert, oriented to person, place and time.  The patient is answering questions appropriately with clear speech.  Normal affect.  HEENT: Normocephalic, atraumatic.  Sclera clear.  Neck is supple.  SKIN:  Skin over the face, neck bilateral upper extremities is clean, dry, intact without rashes.  MUSCULOSKELETAL: Patient ambulates with a narrow-base, nonantalgic gait.  Able to ambulate on toes and heels bilaterally without difficulty.  Mild tenderness palpation sacroiliac joints.   5/5 strength bilateral  hip flexors, knee flexors/extensors, ankle dorsi/plantar flexors.  NEUROLOGICAL: 2+ bilateral patellar and Achilles reflexes bilaterally.  Sensation light touch intact bilateral lower extremities throughout.      RESULTS: I reviewed the MRI lumbar spine from April 21, 2024.  At L3-4 there is a right sided disc bulge and mild facet disease with no spinal canal or neuroforaminal stenosis.  At L4-5 there is a broad-based disc bulge and mild facet disease with mild bilateral foraminal stenosis.

## 2024-05-23 ENCOUNTER — OFFICE VISIT (OUTPATIENT)
Dept: PHYSICAL MEDICINE AND REHAB | Facility: CLINIC | Age: 34
End: 2024-05-23
Payer: COMMERCIAL

## 2024-05-23 VITALS
SYSTOLIC BLOOD PRESSURE: 138 MMHG | WEIGHT: 168 LBS | HEIGHT: 67 IN | DIASTOLIC BLOOD PRESSURE: 75 MMHG | HEART RATE: 84 BPM | BODY MASS INDEX: 26.37 KG/M2

## 2024-05-23 DIAGNOSIS — M53.3 SACROILIAC JOINT PAIN: ICD-10-CM

## 2024-05-23 DIAGNOSIS — M79.18 MYOFASCIAL PAIN: ICD-10-CM

## 2024-05-23 DIAGNOSIS — M54.16 LUMBAR RADICULAR PAIN: Primary | ICD-10-CM

## 2024-05-23 PROCEDURE — 99214 OFFICE O/P EST MOD 30 MIN: CPT | Performed by: PHYSICIAN ASSISTANT

## 2024-05-23 ASSESSMENT — PAIN SCALES - GENERAL: PAINLEVEL: NO PAIN (1)

## 2024-05-23 NOTE — PATIENT INSTRUCTIONS
We talked about several options for next steps:    Osteopathic manipulation  Pelvic joint dysfunction physical therapy  SI belt  Left L5/S1 epidural steroid injection  Left sacroiliac joint injection  EMG left leg if numbness/tingling persists    Please send me a message on [employer] if you want to move forward with any of these choices

## 2024-05-23 NOTE — LETTER
5/23/2024         RE: Georgie Scott  [address]  [place] [address]        Dear Colleague,    Thank you for referring your patient, Georgie Scott, to the [facility]ERY. Please see a copy of my visit note below.    Assessment:   Georgie Scott is a 34 y.o. female with past medical history significant for ADHD who presents today for follow-up regarding chronic low back pain, with radiation into the left lower extremity with associated numbness and tingling.  My review of an MRI lumbar spine shows a disc bulge and mild facet disease at L4-5 contributing to mild bilateral foraminal stenosis.  Patient is status post a left L4-5 transforaminal epidural steroid injection May 9, 2024 which has provided 30% relief of pain thus far.  She continues have intermittent tingling in the left foot affecting toes 2, 3, 4.  She has some residual pain in the sacroiliac joint region.             Plan:     A shared decision making plan was used.  The patient's values and choices were respected.  The following represents what was discussed and decided upon by the physician assistant and the patient.      1.  DIAGNOSTIC TESTS:  - I reviewed the MRI lumbar spine.  - We discussed getting an EMG left lower extremity due to her ongoing paresthesias.  She has some permanent paresthesias in her right arm from before her neck surgery and she wants to avoid any permanent neurologic deficit in her leg.  Since the tingling has improved since the injection she is going to hold off for now.  However, if it worsens again or fails to improve further, she will let me know and I will order an EMG left lower extremity.    2.  PHYSICAL THERAPY:  - Patient did physical therapy for neck pain in 2019, 2020, and 2022.  - I offered a referral to physical therapy specifically for pelvic joint dysfunction.  She will consider this and let me know if she wants to proceed.    3.  MEDICATIONS:   - Patient takes  ibuprofen 400 to 600 mg as needed.  - Patient could take Tylenol as needed.  - Patient takes gabapentin 300 mg at bedtime for sleep.  - Patient can also continue methocarbamol as needed.  She has not needed to take this recently.    4.  INTERVENTIONS:  - We discussed trying a left L5-S1 transforaminal epidural steroid injection.  I explained how this to be given the left L4-5 transforaminal epidural steroid injection.  We could see if it provides more relief.  She will consider this.  - We could also consider a left sacroiliac joint injection.  - We also discussed trying osteopathic manipulation for this problem.  She had osteopathic manipulation previously for her neck.  She does see a chiropractor regularly.    5.  PATIENT EDUCATION: Patient is in agreement the above plan.  All questions were answered.  - I told the patient she could try an SI belt.    6.  FOLLOW-UP: Patient is going to follow-up with me as needed.  If she has questions or concerns, she should not hesitate to call.    Subjective:     Georgie Scott is a 34 y.o. female who presents today for follow-up regarding low back pain with radiation into the left lower extremity with associated numbness and tingling.  Patient is following up after a left L4-5 transforaminal epidural steroid injection May 9, 2024.  Patient reports only 30% improvement in symptoms.    Patient reports that her buttock pain has improved significantly.  She also feels less pain on the left lateral thigh.  She continues to have some pain in the left low back at the lumbosacral junction and right sacral region.  She continues to have tingling in the left foot.  This now involves only toes 3, 4, 5.  It comes and goes.  She does state that it is slightly better compared with before the injection.  Denies weakness.  Denies loss of bowel or bladder control.  Denies fevers.  She rates her pain today as a 1 out of 10.  At its worst it is a 4 out of 10.  At its best it is a 0 out of 10.   Pain is aggravated prolonged sitting.  Pain is alleviated with using an inversion table.  She denies any new symptoms since she was last seen.    Treatment to date:  - 4 sessions of physical therapy May to June 2022  -Patient completed 9 sessions of medics physical therapy November 4, 2020.  - Physical therapy February through March 2019  - Chiropractic treatment 1-2 times per week which is somewhat helpful  - Acupuncture for the past 1 month, unsure if it is helping  - Massage therapy every 3 weeks which is somewhat helpful  - TENS unit as needed which is helpful  - Left L4-5 transforaminal epidural steroid injection manage, 2024 with 30% relief  - C7-T1 interlaminar epidural steroid injection May 6, 2022  - C7-T1 interlaminar epidural steroid injection October 29, 2021  - C7-T1 interlaminar epidural steroid injection November 20, 2020  - Left C5-6, C6-7, C7-T1 facet joint injections December 19, 2019  - Left C5, C6, C7, C8 medial branch blocks November 27, 2019  - C7-T1 interlaminar epidural steroid injection May 3, 2019  - Right C5-6 transforaminal epidural steroid injection April 19, 2019  - Methocarbamol as needed which is only slightly helpful  - Ibuprofen 400 to 600 mg as needed which is somewhat helpful  - Steroid pack with modest relief      Review of Systems:  Positive for numbness/tingling.  Negative for loss of bowel/bladder control, inability to urinate, headache, trip/stumble/falls, difficulty swallowing, difficulty with hand skills, fevers, unintentional weight loss, weakness, pain much worse at night.     Objective:   CONSTITUTIONAL:  Vital signs as above.  No acute distress.  The patient is well nourished and well groomed.    PSYCHIATRIC:  The patient is awake, alert, oriented to person, place and time.  The patient is answering questions appropriately with clear speech.  Normal affect.  HEENT: Normocephalic, atraumatic.  Sclera clear.  Neck is supple.  SKIN:  Skin over the face, neck bilateral  upper extremities is clean, dry, intact without rashes.  MUSCULOSKELETAL: Patient ambulates with a narrow-base, nonantalgic gait.  Able to ambulate on toes and heels bilaterally without difficulty.  Mild tenderness palpation sacroiliac joints.   5/5 strength bilateral hip flexors, knee flexors/extensors, ankle dorsi/plantar flexors.  NEUROLOGICAL: 2+ bilateral patellar and Achilles reflexes bilaterally.  Sensation light touch intact bilateral lower extremities throughout.      RESULTS: I reviewed the MRI lumbar spine from April 21, 2024.  At L3-4 there is a right sided disc bulge and mild facet disease with no spinal canal or neuroforaminal stenosis.  At L4-5 there is a broad-based disc bulge and mild facet disease with mild bilateral foraminal stenosis.      Again, thank you for allowing me to participate in the care of your patient.        Sincerely,        Kay Mcrae PA-C

## 2024-07-26 ENCOUNTER — OFFICE VISIT (OUTPATIENT)
Dept: FAMILY MEDICINE | Facility: CLINIC | Age: 34
End: 2024-07-26
Attending: FAMILY MEDICINE
Payer: COMMERCIAL

## 2024-07-26 VITALS
BODY MASS INDEX: 26.56 KG/M2 | DIASTOLIC BLOOD PRESSURE: 74 MMHG | SYSTOLIC BLOOD PRESSURE: 122 MMHG | HEIGHT: 67 IN | WEIGHT: 169.25 LBS | HEART RATE: 72 BPM | TEMPERATURE: 98.1 F | RESPIRATION RATE: 16 BRPM | OXYGEN SATURATION: 99 %

## 2024-07-26 DIAGNOSIS — F90.0 ADHD (ATTENTION DEFICIT HYPERACTIVITY DISORDER), INATTENTIVE TYPE: ICD-10-CM

## 2024-07-26 DIAGNOSIS — Z79.899 CONTROLLED SUBSTANCE AGREEMENT SIGNED: Primary | ICD-10-CM

## 2024-07-26 PROCEDURE — 99213 OFFICE O/P EST LOW 20 MIN: CPT | Performed by: FAMILY MEDICINE

## 2024-07-26 RX ORDER — DEXTROAMPHETAMINE SACCHARATE, AMPHETAMINE ASPARTATE, DEXTROAMPHETAMINE SULFATE AND AMPHETAMINE SULFATE 5; 5; 5; 5 MG/1; MG/1; MG/1; MG/1
TABLET ORAL
Qty: 60 TABLET | Refills: 0 | Status: SHIPPED | OUTPATIENT
Start: 2024-07-26 | End: 2024-10-01

## 2024-07-26 NOTE — PROGRESS NOTES
"  Assessment & Plan     1. Controlled substance agreement - signed 1/5/24  2. ADHD (attention deficit hyperactivity disorder), inattentive type  - amphetamine-dextroamphetamine (ADDERALL) 20 MG tablet; TAKE ONE TO TWO TABLETS BY MOUTH EVERY DAY  Dispense: 60 tablet; Refill: 0     Georgie presents today for controlled substance med check.  Doing well with Adderall, she had a bit of a back stock due to previous surgeries and has continued to take the medication.  Blood pressure normal, heart rate normal, no concerns for side effects or impact on sleep.    She does have slight dry mouth, though this is manageable.  Extended release was cost prohibitive due to high deductible plan.    Follow-up 6 months controlled substance renewal.  Reach out sooner as needed.      Low Jacques is a 34 year old, presenting for the following health issues:  Recheck Medication    History of Present Illness       Reason for visit:  Med follow up    She eats 0-1 servings of fruits and vegetables daily.She consumes 0 sweetened beverage(s) daily.She exercises with enough effort to increase her heart rate 20 to 29 minutes per day.  She exercises with enough effort to increase her heart rate 3 or less days per week.   She is taking medications regularly.     Controlled substance agreement - signed 1/5/24  ADHD (attention deficit hyperactivity disorder), inattentive type  Continues adderall, bit of dry mouth if takes BID.   Tried extended release, worked better but too expensive.       Review of Systems  See HPI above.       Objective    /74 (BP Location: Left arm, Patient Position: Sitting, Cuff Size: Adult Regular)   Pulse 72   Temp 98.1  F (36.7  C) (Oral)   Resp 16   Ht 1.695 m (5' 6.75\")   Wt 76.8 kg (169 lb 4 oz)   LMP  (LMP Unknown)   SpO2 99%   BMI 26.71 kg/m    Body mass index is 26.71 kg/m .  Physical Exam     GENERAL: alert and no distress  NECK: no adenopathy, no asymmetry, masses, or scars  RESP: lungs clear " to auscultation - no rales, rhonchi or wheezes  CV: regular rate and rhythm, normal S1 S2, no S3 or S4, no murmur, click or rub, no peripheral edema  ABDOMEN: soft, nontender, no hepatosplenomegaly, no masses and bowel sounds normal  MS: no gross musculoskeletal defects noted, no edema          Signed Electronically by: Taylor Womack, DO

## 2024-09-30 DIAGNOSIS — F90.0 ADHD (ATTENTION DEFICIT HYPERACTIVITY DISORDER), INATTENTIVE TYPE: ICD-10-CM

## 2024-09-30 DIAGNOSIS — Z79.899 CONTROLLED SUBSTANCE AGREEMENT SIGNED: ICD-10-CM

## 2024-10-01 RX ORDER — DEXTROAMPHETAMINE SACCHARATE, AMPHETAMINE ASPARTATE, DEXTROAMPHETAMINE SULFATE AND AMPHETAMINE SULFATE 5; 5; 5; 5 MG/1; MG/1; MG/1; MG/1
TABLET ORAL
Qty: 60 TABLET | Refills: 0 | Status: SHIPPED | OUTPATIENT
Start: 2024-10-01

## 2024-10-01 NOTE — TELEPHONE ENCOUNTER
1. Controlled substance agreement - signed 1/5/24  2. ADHD (attention deficit hyperactivity disorder), inattentive type  - amphetamine-dextroamphetamine (ADDERALL) 20 MG tablet; TAKE ONE TO TWO TABLETS BY MOUTH EVERY DAY  Dispense: 60 tablet; Refill: 0     Reviewed [place] . Due for refill. CSA up to date. Next med check scheduled. Refill sent to pharmacy.    Taylor Womack, DO

## 2024-12-18 DIAGNOSIS — Z79.899 CONTROLLED SUBSTANCE AGREEMENT SIGNED: ICD-10-CM

## 2024-12-18 DIAGNOSIS — F90.0 ADHD (ATTENTION DEFICIT HYPERACTIVITY DISORDER), INATTENTIVE TYPE: ICD-10-CM

## 2024-12-18 RX ORDER — DEXTROAMPHETAMINE SACCHARATE, AMPHETAMINE ASPARTATE, DEXTROAMPHETAMINE SULFATE AND AMPHETAMINE SULFATE 5; 5; 5; 5 MG/1; MG/1; MG/1; MG/1
TABLET ORAL
Qty: 60 TABLET | Refills: 0 | Status: SHIPPED | OUTPATIENT
Start: 2024-12-18

## 2025-01-13 ENCOUNTER — MYC MEDICAL ADVICE (OUTPATIENT)
Dept: FAMILY MEDICINE | Facility: CLINIC | Age: 35
End: 2025-01-13
Payer: COMMERCIAL

## 2025-02-19 ENCOUNTER — TELEPHONE (OUTPATIENT)
Dept: SURGERY | Facility: CLINIC | Age: 35
End: 2025-02-19
Payer: COMMERCIAL

## 2025-02-19 NOTE — TELEPHONE ENCOUNTER
Diagnosis, Referred by & from: Abscess   Appt date: 2/20/2025   NOTES STATUS DETAILS   OFFICE NOTE from referring provider Internal [facility]:  1/10/25 - PCC OV with Dr. Womack   OFFICE NOTE from other specialist Internal [facility]:  11/21/22 - PCC OV with TOO Ceballos    [facility] [place]:  11/11/21 - OBGYN OV with Dr. Cervantes  6/25/21 - GI OV with Dr. Thurston   DISCHARGE SUMMARY from hospital N/A    DISCHARGE REPORT from the ER N/A    OPERATIVE REPORT Internal [facility]:  2/11/22 - OP Note for TOTAL LAPAROSCOPIC HYSTERECTOMY BILATERAL SALPINGECTOMY with Dr. Hurley   MEDICATION LIST Internal    LABS N/A    DIAGNOSTIC PROCEDURES     COLONOSCOPY (most recent all time after 5 years) Internal [facility]:  8/31/21 - Colonoscopy - No Specimens   UPPER ENDOSCOPY (EGD) [facility]:  8/31/21 - EGD   IMAGING (DISC & REPORT) N/A

## 2025-02-19 NOTE — TELEPHONE ENCOUNTER
[facility] Call Center    Phone Message    May a detailed message be left on voicemail: yes     Reason for Call: Other: Patient called to schedule appt from referral, DX: Perianal abscess.  This is new abscess and she is currently having symptoms.  Please follow up with patient.     Action Taken: Message routed to:  [facility] ([facility]    Travel Screening: Not Applicable

## 2025-02-19 NOTE — TELEPHONE ENCOUNTER
Patient calling with recurrence of abscess. She had this last month, was on antibiotics, and the abscess completely resolved so she did not follow up in clinic. Now having constant pain and foul-smelling, milky/bloody drainage. Area feels warm, no fevers or chills. Appt made with NEERAJ Govea 2/20 for evaluation.

## 2025-02-20 ENCOUNTER — OFFICE VISIT (OUTPATIENT)
Dept: SURGERY | Facility: CLINIC | Age: 35
End: 2025-02-20
Payer: COMMERCIAL

## 2025-02-20 ENCOUNTER — PRE VISIT (OUTPATIENT)
Dept: SURGERY | Facility: CLINIC | Age: 35
End: 2025-02-20

## 2025-02-20 VITALS
HEART RATE: 103 BPM | HEIGHT: 67 IN | BODY MASS INDEX: 24.96 KG/M2 | SYSTOLIC BLOOD PRESSURE: 128 MMHG | DIASTOLIC BLOOD PRESSURE: 84 MMHG | OXYGEN SATURATION: 100 % | WEIGHT: 159 LBS

## 2025-02-20 DIAGNOSIS — K60.2 ANAL FISSURE: Primary | ICD-10-CM

## 2025-02-20 ASSESSMENT — PAIN SCALES - GENERAL: PAINLEVEL_OUTOF10: MILD PAIN (1)

## 2025-02-20 NOTE — PROGRESS NOTES
"Colon and Rectal Surgery Consult Clinic Note    Date: 2025     Referring provider:  Referred Self, MD  No address on file     RE: Georgie Scott  : 1990  SAMI: 2025    Georgie Scott is a very pleasant 34 year old female here for perianal abscess.    HPI:  Perianal abscess and was given cipro/flagyl on 1/10/25. Had some foul smelling drainage. Had anal fissures many years ago.  Bowel movements are fairly normal but some hard stools.   No anorectal surgeries in the past.   Colonoscopy 20 for rectal bleeding and grandfather with colon cancer at age 40  No obstetric history. Hysterectomy for endometriosis.    Physical Examination:  /84 (BP Location: Left arm, Patient Position: Sitting, Cuff Size: Adult Regular)   Pulse 103   Ht 5' 6.73\"   Wt 159 lb   LMP  (LMP Unknown)   SpO2 100%   BMI 25.10 kg/m    General: alert, oriented, in no acute distress, sitting comfortably  HEENT: mucous membranes moist    Perianal external examination: Exam was chaperoned by MATILDE Santos   Perianal skin: Intact with no excoriation or lichenification.  Lesions: No evidence of an external lesion, nodularity, or induration in the perianal region.  Eversion of buttocks: There was evidence of an anal fissure. Details: posterior midline anal fissure on eversion of buttocks.  Skin tags or external hemorrhoids: None.    Digital rectal examination: Was deferred in order not to cause further trauma    Anoscopy: Was deferred in order not to cause further trauma    Assessment/Plan: 34 year old female with anal fissure. No evidence of abscess or fistula on exam. I discussed that this is likely the source of the pain and bleeding. Discussed the importance of keeping stools soft and easy to pass while healing fissure.  Recommended starting on a daily fiber supplement and can add in a laxative in addition as needed.  Will treat with topical diltiazem with follow up in 8 weeks. Discussed that it may take several weeks " for symptoms to improve. If no improvement is noted after 4 weeks, return to clinic and discuss further intervention such as Botox injections.  Advised the use of Tylenol, ibuprofen, and warm tub baths for any pain. Return after 8 weeks for any persistent symptoms or if she feels like the abscess returns. Patient's questions were answered to her stated satisfaction and she is in agreement with this plan.       Medical history:  Past Medical History:   Diagnosis Date    ADHD     Cervical high risk HPV (human papillomavirus) test positive 03/31/2021    10/1/14 NIL 1/5/18 NIL 3/31/21 NIL pap, +HR HPV (not 16/18). Plan: cotest in 1 year    Dyspepsia     Excessive bleeding in premenopausal period     Heartburn     Herniated cervical disc     Persistent insomnia        Surgical history:  Past Surgical History:   Procedure Laterality Date    COLONOSCOPY N/A 08/31/2021    Procedure: COLONOSCOPY;  Surgeon: Billy Thurston DO;  Location:  GI    HEAD & NECK SURGERY      HYSTERECTOMY, PAP NO LONGER INDICATED      entered for  purposes    LAPAROSCOPIC HYSTERECTOMY TOTAL Bilateral 02/11/2022    Procedure: TOTAL LAPAROSCOPIC HYSTERECTOMY BILATERAL SALPINGECTOMY;  Surgeon: Tiesha Hurley MD;  Location: [facility] OR    REPLACE DISK CERVICAL ANTERIOR Right 06/15/2022    Procedure: Anterior cervical discectomy at cervical 5-6; placement of artificial disc for treatment of right arm symptoms; right incision;  Surgeon: Sam Oseguera MD;  Location: [facility]       Problem list:    Patient Active Problem List    Diagnosis Date Noted    Acne vulgaris 01/05/2024     Priority: Medium    S/P hysterectomy - pap no longer indicated 01/05/2024     Priority: Medium    Controlled substance agreement - renewed 1/10/25 07/09/2021     Priority: Medium    ADHD (attention deficit hyperactivity disorder), inattentive type 03/13/2019     Priority: Medium    Persistent insomnia 01/05/2018     Priority: Medium       Medications:  Current  "Outpatient Medications   Medication Sig Dispense Refill    amphetamine-dextroamphetamine (ADDERALL) 20 MG tablet TAKE ONE TO TWO TABLETS BY MOUTH EVERY DAY 60 tablet 0    gabapentin (NEURONTIN) 300 MG capsule Take 1 capsule (300 mg) by mouth nightly as needed for other (insomnia). 90 capsule 3    methocarbamol (ROBAXIN) 500 MG tablet Take 1 tablet (500 mg) by mouth 4 times daily as needed for muscle spasms 60 tablet 2    spironolactone (ALDACTONE) 100 MG tablet Take 1 tablet (100 mg) by mouth daily. 90 tablet 3       Allergies:  Allergies   Allergen Reactions    Bactrim [Sulfamethoxazole-Trimethoprim] Hives    Amoxicillin Rash    Morphine Rash       Family history:  Family History   Problem Relation Age of Onset    Hypertension Father     Hyperlipidemia Father         on and off    Hypertension Paternal Grandmother     Colon Cancer Paternal Grandfather         DX 42    Diabetes Paternal Uncle     Diabetes Paternal Aunt     Cerebrovascular Disease Maternal Grandmother     Dementia Maternal Grandmother     Depression Maternal Grandmother     Osteoporosis Maternal Grandmother     Brain Cancer Maternal Grandfather     Diabetes Type 2  Paternal Uncle     Diabetes Type 1 Paternal Uncle     Diabetes Paternal Aunt     Diabetes Paternal Aunt     Diabetes Other         Paternal Uncle Type 1    Diabetes Other         Paternal Uncle Type 2    Diabetes Other         Paternal Aunt Type 2    Hypertension Brother        Social history:  Social History     Tobacco Use    Smoking status: Never     Passive exposure: Never    Smokeless tobacco: Never   Substance Use Topics    Alcohol use: Yes     Comment: Socially     Nursing Notes:   Delphine Villa  2/20/2025  2:39 PM  Signed  Chief Complaint   Patient presents with    Consult     abscess       Vitals:    02/20/25 1437   BP: 128/84   BP Location: Left arm   Patient Position: Sitting   Cuff Size: Adult Regular   Pulse: 103   SpO2: 100%   Weight: 159 lb   Height: 5' 6.73\"       Body mass " index is 25.1 kg/m .    MATILDE Santos     20 minutes spent on the date of encounter performing chart review, history and exam, documentation and further activities as noted above     ARI Guzmán, NP-C  Colon and Rectal Surgery   [facility]    This note was created using speech recognition software and may contain unintended word substitutions.

## 2025-02-20 NOTE — NURSING NOTE
"Chief Complaint   Patient presents with    Consult     abscess       Vitals:    02/20/25 1437   BP: 128/84   BP Location: Left arm   Patient Position: Sitting   Cuff Size: Adult Regular   Pulse: 103   SpO2: 100%   Weight: 159 lb   Height: 5' 6.73\"       Body mass index is 25.1 kg/m .    Delphine Villa, EMT  "

## 2025-02-20 NOTE — LETTER
"2025       RE: Georgie Scott  [address]  [place] [address]     Dear Colleague,    Thank you for referring your patient, Georgie Scott, to the [facility] COLON AND RE[facility]AL [facility]IN[facility] [place] at [facility]. Please see a copy of my visit note below.    Colon and Rectal Surgery Consult Clinic Note    Date: 2025     Referring provider:  Referred Self, MD  No address on file     RE: Georgie Scott  : 1990  SAMI: 2025    Georgie Scott is a very pleasant 34 year old female here for perianal abscess.    HPI:  Perianal abscess and was given cipro/flagyl on 1/10/25. Had some foul smelling drainage. Had anal fissures many years ago.  Bowel movements are fairly normal but some hard stools.   No anorectal surgeries in the past.   Colonoscopy 20 for rectal bleeding and grandfather with colon cancer at age 40  No obstetric history. Hysterectomy for endometriosis.    Physical Examination:  /84 (BP Location: Left arm, Patient Position: Sitting, Cuff Size: Adult Regular)   Pulse 103   Ht 5' 6.73\"   Wt 159 lb   LMP  (LMP Unknown)   SpO2 100%   BMI 25.10 kg/m    General: alert, oriented, in no acute distress, sitting comfortably  HEENT: mucous membranes moist    Perianal external examination: Exam was chaperoned by MATILDE Santos   Perianal skin: Intact with no excoriation or lichenification.  Lesions: No evidence of an external lesion, nodularity, or induration in the perianal region.  Eversion of buttocks: There was evidence of an anal fissure. Details: posterior midline anal fissure on eversion of buttocks.  Skin tags or external hemorrhoids: None.    Digital rectal examination: Was deferred in order not to cause further trauma    Anoscopy: Was deferred in order not to cause further trauma    Assessment/Plan: 34 year old female with anal fissure. No evidence of abscess or fistula on exam. I discussed that this " is likely the source of the pain and bleeding. Discussed the importance of keeping stools soft and easy to pass while healing fissure.  Recommended starting on a daily fiber supplement and can add in a laxative in addition as needed.  Will treat with topical diltiazem with follow up in 8 weeks. Discussed that it may take several weeks for symptoms to improve. If no improvement is noted after 4 weeks, return to clinic and discuss further intervention such as Botox injections.  Advised the use of Tylenol, ibuprofen, and warm tub baths for any pain. Return after 8 weeks for any persistent symptoms or if she feels like the abscess returns. Patient's questions were answered to her stated satisfaction and she is in agreement with this plan.       Medical history:  Past Medical History:   Diagnosis Date     ADHD      Cervical high risk HPV (human papillomavirus) test positive 03/31/2021    10/1/14 NIL 1/5/18 NIL 3/31/21 NIL pap, +HR HPV (not 16/18). Plan: cotest in 1 year     Dyspepsia      Excessive bleeding in premenopausal period      Heartburn      Herniated cervical disc      Persistent insomnia        Surgical history:  Past Surgical History:   Procedure Laterality Date     COLONOSCOPY N/A 08/31/2021    Procedure: COLONOSCOPY;  Surgeon: Billy Thurston DO;  Location:  GI     HEAD & NECK SURGERY       HYSTERECTOMY, PAP NO LONGER INDICATED      entered for  purposes     LAPAROSCOPIC HYSTERECTOMY TOTAL Bilateral 02/11/2022    Procedure: TOTAL LAPAROSCOPIC HYSTERECTOMY BILATERAL SALPINGECTOMY;  Surgeon: Tiesha Hurley MD;  Location: [facility]     REPLACE DISK CERVICAL ANTERIOR Right 06/15/2022    Procedure: Anterior cervical discectomy at cervical 5-6; placement of artificial disc for treatment of right arm symptoms; right incision;  Surgeon: Sam Oseguera MD;  Location:  OR       Problem list:    Patient Active Problem List    Diagnosis Date Noted     Acne vulgaris 01/05/2024     Priority:  Medium     S/P hysterectomy - pap no longer indicated 01/05/2024     Priority: Medium     Controlled substance agreement - renewed 1/10/25 07/09/2021     Priority: Medium     ADHD (attention deficit hyperactivity disorder), inattentive type 03/13/2019     Priority: Medium     Persistent insomnia 01/05/2018     Priority: Medium       Medications:  Current Outpatient Medications   Medication Sig Dispense Refill     amphetamine-dextroamphetamine (ADDERALL) 20 MG tablet TAKE ONE TO TWO TABLETS BY MOUTH EVERY DAY 60 tablet 0     gabapentin (NEURONTIN) 300 MG capsule Take 1 capsule (300 mg) by mouth nightly as needed for other (insomnia). 90 capsule 3     methocarbamol (ROBAXIN) 500 MG tablet Take 1 tablet (500 mg) by mouth 4 times daily as needed for muscle spasms 60 tablet 2     spironolactone (ALDACTONE) 100 MG tablet Take 1 tablet (100 mg) by mouth daily. 90 tablet 3       Allergies:  Allergies   Allergen Reactions     Bactrim [Sulfamethoxazole-Trimethoprim] Hives     Amoxicillin Rash     Morphine Rash       Family history:  Family History   Problem Relation Age of Onset     Hypertension Father      Hyperlipidemia Father         on and off     Hypertension Paternal Grandmother      Colon Cancer Paternal Grandfather         DX 42     Diabetes Paternal Uncle      Diabetes Paternal Aunt      Cerebrovascular Disease Maternal Grandmother      Dementia Maternal Grandmother      Depression Maternal Grandmother      Osteoporosis Maternal Grandmother      Brain Cancer Maternal Grandfather      Diabetes Type 2  Paternal Uncle      Diabetes Type 1 Paternal Uncle      Diabetes Paternal Aunt      Diabetes Paternal Aunt      Diabetes Other         Paternal Uncle Type 1     Diabetes Other         Paternal Uncle Type 2     Diabetes Other         Paternal Aunt Type 2     Hypertension Brother        Social history:  Social History     Tobacco Use     Smoking status: Never     Passive exposure: Never     Smokeless tobacco: Never  "  Substance Use Topics     Alcohol use: Yes     Comment: Socially     Nursing Notes:   Delphine Villa  2/20/2025  2:39 PM  Signed  Chief Complaint   Patient presents with     Consult     abscess       Vitals:    02/20/25 1437   BP: 128/84   BP Location: Left arm   Patient Position: Sitting   Cuff Size: Adult Regular   Pulse: 103   SpO2: 100%   Weight: 159 lb   Height: 5' 6.73\"       Body mass index is 25.1 kg/m .    MATILDE Santos     20 minutes spent on the date of encounter performing chart review, history and exam, documentation and further activities as noted above     ARI Granado, NP-C  Colon and Rectal Surgery   [facility]    This note was created using speech recognition software and may contain unintended word substitutions.      Again, thank you for allowing me to participate in the care of your patient.      Sincerely,    ARI Granado CNP    "

## 2025-05-01 ENCOUNTER — TRANSFERRED RECORDS (OUTPATIENT)
Dept: HEALTH INFORMATION MANAGEMENT | Facility: CLINIC | Age: 35
End: 2025-05-01
Payer: COMMERCIAL

## 2025-05-22 ENCOUNTER — E-VISIT (OUTPATIENT)
Dept: URGENT CARE | Facility: URGENT CARE | Age: 35
End: 2025-05-22
Payer: COMMERCIAL

## 2025-05-22 DIAGNOSIS — N39.0 ACUTE UTI (URINARY TRACT INFECTION): Primary | ICD-10-CM

## 2025-05-22 RX ORDER — NITROFURANTOIN 25; 75 MG/1; MG/1
100 CAPSULE ORAL 2 TIMES DAILY
Qty: 10 CAPSULE | Refills: 0 | Status: SHIPPED | OUTPATIENT
Start: 2025-05-22 | End: 2025-05-27

## 2025-05-22 NOTE — PATIENT INSTRUCTIONS
Dear Georgie Scott    After reviewing your responses, I've been able to diagnose you with a urinary tract infection, which is a common infection of the bladder with bacteria.  This is not a sexually transmitted infection, though urinating immediately after intercourse can help prevent infections.  Drinking lots of fluids is also helpful to clear your current infection and prevent the next one.      I have sent a prescription for antibiotics to your pharmacy to treat this infection.    It is important that you take all of your prescribed medication even if your symptoms are improving after a few doses.  Taking all of your medicine helps prevent the symptoms from returning.     If your symptoms worsen, you develop pain in your back or stomach, develop fevers, or are not improving in 5 days, please contact your primary care provider for an appointment or visit any of our convenient Walk-in or Urgent Care Centers to be seen, which can be found on our website here.    Thanks again for choosing us as your health care partner,    Carolyn Franco PA-C  Urinary Tract Infection (UTI) in Women: Care Instructions  Overview     A urinary tract infection (UTI) is an infection caused by bacteria. It can happen anywhere in the urinary tract. A UTI can happen in the:  Kidneys.  Ureters, the tubes that connect the kidneys to the bladder.  Bladder.  Urethra, where the urine comes out.  Most UTIs are bladder infections. They often cause pain or burning when you urinate.  Most UTIs can be cured with antibiotics. If you are prescribed antibiotics, be sure to complete your treatment so that the infection does not get worse.  Follow-up care is a key part of your treatment and safety. Be sure to make and go to all appointments, and call your doctor if you are having problems. It's also a good idea to know your test results and keep a list of the medicines you take.  How can you care for yourself at home?  Take your antibiotics as directed.  "Do not stop taking them just because you feel better. You need to take the full course of antibiotics.  Drink extra water and other fluids for the next day or two. This will help make the urine less concentrated and help wash out the bacteria that are causing the infection. (If you have kidney, heart, or liver disease and have to limit fluids, talk with your doctor before you increase the amount of fluids you drink.)  Avoid drinks that are carbonated or have caffeine. They can irritate the bladder.  Urinate often. Try to empty your bladder each time.  To relieve pain, take a hot bath or lay a heating pad set on low over your lower belly or genital area. Never go to sleep with a heating pad in place.  To prevent UTIs  Drink plenty of water each day. This helps you urinate often, which clears bacteria from your system. (If you have kidney, heart, or liver disease and have to limit fluids, talk with your doctor before you increase the amount of fluids you drink.)  Urinate when you need to.  If you are sexually active, urinate right after you have sex.  Change sanitary pads often.  Avoid douches, bubble baths, feminine hygiene sprays, and other feminine hygiene products that have deodorants.  After going to the bathroom, wipe from front to back.  When should you call for help?   Call your doctor now or seek immediate medical care if:    You have new or worse fever, chills, nausea, or vomiting.     You have new pain in your back just below your rib cage. This is called flank pain.     There is new blood or pus in your urine.     You have any problems with your antibiotic medicine.   Watch closely for changes in your health, and be sure to contact your doctor if:    You are not getting better after taking an antibiotic for 2 days.     Your symptoms go away but then come back.   Where can you learn more?  Go to https://www.healthwise.net/patiented  Enter K848 in the search box to learn more about \"Urinary Tract Infection " "(UTI) in Women: Care Instructions.\"  Current as of: April 30, 2024  Content Version: 14.4    1877-1153 [employer] [facility].   Care instructions adapted under license by your healthcare professional. If you have questions about a medical condition or this instruction, always ask your healthcare professional. [employer] [facility] disclaims any warranty or liability for your use of this information.    "

## 2025-06-16 ENCOUNTER — OFFICE VISIT (OUTPATIENT)
Dept: URGENT CARE | Facility: URGENT CARE | Age: 35
End: 2025-06-16
Payer: COMMERCIAL

## 2025-06-16 VITALS
TEMPERATURE: 98.5 F | HEART RATE: 94 BPM | OXYGEN SATURATION: 99 % | DIASTOLIC BLOOD PRESSURE: 80 MMHG | BODY MASS INDEX: 26.05 KG/M2 | SYSTOLIC BLOOD PRESSURE: 125 MMHG | RESPIRATION RATE: 16 BRPM | WEIGHT: 165 LBS

## 2025-06-16 DIAGNOSIS — N89.8 VAGINAL DISCHARGE: Primary | ICD-10-CM

## 2025-06-16 PROCEDURE — 3074F SYST BP LT 130 MM HG: CPT | Performed by: PHYSICIAN ASSISTANT

## 2025-06-16 PROCEDURE — 87210 SMEAR WET MOUNT SALINE/INK: CPT | Performed by: PHYSICIAN ASSISTANT

## 2025-06-16 PROCEDURE — 99213 OFFICE O/P EST LOW 20 MIN: CPT | Performed by: PHYSICIAN ASSISTANT

## 2025-06-16 PROCEDURE — 3079F DIAST BP 80-89 MM HG: CPT | Performed by: PHYSICIAN ASSISTANT

## 2025-06-16 RX ORDER — FLUCONAZOLE 150 MG/1
150 TABLET ORAL ONCE
Qty: 1 TABLET | Refills: 0 | Status: SHIPPED | OUTPATIENT
Start: 2025-06-16 | End: 2025-06-16

## 2025-06-16 NOTE — PROGRESS NOTES
Chief Complaint   Patient presents with    Vaginal Problem     Vaginal discharge        Assessment & Plan  Assessment  - Yeast infection is the most likely cause of the vaginal discharge, based on the patient's description and previous treatment response.  Even though the wet prep showed just white blood cells  - Differential diagnosis includes bacterial vaginosis, which may be considered if symptoms persist or change.    Plan  - Treat empirically for a yeast infection with Diflucan  - Follow up with PCP or OB/GYN if symptoms persist  - Consider STI testing.  She defers for now    ICD-10-CM    1. Vaginal discharge  N89.8 Wet prep - Clinic Collect     fluconazole (DIFLUCAN) 150 MG tablet          SUBJECTIVE:  History of Present Illness-Georgie Scott, a 35-year-old female, reports experiencing vaginal discharge for several months. She initially suspected a yeast infection and treated it with over-the-counter miconazole, which provided temporary relief. However, the discharge, described as white and without abnormal odor, recurred after about a week of treatment. She has been sexually active with her  in a closed relationship. No significant findings were noted in a recent blood test. Georgie has not undergone STI testing related to this issue.     ROS: Pertinent ROS neg other than the symptoms noted above in the HPI.     OBJECTIVE:  /80   Pulse 94   Temp 98.5  F (36.9  C) (Oral)   Resp 16   Wt 74.8 kg (165 lb)   LMP  (LMP Unknown)   SpO2 99%   BMI 26.05 kg/m        GENERAL: alert and no distress    DIAGNOSTICS       Results for orders placed or performed in visit on 06/16/25   Wet prep - Clinic Collect     Status: Abnormal    Specimen: Vagina; Swab   Result Value Ref Range    Trichomonas Absent Absent    Yeast Absent Absent    Clue Cells Absent Absent    WBCs/high power field 1+ (A) None        Hans Jimenez PA-C    Consent was obtained from the patient to use an AI documentation tool in the  creation of this note.  Any grammatical or spelling errors are non-intentional. Please contact the author of this note directly if you are in need of any clarification.     Current Outpatient Medications   Medication Sig Dispense Refill    fluconazole (DIFLUCAN) 150 MG tablet Take 1 tablet (150 mg) by mouth once for 1 dose. 1 tablet 0    amphetamine-dextroamphetamine (ADDERALL) 20 MG tablet TAKE ONE TO TWO TABLETS BY MOUTH EVERY DAY 60 tablet 0    diltiazem 2% 2% OINT ointment Apply pea sized amount to external anal opening three times a day for 8 weeks 60 g 3    gabapentin (NEURONTIN) 300 MG capsule Take 1 capsule (300 mg) by mouth nightly as needed for other (insomnia). 90 capsule 3    methocarbamol (ROBAXIN) 500 MG tablet Take 1 tablet (500 mg) by mouth 4 times daily as needed for muscle spasms 60 tablet 2    spironolactone (ALDACTONE) 100 MG tablet Take 1 tablet (100 mg) by mouth daily. 90 tablet 3     No current facility-administered medications for this visit.      Patient Active Problem List   Diagnosis    Persistent insomnia    ADHD (attention deficit hyperactivity disorder), inattentive type    Controlled substance agreement - renewed 1/10/25    Acne vulgaris    S/P hysterectomy - pap no longer indicated      Past Medical History:   Diagnosis Date    ADHD     Cervical high risk HPV (human papillomavirus) test positive 03/31/2021    10/1/14 NIL 1/5/18 NIL 3/31/21 NIL pap, +HR HPV (not 16/18). Plan: cotest in 1 year    Dyspepsia     Excessive bleeding in premenopausal period     Heartburn     Herniated cervical disc 6/15/2022    Persistent insomnia      Past Surgical History:   Procedure Laterality Date    COLONOSCOPY N/A 08/31/2021    Procedure: COLONOSCOPY;  Surgeon: Billy Thurston DO;  Location:  GI    HEAD & NECK SURGERY      HYSTERECTOMY, PAP NO LONGER INDICATED      entered for  purposes    LAPAROSCOPIC HYSTERECTOMY TOTAL Bilateral 02/11/2022    Procedure: TOTAL LAPAROSCOPIC HYSTERECTOMY BILATERAL  SALPINGECTOMY;  Surgeon: Tiesha Hurley MD;  Location: [facility] OR    REPLACE DISK CERVICAL ANTERIOR Right 06/15/2022    Procedure: Anterior cervical discectomy at cervical 5-6; placement of artificial disc for treatment of right arm symptoms; right incision;  Surgeon: Sam Oseguera MD;  Location:  OR     Family History   Problem Relation Age of Onset    Hypertension Father     Hyperlipidemia Father         on and off    Hypertension Paternal Grandmother     Colon Cancer Paternal Grandfather         DX 42    Diabetes Paternal Uncle     Diabetes Paternal Aunt     Cerebrovascular Disease Maternal Grandmother     Dementia Maternal Grandmother     Depression Maternal Grandmother     Osteoporosis Maternal Grandmother     Brain Cancer Maternal Grandfather     Diabetes Type 2  Paternal Uncle     Diabetes Type 1 Paternal Uncle     Diabetes Paternal Aunt     Diabetes Paternal Aunt     Diabetes Other         Paternal Uncle Type 1    Diabetes Other         Paternal Uncle Type 2    Diabetes Other         Paternal Aunt Type 2    Hypertension Brother      Social History     Tobacco Use    Smoking status: Never     Passive exposure: Never    Smokeless tobacco: Never   Substance Use Topics    Alcohol use: Yes     Comment: Socially       For information on Fall & Injury Prevention, visit: [email or web address] OR  [email or web address] OR  https://www.cdc.gov/steadi/patient.html

## 2025-06-16 NOTE — PROGRESS NOTES
Urgent Care Clinic Visit    Chief Complaint   Patient presents with    Vaginal Problem     Vaginal discharge

## 2025-06-23 DIAGNOSIS — F90.0 ADHD (ATTENTION DEFICIT HYPERACTIVITY DISORDER), INATTENTIVE TYPE: ICD-10-CM

## 2025-06-23 DIAGNOSIS — Z79.899 CONTROLLED SUBSTANCE AGREEMENT SIGNED: ICD-10-CM

## 2025-06-23 RX ORDER — DEXTROAMPHETAMINE SACCHARATE, AMPHETAMINE ASPARTATE, DEXTROAMPHETAMINE SULFATE AND AMPHETAMINE SULFATE 5; 5; 5; 5 MG/1; MG/1; MG/1; MG/1
TABLET ORAL
Qty: 60 TABLET | Refills: 0 | Status: SHIPPED | OUTPATIENT
Start: 2025-06-23

## 2025-06-24 NOTE — TELEPHONE ENCOUNTER
1. Controlled substance agreement - renewed 1/10/25  2. ADHD (attention deficit hyperactivity disorder), inattentive type  - amphetamine-dextroamphetamine (ADDERALL) 20 MG tablet; TAKE ONE TO TWO TABLETS BY MOUTH EVERY DAY  Dispense: 60 tablet; Refill: 0     Reviewed [place] . Due for refill. CSA up to date. Next med check scheduled. Refill sent to pharmacy.    Taylor Womack, DO

## 2025-08-08 ENCOUNTER — RESULTS FOLLOW-UP (OUTPATIENT)
Dept: FAMILY MEDICINE | Facility: CLINIC | Age: 35
End: 2025-08-08

## 2025-08-08 DIAGNOSIS — R53.83 FATIGUE, UNSPECIFIED TYPE: Primary | ICD-10-CM

## 2025-08-08 DIAGNOSIS — G47.34 NOCTURNAL HYPOXIA: ICD-10-CM

## 2025-08-08 PROCEDURE — 99207 E-CONSULT TO SLEEP MEDICINE (ADULT OUTPT PROVIDER TO SPECIALIST WRITTEN QUESTION & RESPONSE): CPT | Performed by: FAMILY MEDICINE

## 2025-08-12 ENCOUNTER — E-CONSULT (OUTPATIENT)
Dept: SLEEP MEDICINE | Facility: CLINIC | Age: 35
End: 2025-08-12
Payer: COMMERCIAL

## 2025-08-12 ENCOUNTER — MYC MEDICAL ADVICE (OUTPATIENT)
Dept: FAMILY MEDICINE | Facility: CLINIC | Age: 35
End: 2025-08-12

## 2025-08-12 DIAGNOSIS — G47.8 UNREFRESHED BY SLEEP: ICD-10-CM

## 2025-08-12 DIAGNOSIS — G47.00 INSOMNIA, UNSPECIFIED TYPE: ICD-10-CM

## 2025-08-12 DIAGNOSIS — G47.34 NOCTURNAL HYPOXEMIA: Primary | ICD-10-CM

## 2025-08-12 PROCEDURE — 99451 NTRPROF PH1/NTRNET/EHR 5/>: CPT | Performed by: INTERNAL MEDICINE

## 2025-08-20 ENCOUNTER — OFFICE VISIT (OUTPATIENT)
Dept: NEUROLOGY | Facility: CLINIC | Age: 35
End: 2025-08-20
Payer: COMMERCIAL

## 2025-08-20 VITALS
HEART RATE: 93 BPM | WEIGHT: 148 LBS | HEIGHT: 67 IN | SYSTOLIC BLOOD PRESSURE: 112 MMHG | BODY MASS INDEX: 23.23 KG/M2 | DIASTOLIC BLOOD PRESSURE: 79 MMHG

## 2025-08-20 DIAGNOSIS — R41.3 MEMORY LOSS: Primary | ICD-10-CM

## 2025-08-20 DIAGNOSIS — R20.2 PARESTHESIAS: ICD-10-CM

## 2025-08-20 DIAGNOSIS — F90.0 ADHD (ATTENTION DEFICIT HYPERACTIVITY DISORDER), INATTENTIVE TYPE: ICD-10-CM

## 2025-08-20 DIAGNOSIS — R27.0 ATAXIA: ICD-10-CM

## 2025-08-20 DIAGNOSIS — G47.00 PERSISTENT INSOMNIA: ICD-10-CM

## 2025-08-20 PROCEDURE — 3074F SYST BP LT 130 MM HG: CPT | Performed by: PSYCHIATRY & NEUROLOGY

## 2025-08-20 PROCEDURE — 99205 OFFICE O/P NEW HI 60 MIN: CPT | Performed by: PSYCHIATRY & NEUROLOGY

## 2025-08-20 PROCEDURE — 3078F DIAST BP <80 MM HG: CPT | Performed by: PSYCHIATRY & NEUROLOGY

## 2025-08-20 PROCEDURE — G2211 COMPLEX E/M VISIT ADD ON: HCPCS | Performed by: PSYCHIATRY & NEUROLOGY

## 2025-08-20 ASSESSMENT — MONTREAL COGNITIVE ASSESSMENT (MOCA)
11. FOR EACH PAIR OF WORDS, WHAT CATEGORY DO THEY BELONG TO (OUT OF 2): 2
VISUOSPATIAL/EXECUTIVE SUBSCORE: 5
9. REPEAT EACH SENTENCE: 2
WHAT IS THE TOTAL SCORE (OUT OF 30): 29
10. [FLUENCY] NAME WORDS STARTING WITH DESIGNATED LETTER: 1
8. SERIAL SUBTRACTION OF 7S: 3
12. MEMORY INDEX SCORE: 4
7. [VIGILENCE] TAP WHEN HEARING DESIGNATED LETTER: 1
WHAT LEVEL OF EDUCATION WAS ATTAINED: 0
6. READ LIST OF DIGITS [FORWARD/BACKWARD]: 2
13. ORIENTATION SUBSCORE: 6
4. NAME EACH OF THE THREE ANIMALS SHOWN: 3

## (undated) DEVICE — GLOVE UNDER INDICATOR PI SZ 7.0 LF 41670

## (undated) DEVICE — SUTURE VICRYL+ 0 27IN CT-1 UND VCP260H

## (undated) DEVICE — SYR 10ML FINGER CONTROL W/O NDL 309695

## (undated) DEVICE — ENDO TROCAR FIRST ENTRY KII FIOS ADV FIX 05X100MM CFF03

## (undated) DEVICE — SU VICRYL 4-0 PS-2 18" UND J496H

## (undated) DEVICE — MAT FLOOR SURGICAL 40X38 0702140238

## (undated) DEVICE — SUTURE MONOCRYL+ 4-0 PS-2 27IN MCP426H

## (undated) DEVICE — DRAPE MICROSCOPE LEICA 54X150" AR8033650

## (undated) DEVICE — PACK SPINE SM CUSTOM SNE15SSFSK

## (undated) DEVICE — SYRINGE 10ML FILL SALINE FLUSH STERILE 306553

## (undated) DEVICE — COVER LIGHT HANDLE STRL SGL USE AM3612

## (undated) DEVICE — ADH SKIN CLOSURE PREMIERPRO EXOFIN 1.0ML 3470

## (undated) DEVICE — SOL NACL 0.9% INJ 250ML BAG 2B1322Q

## (undated) DEVICE — PREP CHLORAPREP 26ML TINTED HI-LITE ORANGE 930815

## (undated) DEVICE — MANIPULATOR UTERINE LIGHTED FORNISE 40MM

## (undated) DEVICE — DRSG GAUZE 2X2" 8042

## (undated) DEVICE — GLOVE BIOGEL PI ULTRATOUCH G SZ 6.5 42165

## (undated) DEVICE — ESU PENCIL W/HOLSTER

## (undated) DEVICE — TUBING LAP SUCT/IRRIG STRYKER 250070500

## (undated) DEVICE — GLOVE PROTEXIS BLUE W/NEU-THERA 8.5  2D73EB85

## (undated) DEVICE — CUSTOM PACK PELVISCOPY SMA5BPVHEA

## (undated) DEVICE — SOL WATER IRRIG 1000ML BOTTLE 2F7114

## (undated) DEVICE — TUBING SMOKE EVAC PNEUMOCLEAR HIGH FLOW 0620050250

## (undated) DEVICE — BONE WAX 2.5GM W31G

## (undated) DEVICE — TUBING SET IRR MALIS BIPOLAR CORD INTEGRATE 6790-100-003

## (undated) DEVICE — SOL RINGERS LACTATED 1000ML BAG 2B2324X

## (undated) DEVICE — TOOL DISSECT MIDAS MR8 14CM MATCH HEAD 3MM MR8-14MH30

## (undated) DEVICE — SOL NACL 0.9% INJ 1000ML BAG 2B1324X

## (undated) DEVICE — Device

## (undated) DEVICE — ENDO SHEARS RENEW LAP ENDOCUT SCISSOR TIP 16.5MM 3142

## (undated) DEVICE — ESU GROUND PAD UNIVERSAL W/O CORD

## (undated) DEVICE — GOWN XXL 9575

## (undated) DEVICE — GOWN XLG DISP 9545

## (undated) DEVICE — GLOVE UNDER INDICATOR PI SZ 6.5 LF 41665

## (undated) DEVICE — PREP DURAPREP 26ML APL 8630

## (undated) DEVICE — SUCTION TIP YANKAUER W/O VENT K86

## (undated) DEVICE — SURGICEL POWDER ABSORBABLE HEMOSTAT 3GM 3013SP

## (undated) DEVICE — DRAPE STERI TOWEL LG 1010

## (undated) DEVICE — GOWN LG DISP 9515

## (undated) DEVICE — PLATE GROUNDING ADULT W/CORD 9165L

## (undated) DEVICE — MANIFOLD NEPTUNE 4 PORT 700-20

## (undated) DEVICE — ESU HANDPIECE BIPOLAR THUNDERBEAT FC 5MMX35CM TB-0535FC

## (undated) DEVICE — SYR BULB IRRIG DOVER 60 ML LATEX FREE 67000

## (undated) DEVICE — SU VICRYL 3-0 RB-1 18" J713D

## (undated) DEVICE — SYR 50ML SLIP TIP W/O NDL 309654

## (undated) DEVICE — GLOVE PROTEXIS BLUE W/NEU-THERA 8.0  2D73EB80

## (undated) DEVICE — PAD POS XL 1X20X40IN PINK PIGAZZI

## (undated) DEVICE — APPLICATOR ENDOSCOPIC 5 SURGICEL POWDER 3123SPEA

## (undated) DEVICE — GLOVE SURG PI ULTRA TOUCH M SZ 6-1/2 LF

## (undated) DEVICE — SOL NACL 0.9% IRRIG 1000ML BOTTLE 2F7124

## (undated) DEVICE — PROTECTOR ARM STANDARD ONE STEP

## (undated) DEVICE — SYR EAR BULB 3OZ 0035830

## (undated) DEVICE — SPONGE SURGIFOAM 50

## (undated) DEVICE — STRAP POSITIONING VELCRO 13' CERVICAL HARNESS 920877

## (undated) DEVICE — CATH FOLEY 5CC 16FR SIL/LTX 0165V16S

## (undated) DEVICE — SPONGE KITTNER 30-101

## (undated) DEVICE — DRAPE SHEET REV FOLD 3/4 9349

## (undated) DEVICE — LINEN TOWEL PACK X5 5464

## (undated) DEVICE — NEEDLE SPINAL DISP 22GA X 3.5" QUINCKE 333320

## (undated) DEVICE — CUP AND LID 2PK 2OZ STERILE  SSK9006A

## (undated) DEVICE — PIN DISTRACTION ANCHOR FOR SCR 14MM MDS9091414

## (undated) DEVICE — ENDO TROCAR SLEEVE KII ADV FIXATION 05X100MM CFS02

## (undated) DEVICE — DRAPE C-ARM 60X42" 1013

## (undated) DEVICE — GLOVE PROTEXIS W/NEU-THERA 7.5  2D73TE75

## (undated) DEVICE — ESU ELEC BLADE 2.75" COATED/INSULATED E1455

## (undated) RX ORDER — LIDOCAINE HYDROCHLORIDE 20 MG/ML
SOLUTION OROPHARYNGEAL
Status: DISPENSED
Start: 2021-08-31

## (undated) RX ORDER — HYDROMORPHONE HCL IN WATER/PF 6 MG/30 ML
PATIENT CONTROLLED ANALGESIA SYRINGE INTRAVENOUS
Status: DISPENSED
Start: 2022-06-15

## (undated) RX ORDER — LIDOCAINE HYDROCHLORIDE AND EPINEPHRINE 10; 10 MG/ML; UG/ML
INJECTION, SOLUTION INFILTRATION; PERINEURAL
Status: DISPENSED
Start: 2022-02-11

## (undated) RX ORDER — KETAMINE HCL IN NACL, ISO-OSM 100MG/10ML
SYRINGE (ML) INJECTION
Status: DISPENSED
Start: 2022-06-15

## (undated) RX ORDER — KETAMINE HYDROCHLORIDE 10 MG/ML
INJECTION INTRAMUSCULAR; INTRAVENOUS
Status: DISPENSED
Start: 2022-02-11

## (undated) RX ORDER — PROPOFOL 10 MG/ML
INJECTION, EMULSION INTRAVENOUS
Status: DISPENSED
Start: 2022-02-11

## (undated) RX ORDER — FENTANYL CITRATE 0.05 MG/ML
INJECTION, SOLUTION INTRAMUSCULAR; INTRAVENOUS
Status: DISPENSED
Start: 2022-06-15

## (undated) RX ORDER — FENTANYL CITRATE 50 UG/ML
INJECTION, SOLUTION INTRAMUSCULAR; INTRAVENOUS
Status: DISPENSED
Start: 2022-06-15

## (undated) RX ORDER — FENTANYL CITRATE 50 UG/ML
INJECTION, SOLUTION INTRAMUSCULAR; INTRAVENOUS
Status: DISPENSED
Start: 2022-02-11

## (undated) RX ORDER — LIDOCAINE HYDROCHLORIDE 20 MG/ML
INJECTION, SOLUTION EPIDURAL; INFILTRATION; INTRACAUDAL; PERINEURAL
Status: DISPENSED
Start: 2022-06-15

## (undated) RX ORDER — BUPIVACAINE HYDROCHLORIDE 5 MG/ML
INJECTION, SOLUTION EPIDURAL; INTRACAUDAL
Status: DISPENSED
Start: 2022-02-11

## (undated) RX ORDER — DEXAMETHASONE SODIUM PHOSPHATE 10 MG/ML
INJECTION, EMULSION INTRAMUSCULAR; INTRAVENOUS
Status: DISPENSED
Start: 2022-02-11

## (undated) RX ORDER — ONDANSETRON 2 MG/ML
INJECTION INTRAMUSCULAR; INTRAVENOUS
Status: DISPENSED
Start: 2022-06-15

## (undated) RX ORDER — HYDROMORPHONE HYDROCHLORIDE 1 MG/ML
INJECTION, SOLUTION INTRAMUSCULAR; INTRAVENOUS; SUBCUTANEOUS
Status: DISPENSED
Start: 2022-06-15

## (undated) RX ORDER — PROPOFOL 10 MG/ML
INJECTION, EMULSION INTRAVENOUS
Status: DISPENSED
Start: 2021-08-31

## (undated) RX ORDER — ONDANSETRON 2 MG/ML
INJECTION INTRAMUSCULAR; INTRAVENOUS
Status: DISPENSED
Start: 2022-02-11

## (undated) RX ORDER — DEXAMETHASONE SODIUM PHOSPHATE 4 MG/ML
INJECTION, SOLUTION INTRA-ARTICULAR; INTRALESIONAL; INTRAMUSCULAR; INTRAVENOUS; SOFT TISSUE
Status: DISPENSED
Start: 2022-06-15

## (undated) RX ORDER — KETOROLAC TROMETHAMINE 30 MG/ML
INJECTION, SOLUTION INTRAMUSCULAR; INTRAVENOUS
Status: DISPENSED
Start: 2022-02-11

## (undated) RX ORDER — ACETAMINOPHEN 325 MG/1
TABLET ORAL
Status: DISPENSED
Start: 2022-06-15